# Patient Record
Sex: FEMALE | Race: WHITE | NOT HISPANIC OR LATINO | Employment: FULL TIME | URBAN - METROPOLITAN AREA
[De-identification: names, ages, dates, MRNs, and addresses within clinical notes are randomized per-mention and may not be internally consistent; named-entity substitution may affect disease eponyms.]

---

## 2017-02-09 ENCOUNTER — TELEPHONE (OUTPATIENT)
Dept: INTERNAL MEDICINE | Facility: CLINIC | Age: 47
End: 2017-02-09

## 2017-02-09 RX ORDER — SCOLOPAMINE TRANSDERMAL SYSTEM 1 MG/1
1 PATCH, EXTENDED RELEASE TRANSDERMAL
Qty: 4 PATCH | Refills: 0 | Status: SHIPPED | OUTPATIENT
Start: 2017-02-09 | End: 2018-05-21

## 2017-02-09 NOTE — TELEPHONE ENCOUNTER
----- Message from Maribel Ortiz sent at 2/9/2017 10:08 AM CST -----  Contact: pt  Pt is requesting motion sickness patches for upcoming cruise. Pls call pt back at [phone]. Pt uses ..  [id number] [place] - [address] AT [facility]  [address]  [place] [address]  Phone: [phone] Fax: [phone]

## 2017-02-09 NOTE — TELEPHONE ENCOUNTER
Returned call to pt. Says she'd like to have motion sickness patches for her upcoming cruise. Wasn't sure if she needs an appointment.//rlt

## 2018-02-07 ENCOUNTER — PATIENT OUTREACH (OUTPATIENT)
Dept: ADMINISTRATIVE | Facility: HOSPITAL | Age: 48
End: 2018-02-07

## 2018-02-07 NOTE — PROGRESS NOTES
I have attempted without success to contact this patient by phone to schedule annual mammogram exam. [facility] I have requested the record. Patient in agreement and vocalize understanding.

## 2018-02-16 ENCOUNTER — PATIENT OUTREACH (OUTPATIENT)
Dept: ADMINISTRATIVE | Facility: HOSPITAL | Age: 48
End: 2018-02-16

## 2018-05-21 ENCOUNTER — TELEPHONE (OUTPATIENT)
Dept: INTERNAL MEDICINE | Facility: CLINIC | Age: 48
End: 2018-05-21

## 2018-05-21 ENCOUNTER — OFFICE VISIT (OUTPATIENT)
Dept: INTERNAL MEDICINE | Facility: CLINIC | Age: 48
End: 2018-05-21
Payer: COMMERCIAL

## 2018-05-21 ENCOUNTER — LAB VISIT (OUTPATIENT)
Dept: LAB | Facility: HOSPITAL | Age: 48
End: 2018-05-21
Attending: FAMILY MEDICINE
Payer: COMMERCIAL

## 2018-05-21 VITALS
TEMPERATURE: 95 F | HEART RATE: 80 BPM | SYSTOLIC BLOOD PRESSURE: 124 MMHG | BODY MASS INDEX: 25.85 KG/M2 | OXYGEN SATURATION: 98 % | HEIGHT: 65 IN | DIASTOLIC BLOOD PRESSURE: 76 MMHG | WEIGHT: 155.19 LBS

## 2018-05-21 DIAGNOSIS — R30.0 DYSURIA: Primary | ICD-10-CM

## 2018-05-21 DIAGNOSIS — E10.42 DM TYPE 1 WITH DIABETIC PERIPHERAL NEUROPATHY: ICD-10-CM

## 2018-05-21 DIAGNOSIS — E53.8 B12 DEFICIENCY: ICD-10-CM

## 2018-05-21 DIAGNOSIS — R30.0 DYSURIA: ICD-10-CM

## 2018-05-21 LAB
ALBUMIN SERPL BCP-MCNC: 3.9 G/DL
ALP SERPL-CCNC: 87 U/L
ALT SERPL W/O P-5'-P-CCNC: 17 U/L
ANION GAP SERPL CALC-SCNC: 10 MMOL/L
AST SERPL-CCNC: 17 U/L
BILIRUB SERPL-MCNC: 0.4 MG/DL
BUN SERPL-MCNC: 15 MG/DL
CALCIUM SERPL-MCNC: 9.4 MG/DL
CHLORIDE SERPL-SCNC: 103 MMOL/L
CO2 SERPL-SCNC: 26 MMOL/L
CREAT SERPL-MCNC: 0.8 MG/DL
EST. GFR  (AFRICAN AMERICAN): >60 ML/MIN/1.73 M^2
EST. GFR  (NON AFRICAN AMERICAN): >60 ML/MIN/1.73 M^2
GLUCOSE SERPL-MCNC: 180 MG/DL
POTASSIUM SERPL-SCNC: 3.9 MMOL/L
PROT SERPL-MCNC: 7 G/DL
SODIUM SERPL-SCNC: 139 MMOL/L

## 2018-05-21 PROCEDURE — 99204 OFFICE O/P NEW MOD 45 MIN: CPT | Mod: S$GLB,,, | Performed by: FAMILY MEDICINE

## 2018-05-21 PROCEDURE — 3008F BODY MASS INDEX DOCD: CPT | Mod: CPTII,S$GLB,, | Performed by: FAMILY MEDICINE

## 2018-05-21 PROCEDURE — 99999 PR PBB SHADOW E&M-EST. PATIENT-LVL III: CPT | Mod: PBBFAC,,, | Performed by: FAMILY MEDICINE

## 2018-05-21 PROCEDURE — 36415 COLL VENOUS BLD VENIPUNCTURE: CPT | Mod: PO

## 2018-05-21 PROCEDURE — 80053 COMPREHEN METABOLIC PANEL: CPT | Mod: PO

## 2018-05-21 RX ORDER — CANAGLIFLOZIN 300 MG/1
300 TABLET, FILM COATED ORAL DAILY
COMMUNITY
Start: 2018-03-19 | End: 2020-11-04 | Stop reason: SDUPTHER

## 2018-05-21 RX ORDER — METFORMIN HYDROCHLORIDE 500 MG/1
TABLET, EXTENDED RELEASE ORAL
COMMUNITY
Start: 2018-02-19 | End: 2018-05-21 | Stop reason: SDUPTHER

## 2018-05-21 RX ORDER — INSULIN LISPRO 100 [IU]/ML
INJECTION, SOLUTION INTRAVENOUS; SUBCUTANEOUS
Status: ON HOLD | COMMUNITY
Start: 2014-10-01 | End: 2018-07-19

## 2018-05-21 RX ORDER — CYANOCOBALAMIN 1000 UG/ML
INJECTION, SOLUTION INTRAMUSCULAR; SUBCUTANEOUS
COMMUNITY
Start: 2018-02-19

## 2018-05-21 RX ORDER — NITROFURANTOIN 25; 75 MG/1; MG/1
100 CAPSULE ORAL EVERY 12 HOURS
Status: DISCONTINUED | OUTPATIENT
Start: 2018-05-21 | End: 2018-05-21

## 2018-05-21 RX ORDER — ATORVASTATIN CALCIUM 20 MG/1
TABLET, FILM COATED ORAL
COMMUNITY
Start: 2018-03-25 | End: 2021-05-14 | Stop reason: SDUPTHER

## 2018-05-21 RX ORDER — NITROFURANTOIN (MACROCRYSTALS) 100 MG/1
100 CAPSULE ORAL EVERY 12 HOURS
Qty: 14 CAPSULE | Refills: 0 | Status: SHIPPED | OUTPATIENT
Start: 2018-05-21 | End: 2018-05-28

## 2018-05-21 RX ORDER — PEN NEEDLE, DIABETIC 30 GX3/16"
NEEDLE, DISPOSABLE MISCELLANEOUS
COMMUNITY
Start: 2015-02-19 | End: 2023-02-06

## 2018-05-21 NOTE — TELEPHONE ENCOUNTER
----- Message from Jayna Swenson MD sent at 5/21/2018  3:42 PM CDT -----  Please let Yandy know her kidney function is normal, electrolytes and liver function is also normal. Her urinalysis does not show any indication of infection. She does not need to take the antibiotic prescribed. Her increased urination may be related to her diabetes medication or perhaps increased carbohydrate in her diet. If symptoms persist, I would recommend contacting her endocrinologist. Please let her know she can view her results by logging into [employer].

## 2018-05-21 NOTE — PROGRESS NOTES
Subjective:       Patient ID: Yandy Cabral is a 47 y.o. female.    Chief Complaint: Cystitis    48 yo female with diabetes and here with 5 day history of increased urinary frequency--she is concerned she has a UTI. Denies dysuria, hematuria. She tells me last night she had chills, body aches--she felt like she had high blood sugar but home glucose check was 110. This AM after coffee with cream her CBG was 140. She also reports some vaginal spotting after 2 years of amenorrhea.    Requests Rx for needles to go with previously ordered B12 injections       does not have any pertinent problems on file.  Past Medical History:   Diagnosis Date    Chronic constipation     Diabetes mellitus type I      Past Surgical History:   Procedure Laterality Date    CYST REMOVAL      ganglionic right wrist    gastric sleve  1/01/12    TUBAL LIGATION       Family History   Problem Relation Age of Onset    Diabetes Father         type 2    Heart disease Father     Cancer Brother         testicular    Colon cancer Neg Hx      Social History     Social History    Marital status:      Spouse name: N/A    Number of children: N/A    Years of education: N/A     Occupational History    Not on file.     Social History Main Topics    Smoking status: Never Smoker    Smokeless tobacco: Not on file    Alcohol use Yes    Drug use: No    Sexual activity: Not on file     Other Topics Concern    Not on file     Social History Narrative    No narrative on file     Review of Systems   Constitutional: Positive for chills. Negative for activity change, appetite change, fatigue and fever.   Gastrointestinal: Positive for abdominal pain. Negative for constipation, diarrhea, nausea and vomiting.   Endocrine: Positive for polyuria. Negative for cold intolerance, heat intolerance, polydipsia and polyphagia.   Genitourinary: Positive for urgency and vaginal bleeding. Negative for decreased urine volume, difficulty urinating,  dysuria, enuresis, flank pain, hematuria and vaginal discharge.   Musculoskeletal: Positive for arthralgias and myalgias. Negative for joint swelling, neck pain and neck stiffness.   All other systems reviewed and are negative.      Objective:     Vitals:    05/21/18 1308   BP: 124/76   Pulse: 80   Temp: (!) 95.4 °F (35.2 °C)        Physical Exam   Constitutional: She is oriented to person, place, and time. She appears well-developed and well-nourished.   HENT:   Head: Normocephalic and atraumatic.   Right Ear: External ear normal.   Left Ear: External ear normal.   Nose: Nose normal.   Eyes: Conjunctivae and EOM are normal. Pupils are equal, round, and reactive to light. No scleral icterus.   Cardiovascular: Normal rate, regular rhythm, normal heart sounds and intact distal pulses.  Exam reveals no friction rub.    No murmur heard.  Pulmonary/Chest: Effort normal and breath sounds normal. No respiratory distress. She has no wheezes.   Abdominal: Soft. Bowel sounds are normal. She exhibits no distension and no mass. There is no tenderness.   No SP tenderness or CVA tenderness   Neurological: She is alert and oriented to person, place, and time.   Normal gait. No speech abnormality   Psychiatric: She has a normal mood and affect. Her behavior is normal. Judgment and thought content normal.   Nursing note and vitals reviewed.      Assessment:       1. Dysuria    2. B12 deficiency    3. DM type 1 with diabetic peripheral neuropathy        Plan:           Problem List Items Addressed This Visit     DM type 1 with diabetic peripheral neuropathy    Overview     Dr Mario Oconnor follows         Current Assessment & Plan     Check metabolic panel to assess renal function on Invokana         Dysuria - Primary    Current Assessment & Plan     Emperic treatment with macrobid; increased risk of UTI with use of Invokana for diabetes.          Relevant Medications    nitrofurantoin (MACRODANTIN) 100 MG capsule    Other Relevant  "Orders    Comprehensive metabolic panel (Completed)    Urinalysis (Completed)    Urine culture      Other Visit Diagnoses     B12 deficiency        Relevant Medications    needle, disp, 24 gauge 24 gauge x 1" Ndle        "

## 2018-05-21 NOTE — TELEPHONE ENCOUNTER
Informed pt of results as well as letting her know Rx for antibiotics are not needed and if s/s continue she needs to contact a  Endocrinologist. Pt verbalized understanding./srb

## 2018-05-21 NOTE — TELEPHONE ENCOUNTER
Returned call to pt regarding bladder infection. I advised her that Dr. Ramos does not have anything available today and she can be put on the schedule to see someone else. She has an appt scheduled with Dr. Swenson at 1:00p today. She verbalized understanding.

## 2018-05-23 ENCOUNTER — TELEPHONE (OUTPATIENT)
Dept: INTERNAL MEDICINE | Facility: CLINIC | Age: 48
End: 2018-05-23

## 2018-05-23 NOTE — TELEPHONE ENCOUNTER
Informed pt her urine culture showed no growth; I recommend she follow up with her diabetes doctor if her symptoms persist. Pt verbalized understanding./srb

## 2018-05-23 NOTE — TELEPHONE ENCOUNTER
----- Message from Jayna Swenson MD sent at 5/23/2018  9:56 AM CDT -----  Please let her know her urine culture showed no growth; I recommend she follow up with her diabetes doctor if her symptoms persist.

## 2018-07-19 ENCOUNTER — HOSPITAL ENCOUNTER (INPATIENT)
Facility: HOSPITAL | Age: 48
LOS: 6 days | Discharge: HOME | DRG: 417 | End: 2018-07-25
Attending: EMERGENCY MEDICINE | Admitting: SURGERY
Payer: COMMERCIAL

## 2018-07-19 DIAGNOSIS — K81.0 CHOLECYSTITIS, ACUTE: ICD-10-CM

## 2018-07-19 DIAGNOSIS — R93.89 ABNORMAL CT SCAN: ICD-10-CM

## 2018-07-19 DIAGNOSIS — K81.9 ACALCULOUS CHOLECYSTITIS: Primary | ICD-10-CM

## 2018-07-19 LAB
ALBUMIN SERPL BCP-MCNC: 4.4 G/DL
ALP SERPL-CCNC: 100 U/L
ALT SERPL W/O P-5'-P-CCNC: 25 U/L
ANION GAP SERPL CALC-SCNC: 18 MMOL/L
AST SERPL-CCNC: 21 U/L
B-HCG UR QL: NEGATIVE
BACTERIA #/AREA URNS HPF: NORMAL /HPF
BASOPHILS # BLD AUTO: 0.02 K/UL
BASOPHILS NFR BLD: 0.2 %
BILIRUB SERPL-MCNC: 1.3 MG/DL
BILIRUB UR QL STRIP: NEGATIVE
BUN SERPL-MCNC: 16 MG/DL
CALCIUM SERPL-MCNC: 9.9 MG/DL
CHLORIDE SERPL-SCNC: 100 MMOL/L
CLARITY UR: CLEAR
CO2 SERPL-SCNC: 19 MMOL/L
COLOR UR: YELLOW
CREAT SERPL-MCNC: 0.8 MG/DL
DIFFERENTIAL METHOD: ABNORMAL
EOSINOPHIL # BLD AUTO: 0 K/UL
EOSINOPHIL NFR BLD: 0.2 %
ERYTHROCYTE [DISTWIDTH] IN BLOOD BY AUTOMATED COUNT: 12.9 %
EST. GFR  (AFRICAN AMERICAN): >60 ML/MIN/1.73 M^2
EST. GFR  (NON AFRICAN AMERICAN): >60 ML/MIN/1.73 M^2
ESTIMATED AVG GLUCOSE: 177 MG/DL
GLUCOSE SERPL-MCNC: 176 MG/DL
GLUCOSE UR QL STRIP: ABNORMAL
HBA1C MFR BLD HPLC: 7.8 %
HCT VFR BLD AUTO: 41.2 %
HGB BLD-MCNC: 14.1 G/DL
HGB UR QL STRIP: NEGATIVE
KETONES UR QL STRIP: ABNORMAL
LEUKOCYTE ESTERASE UR QL STRIP: NEGATIVE
LIPASE SERPL-CCNC: 16 U/L
LYMPHOCYTES # BLD AUTO: 1.5 K/UL
LYMPHOCYTES NFR BLD: 11.6 %
MCH RBC QN AUTO: 31.8 PG
MCHC RBC AUTO-ENTMCNC: 34.2 G/DL
MCV RBC AUTO: 93 FL
MICROSCOPIC COMMENT: NORMAL
MONOCYTES # BLD AUTO: 1 K/UL
MONOCYTES NFR BLD: 7.9 %
NEUTROPHILS # BLD AUTO: 10.4 K/UL
NEUTROPHILS NFR BLD: 80.1 %
NITRITE UR QL STRIP: NEGATIVE
PH UR STRIP: 6 [PH] (ref 5–8)
PLATELET # BLD AUTO: 258 K/UL
PMV BLD AUTO: 9.9 FL
POCT GLUCOSE: 146 MG/DL (ref 70–110)
POCT GLUCOSE: 88 MG/DL (ref 70–110)
POTASSIUM SERPL-SCNC: 4 MMOL/L
PROT SERPL-MCNC: 8.2 G/DL
PROT UR QL STRIP: NEGATIVE
RBC # BLD AUTO: 4.44 M/UL
SODIUM SERPL-SCNC: 137 MMOL/L
SP GR UR STRIP: 1.02 (ref 1–1.03)
URN SPEC COLLECT METH UR: ABNORMAL
UROBILINOGEN UR STRIP-ACNC: NEGATIVE EU/DL
WBC # BLD AUTO: 12.97 K/UL
WBC #/AREA URNS HPF: 2 /HPF (ref 0–5)
YEAST URNS QL MICRO: NORMAL

## 2018-07-19 PROCEDURE — 25000003 PHARM REV CODE 250: Performed by: EMERGENCY MEDICINE

## 2018-07-19 PROCEDURE — 83690 ASSAY OF LIPASE: CPT

## 2018-07-19 PROCEDURE — 96361 HYDRATE IV INFUSION ADD-ON: CPT

## 2018-07-19 PROCEDURE — 96375 TX/PRO/DX INJ NEW DRUG ADDON: CPT

## 2018-07-19 PROCEDURE — 63600175 PHARM REV CODE 636 W HCPCS: Performed by: EMERGENCY MEDICINE

## 2018-07-19 PROCEDURE — 96376 TX/PRO/DX INJ SAME DRUG ADON: CPT

## 2018-07-19 PROCEDURE — 36415 COLL VENOUS BLD VENIPUNCTURE: CPT

## 2018-07-19 PROCEDURE — 25000003 PHARM REV CODE 250: Performed by: PHYSICIAN ASSISTANT

## 2018-07-19 PROCEDURE — 83036 HEMOGLOBIN GLYCOSYLATED A1C: CPT

## 2018-07-19 PROCEDURE — 96365 THER/PROPH/DIAG IV INF INIT: CPT

## 2018-07-19 PROCEDURE — 63600175 PHARM REV CODE 636 W HCPCS: Performed by: PHYSICIAN ASSISTANT

## 2018-07-19 PROCEDURE — 96367 TX/PROPH/DG ADDL SEQ IV INF: CPT

## 2018-07-19 PROCEDURE — 82962 GLUCOSE BLOOD TEST: CPT

## 2018-07-19 PROCEDURE — 99285 EMERGENCY DEPT VISIT HI MDM: CPT | Mod: 25

## 2018-07-19 PROCEDURE — 11000001 HC ACUTE MED/SURG PRIVATE ROOM

## 2018-07-19 PROCEDURE — 80053 COMPREHEN METABOLIC PANEL: CPT

## 2018-07-19 PROCEDURE — 81025 URINE PREGNANCY TEST: CPT

## 2018-07-19 PROCEDURE — 85025 COMPLETE CBC W/AUTO DIFF WBC: CPT

## 2018-07-19 PROCEDURE — 81000 URINALYSIS NONAUTO W/SCOPE: CPT

## 2018-07-19 PROCEDURE — 99222 1ST HOSP IP/OBS MODERATE 55: CPT | Mod: 57,,, | Performed by: PHYSICIAN ASSISTANT

## 2018-07-19 PROCEDURE — 25500020 PHARM REV CODE 255: Performed by: EMERGENCY MEDICINE

## 2018-07-19 RX ORDER — PRAVASTATIN SODIUM 20 MG/1
20 TABLET ORAL DAILY
Status: DISCONTINUED | OUTPATIENT
Start: 2018-07-19 | End: 2018-07-20

## 2018-07-19 RX ORDER — RAMELTEON 8 MG/1
8 TABLET ORAL NIGHTLY PRN
Status: DISCONTINUED | OUTPATIENT
Start: 2018-07-19 | End: 2018-07-20

## 2018-07-19 RX ORDER — DIPHENHYDRAMINE HYDROCHLORIDE 50 MG/ML
25 INJECTION INTRAMUSCULAR; INTRAVENOUS EVERY 4 HOURS PRN
Status: DISCONTINUED | OUTPATIENT
Start: 2018-07-19 | End: 2018-07-20

## 2018-07-19 RX ORDER — MORPHINE SULFATE 4 MG/ML
4 INJECTION, SOLUTION INTRAMUSCULAR; INTRAVENOUS EVERY 4 HOURS PRN
Status: DISCONTINUED | OUTPATIENT
Start: 2018-07-19 | End: 2018-07-20

## 2018-07-19 RX ORDER — HYDROMORPHONE HYDROCHLORIDE 2 MG/ML
0.5 INJECTION, SOLUTION INTRAMUSCULAR; INTRAVENOUS; SUBCUTANEOUS
Status: COMPLETED | OUTPATIENT
Start: 2018-07-19 | End: 2018-07-19

## 2018-07-19 RX ORDER — SODIUM CHLORIDE 9 MG/ML
1000 INJECTION, SOLUTION INTRAVENOUS
Status: COMPLETED | OUTPATIENT
Start: 2018-07-19 | End: 2018-07-19

## 2018-07-19 RX ORDER — SODIUM CHLORIDE 9 MG/ML
INJECTION, SOLUTION INTRAVENOUS CONTINUOUS
Status: DISCONTINUED | OUTPATIENT
Start: 2018-07-19 | End: 2018-07-20

## 2018-07-19 RX ORDER — HYDROMORPHONE HYDROCHLORIDE 2 MG/ML
1 INJECTION, SOLUTION INTRAMUSCULAR; INTRAVENOUS; SUBCUTANEOUS
Status: COMPLETED | OUTPATIENT
Start: 2018-07-19 | End: 2018-07-19

## 2018-07-19 RX ORDER — ONDANSETRON 2 MG/ML
4 INJECTION INTRAMUSCULAR; INTRAVENOUS ONCE
Status: COMPLETED | OUTPATIENT
Start: 2018-07-19 | End: 2018-07-19

## 2018-07-19 RX ORDER — INSULIN ASPART 100 [IU]/ML
0-5 INJECTION, SOLUTION INTRAVENOUS; SUBCUTANEOUS EVERY 6 HOURS PRN
Status: DISCONTINUED | OUTPATIENT
Start: 2018-07-19 | End: 2018-07-20

## 2018-07-19 RX ORDER — GLUCAGON 1 MG
1 KIT INJECTION
Status: DISCONTINUED | OUTPATIENT
Start: 2018-07-19 | End: 2018-07-20

## 2018-07-19 RX ORDER — HYDROCODONE BITARTRATE AND ACETAMINOPHEN 5; 325 MG/1; MG/1
1 TABLET ORAL EVERY 4 HOURS PRN
Status: DISCONTINUED | OUTPATIENT
Start: 2018-07-19 | End: 2018-07-20

## 2018-07-19 RX ORDER — ACETAMINOPHEN 325 MG/1
650 TABLET ORAL EVERY 8 HOURS PRN
Status: DISCONTINUED | OUTPATIENT
Start: 2018-07-19 | End: 2018-07-20

## 2018-07-19 RX ORDER — ONDANSETRON 2 MG/ML
4 INJECTION INTRAMUSCULAR; INTRAVENOUS
Status: COMPLETED | OUTPATIENT
Start: 2018-07-19 | End: 2018-07-19

## 2018-07-19 RX ORDER — ONDANSETRON 8 MG/1
8 TABLET, ORALLY DISINTEGRATING ORAL EVERY 8 HOURS PRN
Status: DISCONTINUED | OUTPATIENT
Start: 2018-07-19 | End: 2018-07-20

## 2018-07-19 RX ADMIN — PIPERACILLIN AND TAZOBACTAM 4.5 G: 4; .5 INJECTION, POWDER, LYOPHILIZED, FOR SOLUTION INTRAVENOUS; PARENTERAL at 05:07

## 2018-07-19 RX ADMIN — SODIUM CHLORIDE 1000 ML: 0.9 INJECTION, SOLUTION INTRAVENOUS at 10:07

## 2018-07-19 RX ADMIN — SODIUM CHLORIDE 1000 ML: 0.9 INJECTION, SOLUTION INTRAVENOUS at 02:07

## 2018-07-19 RX ADMIN — HYDROMORPHONE HYDROCHLORIDE 0.5 MG: 2 INJECTION, SOLUTION INTRAMUSCULAR; INTRAVENOUS; SUBCUTANEOUS at 08:07

## 2018-07-19 RX ADMIN — SODIUM CHLORIDE: 0.9 INJECTION, SOLUTION INTRAVENOUS at 11:07

## 2018-07-19 RX ADMIN — IOHEXOL 75 ML: 350 INJECTION, SOLUTION INTRAVENOUS at 06:07

## 2018-07-19 RX ADMIN — ONDANSETRON 4 MG: 2 INJECTION, SOLUTION INTRAMUSCULAR; INTRAVENOUS at 08:07

## 2018-07-19 RX ADMIN — MORPHINE SULFATE 4 MG: 4 INJECTION INTRAVENOUS at 09:07

## 2018-07-19 RX ADMIN — IOHEXOL 30 ML: 350 INJECTION, SOLUTION INTRAVENOUS at 04:07

## 2018-07-19 RX ADMIN — MORPHINE SULFATE 4 MG: 4 INJECTION INTRAVENOUS at 03:07

## 2018-07-19 RX ADMIN — PIPERACILLIN AND TAZOBACTAM 4.5 G: 4; .5 INJECTION, POWDER, LYOPHILIZED, FOR SOLUTION INTRAVENOUS; PARENTERAL at 10:07

## 2018-07-19 RX ADMIN — PROMETHAZINE HYDROCHLORIDE 6.25 MG: 25 INJECTION INTRAMUSCULAR; INTRAVENOUS at 12:07

## 2018-07-19 RX ADMIN — ONDANSETRON 4 MG: 2 INJECTION, SOLUTION INTRAMUSCULAR; INTRAVENOUS at 02:07

## 2018-07-19 RX ADMIN — PROMETHAZINE HYDROCHLORIDE 6.25 MG: 25 INJECTION INTRAMUSCULAR; INTRAVENOUS at 06:07

## 2018-07-19 RX ADMIN — HYDROMORPHONE HYDROCHLORIDE 1 MG: 2 INJECTION, SOLUTION INTRAMUSCULAR; INTRAVENOUS; SUBCUTANEOUS at 02:07

## 2018-07-19 RX ADMIN — PRAVASTATIN SODIUM 20 MG: 20 TABLET ORAL at 11:07

## 2018-07-19 RX ADMIN — ONDANSETRON 8 MG: 8 TABLET, ORALLY DISINTEGRATING ORAL at 03:07

## 2018-07-19 NOTE — ED NOTES
Armband checked, patient verified. Verified by spelling and stated name on armband along with .   LOC: The patient is awake, alert and aware of environment with an appropriate affect, the patient is oriented x 3 and speaking appropriately.  APPEARANCE: Patient on stretcher, pt uncomfortable at this time. Patient is clean and well groomed  SKIN: The skin is warm and dry, color consistent with ethnicity, patient has normal skin turgor and moist mucus membranes, no breakdown or bruising noted.   MUSCULOSKELETAL: Patient moving all extremities to command  RESPIRATORY: Airway is open and patent, respirations are regular, even and non labored.  CARDIAC: Patient has a normal rate, no periphreal edema noted, capillary refill < 3 seconds.  ABDOMEN: Soft and tender to palpation across entire abd. Pt states started with abd pain to across entire upper abd with radiation to left side started yesterday am. States having n/v with pain. Denies dysuria, diarrhea or fever.  Pt states has had gastric sleeve in the past.

## 2018-07-19 NOTE — PLAN OF CARE
Met with patient and family. Patient denies any post hospital needs or services at this time. Patient is independent with all adl's and iadl's.  Transitional Care Folder, Discharge Planning Begins on Admission pamphlet, [facility], Advance Directive information given to patient along with the contact information given.Instructed patient or family to call with any questions or concerns.        [facility] [id number] - [place], [place] - [address] AT [facility]  [address]  [place] [address]  Phone: [phone] Fax: [phone]    [facility]  [place], [place] - [address] [facility]  [address] [place] [address]  Phone: [phone] Fax: [phone]    LEONID Ramos Jr, MD  Payor: [employer]ora [employer] [facility] / Plan: BCBS ALL OUT OF STATE / Product Type: PPO /           07/19/18 1626   Discharge Assessment   Assessment Type Discharge Planning Assessment   Confirmed/corrected address and phone number on facesheet? Yes   Assessment information obtained from? Patient;Caregiver;Medical Record   Expected Length of Stay (days) (tbd)   Communicated expected length of stay with patient/caregiver no   Prior to hospitilization cognitive status: Alert/Oriented   Prior to hospitalization functional status: Independent   Current cognitive status: Alert/Oriented   Current Functional Status: Independent   Facility Arrived From: home   Lives With spouse;child(jill), dependent   Able to Return to Prior Arrangements yes   Is patient able to care for self after discharge? Yes   Who are your caregiver(s) and their phone number(s)? Eliecer Cabral ( spouse ) [phone]   Patient's perception of discharge disposition home or selfcare   Readmission Within The Last 30 Days no previous admission in last 30 days   Patient currently being followed by outpatient case management? No   Patient currently receives any other outside agency services? No    Equipment Currently Used at Home none   Do you have any problems affording any of your prescribed medications? No   Is the patient taking medications as prescribed? yes   Does the patient have transportation home? Yes   Transportation Available car;family or friend will provide   Does the patient receive services at the [facility]? No   Discharge Plan A Home;Home with family   Discharge Plan B Home   Patient/Family In Agreement With Plan yes

## 2018-07-19 NOTE — CONSULTS
[facility] Hospital Medicine  Consult Note    Patient Name: Yandy Cabral  MRN: [id number]  Admission Date: 7/19/2018  Hospital Length of Stay: 0 days  Attending Physician: Fran Chaidez MD   Primary Care Provider: LEONID Ramos Jr, MD           Patient information was obtained from patient, past medical records and ER records.     Inpatient consult to Internal Medicine  Consult performed by: AMINA MCGREGOR  Consult ordered by: JOSHUA ALDRIDGE        Subjective:     Principal Problem: Cholecystitis, acute    Chief Complaint:   Chief Complaint   Patient presents with    Abdominal Pain     upper abdominal pain that radiates to back. +N/V has had gastric sleeve since 2012        HPI: Yandy Cabral is a 47 year old female with DM II and hyperlipidemia who presented to the ED on 7/18/18 with complaints of abdominal pain, nausea and vomiting. Symptoms began yesterday and have been constant. Her abdominal pain is located across her upper abdomen. Her emesis is described as bilious. There are associated chills. She denies fever. In the ED, she was found to have a mildly elevated biliruibin, a mild leukocytosis and findings consistent with acalculous cholecystitis on abdominal US and CT scan of the abdomen. At this time, she is being conservatively managed with IV antibiotics and supportive care.     Past Medical History:   Diagnosis Date    Chronic constipation     DM II (diabetes mellitus, type II), controlled        Past Surgical History:   Procedure Laterality Date    CYST REMOVAL      ganglionic right wrist    gastric sleve  1/01/12    TUBAL LIGATION         Review of patient's allergies indicates:  No Known Allergies    No current facility-administered medications on file prior to encounter.      Current Outpatient Prescriptions on File Prior to Encounter   Medication Sig    CANAGLIFLOZIN (INVOKANA ORAL) Take by mouth.    INVOKANA 300 mg Tab tablet     melatonin 5 mg Tab Take by mouth.  "   metformin (GLUCOPHAGE) 500 MG tablet Take 500 mg by mouth 2 (two) times daily with meals.    multivitamin (ONE DAILY MULTIVITAMIN) per tablet Take 1 tablet by mouth.    atorvastatin (LIPITOR) 20 MG tablet     blood sugar diagnostic Strp Use ac and hs    cyanocobalamin 1,000 mcg/mL injection INJECT 1 ML INTO THE MUSCLE EVERY 30 DAYS    insulin lispro (HUMALOG) 100 unit/mL injection (VIAL)  INJECT UP TO 25 UNITS TOTAL FOR THE DAY IN DIVIDED DOSES    needle, disp, 24 gauge 24 gauge x 1" Ndle 1 Dose by Misc.(Non-Drug; Combo Route) route every 30 days.    ONE TOUCH VERIO Strp     pen needle, diabetic (BD ULTRA-FINE SHORT PEN NEEDLE) 31 gauge x 5/16" Ndle Use up to three daily    pravastatin (PRAVACHOL) 20 MG tablet Take 20 mg by mouth.    sitagliptin (JANUVIA) 100 MG Tab Take 100 mg by mouth.    syringe with needle 3 mL 21 gauge x 1" Syrg USE ONE Q 4 WEEKS     Family History     Problem Relation (Age of Onset)    Cancer Brother    Diabetes Father    Heart disease Father        Social History Main Topics    Smoking status: Never Smoker    Smokeless tobacco: Not on file    Alcohol use 2.4 oz/week     4 Glasses of wine per week    Drug use: No    Sexual activity: Not on file     Review of Systems   Constitutional: Positive for chills. Negative for appetite change, diaphoresis, fatigue and fever.   HENT: Negative for congestion, ear pain, mouth sores, sore throat and trouble swallowing.    Eyes: Negative for pain and visual disturbance.   Respiratory: Negative for cough, chest tightness and shortness of breath.    Cardiovascular: Negative for chest pain, palpitations and leg swelling.   Gastrointestinal: Positive for abdominal pain, nausea and vomiting. Negative for constipation.   Endocrine: Negative for cold intolerance, heat intolerance, polydipsia and polyuria.   Genitourinary: Negative for dysuria, frequency and hematuria.   Musculoskeletal: Negative for arthralgias, back pain, myalgias and neck " pain.   Skin: Negative for pallor, rash and wound.   Allergic/Immunologic: Negative for environmental allergies and immunocompromised state.   Neurological: Negative for dizziness, seizures, syncope, weakness, numbness and headaches.   Hematological: Negative for adenopathy. Does not bruise/bleed easily.   Psychiatric/Behavioral: Negative for agitation, confusion and sleep disturbance.     Objective:     Vital Signs (Most Recent):  Temp: 99.9 °F (37.7 °C) (07/19/18 1239)  Pulse: 80 (07/19/18 1239)  Resp: 16 (07/19/18 1239)  BP: 134/71 (07/19/18 1239)  SpO2: 97 % (07/19/18 1239) Vital Signs (24h Range):  Temp:  [98.2 °F (36.8 °C)-99.9 °F (37.7 °C)] 99.9 °F (37.7 °C)  Pulse:  [] 80  Resp:  [16-20] 16  SpO2:  [91 %-99 %] 97 %  BP: (101-149)/(57-74) 134/71     Weight: 69.5 kg (153 lb 3.5 oz)  Body mass index is 25.5 kg/m².    Physical Exam   Constitutional: She is oriented to person, place, and time. She appears well-developed and well-nourished. No distress.   HENT:   Head: Normocephalic and atraumatic.   Eyes: Conjunctivae are normal.   PERRL   Neck: Neck supple. No JVD present.   Cardiovascular: Normal rate, regular rhythm and normal heart sounds.    Pulmonary/Chest: Effort normal and breath sounds normal.   Abdominal: Soft. Bowel sounds are normal. She exhibits no distension. There is tenderness.   Musculoskeletal: Normal range of motion.   Neurological: She is alert and oriented to person, place, and time.   Skin: Skin is warm and dry.   Psychiatric: She has a normal mood and affect. Her behavior is normal. Thought content normal.   Nursing note and vitals reviewed.          Significant Labs:   CBC:   Recent Labs  Lab 07/19/18  0241   WBC 12.97*   HGB 14.1   HCT 41.2        CMP:   Recent Labs  Lab 07/19/18  0241      K 4.0      CO2 19*   *   BUN 16   CREATININE 0.8   CALCIUM 9.9   PROT 8.2   ALBUMIN 4.4   BILITOT 1.3*   ALKPHOS 100   AST 21   ALT 25   ANIONGAP 18*   EGFRNONAA >60      All pertinent labs within the past 24 hours have been reviewed.    Significant Imaging: I have reviewed all pertinent imaging results/findings within the past 24 hours.   Imaging Results          US Abdomen Limited (Final result)  Result time 07/19/18 09:27:09    Final result by GAVIN Gonzalez Sr., MD (07/19/18 09:27:09)                 Impression:      The findings are consistent with the patient's history and characteristic of acalculous cholecystitis.  The wall of the gallbladder is thickened. It measures 5 mm in thickness. There is a tiny amount of pericholecystic fluid. There is a positive sonographic Ordonez's sign.  There is no cholelithiasis or common bile duct dilatation.      Electronically signed by: Giorgio Gonzalez MD  Date:    07/19/2018  Time:    09:27             Narrative:    EXAMINATION:  US ABDOMEN LIMITED    CLINICAL HISTORY:  Abnormal findings on diagnostic imaging of other specified body structures; suspected acute cholecystitis    TECHNIQUE:  Multiple static ultrasound images are submitted for interpretation.    FINDINGS:  Comparison was made to a CT examination of the abdomen and pelvis performed on 07/19/2018.  The liver is normal in appearance. It measures 13.1 cm in length. There is no intrahepatic biliary ductal dilatation. The common bile duct has a diameter of 4 mm.  There is a small amount of echogenic sludge in the dependent portion of the gallbladder.  The wall of the gallbladder is thickened.  It measures 5 mm in thickness.  There is a tiny amount of pericholecystic fluid.  There is a positive sonographic Ordonez's sign.  There is no cholelithiasis.  The visualized portion of the pancreas is normal in appearance. The right kidney is normal in appearance.  It measures 11.6 cm in length. The main portal vein has a normal venous waveform with flow directed towards the liver. It has a velocity of 42 cm/sec.                               CT Abdomen Pelvis With Contrast (Final result)   Result time 07/19/18 08:06:40    Final result by Len Rivas MD (07/19/18 08:06:40)                 Impression:      Distended gallbladder with wall thickening and pericholecystic fluid without radiopaque stones identified. Findings are concerning for acute cholecystitis.  Ultrasound or HIDA scan can be obtained as clinically warranted.    All CT scans at this facility use dose modulation, iterative reconstruction, and/or weight based dosing when appropriate to reduce radiation dose to as low as reasonable achievable.      Electronically signed by: Len Rivas MD  Date:    07/19/2018  Time:    08:06             Narrative:    EXAMINATION:  CT ABDOMEN PELVIS WITH CONTRAST    CLINICAL HISTORY:  Infection, abdomen-pelvis;    TECHNIQUE:  Low dose axial images, sagittal and coronal reformations were obtained from the lung bases to the pubic symphysis following the IV administration of 75 mL of Omnipaque 350.    COMPARISON:  None    FINDINGS:  Heart: Normal size. No effusion.  Coronary artery disease noted    Lung Bases: Clear.    Liver: Normal size with decreased attenuation consistent with hepatic steatosis.  No focal lesions.    Gallbladder: Distended gallbladder with wall thickening and pericholecystic fluid without radiopaque stones identified.  Findings are concerning for cholecystitis.  Ultrasound or HIDA scan can be obtained as clinically warranted.    Bile Ducts: Common bile duct measures 8 mm which is prominent without evidence of radiopaque choledocholithiasis.    Pancreas: No mass. No peripancreatic fat stranding.    Spleen: Normal.    Adrenals: Normal.    Kidneys/Ureters: Normal enhancement.  No mass or  hydroureteronephrosis.    Bladder: No wall thickening.    Reproductive organs: Normal.    GI Tract/Mesentery: Postoperative changes are seen within the stomach.  Small hiatal hernia.  No evidence of bowel obstruction or inflammation.  Normal appendix.  Moderate constipation    Peritoneal Space: No  ascites or free air.    Retroperitoneum: No significant adenopathy.    Abdominal wall: Normal.    Vasculature: No aneurysm.    Bones: No acute fracture. No suspicious lytic or sclerotic lesions.                                  Assessment/Plan:     * Cholecystitis, acute    IV antibiotics and supportive care, per General Surgery.           Hyperlipemia    Continue statin.             Type 2 diabetes mellitus with diabetic neuropathy, without long-term current use of insulin    -NISS.  -Check hemoglobin A1C.             VTE Risk Mitigation         Ordered     Place sequential compression device  Until discontinued      07/19/18 1022     IP VTE LOW RISK PATIENT  Once      07/19/18 1022              Thank you for your consult. I will follow-up with patient. Please contact us if you have any additional questions.    ALEX Cano  Department of Hospital Medicine   [facility]

## 2018-07-19 NOTE — SUBJECTIVE & OBJECTIVE
"No current facility-administered medications on file prior to encounter.      Current Outpatient Prescriptions on File Prior to Encounter   Medication Sig    CANAGLIFLOZIN (INVOKANA ORAL) Take by mouth.    INVOKANA 300 mg Tab tablet     melatonin 5 mg Tab Take by mouth.    metformin (GLUCOPHAGE) 500 MG tablet Take 500 mg by mouth 2 (two) times daily with meals.    multivitamin (ONE DAILY MULTIVITAMIN) per tablet Take 1 tablet by mouth.    atorvastatin (LIPITOR) 20 MG tablet     blood sugar diagnostic Strp Use ac and hs    cyanocobalamin 1,000 mcg/mL injection INJECT 1 ML INTO THE MUSCLE EVERY 30 DAYS    insulin lispro (HUMALOG) 100 unit/mL injection (VIAL)  INJECT UP TO 25 UNITS TOTAL FOR THE DAY IN DIVIDED DOSES    needle, disp, 24 gauge 24 gauge x 1" Ndle 1 Dose by Misc.(Non-Drug; Combo Route) route every 30 days.    ONE TOUCH VERIO Strp     pen needle, diabetic (BD ULTRA-FINE SHORT PEN NEEDLE) 31 gauge x 5/16" Ndle Use up to three daily    pravastatin (PRAVACHOL) 20 MG tablet Take 20 mg by mouth.    sitagliptin (JANUVIA) 100 MG Tab Take 100 mg by mouth.    syringe with needle 3 mL 21 gauge x 1" Syrg USE ONE Q 4 WEEKS       Review of patient's allergies indicates:  No Known Allergies    Past Medical History:   Diagnosis Date    Chronic constipation     Diabetes mellitus type I      Past Surgical History:   Procedure Laterality Date    CYST REMOVAL      ganglionic right wrist    gastric sleve  1/01/12    TUBAL LIGATION       Family History     Problem Relation (Age of Onset)    Cancer Brother    Diabetes Father    Heart disease Father        Social History Main Topics    Smoking status: Never Smoker    Smokeless tobacco: Not on file    Alcohol use Yes    Drug use: No    Sexual activity: Not on file     Review of Systems   Constitutional: Negative for activity change, chills and fever.   Respiratory: Negative for shortness of breath.    Cardiovascular: Negative for chest pain. "   Gastrointestinal: Positive for abdominal pain, nausea and vomiting.   Genitourinary: Negative for dysuria.   Musculoskeletal: Negative for myalgias.   Skin: Negative for wound.   Neurological: Negative for weakness.   Hematological: Does not bruise/bleed easily.   Psychiatric/Behavioral: The patient is not nervous/anxious.      Objective:     Vital Signs (Most Recent):  Temp: 98.3 °F (36.8 °C) (07/19/18 1002)  Pulse: 80 (07/19/18 1002)  Resp: 18 (07/19/18 0401)  BP: (!) 140/73 (07/19/18 1002)  SpO2: 97 % (07/19/18 1002) Vital Signs (24h Range):  Temp:  [98.3 °F (36.8 °C)-99 °F (37.2 °C)] 98.3 °F (36.8 °C)  Pulse:  [] 80  Resp:  [18-20] 18  SpO2:  [91 %-99 %] 97 %  BP: (101-149)/(57-74) 140/73     Weight: 69.5 kg (153 lb 3.5 oz)  Body mass index is 25.5 kg/m².    Physical Exam   Constitutional: She is oriented to person, place, and time. She appears well-developed and well-nourished.   She appears uncomfortable   HENT:   Head: Normocephalic and atraumatic.   Eyes: EOM are normal.   Cardiovascular: Normal rate and regular rhythm.    Pulmonary/Chest: Effort normal. No respiratory distress.   Abdominal: Soft. She exhibits distension (mild). There is tenderness (RUQ and epigastric ttp).   Musculoskeletal: Normal range of motion.   Neurological: She is alert and oriented to person, place, and time.   Skin: Skin is warm and dry.   Psychiatric: She has a normal mood and affect. Thought content normal.   Vitals reviewed.      Significant Labs:  CBC:   Recent Labs  Lab 07/19/18  0241   WBC 12.97*   RBC 4.44   HGB 14.1   HCT 41.2      MCV 93   MCH 31.8*   MCHC 34.2     CMP:   Recent Labs  Lab 07/19/18  0241   *   CALCIUM 9.9   ALBUMIN 4.4   PROT 8.2      K 4.0   CO2 19*      BUN 16   CREATININE 0.8   ALKPHOS 100   ALT 25   AST 21   BILITOT 1.3*       Significant Diagnostics:  I have reviewed all pertinent imaging results/findings within the past 24 hours.

## 2018-07-19 NOTE — ASSESSMENT & PLAN NOTE
Admit, IV fluids, IV antibiotics, NPO  Plan to treat conservatively and if no improvement she will need cholecystectomy  The plan was discussed with the patient who understands and agrees to proceed with this plan.

## 2018-07-19 NOTE — H&P
"[facility] -   General Surgery  History & Physical    Patient Name: Yandy Cabral  MRN: [id number]  Admission Date: 7/19/2018  Attending Physician: Fran Chaidez MD  Primary Care Provider: LEONID Ramos Jr, MD    Patient information was obtained from patient, past medical records and ER records.     Subjective:     Chief Complaint/Reason for Admission: acute cholecystitis    History of Present Illness: Yandy Cabral is a 47 y.o. female with a pmhx of diabetes, dyslipidemia. She is s/p gastric sleeve in 2012. She presented to the ER with RUQ abdominal pain that started yesterday. Sx are associated with nausea and vomiting. She denies fever or chills. CT and ultrasound done in ED are concerning for acalculous cholecystitis.     No current facility-administered medications on file prior to encounter.      Current Outpatient Prescriptions on File Prior to Encounter   Medication Sig    CANAGLIFLOZIN (INVOKANA ORAL) Take by mouth.    INVOKANA 300 mg Tab tablet     melatonin 5 mg Tab Take by mouth.    metformin (GLUCOPHAGE) 500 MG tablet Take 500 mg by mouth 2 (two) times daily with meals.    multivitamin (ONE DAILY MULTIVITAMIN) per tablet Take 1 tablet by mouth.    atorvastatin (LIPITOR) 20 MG tablet     blood sugar diagnostic Strp Use ac and hs    cyanocobalamin 1,000 mcg/mL injection INJECT 1 ML INTO THE MUSCLE EVERY 30 DAYS    insulin lispro (HUMALOG) 100 unit/mL injection (VIAL)  INJECT UP TO 25 UNITS TOTAL FOR THE DAY IN DIVIDED DOSES    needle, disp, 24 gauge 24 gauge x 1" Ndle 1 Dose by Misc.(Non-Drug; Combo Route) route every 30 days.    ONE TOUCH VERIO Strp     pen needle, diabetic (BD ULTRA-FINE SHORT PEN NEEDLE) 31 gauge x 5/16" Ndle Use up to three daily    pravastatin (PRAVACHOL) 20 MG tablet Take 20 mg by mouth.    sitagliptin (JANUVIA) 100 MG Tab Take 100 mg by mouth.    syringe with needle 3 mL 21 gauge x 1" Syrg USE ONE Q 4 WEEKS       Review of patient's allergies " indicates:  No Known Allergies    Past Medical History:   Diagnosis Date    Chronic constipation     Diabetes mellitus type I      Past Surgical History:   Procedure Laterality Date    CYST REMOVAL      ganglionic right wrist    gastric sleve  1/01/12    TUBAL LIGATION       Family History     Problem Relation (Age of Onset)    Cancer Brother    Diabetes Father    Heart disease Father        Social History Main Topics    Smoking status: Never Smoker    Smokeless tobacco: Not on file    Alcohol use Yes    Drug use: No    Sexual activity: Not on file     Review of Systems   Constitutional: Negative for activity change, chills and fever.   Respiratory: Negative for shortness of breath.    Cardiovascular: Negative for chest pain.   Gastrointestinal: Positive for abdominal pain, nausea and vomiting.   Genitourinary: Negative for dysuria.   Musculoskeletal: Negative for myalgias.   Skin: Negative for wound.   Neurological: Negative for weakness.   Hematological: Does not bruise/bleed easily.   Psychiatric/Behavioral: The patient is not nervous/anxious.      Objective:     Vital Signs (Most Recent):  Temp: 98.3 °F (36.8 °C) (07/19/18 1002)  Pulse: 80 (07/19/18 1002)  Resp: 18 (07/19/18 0401)  BP: (!) 140/73 (07/19/18 1002)  SpO2: 97 % (07/19/18 1002) Vital Signs (24h Range):  Temp:  [98.3 °F (36.8 °C)-99 °F (37.2 °C)] 98.3 °F (36.8 °C)  Pulse:  [] 80  Resp:  [18-20] 18  SpO2:  [91 %-99 %] 97 %  BP: (101-149)/(57-74) 140/73     Weight: 69.5 kg (153 lb 3.5 oz)  Body mass index is 25.5 kg/m².    Physical Exam   Constitutional: She is oriented to person, place, and time. She appears well-developed and well-nourished.   She appears uncomfortable   HENT:   Head: Normocephalic and atraumatic.   Eyes: EOM are normal.   Cardiovascular: Normal rate and regular rhythm.    Pulmonary/Chest: Effort normal. No respiratory distress.   Abdominal: Soft. She exhibits distension (mild). There is tenderness (RUQ and epigastric  ttp).   Musculoskeletal: Normal range of motion.   Neurological: She is alert and oriented to person, place, and time.   Skin: Skin is warm and dry.   Psychiatric: She has a normal mood and affect. Thought content normal.   Vitals reviewed.      Significant Labs:  CBC:   Recent Labs  Lab 07/19/18  0241   WBC 12.97*   RBC 4.44   HGB 14.1   HCT 41.2      MCV 93   MCH 31.8*   MCHC 34.2     CMP:   Recent Labs  Lab 07/19/18  0241   *   CALCIUM 9.9   ALBUMIN 4.4   PROT 8.2      K 4.0   CO2 19*      BUN 16   CREATININE 0.8   ALKPHOS 100   ALT 25   AST 21   BILITOT 1.3*       Significant Diagnostics:  I have reviewed all pertinent imaging results/findings within the past 24 hours.  Imaging is concerning for acute acalculous cholecystitis    Assessment/Plan:     * Cholecystitis, acute    Admit, IV fluids, IV antibiotics, NPO  Plan to treat conservatively and if no improvement she will need cholecystectomy  The plan was discussed with the patient who understands and agrees to proceed with this plan.         Hyperlipemia    Home meds        DM type 1 with diabetic peripheral neuropathy    Insulin sliding scale  [facility]            VTE Risk Mitigation         Ordered     Place sequential compression device  Until discontinued      07/19/18 1022     IP VTE LOW RISK PATIENT  Once      07/19/18 1022          Daxa Costa PA-C  General Surgery  [facility] [place]

## 2018-07-19 NOTE — ED PROVIDER NOTES
SCRIBE #1 NOTE: I, Yanira Gee, am scribing for, and in the presence of, Ken Peck DO. I have scribed the HPI, ROS, and PEx.     SCRIBE #2 NOTE: I, Corinne Mack, am scribing for, and in the presence of,  Giselle Cruz DO. I have scribed the remaining portions of the note not scribed by Scribe #1.     History      Chief Complaint   Patient presents with    Abdominal Pain     upper abdominal pain that radiates to back. +N/V has had gastric sleeve since 2012       Review of patient's allergies indicates:  No Known Allergies     HPI   HPI    7/19/2018, 2:17 AM   History obtained from the patient      History of Present Illness: Yandy Cabral is a 47 y.o. female patient with PMHx of DM who presents to the Emergency Department for upper abdominal pain which onset gradually yesterday. Symptoms are worsening and moderate in severity. The patient describes the sxs as radiating to her back. No mitigating or exacerbating factors reported. Associated sxs include N/V. Patient denies any fever, chills, dysuria, hematuria, hematochezia, constipation, diarrhea, and all other sxs at this time. PSHx includes gastric sleeve. No further complaints or concerns at this time.     Arrival mode: Personal vehicle      PCP: LEONID Ramos Jr, MD       Past Medical History:  Past Medical History:   Diagnosis Date    Chronic constipation     DM II (diabetes mellitus, type II), controlled        Past Surgical History:  Past Surgical History:   Procedure Laterality Date    CYST REMOVAL      ganglionic right wrist    gastric sleve  1/01/12    TUBAL LIGATION           Family History:  Family History   Problem Relation Age of Onset    Diabetes Father         type 2    Heart disease Father     Cancer Brother         testicular    Colon cancer Neg Hx        Social History:  Social History     Social History Main Topics    Smoking status: Never Smoker    Smokeless tobacco: Not on file    Alcohol use 2.4 oz/week     4 Glasses  of wine per week    Drug use: No    Sexual activity: Not on file       ROS   Review of Systems   Constitutional: Negative for chills and fever.   HENT: Negative for sore throat.    Respiratory: Negative for shortness of breath.    Cardiovascular: Negative for chest pain.   Gastrointestinal: Positive for abdominal pain (upper), nausea and vomiting. Negative for blood in stool, constipation and diarrhea.   Genitourinary: Negative for dysuria and hematuria.   Musculoskeletal: Negative for back pain.   Skin: Negative for rash.   Neurological: Negative for weakness.   Hematological: Does not bruise/bleed easily.   All other systems reviewed and are negative.    Physical Exam      Initial Vitals [07/19/18 0212]   BP Pulse Resp Temp SpO2   (!) 149/74 109 18 99 °F (37.2 °C) 99 %      MAP       --          Physical Exam  Nursing Notes and Vital Signs Reviewed.  Constitutional: Patient is in no acute distress. Well-developed and well-nourished.  Head: Atraumatic. Normocephalic.  Eyes: PERRL. EOM intact. Conjunctivae are not pale. No scleral icterus.  ENT: Mucous membranes are moist. Oropharynx is clear and symmetric.    Neck: Supple. Full ROM. No lymphadenopathy.  Cardiovascular: Regular rate. Regular rhythm. No murmurs, rubs, or gallops. Distal pulses are 2+ and symmetric.  Pulmonary/Chest: No respiratory distress. Clear to auscultation bilaterally. No wheezing or rales.  Abdominal: Soft and non-distended.  There is epigastric to RUQ tenderness.  No rebound, guarding, or rigidity. Good bowel sounds.  Genitourinary: No CVA tenderness  Musculoskeletal: Moves all extremities. No obvious deformities. No edema. No calf tenderness.  Skin: Warm and dry.  Neurological:  Alert, awake, and appropriate.  Normal speech.  No acute focal neurological deficits are appreciated.  Psychiatric: Normal affect. Good eye contact. Appropriate in content.    ED Course    Procedures  ED Vital Signs:  Vitals:    07/19/18 0212 07/19/18 0252 07/19/18  "0300 07/19/18 0401   BP: (!) 149/74 118/70  (!) 101/57   Pulse: 109 85  85   Resp: 18 19 20 18   Temp: 99 °F (37.2 °C)      TempSrc: Oral      SpO2: 99% (!) 91%  (!) 91%   Weight: 69.5 kg (153 lb 3.5 oz)      Height: 5' 5" (1.651 m)       07/19/18 0655 07/19/18 0845 07/19/18 1002 07/19/18 1202   BP: 112/64 118/71 (!) 140/73 136/70   Pulse: 81 79 80 81   Resp:       Temp:   98.3 °F (36.8 °C)    TempSrc:   Oral    SpO2: (!) 94% (!) 94% 97% 95%   Weight:       Height:        07/19/18 1226 07/19/18 1239   BP: 122/68 134/71   Pulse: 83 80   Resp:  16   Temp: 98.2 °F (36.8 °C) 99.9 °F (37.7 °C)   TempSrc: Oral Oral   SpO2: 96% 97%   Weight:     Height:         Abnormal Lab Results:  Labs Reviewed   CBC W/ AUTO DIFFERENTIAL - Abnormal; Notable for the following:        Result Value    WBC 12.97 (*)     MCH 31.8 (*)     Gran # (ANC) 10.4 (*)     Gran% 80.1 (*)     Lymph% 11.6 (*)     All other components within normal limits   COMPREHENSIVE METABOLIC PANEL - Abnormal; Notable for the following:     CO2 19 (*)     Glucose 176 (*)     Total Bilirubin 1.3 (*)     Anion Gap 18 (*)     All other components within normal limits   URINALYSIS - Abnormal; Notable for the following:     Glucose, UA 3+ (*)     Ketones, UA 3+ (*)     All other components within normal limits   POCT GLUCOSE - Abnormal; Notable for the following:     POCT Glucose 146 (*)     All other components within normal limits   LIPASE   PREGNANCY TEST, URINE RAPID   URINALYSIS MICROSCOPIC   HEMOGLOBIN A1C   POCT GLUCOSE MONITORING CONTINUOUS   POCT GLUCOSE MONITORING CONTINUOUS        All Lab Results:  Results for orders placed or performed during the hospital encounter of 07/19/18   CBC auto differential   Result Value Ref Range    WBC 12.97 (H) 3.90 - 12.70 K/uL    RBC 4.44 4.00 - 5.40 M/uL    Hemoglobin 14.1 12.0 - 16.0 g/dL    Hematocrit 41.2 37.0 - 48.5 %    MCV 93 82 - 98 fL    MCH 31.8 (H) 27.0 - 31.0 pg    MCHC 34.2 32.0 - 36.0 g/dL    RDW 12.9 11.5 - 14.5 " %    Platelets 258 150 - 350 K/uL    MPV 9.9 9.2 - 12.9 fL    Gran # (ANC) 10.4 (H) 1.8 - 7.7 K/uL    Lymph # 1.5 1.0 - 4.8 K/uL    Mono # 1.0 0.3 - 1.0 K/uL    Eos # 0.0 0.0 - 0.5 K/uL    Baso # 0.02 0.00 - 0.20 K/uL    Gran% 80.1 (H) 38.0 - 73.0 %    Lymph% 11.6 (L) 18.0 - 48.0 %    Mono% 7.9 4.0 - 15.0 %    Eosinophil% 0.2 0.0 - 8.0 %    Basophil% 0.2 0.0 - 1.9 %    Differential Method Automated    Comprehensive metabolic panel   Result Value Ref Range    Sodium 137 136 - 145 mmol/L    Potassium 4.0 3.5 - 5.1 mmol/L    Chloride 100 95 - 110 mmol/L    CO2 19 (L) 23 - 29 mmol/L    Glucose 176 (H) 70 - 110 mg/dL    BUN, Bld 16 6 - 20 mg/dL    Creatinine 0.8 0.5 - 1.4 mg/dL    Calcium 9.9 8.7 - 10.5 mg/dL    Total Protein 8.2 6.0 - 8.4 g/dL    Albumin 4.4 3.5 - 5.2 g/dL    Total Bilirubin 1.3 (H) 0.1 - 1.0 mg/dL    Alkaline Phosphatase 100 55 - 135 U/L    AST 21 10 - 40 U/L    ALT 25 10 - 44 U/L    Anion Gap 18 (H) 8 - 16 mmol/L    eGFR if African American >60 >60 mL/min/1.73 m^2    eGFR if non African American >60 >60 mL/min/1.73 m^2   Lipase   Result Value Ref Range    Lipase 16 4 - 60 U/L   Urinalysis   Result Value Ref Range    Specimen UA Urine, Clean Catch     Color, UA Yellow Yellow, Straw, Roberta    Appearance, UA Clear Clear    pH, UA 6.0 5.0 - 8.0    Specific Gravity, UA 1.020 1.005 - 1.030    Protein, UA Negative Negative    Glucose, UA 3+ (A) Negative    Ketones, UA 3+ (A) Negative    Bilirubin (UA) Negative Negative    Occult Blood UA Negative Negative    Nitrite, UA Negative Negative    Urobilinogen, UA Negative <2.0 EU/dL    Leukocytes, UA Negative Negative   Pregnancy, urine rapid   Result Value Ref Range    Preg Test, Ur Negative    Urinalysis Microscopic   Result Value Ref Range    WBC, UA 2 0 - 5 /hpf    Bacteria, UA Rare None-Occ /hpf    Yeast, UA None None    Microscopic Comment SEE COMMENT    POCT glucose   Result Value Ref Range    POCT Glucose 146 (H) 70 - 110 mg/dL       Imaging  Results:  Imaging Results          US Abdomen Limited (Final result)  Result time 07/19/18 09:27:09    Final result by GAVIN Gonzalez Sr., MD (07/19/18 09:27:09)                 Impression:      The findings are consistent with the patient's history and characteristic of acalculous cholecystitis.  The wall of the gallbladder is thickened. It measures 5 mm in thickness. There is a tiny amount of pericholecystic fluid. There is a positive sonographic Ordonez's sign.  There is no cholelithiasis or common bile duct dilatation.      Electronically signed by: Giorgio Gonzalez MD  Date:    07/19/2018  Time:    09:27             Narrative:    EXAMINATION:  US ABDOMEN LIMITED    CLINICAL HISTORY:  Abnormal findings on diagnostic imaging of other specified body structures; suspected acute cholecystitis    TECHNIQUE:  Multiple static ultrasound images are submitted for interpretation.    FINDINGS:  Comparison was made to a CT examination of the abdomen and pelvis performed on 07/19/2018.  The liver is normal in appearance. It measures 13.1 cm in length. There is no intrahepatic biliary ductal dilatation. The common bile duct has a diameter of 4 mm.  There is a small amount of echogenic sludge in the dependent portion of the gallbladder.  The wall of the gallbladder is thickened.  It measures 5 mm in thickness.  There is a tiny amount of pericholecystic fluid.  There is a positive sonographic Ordonez's sign.  There is no cholelithiasis.  The visualized portion of the pancreas is normal in appearance. The right kidney is normal in appearance.  It measures 11.6 cm in length. The main portal vein has a normal venous waveform with flow directed towards the liver. It has a velocity of 42 cm/sec.                               CT Abdomen Pelvis With Contrast (Final result)  Result time 07/19/18 08:06:40    Final result by Len Rivas MD (07/19/18 08:06:40)                 Impression:      Distended gallbladder with wall thickening  and pericholecystic fluid without radiopaque stones identified. Findings are concerning for acute cholecystitis.  Ultrasound or HIDA scan can be obtained as clinically warranted.    All CT scans at this facility use dose modulation, iterative reconstruction, and/or weight based dosing when appropriate to reduce radiation dose to as low as reasonable achievable.      Electronically signed by: Len Rivas MD  Date:    07/19/2018  Time:    08:06             Narrative:    EXAMINATION:  CT ABDOMEN PELVIS WITH CONTRAST    CLINICAL HISTORY:  Infection, abdomen-pelvis;    TECHNIQUE:  Low dose axial images, sagittal and coronal reformations were obtained from the lung bases to the pubic symphysis following the IV administration of 75 mL of Omnipaque 350.    COMPARISON:  None    FINDINGS:  Heart: Normal size. No effusion.  Coronary artery disease noted    Lung Bases: Clear.    Liver: Normal size with decreased attenuation consistent with hepatic steatosis.  No focal lesions.    Gallbladder: Distended gallbladder with wall thickening and pericholecystic fluid without radiopaque stones identified.  Findings are concerning for cholecystitis.  Ultrasound or HIDA scan can be obtained as clinically warranted.    Bile Ducts: Common bile duct measures 8 mm which is prominent without evidence of radiopaque choledocholithiasis.    Pancreas: No mass. No peripancreatic fat stranding.    Spleen: Normal.    Adrenals: Normal.    Kidneys/Ureters: Normal enhancement.  No mass or  hydroureteronephrosis.    Bladder: No wall thickening.    Reproductive organs: Normal.    GI Tract/Mesentery: Postoperative changes are seen within the stomach.  Small hiatal hernia.  No evidence of bowel obstruction or inflammation.  Normal appendix.  Moderate constipation    Peritoneal Space: No ascites or free air.    Retroperitoneum: No significant adenopathy.    Abdominal wall: Normal.    Vasculature: No aneurysm.    Bones: No acute fracture. No suspicious  lytic or sclerotic lesions.                            IMPRESSION:    Findings suspicious for cholecystitis. Correlate with sonogram and clinical findings. Normal appendix.    Read by Rachel House MD at 06:08           The Emergency Provider reviewed the vital signs and test results, which are outlined above.    ED Discussion     6:00 AM: Dr. Peck transfers care of pt to Dr. Cruz, pending imaging results.    9:07 AM: Pt evaluated by Dr. Cruz. Pt with PMHx of gastric sleeve surgery reports that she began to have worsening upper abd pain that radiates to the pt's back yesterday. Pt's sxs are constant and moderate in severity. There are no mitigating or exacerbating factors reported.Associated sxs include nausea and emesis. Pt denies any fever, chills, CP, SOB, diarrhea, constipation, back pain, flank pain, dysuria, hematuria, HA, dizziness, and all other sxs at this time. No prior Tx reported. Upon re-examination pt's abd is soft and non-distended. Pt is positive Ordonez's sign. D/w pt her lab and imaging results. Informed pt that there is concern for cholecystitis. D/w pt that we re awaiting a general surgery consult and an US of her gallbladder before moving forward with her care. Pt expresses her understanding and has not further questions or concerns at this time.    9:49 AM: Re-evaluated pt. Pt is resting comfortably and is in no acute distress.  D/w pt all pertinent results. Informed pt that her US shows acalculous cholecystitis and of the possible need for surgery. D/w pt any concerns expressed at this time. Answered all questions. Pt expresses understanding at this time.    10:10 AM: Discussed case with Dr. Chaidez (General Surgery). Dr. Chaidez agrees with current care and management of pt and accepts admission. Pt should be made NPO.  Admitting Service: General Surgery  Admitting Physician: Dr. Chaidez  Admit to: Med-Surg    10:13 AM: Dr. Chaidez (General Surgery) is at the pt bedside.    10:15 AM:  Re-evaluated pt. I have discussed test results, shared treatment plan, and the need for admission with patient and family at bedside. Pt and family express understanding at this time and agree with all information. All questions answered. Pt and family have no further questions or concerns at this time. Pt is ready for admit.      ED Medication(s):  Medications   ondansetron disintegrating tablet 8 mg (8 mg Oral Given 7/19/18 1539)   promethazine (PHENERGAN) 6.25 mg in dextrose 5 % 50 mL IVPB (6.25 mg Intravenous New Bag 7/19/18 1207)   ramelteon tablet 8 mg (not administered)   acetaminophen tablet 650 mg (650 mg Oral Not Given 7/19/18 1208)   diphenhydrAMINE injection 25 mg (not administered)   HYDROcodone-acetaminophen 5-325 mg per tablet 1 tablet (not administered)   morphine injection 4 mg (4 mg Intravenous Given 7/19/18 1538)   0.9%  NaCl infusion ( Intravenous New Bag 7/19/18 1108)   pravastatin tablet 20 mg (20 mg Oral Given 7/19/18 1107)   dextrose 50% injection 12.5 g (not administered)   glucagon (human recombinant) injection 1 mg (not administered)   insulin aspart U-100 pen 0-5 Units (not administered)   piperacillin-tazobactam 4.5 g in dextrose 5 % 100 mL IVPB (ready to mix system) (not administered)   sodium chloride 0.9% bolus 1,000 mL (0 mLs Intravenous Stopped 7/19/18 0400)   ondansetron injection 4 mg (4 mg Intravenous Given 7/19/18 0243)   hydromorphone (PF) injection 1 mg (1 mg Intravenous Given 7/19/18 0242)   omnipaque 350 iohexol 30 mL (30 mLs Oral Given 7/19/18 0410)   omnipaque 350 iohexol 75 mL (75 mLs Intravenous Given 7/19/18 0600)   hydromorphone (PF) injection 0.5 mg (0.5 mg Intravenous Given 7/19/18 0816)   ondansetron injection 4 mg (4 mg Intravenous Given 7/19/18 0816)   0.9%  NaCl infusion (0 mLs Intravenous Stopped 7/19/18 1107)   piperacillin-tazobactam 4.5 g in dextrose 5 % 100 mL IVPB (ready to mix system) (0 g Intravenous Stopped 7/19/18 1033)       Current Discharge  Medication List                Medical Decision Making    Medical Decision Making:   Clinical Tests:   Lab Tests: Ordered and Reviewed  Radiological Study: Ordered and Reviewed           Scribe Attestation:   Scribe #1: I performed the above scribed service and the documentation accurately describes the services I performed. I attest to the accuracy of the note.    Attending:   Physician Attestation Statement for Scribe #1: I, Ken Peck DO, personally performed the services described in this documentation, as scribed by Yanira Gee, in my presence, and it is both accurate and complete.       Scribe Attestation:   Scribe #2: I performed the above scribed service and the documentation accurately describes the services I performed. I attest to the accuracy of the note.    Attending Attestation:           Physician Attestation for Scribe:    Physician Attestation Statement for Scribe #2: I, Giselle Cruz DO, reviewed documentation, as scribed by Corinne Mack in my presence, and it is both accurate and complete. I also acknowledge and confirm the content of the note done by Scribe #1.          Clinical Impression       ICD-10-CM ICD-9-CM   1. Acalculous cholecystitis K81.9 575.10   2. Abnormal CT scan R93.8 793.99   3. Cholecystitis, acute K81.0 575.0       Disposition:   Disposition: Admitted  Condition: Fair         Giselle Cruz DO  07/19/18 1625

## 2018-07-19 NOTE — SUBJECTIVE & OBJECTIVE
"Past Medical History:   Diagnosis Date    Chronic constipation     DM II (diabetes mellitus, type II), controlled        Past Surgical History:   Procedure Laterality Date    CYST REMOVAL      ganglionic right wrist    gastric sleve  1/01/12    TUBAL LIGATION         Review of patient's allergies indicates:  No Known Allergies    No current facility-administered medications on file prior to encounter.      Current Outpatient Prescriptions on File Prior to Encounter   Medication Sig    CANAGLIFLOZIN (INVOKANA ORAL) Take by mouth.    INVOKANA 300 mg Tab tablet     melatonin 5 mg Tab Take by mouth.    metformin (GLUCOPHAGE) 500 MG tablet Take 500 mg by mouth 2 (two) times daily with meals.    multivitamin (ONE DAILY MULTIVITAMIN) per tablet Take 1 tablet by mouth.    atorvastatin (LIPITOR) 20 MG tablet     blood sugar diagnostic Strp Use ac and hs    cyanocobalamin 1,000 mcg/mL injection INJECT 1 ML INTO THE MUSCLE EVERY 30 DAYS    insulin lispro (HUMALOG) 100 unit/mL injection (VIAL)  INJECT UP TO 25 UNITS TOTAL FOR THE DAY IN DIVIDED DOSES    needle, disp, 24 gauge 24 gauge x 1" Ndle 1 Dose by Misc.(Non-Drug; Combo Route) route every 30 days.    ONE TOUCH VERIO Strp     pen needle, diabetic (BD ULTRA-FINE SHORT PEN NEEDLE) 31 gauge x 5/16" Ndle Use up to three daily    pravastatin (PRAVACHOL) 20 MG tablet Take 20 mg by mouth.    sitagliptin (JANUVIA) 100 MG Tab Take 100 mg by mouth.    syringe with needle 3 mL 21 gauge x 1" Syrg USE ONE Q 4 WEEKS     Family History     Problem Relation (Age of Onset)    Cancer Brother    Diabetes Father    Heart disease Father        Social History Main Topics    Smoking status: Never Smoker    Smokeless tobacco: Not on file    Alcohol use 2.4 oz/week     4 Glasses of wine per week    Drug use: No    Sexual activity: Not on file     Review of Systems   Constitutional: Positive for chills. Negative for appetite change, diaphoresis, fatigue and fever.   HENT: " Negative for congestion, ear pain, mouth sores, sore throat and trouble swallowing.    Eyes: Negative for pain and visual disturbance.   Respiratory: Negative for cough, chest tightness and shortness of breath.    Cardiovascular: Negative for chest pain, palpitations and leg swelling.   Gastrointestinal: Positive for abdominal pain, nausea and vomiting. Negative for constipation.   Endocrine: Negative for cold intolerance, heat intolerance, polydipsia and polyuria.   Genitourinary: Negative for dysuria, frequency and hematuria.   Musculoskeletal: Negative for arthralgias, back pain, myalgias and neck pain.   Skin: Negative for pallor, rash and wound.   Allergic/Immunologic: Negative for environmental allergies and immunocompromised state.   Neurological: Negative for dizziness, seizures, syncope, weakness, numbness and headaches.   Hematological: Negative for adenopathy. Does not bruise/bleed easily.   Psychiatric/Behavioral: Negative for agitation, confusion and sleep disturbance.     Objective:     Vital Signs (Most Recent):  Temp: 99.9 °F (37.7 °C) (07/19/18 1239)  Pulse: 80 (07/19/18 1239)  Resp: 16 (07/19/18 1239)  BP: 134/71 (07/19/18 1239)  SpO2: 97 % (07/19/18 1239) Vital Signs (24h Range):  Temp:  [98.2 °F (36.8 °C)-99.9 °F (37.7 °C)] 99.9 °F (37.7 °C)  Pulse:  [] 80  Resp:  [16-20] 16  SpO2:  [91 %-99 %] 97 %  BP: (101-149)/(57-74) 134/71     Weight: 69.5 kg (153 lb 3.5 oz)  Body mass index is 25.5 kg/m².    Physical Exam   Constitutional: She is oriented to person, place, and time. She appears well-developed and well-nourished. No distress.   HENT:   Head: Normocephalic and atraumatic.   Eyes: Conjunctivae are normal.   PERRL   Neck: Neck supple. No JVD present.   Cardiovascular: Normal rate, regular rhythm and normal heart sounds.    Pulmonary/Chest: Effort normal and breath sounds normal.   Abdominal: Soft. Bowel sounds are normal. She exhibits no distension. There is tenderness.    Musculoskeletal: Normal range of motion.   Neurological: She is alert and oriented to person, place, and time.   Skin: Skin is warm and dry.   Psychiatric: She has a normal mood and affect. Her behavior is normal. Thought content normal.   Nursing note and vitals reviewed.          Significant Labs:   CBC:   Recent Labs  Lab 07/19/18  0241   WBC 12.97*   HGB 14.1   HCT 41.2        CMP:   Recent Labs  Lab 07/19/18  0241      K 4.0      CO2 19*   *   BUN 16   CREATININE 0.8   CALCIUM 9.9   PROT 8.2   ALBUMIN 4.4   BILITOT 1.3*   ALKPHOS 100   AST 21   ALT 25   ANIONGAP 18*   EGFRNONAA >60     All pertinent labs within the past 24 hours have been reviewed.    Significant Imaging: I have reviewed all pertinent imaging results/findings within the past 24 hours.   Imaging Results          US Abdomen Limited (Final result)  Result time 07/19/18 09:27:09    Final result by GAVIN Gonzalez Sr., MD (07/19/18 09:27:09)                 Impression:      The findings are consistent with the patient's history and characteristic of acalculous cholecystitis.  The wall of the gallbladder is thickened. It measures 5 mm in thickness. There is a tiny amount of pericholecystic fluid. There is a positive sonographic Ordonez's sign.  There is no cholelithiasis or common bile duct dilatation.      Electronically signed by: Giorgio Gonzalez MD  Date:    07/19/2018  Time:    09:27             Narrative:    EXAMINATION:  US ABDOMEN LIMITED    CLINICAL HISTORY:  Abnormal findings on diagnostic imaging of other specified body structures; suspected acute cholecystitis    TECHNIQUE:  Multiple static ultrasound images are submitted for interpretation.    FINDINGS:  Comparison was made to a CT examination of the abdomen and pelvis performed on 07/19/2018.  The liver is normal in appearance. It measures 13.1 cm in length. There is no intrahepatic biliary ductal dilatation. The common bile duct has a diameter of 4 mm.  There  is a small amount of echogenic sludge in the dependent portion of the gallbladder.  The wall of the gallbladder is thickened.  It measures 5 mm in thickness.  There is a tiny amount of pericholecystic fluid.  There is a positive sonographic Ordonez's sign.  There is no cholelithiasis.  The visualized portion of the pancreas is normal in appearance. The right kidney is normal in appearance.  It measures 11.6 cm in length. The main portal vein has a normal venous waveform with flow directed towards the liver. It has a velocity of 42 cm/sec.                               CT Abdomen Pelvis With Contrast (Final result)  Result time 07/19/18 08:06:40    Final result by Len Rivas MD (07/19/18 08:06:40)                 Impression:      Distended gallbladder with wall thickening and pericholecystic fluid without radiopaque stones identified. Findings are concerning for acute cholecystitis.  Ultrasound or HIDA scan can be obtained as clinically warranted.    All CT scans at this facility use dose modulation, iterative reconstruction, and/or weight based dosing when appropriate to reduce radiation dose to as low as reasonable achievable.      Electronically signed by: Len Rivas MD  Date:    07/19/2018  Time:    08:06             Narrative:    EXAMINATION:  CT ABDOMEN PELVIS WITH CONTRAST    CLINICAL HISTORY:  Infection, abdomen-pelvis;    TECHNIQUE:  Low dose axial images, sagittal and coronal reformations were obtained from the lung bases to the pubic symphysis following the IV administration of 75 mL of Omnipaque 350.    COMPARISON:  None    FINDINGS:  Heart: Normal size. No effusion.  Coronary artery disease noted    Lung Bases: Clear.    Liver: Normal size with decreased attenuation consistent with hepatic steatosis.  No focal lesions.    Gallbladder: Distended gallbladder with wall thickening and pericholecystic fluid without radiopaque stones identified.  Findings are concerning for cholecystitis.  Ultrasound  or HIDA scan can be obtained as clinically warranted.    Bile Ducts: Common bile duct measures 8 mm which is prominent without evidence of radiopaque choledocholithiasis.    Pancreas: No mass. No peripancreatic fat stranding.    Spleen: Normal.    Adrenals: Normal.    Kidneys/Ureters: Normal enhancement.  No mass or  hydroureteronephrosis.    Bladder: No wall thickening.    Reproductive organs: Normal.    GI Tract/Mesentery: Postoperative changes are seen within the stomach.  Small hiatal hernia.  No evidence of bowel obstruction or inflammation.  Normal appendix.  Moderate constipation    Peritoneal Space: No ascites or free air.    Retroperitoneum: No significant adenopathy.    Abdominal wall: Normal.    Vasculature: No aneurysm.    Bones: No acute fracture. No suspicious lytic or sclerotic lesions.

## 2018-07-19 NOTE — LETTER
[address] [facility] [address]  Phone: [phone]  Fax: [phone] July 25, 2018     Patient: Yandy Cabral   YOB: 1970   Date of Visit: 7/19/2018       To Whom It May Concern:    Please excuse Yandy Cabral from work until 8/6/18 for medical reason.  No lifting over 20 pounds until 8/20/18    If you have any questions or concerns, please don't hesitate to contact my office.    Sincerely,       Louis O. Jeansonne IV, MD, FACS

## 2018-07-19 NOTE — ED NOTES
Pt resting in bed with eyes closed.  at bedside.Pt easily aroused. AAO x4. Skin warm and dry. Resp even and unlabored with equal chest rise and fall. Pt reports pain rated 8/10 on pain scale to Chris upper quadrants that radiates to her back. States she can tolerate the pain at this time.

## 2018-07-19 NOTE — HPI
Yandy Cabral is a 47 y.o. female with a pmhx of diabetes, dyslipidemia. She is s/p gastric sleeve in 2012. She presented to the ER with RUQ abdominal pain that started yesterday. Sx are associated with nausea and vomiting. She denies fever or chills. CT and ultrasound done in ED are concerning for acalculous cholecystitis.

## 2018-07-19 NOTE — HPI
Yandy Cabral is a 47 year old female with DM II and hyperlipidemia who presented to the ED on 7/18/18 with complaints of abdominal pain, nausea and vomiting. Symptoms began yesterday and have been constant. Her abdominal pain is located across her upper abdomen. Her emesis is described as bilious. There are associated chills. She denies fever. In the ED, she was found to have a mildly elevated biliruibin, a mild leukocytosis and findings consistent with acalculous cholecystitis on abdominal US and CT scan of the abdomen. At this time, she is being conservatively managed with IV antibiotics and supportive care.

## 2018-07-20 ENCOUNTER — ANESTHESIA (OUTPATIENT)
Dept: SURGERY | Facility: HOSPITAL | Age: 48
DRG: 417 | End: 2018-07-20
Payer: COMMERCIAL

## 2018-07-20 ENCOUNTER — ANESTHESIA EVENT (OUTPATIENT)
Dept: SURGERY | Facility: HOSPITAL | Age: 48
DRG: 417 | End: 2018-07-20
Payer: COMMERCIAL

## 2018-07-20 PROBLEM — K81.9 ACALCULOUS CHOLECYSTITIS: Status: ACTIVE | Noted: 2018-07-20

## 2018-07-20 LAB
ALBUMIN SERPL BCP-MCNC: 3.6 G/DL
ALP SERPL-CCNC: 263 U/L
ALT SERPL W/O P-5'-P-CCNC: 434 U/L
ANION GAP SERPL CALC-SCNC: 23 MMOL/L
AST SERPL-CCNC: 368 U/L
BASOPHILS # BLD AUTO: 0.01 K/UL
BASOPHILS NFR BLD: 0 %
BILIRUB SERPL-MCNC: 1.5 MG/DL
BUN SERPL-MCNC: 14 MG/DL
CALCIUM SERPL-MCNC: 9.6 MG/DL
CHLORIDE SERPL-SCNC: 103 MMOL/L
CO2 SERPL-SCNC: 10 MMOL/L
CREAT SERPL-MCNC: 1 MG/DL
DIFFERENTIAL METHOD: ABNORMAL
EOSINOPHIL # BLD AUTO: 0 K/UL
EOSINOPHIL NFR BLD: 0 %
ERYTHROCYTE [DISTWIDTH] IN BLOOD BY AUTOMATED COUNT: 13.5 %
EST. GFR  (AFRICAN AMERICAN): >60 ML/MIN/1.73 M^2
EST. GFR  (NON AFRICAN AMERICAN): >60 ML/MIN/1.73 M^2
GLUCOSE SERPL-MCNC: 162 MG/DL
HCT VFR BLD AUTO: 41.4 %
HGB BLD-MCNC: 13.3 G/DL
LYMPHOCYTES # BLD AUTO: 0.6 K/UL
LYMPHOCYTES NFR BLD: 2.6 %
MCH RBC QN AUTO: 31.6 PG
MCHC RBC AUTO-ENTMCNC: 32.1 G/DL
MCV RBC AUTO: 98 FL
MONOCYTES # BLD AUTO: 1.3 K/UL
MONOCYTES NFR BLD: 5.6 %
NEUTROPHILS # BLD AUTO: 21.5 K/UL
NEUTROPHILS NFR BLD: 92.2 %
PLATELET # BLD AUTO: 265 K/UL
PMV BLD AUTO: 10.5 FL
POCT GLUCOSE: 192 MG/DL (ref 70–110)
POCT GLUCOSE: 212 MG/DL (ref 70–110)
POCT GLUCOSE: 332 MG/DL (ref 70–110)
POTASSIUM SERPL-SCNC: 4.3 MMOL/L
PROT SERPL-MCNC: 7.7 G/DL
RBC # BLD AUTO: 4.21 M/UL
SODIUM SERPL-SCNC: 136 MMOL/L
WBC # BLD AUTO: 23.42 K/UL

## 2018-07-20 PROCEDURE — 94799 UNLISTED PULMONARY SVC/PX: CPT

## 2018-07-20 PROCEDURE — S0030 INJECTION, METRONIDAZOLE: HCPCS | Performed by: SURGERY

## 2018-07-20 PROCEDURE — 88304 TISSUE EXAM BY PATHOLOGIST: CPT | Performed by: PATHOLOGY

## 2018-07-20 PROCEDURE — 25000003 PHARM REV CODE 250: Performed by: SURGERY

## 2018-07-20 PROCEDURE — 80053 COMPREHEN METABOLIC PANEL: CPT

## 2018-07-20 PROCEDURE — 36000708 HC OR TIME LEV III 1ST 15 MIN: Performed by: SURGERY

## 2018-07-20 PROCEDURE — 63600175 PHARM REV CODE 636 W HCPCS: Performed by: NURSE ANESTHETIST, CERTIFIED REGISTERED

## 2018-07-20 PROCEDURE — 63600175 PHARM REV CODE 636 W HCPCS: Performed by: NURSE PRACTITIONER

## 2018-07-20 PROCEDURE — 63600175 PHARM REV CODE 636 W HCPCS: Performed by: PHYSICIAN ASSISTANT

## 2018-07-20 PROCEDURE — 25000003 PHARM REV CODE 250: Performed by: PHYSICIAN ASSISTANT

## 2018-07-20 PROCEDURE — 63600175 PHARM REV CODE 636 W HCPCS: Performed by: SURGERY

## 2018-07-20 PROCEDURE — 80074 ACUTE HEPATITIS PANEL: CPT

## 2018-07-20 PROCEDURE — 25000003 PHARM REV CODE 250: Performed by: NURSE ANESTHETIST, CERTIFIED REGISTERED

## 2018-07-20 PROCEDURE — 94760 N-INVAS EAR/PLS OXIMETRY 1: CPT

## 2018-07-20 PROCEDURE — 11000001 HC ACUTE MED/SURG PRIVATE ROOM

## 2018-07-20 PROCEDURE — 47562 LAPAROSCOPIC CHOLECYSTECTOMY: CPT | Mod: ,,, | Performed by: SURGERY

## 2018-07-20 PROCEDURE — 85025 COMPLETE CBC W/AUTO DIFF WBC: CPT

## 2018-07-20 PROCEDURE — 0FT44ZZ RESECTION OF GALLBLADDER, PERCUTANEOUS ENDOSCOPIC APPROACH: ICD-10-PCS | Performed by: SURGERY

## 2018-07-20 PROCEDURE — 37000008 HC ANESTHESIA 1ST 15 MINUTES: Performed by: SURGERY

## 2018-07-20 PROCEDURE — 88304 TISSUE EXAM BY PATHOLOGIST: CPT | Mod: 26,,, | Performed by: PATHOLOGY

## 2018-07-20 PROCEDURE — 71000033 HC RECOVERY, INTIAL HOUR: Performed by: SURGERY

## 2018-07-20 PROCEDURE — 37000009 HC ANESTHESIA EA ADD 15 MINS: Performed by: SURGERY

## 2018-07-20 PROCEDURE — 36000709 HC OR TIME LEV III EA ADD 15 MIN: Performed by: SURGERY

## 2018-07-20 PROCEDURE — 27201423 OPTIME MED/SURG SUP & DEVICES STERILE SUPPLY: Performed by: SURGERY

## 2018-07-20 PROCEDURE — 36415 COLL VENOUS BLD VENIPUNCTURE: CPT

## 2018-07-20 RX ORDER — FENTANYL CITRATE 50 UG/ML
INJECTION, SOLUTION INTRAMUSCULAR; INTRAVENOUS
Status: DISCONTINUED | OUTPATIENT
Start: 2018-07-20 | End: 2018-07-20

## 2018-07-20 RX ORDER — CEFAZOLIN SODIUM 2 G/50ML
2 SOLUTION INTRAVENOUS
Status: COMPLETED | OUTPATIENT
Start: 2018-07-20 | End: 2018-07-21

## 2018-07-20 RX ORDER — ONDANSETRON 2 MG/ML
INJECTION INTRAMUSCULAR; INTRAVENOUS
Status: DISCONTINUED | OUTPATIENT
Start: 2018-07-20 | End: 2018-07-20

## 2018-07-20 RX ORDER — HYDROCODONE BITARTRATE AND ACETAMINOPHEN 10; 325 MG/1; MG/1
1 TABLET ORAL EVERY 4 HOURS PRN
Status: DISCONTINUED | OUTPATIENT
Start: 2018-07-20 | End: 2018-07-21

## 2018-07-20 RX ORDER — HYDROMORPHONE HYDROCHLORIDE 1 MG/ML
0.2 INJECTION, SOLUTION INTRAMUSCULAR; INTRAVENOUS; SUBCUTANEOUS EVERY 5 MIN PRN
Status: DISCONTINUED | OUTPATIENT
Start: 2018-07-20 | End: 2018-07-20 | Stop reason: HOSPADM

## 2018-07-20 RX ORDER — ONDANSETRON 2 MG/ML
4 INJECTION INTRAMUSCULAR; INTRAVENOUS DAILY PRN
Status: DISCONTINUED | OUTPATIENT
Start: 2018-07-20 | End: 2018-07-20 | Stop reason: HOSPADM

## 2018-07-20 RX ORDER — SODIUM CHLORIDE 0.9 % (FLUSH) 0.9 %
3 SYRINGE (ML) INJECTION
Status: DISCONTINUED | OUTPATIENT
Start: 2018-07-20 | End: 2018-07-20 | Stop reason: HOSPADM

## 2018-07-20 RX ORDER — IBUPROFEN 200 MG
16 TABLET ORAL
Status: DISCONTINUED | OUTPATIENT
Start: 2018-07-20 | End: 2018-07-25 | Stop reason: HOSPADM

## 2018-07-20 RX ORDER — ENOXAPARIN SODIUM 100 MG/ML
40 INJECTION SUBCUTANEOUS EVERY 24 HOURS
Status: DISCONTINUED | OUTPATIENT
Start: 2018-07-21 | End: 2018-07-25 | Stop reason: HOSPADM

## 2018-07-20 RX ORDER — CLONIDINE HYDROCHLORIDE 0.1 MG/1
0.1 TABLET ORAL EVERY 6 HOURS PRN
Status: DISCONTINUED | OUTPATIENT
Start: 2018-07-20 | End: 2018-07-25 | Stop reason: HOSPADM

## 2018-07-20 RX ORDER — AMOXICILLIN 250 MG
1 CAPSULE ORAL 2 TIMES DAILY
Status: DISCONTINUED | OUTPATIENT
Start: 2018-07-20 | End: 2018-07-25 | Stop reason: HOSPADM

## 2018-07-20 RX ORDER — BUPIVACAINE HYDROCHLORIDE 2.5 MG/ML
INJECTION, SOLUTION EPIDURAL; INFILTRATION; INTRACAUDAL
Status: DISCONTINUED | OUTPATIENT
Start: 2018-07-20 | End: 2018-07-20 | Stop reason: HOSPADM

## 2018-07-20 RX ORDER — CHLORHEXIDINE GLUCONATE ORAL RINSE 1.2 MG/ML
10 SOLUTION DENTAL 2 TIMES DAILY
Status: DISCONTINUED | OUTPATIENT
Start: 2018-07-20 | End: 2018-07-25 | Stop reason: HOSPADM

## 2018-07-20 RX ORDER — NEOSTIGMINE METHYLSULFATE 1 MG/ML
INJECTION, SOLUTION INTRAVENOUS
Status: DISCONTINUED | OUTPATIENT
Start: 2018-07-20 | End: 2018-07-20

## 2018-07-20 RX ORDER — IBUPROFEN 200 MG
24 TABLET ORAL
Status: DISCONTINUED | OUTPATIENT
Start: 2018-07-20 | End: 2018-07-25 | Stop reason: HOSPADM

## 2018-07-20 RX ORDER — ACETAMINOPHEN 10 MG/ML
1000 INJECTION, SOLUTION INTRAVENOUS EVERY 8 HOURS
Status: DISCONTINUED | OUTPATIENT
Start: 2018-07-20 | End: 2018-07-20

## 2018-07-20 RX ORDER — GLUCAGON 1 MG
1 KIT INJECTION
Status: DISCONTINUED | OUTPATIENT
Start: 2018-07-20 | End: 2018-07-25 | Stop reason: HOSPADM

## 2018-07-20 RX ORDER — INSULIN ASPART 100 [IU]/ML
0-5 INJECTION, SOLUTION INTRAVENOUS; SUBCUTANEOUS EVERY 6 HOURS PRN
Status: DISCONTINUED | OUTPATIENT
Start: 2018-07-20 | End: 2018-07-20

## 2018-07-20 RX ORDER — SODIUM CHLORIDE, SODIUM LACTATE, POTASSIUM CHLORIDE, CALCIUM CHLORIDE 600; 310; 30; 20 MG/100ML; MG/100ML; MG/100ML; MG/100ML
INJECTION, SOLUTION INTRAVENOUS CONTINUOUS PRN
Status: DISCONTINUED | OUTPATIENT
Start: 2018-07-20 | End: 2018-07-20

## 2018-07-20 RX ORDER — LACTULOSE 10 G/15ML
20 SOLUTION ORAL EVERY 6 HOURS PRN
Status: DISCONTINUED | OUTPATIENT
Start: 2018-07-20 | End: 2018-07-25 | Stop reason: HOSPADM

## 2018-07-20 RX ORDER — ACETAMINOPHEN 325 MG/1
650 TABLET ORAL EVERY 6 HOURS PRN
Status: DISCONTINUED | OUTPATIENT
Start: 2018-07-20 | End: 2018-07-21

## 2018-07-20 RX ORDER — DEXAMETHASONE SODIUM PHOSPHATE 4 MG/ML
INJECTION, SOLUTION INTRA-ARTICULAR; INTRALESIONAL; INTRAMUSCULAR; INTRAVENOUS; SOFT TISSUE
Status: DISCONTINUED | OUTPATIENT
Start: 2018-07-20 | End: 2018-07-20

## 2018-07-20 RX ORDER — SODIUM CHLORIDE, SODIUM LACTATE, POTASSIUM CHLORIDE, CALCIUM CHLORIDE 600; 310; 30; 20 MG/100ML; MG/100ML; MG/100ML; MG/100ML
INJECTION, SOLUTION INTRAVENOUS CONTINUOUS
Status: DISCONTINUED | OUTPATIENT
Start: 2018-07-20 | End: 2018-07-21

## 2018-07-20 RX ORDER — ONDANSETRON 2 MG/ML
4 INJECTION INTRAMUSCULAR; INTRAVENOUS DAILY PRN
Status: DISCONTINUED | OUTPATIENT
Start: 2018-07-20 | End: 2018-07-20 | Stop reason: SDUPTHER

## 2018-07-20 RX ORDER — PROPOFOL 10 MG/ML
VIAL (ML) INTRAVENOUS
Status: DISCONTINUED | OUTPATIENT
Start: 2018-07-20 | End: 2018-07-20

## 2018-07-20 RX ORDER — OXYCODONE HYDROCHLORIDE 5 MG/1
5 TABLET ORAL
Status: DISCONTINUED | OUTPATIENT
Start: 2018-07-20 | End: 2018-07-20

## 2018-07-20 RX ORDER — DIPHENHYDRAMINE HYDROCHLORIDE 50 MG/ML
25 INJECTION INTRAMUSCULAR; INTRAVENOUS EVERY 4 HOURS PRN
Status: DISCONTINUED | OUTPATIENT
Start: 2018-07-20 | End: 2018-07-25 | Stop reason: HOSPADM

## 2018-07-20 RX ORDER — MORPHINE SULFATE 4 MG/ML
2 INJECTION, SOLUTION INTRAMUSCULAR; INTRAVENOUS EVERY 5 MIN PRN
Status: DISCONTINUED | OUTPATIENT
Start: 2018-07-20 | End: 2018-07-20

## 2018-07-20 RX ORDER — ROCURONIUM BROMIDE 10 MG/ML
INJECTION, SOLUTION INTRAVENOUS
Status: DISCONTINUED | OUTPATIENT
Start: 2018-07-20 | End: 2018-07-20

## 2018-07-20 RX ORDER — SUCCINYLCHOLINE CHLORIDE 20 MG/ML
INJECTION INTRAMUSCULAR; INTRAVENOUS
Status: DISCONTINUED | OUTPATIENT
Start: 2018-07-20 | End: 2018-07-20

## 2018-07-20 RX ORDER — ONDANSETRON 8 MG/1
8 TABLET, ORALLY DISINTEGRATING ORAL EVERY 8 HOURS PRN
Status: DISCONTINUED | OUTPATIENT
Start: 2018-07-20 | End: 2018-07-21

## 2018-07-20 RX ORDER — RAMELTEON 8 MG/1
8 TABLET ORAL NIGHTLY PRN
Status: DISCONTINUED | OUTPATIENT
Start: 2018-07-20 | End: 2018-07-25 | Stop reason: HOSPADM

## 2018-07-20 RX ORDER — MIDAZOLAM HYDROCHLORIDE 1 MG/ML
INJECTION, SOLUTION INTRAMUSCULAR; INTRAVENOUS
Status: DISCONTINUED | OUTPATIENT
Start: 2018-07-20 | End: 2018-07-20

## 2018-07-20 RX ORDER — OXYCODONE HYDROCHLORIDE 5 MG/1
5 TABLET ORAL
Status: DISCONTINUED | OUTPATIENT
Start: 2018-07-20 | End: 2018-07-20 | Stop reason: HOSPADM

## 2018-07-20 RX ORDER — LIDOCAINE HYDROCHLORIDE 10 MG/ML
INJECTION, SOLUTION EPIDURAL; INFILTRATION; INTRACAUDAL; PERINEURAL
Status: DISCONTINUED | OUTPATIENT
Start: 2018-07-20 | End: 2018-07-20 | Stop reason: HOSPADM

## 2018-07-20 RX ORDER — SCOLOPAMINE TRANSDERMAL SYSTEM 1 MG/1
PATCH, EXTENDED RELEASE TRANSDERMAL
Status: DISCONTINUED | OUTPATIENT
Start: 2018-07-20 | End: 2018-07-20

## 2018-07-20 RX ORDER — METRONIDAZOLE 500 MG/100ML
500 INJECTION, SOLUTION INTRAVENOUS
Status: COMPLETED | OUTPATIENT
Start: 2018-07-20 | End: 2018-07-21

## 2018-07-20 RX ORDER — HYDROCODONE BITARTRATE AND ACETAMINOPHEN 5; 325 MG/1; MG/1
1 TABLET ORAL EVERY 4 HOURS PRN
Status: DISCONTINUED | OUTPATIENT
Start: 2018-07-20 | End: 2018-07-21

## 2018-07-20 RX ORDER — LIDOCAINE HYDROCHLORIDE 10 MG/ML
INJECTION INFILTRATION; PERINEURAL
Status: DISCONTINUED | OUTPATIENT
Start: 2018-07-20 | End: 2018-07-20

## 2018-07-20 RX ORDER — GLYCOPYRROLATE 0.2 MG/ML
INJECTION INTRAMUSCULAR; INTRAVENOUS
Status: DISCONTINUED | OUTPATIENT
Start: 2018-07-20 | End: 2018-07-20

## 2018-07-20 RX ORDER — SODIUM CHLORIDE 0.9 % (FLUSH) 0.9 %
3 SYRINGE (ML) INJECTION
Status: DISCONTINUED | OUTPATIENT
Start: 2018-07-20 | End: 2018-07-24

## 2018-07-20 RX ORDER — ONDANSETRON 2 MG/ML
4 INJECTION INTRAMUSCULAR; INTRAVENOUS EVERY 8 HOURS PRN
Status: DISCONTINUED | OUTPATIENT
Start: 2018-07-20 | End: 2018-07-20

## 2018-07-20 RX ORDER — BISACODYL 10 MG
10 SUPPOSITORY, RECTAL RECTAL DAILY PRN
Status: DISCONTINUED | OUTPATIENT
Start: 2018-07-20 | End: 2018-07-25 | Stop reason: HOSPADM

## 2018-07-20 RX ORDER — INSULIN ASPART 100 [IU]/ML
0-5 INJECTION, SOLUTION INTRAVENOUS; SUBCUTANEOUS
Status: DISCONTINUED | OUTPATIENT
Start: 2018-07-20 | End: 2018-07-22

## 2018-07-20 RX ADMIN — MORPHINE SULFATE 4 MG: 4 INJECTION INTRAVENOUS at 01:07

## 2018-07-20 RX ADMIN — ROCURONIUM BROMIDE 20 MG: 10 INJECTION, SOLUTION INTRAVENOUS at 02:07

## 2018-07-20 RX ADMIN — HYDROCODONE BITARTRATE AND ACETAMINOPHEN 1 TABLET: 10; 325 TABLET ORAL at 05:07

## 2018-07-20 RX ADMIN — CHLORHEXIDINE GLUCONATE 10 ML: 1.2 RINSE ORAL at 09:07

## 2018-07-20 RX ADMIN — MIDAZOLAM 2 MG: 1 INJECTION INTRAMUSCULAR; INTRAVENOUS at 02:07

## 2018-07-20 RX ADMIN — SODIUM CHLORIDE, SODIUM LACTATE, POTASSIUM CHLORIDE, AND CALCIUM CHLORIDE: 600; 310; 30; 20 INJECTION, SOLUTION INTRAVENOUS at 03:07

## 2018-07-20 RX ADMIN — NEOSTIGMINE METHYLSULFATE 5 MG: 1 INJECTION INTRAVENOUS at 03:07

## 2018-07-20 RX ADMIN — FENTANYL CITRATE 50 MCG: 50 INJECTION, SOLUTION INTRAMUSCULAR; INTRAVENOUS at 02:07

## 2018-07-20 RX ADMIN — METRONIDAZOLE 500 MG: 500 INJECTION, SOLUTION INTRAVENOUS at 05:07

## 2018-07-20 RX ADMIN — PIPERACILLIN AND TAZOBACTAM 4.5 G: 4; .5 INJECTION, POWDER, LYOPHILIZED, FOR SOLUTION INTRAVENOUS; PARENTERAL at 10:07

## 2018-07-20 RX ADMIN — STANDARDIZED SENNA CONCENTRATE AND DOCUSATE SODIUM 1 TABLET: 8.6; 5 TABLET, FILM COATED ORAL at 09:07

## 2018-07-20 RX ADMIN — ROBINUL 0.8 MG: 0.2 INJECTION INTRAMUSCULAR; INTRAVENOUS at 03:07

## 2018-07-20 RX ADMIN — ONDANSETRON 4 MG: 2 INJECTION, SOLUTION INTRAMUSCULAR; INTRAVENOUS at 03:07

## 2018-07-20 RX ADMIN — SCOPOLAMINE 1 PATCH: 1 PATCH, EXTENDED RELEASE TRANSDERMAL at 02:07

## 2018-07-20 RX ADMIN — ONDANSETRON 4 MG: 2 INJECTION, SOLUTION INTRAMUSCULAR; INTRAVENOUS at 06:07

## 2018-07-20 RX ADMIN — MORPHINE SULFATE 4 MG: 4 INJECTION INTRAVENOUS at 06:07

## 2018-07-20 RX ADMIN — HYDROCODONE BITARTRATE AND ACETAMINOPHEN 1 TABLET: 10; 325 TABLET ORAL at 09:07

## 2018-07-20 RX ADMIN — DEXAMETHASONE SODIUM PHOSPHATE 4 MG: 4 INJECTION, SOLUTION INTRA-ARTICULAR; INTRALESIONAL; INTRAMUSCULAR; INTRAVENOUS; SOFT TISSUE at 03:07

## 2018-07-20 RX ADMIN — CEFAZOLIN SODIUM 2 G: 2 SOLUTION INTRAVENOUS at 05:07

## 2018-07-20 RX ADMIN — PIPERACILLIN AND TAZOBACTAM 4.5 G: 4; .5 INJECTION, POWDER, LYOPHILIZED, FOR SOLUTION INTRAVENOUS; PARENTERAL at 01:07

## 2018-07-20 RX ADMIN — INSULIN ASPART 2 UNITS: 100 INJECTION, SOLUTION INTRAVENOUS; SUBCUTANEOUS at 10:07

## 2018-07-20 RX ADMIN — SUCCINYLCHOLINE CHLORIDE 100 MG: 20 INJECTION, SOLUTION INTRAMUSCULAR; INTRAVENOUS at 02:07

## 2018-07-20 RX ADMIN — LIDOCAINE HYDROCHLORIDE 40 MG: 10 INJECTION, SOLUTION INFILTRATION; PERINEURAL at 02:07

## 2018-07-20 RX ADMIN — Medication 10 MG: at 03:07

## 2018-07-20 RX ADMIN — PROPOFOL 150 MG: 10 INJECTION, EMULSION INTRAVENOUS at 02:07

## 2018-07-20 RX ADMIN — ROCURONIUM BROMIDE 5 MG: 10 INJECTION, SOLUTION INTRAVENOUS at 02:07

## 2018-07-20 RX ADMIN — SODIUM CHLORIDE, SODIUM LACTATE, POTASSIUM CHLORIDE, AND CALCIUM CHLORIDE: 600; 310; 30; 20 INJECTION, SOLUTION INTRAVENOUS at 02:07

## 2018-07-20 RX ADMIN — PROMETHAZINE HYDROCHLORIDE 6.25 MG: 25 INJECTION INTRAMUSCULAR; INTRAVENOUS at 08:07

## 2018-07-20 RX ADMIN — INSULIN ASPART 2 UNITS: 100 INJECTION, SOLUTION INTRAVENOUS; SUBCUTANEOUS at 06:07

## 2018-07-20 RX ADMIN — SODIUM CHLORIDE, SODIUM LACTATE, POTASSIUM CHLORIDE, AND CALCIUM CHLORIDE: .6; .31; .03; .02 INJECTION, SOLUTION INTRAVENOUS at 05:07

## 2018-07-20 NOTE — HOSPITAL COURSE
"Patient admitted for cholecystitis. Initial workup revealed mildly elevated biliruibin, a mild leukocytosis and findings consistent with acalculous cholecystitis on abdominal US and CT scan of the abdomen. General surgery primary. At this time, she is being conservatively managed with IV antibiotics and supportive care. POD # 1 s/p laparoscopic cholecystectomy. Heart rate in the 110's-120's. Mostly likely due to dehydration 2/2 vomiting. IVFs started. Will continue to monitor closely.    7/22  I was called in the room this morning by the nurse to evaluate patient for tachypnea .She was seen yesterday by dr Miller . Patient is confused tachypnea ,tachycardia and sweating . Review of chart patient received clonidine for tachycardia/hypertension yesterday and 500 ml iv bolus for dehydration.BMP showing increase anion gap metabolic acidosis . Patient looks dehydrated with high concern for sepsis .Ordered iv fluids bolus . Started antibotics blood cultures . Start bicarb drip for severe acidosis . Transfer to icu . cta negative for pe . Ct abdomen pelvis no new acute finding . Concern for dka discuss with icu will wait on next bmp also replace K before giving insulin   7/23  vitals and labs responding appropriately to hydration and bicarb infusion; hypophosphatemia refractory to overnight supplementation; reports feeling "better" but continues to complain of nausea, malaise, fatigue  7/24  Patient feels better today and electrolytes replaced . Discussed with icu team and surgery . Will move patient out of icu  7/25  Patient stable to discharge home on oral antibotics with cipro/metronidazole for 10 days as per surgery. Patient denies of any complains   "

## 2018-07-20 NOTE — PROGRESS NOTES
[facility] -   General Surgery  Progress Note    Subjective:     History of Present Illness:  Yandy Cabral is a 47 y.o. female with a pmhx of diabetes, dyslipidemia. She is s/p gastric sleeve in 2012. She presented to the ER with RUQ abdominal pain that started yesterday. Sx are associated with nausea and vomiting. She denies fever or chills. CT and ultrasound done in ED are concerning for acalculous cholecystitis.     Post-Op Info:  * No surgery found *         Interval History: she continues to c/o severe RUQ pain and tenderness as well as nausea, emesis.     Medications:  Continuous Infusions:   sodium chloride 0.9% 100 mL/hr at 07/19/18 1108     Scheduled Meds:   acetaminophen  1,000 mg Intravenous Q8H    piperacillin-tazobactam (ZOSYN) IVPB  4.5 g Intravenous Q8H    pravastatin  20 mg Oral Daily     PRN Meds:acetaminophen, dextrose 50%, diphenhydrAMINE, glucagon (human recombinant), HYDROcodone-acetaminophen, insulin aspart U-100, ondansetron, promethazine (PHENERGAN) IVPB, ramelteon     Review of patient's allergies indicates:  No Known Allergies  Objective:     Vital Signs (Most Recent):  Temp: 98.8 °F (37.1 °C) (07/20/18 0827)  Pulse: (!) 112 (07/20/18 0827)  Resp: 19 (07/20/18 0827)  BP: 136/74 (07/20/18 0827)  SpO2: 95 % (07/20/18 0827) Vital Signs (24h Range):  Temp:  [98.2 °F (36.8 °C)-99.9 °F (37.7 °C)] 98.8 °F (37.1 °C)  Pulse:  [] 112  Resp:  [16-20] 19  SpO2:  [95 %-98 %] 95 %  BP: (122-140)/(61-74) 136/74     Weight: 69.5 kg (153 lb 3.5 oz)  Body mass index is 25.5 kg/m².    Intake/Output - Last 3 Shifts       07/18 0700 - 07/19 0659 07/19 0700 - 07/20 0659 07/20 0700 - 07/21 0659    I.V. (mL/kg)  743.3 (10.7)     IV Piggyback 1000 250     Total Intake(mL/kg) 1000 (14.4) 993.3 (14.3)     Net +1000 +993.3             Urine Occurrence  1 x 1 x    Emesis Occurrence  1 x           Physical Exam   Constitutional: She is oriented to person, place, and time. She appears  well-developed and well-nourished.   HENT:   Head: Normocephalic and atraumatic.   Eyes: EOM are normal.   Cardiovascular: Normal rate and regular rhythm.    Pulmonary/Chest: Effort normal. No respiratory distress.   Abdominal: Soft. There is tenderness (RUQ). There is guarding (ruq).   Musculoskeletal: Normal range of motion.   Neurological: She is alert and oriented to person, place, and time.   Skin: Skin is warm and dry.   Psychiatric: She has a normal mood and affect. Thought content normal.   Vitals reviewed.      Significant Labs:  CBC:   Recent Labs  Lab 07/20/18  0455   WBC 23.42*   RBC 4.21   HGB 13.3   HCT 41.4      MCV 98   MCH 31.6*   MCHC 32.1     CMP:   Recent Labs  Lab 07/20/18  0455   *   CALCIUM 9.6   ALBUMIN 3.6   PROT 7.7      K 4.3   CO2 10*      BUN 14   CREATININE 1.0   ALKPHOS 263*   *   *   BILITOT 1.5*       Significant Diagnostics:  I have reviewed all pertinent imaging results/findings within the past 24 hours.    Assessment/Plan:     * Cholecystitis, acute    Continue IV fluids, NPO, IV antibiotics. IV tylenol for pain  HIDA scan to further eval for acute cholecystitis  Hepatitis panel        Hyperlipemia    Home meds        Type 2 diabetes mellitus with diabetic neuropathy, without long-term current use of insulin    Insulin sliding scale  [facility]              Daxa Costa PA-C  General Surgery  [facility] -

## 2018-07-20 NOTE — SUBJECTIVE & OBJECTIVE
Interval History: No acute events overnight. Patient continues to c/o severe RUQ pain and tenderness. General surgery following.     Review of Systems   Constitutional: Positive for chills. Negative for appetite change, diaphoresis, fatigue and fever.   HENT: Negative for congestion, ear pain, mouth sores, sore throat and trouble swallowing.    Eyes: Negative for pain and visual disturbance.   Respiratory: Negative for cough, chest tightness and shortness of breath.    Cardiovascular: Negative for chest pain, palpitations and leg swelling.   Gastrointestinal: Positive for abdominal pain, nausea and vomiting. Negative for constipation.   Endocrine: Negative for cold intolerance, heat intolerance, polydipsia and polyuria.   Genitourinary: Negative for dysuria, frequency and hematuria.   Musculoskeletal: Negative for arthralgias, back pain, myalgias and neck pain.   Skin: Negative for pallor, rash and wound.   Allergic/Immunologic: Negative for environmental allergies and immunocompromised state.   Neurological: Negative for dizziness, seizures, syncope, weakness, numbness and headaches.   Hematological: Negative for adenopathy. Does not bruise/bleed easily.   Psychiatric/Behavioral: Negative for agitation, confusion and sleep disturbance.     Objective:     Vital Signs (Most Recent):  Temp: 98.2 °F (36.8 °C) (07/20/18 1645)  Pulse: 110 (07/20/18 1645)  Resp: 18 (07/20/18 1645)  BP: (!) 148/74 (07/20/18 1645)  SpO2: 95 % (07/20/18 1645) Vital Signs (24h Range):  Temp:  [98.2 °F (36.8 °C)-99.8 °F (37.7 °C)] 98.2 °F (36.8 °C)  Pulse:  [] 110  Resp:  [15-22] 18  SpO2:  [95 %-100 %] 95 %  BP: (136-157)/(69-86) 148/74     Weight: 69.5 kg (153 lb 3.5 oz)  Body mass index is 25.5 kg/m².    Intake/Output Summary (Last 24 hours) at 07/20/18 1650  Last data filed at 07/20/18 1620   Gross per 24 hour   Intake          2448.33 ml   Output                0 ml   Net          2448.33 ml      Physical Exam   Constitutional: She  is oriented to person, place, and time. She appears well-developed and well-nourished. No distress.   HENT:   Head: Normocephalic and atraumatic.   Eyes: Conjunctivae are normal.   PERRL   Neck: Neck supple. No JVD present.   Cardiovascular: Normal rate, regular rhythm and normal heart sounds.    Pulmonary/Chest: Effort normal and breath sounds normal.   Abdominal: Soft. Bowel sounds are normal. She exhibits no distension. There is tenderness. There is guarding.   Musculoskeletal: Normal range of motion.   Neurological: She is alert and oriented to person, place, and time.   Skin: Skin is warm and dry.   Psychiatric: She has a normal mood and affect. Her behavior is normal. Thought content normal.   Nursing note and vitals reviewed.      Significant Labs:   BMP:   Recent Labs  Lab 07/20/18  0455   *      K 4.3      CO2 10*   BUN 14   CREATININE 1.0   CALCIUM 9.6     CBC:   Recent Labs  Lab 07/19/18 0241 07/20/18  0455   WBC 12.97* 23.42*   HGB 14.1 13.3   HCT 41.2 41.4    265     CMP:   Recent Labs  Lab 07/19/18 0241 07/20/18  0455    136   K 4.0 4.3    103   CO2 19* 10*   * 162*   BUN 16 14   CREATININE 0.8 1.0   CALCIUM 9.9 9.6   PROT 8.2 7.7   ALBUMIN 4.4 3.6   BILITOT 1.3* 1.5*   ALKPHOS 100 263*   AST 21 368*   ALT 25 434*   ANIONGAP 18* 23*   EGFRNONAA >60 >60     All pertinent labs within the past 24 hours have been reviewed.    Significant Imaging:   Imaging Results          US Abdomen Limited (Final result)  Result time 07/19/18 09:27:09    Final result by GAVIN Gonzalez Sr., MD (07/19/18 09:27:09)                 Impression:      The findings are consistent with the patient's history and characteristic of acalculous cholecystitis.  The wall of the gallbladder is thickened. It measures 5 mm in thickness. There is a tiny amount of pericholecystic fluid. There is a positive sonographic Ordonez's sign.  There is no cholelithiasis or common bile duct  dilatation.      Electronically signed by: Giorgio Gonzalez MD  Date:    07/19/2018  Time:    09:27             Narrative:    EXAMINATION:  US ABDOMEN LIMITED    CLINICAL HISTORY:  Abnormal findings on diagnostic imaging of other specified body structures; suspected acute cholecystitis    TECHNIQUE:  Multiple static ultrasound images are submitted for interpretation.    FINDINGS:  Comparison was made to a CT examination of the abdomen and pelvis performed on 07/19/2018.  The liver is normal in appearance. It measures 13.1 cm in length. There is no intrahepatic biliary ductal dilatation. The common bile duct has a diameter of 4 mm.  There is a small amount of echogenic sludge in the dependent portion of the gallbladder.  The wall of the gallbladder is thickened.  It measures 5 mm in thickness.  There is a tiny amount of pericholecystic fluid.  There is a positive sonographic Ordonez's sign.  There is no cholelithiasis.  The visualized portion of the pancreas is normal in appearance. The right kidney is normal in appearance.  It measures 11.6 cm in length. The main portal vein has a normal venous waveform with flow directed towards the liver. It has a velocity of 42 cm/sec.                               CT Abdomen Pelvis With Contrast (Final result)  Result time 07/19/18 08:06:40    Final result by Len Rivas MD (07/19/18 08:06:40)                 Impression:      Distended gallbladder with wall thickening and pericholecystic fluid without radiopaque stones identified. Findings are concerning for acute cholecystitis.  Ultrasound or HIDA scan can be obtained as clinically warranted.    All CT scans at this facility use dose modulation, iterative reconstruction, and/or weight based dosing when appropriate to reduce radiation dose to as low as reasonable achievable.      Electronically signed by: Len Rivas MD  Date:    07/19/2018  Time:    08:06             Narrative:    EXAMINATION:  CT ABDOMEN PELVIS WITH  CONTRAST    CLINICAL HISTORY:  Infection, abdomen-pelvis;    TECHNIQUE:  Low dose axial images, sagittal and coronal reformations were obtained from the lung bases to the pubic symphysis following the IV administration of 75 mL of Omnipaque 350.    COMPARISON:  None    FINDINGS:  Heart: Normal size. No effusion.  Coronary artery disease noted    Lung Bases: Clear.    Liver: Normal size with decreased attenuation consistent with hepatic steatosis.  No focal lesions.    Gallbladder: Distended gallbladder with wall thickening and pericholecystic fluid without radiopaque stones identified.  Findings are concerning for cholecystitis.  Ultrasound or HIDA scan can be obtained as clinically warranted.    Bile Ducts: Common bile duct measures 8 mm which is prominent without evidence of radiopaque choledocholithiasis.    Pancreas: No mass. No peripancreatic fat stranding.    Spleen: Normal.    Adrenals: Normal.    Kidneys/Ureters: Normal enhancement.  No mass or  hydroureteronephrosis.    Bladder: No wall thickening.    Reproductive organs: Normal.    GI Tract/Mesentery: Postoperative changes are seen within the stomach.  Small hiatal hernia.  No evidence of bowel obstruction or inflammation.  Normal appendix.  Moderate constipation    Peritoneal Space: No ascites or free air.    Retroperitoneum: No significant adenopathy.    Abdominal wall: Normal.    Vasculature: No aneurysm.    Bones: No acute fracture. No suspicious lytic or sclerotic lesions.

## 2018-07-20 NOTE — PLAN OF CARE
Problem: Patient Care Overview  Goal: Plan of Care Review  Outcome: Ongoing (interventions implemented as appropriate)  Plan of care reviewed and discussed with patient.  No acute distress noted.  Safety and fall precautions in place.  Patient free from injury.  Pain managed adequately.  Nausea medication given around the clock.  NPO status maintained.  HIDA scan scheduled to around noon.  IV hydration maintained.  Will continue to monitor.    12 hour chart check complete.

## 2018-07-20 NOTE — HOSPITAL COURSE
Hospital day 2: wbc increased, LFT's increased. She continued to c/o severe RUQ pain.   Taken to OR on 7'/20 for laparoscopic cholecystectomy.  On 7/21 she c/o vomiting and inadequate pain control.  On 7/22 she began to have labored breathing, tachycardia, and low pH.  She was transferred to ICU.  She responded to fluid resuscitation.  By 07/25/2018 her vitals and labs had normalized and she had good urine output.  She was felt to be stable for discharge.

## 2018-07-20 NOTE — PROGRESS NOTES
[facility] Hospital Medicine  Progress Note    Patient Name: Yandy Cabral  MRN: [id number]  Patient Class: IP- Inpatient   Admission Date: 7/19/2018  Length of Stay: 1 days  Attending Physician: Fran Chaidez MD  Primary Care Provider: LEONID Ramos Jr, MD        Subjective:     Principal Problem:Cholecystitis, acute    HPI:  Yandy Cabral is a 47 year old female with DM II and hyperlipidemia who presented to the ED on 7/18/18 with complaints of abdominal pain, nausea and vomiting. Symptoms began yesterday and have been constant. Her abdominal pain is located across her upper abdomen. Her emesis is described as bilious. There are associated chills. She denies fever. In the ED, she was found to have a mildly elevated biliruibin, a mild leukocytosis and findings consistent with acalculous cholecystitis on abdominal US and CT scan of the abdomen. At this time, she is being conservatively managed with IV antibiotics and supportive care.     Hospital Course:  Patient admitted for cholecystitis. Initial workup revealed mildly elevated biliruibin, a mild leukocytosis and findings consistent with acalculous cholecystitis on abdominal US and CT scan of the abdomen. General surgery primary. At this time, she is being conservatively managed with IV antibiotics and supportive care. WBCs trending up.     Interval History: No acute events overnight. Patient continues to c/o severe RUQ pain and tenderness. General surgery following.     Review of Systems   Constitutional: Positive for chills. Negative for appetite change, diaphoresis, fatigue and fever.   HENT: Negative for congestion, ear pain, mouth sores, sore throat and trouble swallowing.    Eyes: Negative for pain and visual disturbance.   Respiratory: Negative for cough, chest tightness and shortness of breath.    Cardiovascular: Negative for chest pain, palpitations and leg swelling.   Gastrointestinal: Positive for abdominal pain, nausea and  vomiting. Negative for constipation.   Endocrine: Negative for cold intolerance, heat intolerance, polydipsia and polyuria.   Genitourinary: Negative for dysuria, frequency and hematuria.   Musculoskeletal: Negative for arthralgias, back pain, myalgias and neck pain.   Skin: Negative for pallor, rash and wound.   Allergic/Immunologic: Negative for environmental allergies and immunocompromised state.   Neurological: Negative for dizziness, seizures, syncope, weakness, numbness and headaches.   Hematological: Negative for adenopathy. Does not bruise/bleed easily.   Psychiatric/Behavioral: Negative for agitation, confusion and sleep disturbance.     Objective:     Vital Signs (Most Recent):  Temp: 98.2 °F (36.8 °C) (07/20/18 1645)  Pulse: 110 (07/20/18 1645)  Resp: 18 (07/20/18 1645)  BP: (!) 148/74 (07/20/18 1645)  SpO2: 95 % (07/20/18 1645) Vital Signs (24h Range):  Temp:  [98.2 °F (36.8 °C)-99.8 °F (37.7 °C)] 98.2 °F (36.8 °C)  Pulse:  [] 110  Resp:  [15-22] 18  SpO2:  [95 %-100 %] 95 %  BP: (136-157)/(69-86) 148/74     Weight: 69.5 kg (153 lb 3.5 oz)  Body mass index is 25.5 kg/m².    Intake/Output Summary (Last 24 hours) at 07/20/18 1650  Last data filed at 07/20/18 1620   Gross per 24 hour   Intake          2448.33 ml   Output                0 ml   Net          2448.33 ml      Physical Exam   Constitutional: She is oriented to person, place, and time. She appears well-developed and well-nourished. No distress.   HENT:   Head: Normocephalic and atraumatic.   Eyes: Conjunctivae are normal.   PERRL   Neck: Neck supple. No JVD present.   Cardiovascular: Normal rate, regular rhythm and normal heart sounds.    Pulmonary/Chest: Effort normal and breath sounds normal.   Abdominal: Soft. Bowel sounds are normal. She exhibits no distension. There is tenderness. There is guarding.   Musculoskeletal: Normal range of motion.   Neurological: She is alert and oriented to person, place, and time.   Skin: Skin is warm and  dry.   Psychiatric: She has a normal mood and affect. Her behavior is normal. Thought content normal.   Nursing note and vitals reviewed.      Significant Labs:   BMP:   Recent Labs  Lab 07/20/18  0455   *      K 4.3      CO2 10*   BUN 14   CREATININE 1.0   CALCIUM 9.6     CBC:   Recent Labs  Lab 07/19/18  0241 07/20/18  0455   WBC 12.97* 23.42*   HGB 14.1 13.3   HCT 41.2 41.4    265     CMP:   Recent Labs  Lab 07/19/18  0241 07/20/18  0455    136   K 4.0 4.3    103   CO2 19* 10*   * 162*   BUN 16 14   CREATININE 0.8 1.0   CALCIUM 9.9 9.6   PROT 8.2 7.7   ALBUMIN 4.4 3.6   BILITOT 1.3* 1.5*   ALKPHOS 100 263*   AST 21 368*   ALT 25 434*   ANIONGAP 18* 23*   EGFRNONAA >60 >60     All pertinent labs within the past 24 hours have been reviewed.    Significant Imaging:   Imaging Results          US Abdomen Limited (Final result)  Result time 07/19/18 09:27:09    Final result by GAVIN Gonzalez Sr., MD (07/19/18 09:27:09)                 Impression:      The findings are consistent with the patient's history and characteristic of acalculous cholecystitis.  The wall of the gallbladder is thickened. It measures 5 mm in thickness. There is a tiny amount of pericholecystic fluid. There is a positive sonographic Ordonez's sign.  There is no cholelithiasis or common bile duct dilatation.      Electronically signed by: Giorgio Gonzalez MD  Date:    07/19/2018  Time:    09:27             Narrative:    EXAMINATION:  US ABDOMEN LIMITED    CLINICAL HISTORY:  Abnormal findings on diagnostic imaging of other specified body structures; suspected acute cholecystitis    TECHNIQUE:  Multiple static ultrasound images are submitted for interpretation.    FINDINGS:  Comparison was made to a CT examination of the abdomen and pelvis performed on 07/19/2018.  The liver is normal in appearance. It measures 13.1 cm in length. There is no intrahepatic biliary ductal dilatation. The common bile duct has  a diameter of 4 mm.  There is a small amount of echogenic sludge in the dependent portion of the gallbladder.  The wall of the gallbladder is thickened.  It measures 5 mm in thickness.  There is a tiny amount of pericholecystic fluid.  There is a positive sonographic Ordonez's sign.  There is no cholelithiasis.  The visualized portion of the pancreas is normal in appearance. The right kidney is normal in appearance.  It measures 11.6 cm in length. The main portal vein has a normal venous waveform with flow directed towards the liver. It has a velocity of 42 cm/sec.                               CT Abdomen Pelvis With Contrast (Final result)  Result time 07/19/18 08:06:40    Final result by Len Rivas MD (07/19/18 08:06:40)                 Impression:      Distended gallbladder with wall thickening and pericholecystic fluid without radiopaque stones identified. Findings are concerning for acute cholecystitis.  Ultrasound or HIDA scan can be obtained as clinically warranted.    All CT scans at this facility use dose modulation, iterative reconstruction, and/or weight based dosing when appropriate to reduce radiation dose to as low as reasonable achievable.      Electronically signed by: Len Rivas MD  Date:    07/19/2018  Time:    08:06             Narrative:    EXAMINATION:  CT ABDOMEN PELVIS WITH CONTRAST    CLINICAL HISTORY:  Infection, abdomen-pelvis;    TECHNIQUE:  Low dose axial images, sagittal and coronal reformations were obtained from the lung bases to the pubic symphysis following the IV administration of 75 mL of Omnipaque 350.    COMPARISON:  None    FINDINGS:  Heart: Normal size. No effusion.  Coronary artery disease noted    Lung Bases: Clear.    Liver: Normal size with decreased attenuation consistent with hepatic steatosis.  No focal lesions.    Gallbladder: Distended gallbladder with wall thickening and pericholecystic fluid without radiopaque stones identified.  Findings are concerning for  cholecystitis.  Ultrasound or HIDA scan can be obtained as clinically warranted.    Bile Ducts: Common bile duct measures 8 mm which is prominent without evidence of radiopaque choledocholithiasis.    Pancreas: No mass. No peripancreatic fat stranding.    Spleen: Normal.    Adrenals: Normal.    Kidneys/Ureters: Normal enhancement.  No mass or  hydroureteronephrosis.    Bladder: No wall thickening.    Reproductive organs: Normal.    GI Tract/Mesentery: Postoperative changes are seen within the stomach.  Small hiatal hernia.  No evidence of bowel obstruction or inflammation.  Normal appendix.  Moderate constipation    Peritoneal Space: No ascites or free air.    Retroperitoneum: No significant adenopathy.    Abdominal wall: Normal.    Vasculature: No aneurysm.    Bones: No acute fracture. No suspicious lytic or sclerotic lesions.                              Assessment/Plan:      * Cholecystitis, acute    General Surgery primary   NPO, IV antibiotics, supportive care.   WBCs trending up           Hyperlipemia    NPO , will resume statin when appropriate.          Type 2 diabetes mellitus with diabetic neuropathy, without long-term current use of insulin    -NISS.  -hemoglobin A1C 7.8.               VTE Risk Mitigation         Ordered     enoxaparin injection 40 mg  Daily      07/20/18 1657     Place sequential compression device  Until discontinued      07/20/18 1657     IP VTE HIGH RISK PATIENT  Once      07/20/18 1657              Ana Hernandez NP  Department of Hospital Medicine   [facility]

## 2018-07-20 NOTE — SUBJECTIVE & OBJECTIVE
Interval History: she continues to c/o severe RUQ pain and tenderness as well as nausea, emesis.     Medications:  Continuous Infusions:   sodium chloride 0.9% 100 mL/hr at 07/19/18 1108     Scheduled Meds:   acetaminophen  1,000 mg Intravenous Q8H    piperacillin-tazobactam (ZOSYN) IVPB  4.5 g Intravenous Q8H    pravastatin  20 mg Oral Daily     PRN Meds:acetaminophen, dextrose 50%, diphenhydrAMINE, glucagon (human recombinant), HYDROcodone-acetaminophen, insulin aspart U-100, ondansetron, promethazine (PHENERGAN) IVPB, ramelteon     Review of patient's allergies indicates:  No Known Allergies  Objective:     Vital Signs (Most Recent):  Temp: 98.8 °F (37.1 °C) (07/20/18 0827)  Pulse: (!) 112 (07/20/18 0827)  Resp: 19 (07/20/18 0827)  BP: 136/74 (07/20/18 0827)  SpO2: 95 % (07/20/18 0827) Vital Signs (24h Range):  Temp:  [98.2 °F (36.8 °C)-99.9 °F (37.7 °C)] 98.8 °F (37.1 °C)  Pulse:  [] 112  Resp:  [16-20] 19  SpO2:  [95 %-98 %] 95 %  BP: (122-140)/(61-74) 136/74     Weight: 69.5 kg (153 lb 3.5 oz)  Body mass index is 25.5 kg/m².    Intake/Output - Last 3 Shifts       07/18 0700 - 07/19 0659 07/19 0700 - 07/20 0659 07/20 0700 - 07/21 0659    I.V. (mL/kg)  743.3 (10.7)     IV Piggyback 1000 250     Total Intake(mL/kg) 1000 (14.4) 993.3 (14.3)     Net +1000 +993.3             Urine Occurrence  1 x 1 x    Emesis Occurrence  1 x           Physical Exam   Constitutional: She is oriented to person, place, and time. She appears well-developed and well-nourished.   HENT:   Head: Normocephalic and atraumatic.   Eyes: EOM are normal.   Cardiovascular: Normal rate and regular rhythm.    Pulmonary/Chest: Effort normal. No respiratory distress.   Abdominal: Soft. There is tenderness (RUQ). There is guarding (ruq).   Musculoskeletal: Normal range of motion.   Neurological: She is alert and oriented to person, place, and time.   Skin: Skin is warm and dry.   Psychiatric: She has a normal mood and affect. Thought content  normal.   Vitals reviewed.      Significant Labs:  CBC:   Recent Labs  Lab 07/20/18  0455   WBC 23.42*   RBC 4.21   HGB 13.3   HCT 41.4      MCV 98   MCH 31.6*   MCHC 32.1     CMP:   Recent Labs  Lab 07/20/18  0455   *   CALCIUM 9.6   ALBUMIN 3.6   PROT 7.7      K 4.3   CO2 10*      BUN 14   CREATININE 1.0   ALKPHOS 263*   *   *   BILITOT 1.5*       Significant Diagnostics:  I have reviewed all pertinent imaging results/findings within the past 24 hours.

## 2018-07-20 NOTE — TRANSFER OF CARE
"Anesthesia Transfer of Care Note    Patient: Yandy Cabral    Procedure(s) Performed: Procedure(s) (LRB):  CHOLECYSTECTOMY-LAPAROSCOPIC (N/A)    Patient location: PACU    Anesthesia Type: general    Transport from OR: Transported from OR on room air with adequate spontaneous ventilation    Post pain: adequate analgesia    Post assessment: no apparent anesthetic complications    Post vital signs: stable    Level of consciousness: responds to stimulation    Nausea/Vomiting: no nausea/vomiting    Complications: none    Transfer of care protocol was followed      Last vitals:   Visit Vitals  BP (!) 156/77 (BP Location: Right arm, Patient Position: Lying)   Pulse (!) 120   Temp 37.4 °C (99.4 °F) (Temporal)   Resp 16   Ht 5' 5" (1.651 m)   Wt 69.5 kg (153 lb 3.5 oz)   SpO2 96%   Breastfeeding? No   BMI 25.50 kg/m²     "

## 2018-07-20 NOTE — ANESTHESIA PREPROCEDURE EVALUATION
07/20/2018  Yandy Cabral is a 47 y.o., female.    Anesthesia Evaluation    I have reviewed the Patient Summary Reports.    I have reviewed the Nursing Notes.   I have reviewed the Medications.     Review of Systems  Anesthesia Hx:  No problems with previous Anesthesia  Denies Family Hx of Anesthesia complications.   Denies Personal Hx of Anesthesia complications.   Social:  Non-Smoker    Hematology/Oncology:  Hematology Normal   Oncology Normal     EENT/Dental:EENT/Dental Normal   Cardiovascular:  Cardiovascular Normal Exercise tolerance: good     Pulmonary:  Pulmonary Normal    Renal/:  Renal/ Normal     Hepatic/GI:  Hepatic/GI Normal    Musculoskeletal:  Musculoskeletal Normal    Neurological:  Neurology Normal    Endocrine:   Diabetes, poorly controlled, type 2    Dermatological:  Skin Normal    Psych:  Psychiatric Normal           Physical Exam  General:  Well nourished    Airway/Jaw/Neck:  Airway Findings: Mouth Opening: Normal Tongue: Normal  General Airway Assessment: Adult, Average  Mallampati: II  TM Distance: Normal, at least 6 cm       Chest/Lungs:  Chest/Lungs Findings: Clear to auscultation, Normal Respiratory Rate     Heart/Vascular:  Heart Findings: Rate: Normal  Rhythm: Regular Rhythm  Sounds: Normal        Mental Status:  Mental Status Findings:  Cooperative, Alert and Oriented         Anesthesia Plan  Type of Anesthesia, risks & benefits discussed:  Anesthesia Type:  general  Patient's Preference:   Intra-op Monitoring Plan:   Intra-op Monitoring Plan Comments:   Post Op Pain Control Plan:   Post Op Pain Control Plan Comments:   Induction:   IV  Beta Blocker:  Patient is not currently on a Beta-Blocker (No further documentation required).       Informed Consent: Patient understands risks and agrees with Anesthesia plan.  Questions answered. Anesthesia consent signed with  patient.  ASA Score: 2     Day of Surgery Review of History & Physical: I have interviewed and examined the patient. I have reviewed the patient's H&P dated: 7/20/18. There are no significant changes.          Ready For Surgery From Anesthesia Perspective.

## 2018-07-20 NOTE — ANESTHESIA RELEASE NOTE
"Anesthesia Release from PACU Note    Patient: Yandy Cabral    Procedure(s) Performed: Procedure(s) (LRB):  CHOLECYSTECTOMY-LAPAROSCOPIC (N/A)    Anesthesia type: general    Post pain: Adequate analgesia    Post assessment: no apparent anesthetic complications, tolerated procedure well and no evidence of recall    Last Vitals:   Visit Vitals  BP (!) 156/77 (BP Location: Right arm, Patient Position: Lying)   Pulse (!) 120   Temp 37.4 °C (99.4 °F) (Temporal)   Resp 16   Ht 5' 5" (1.651 m)   Wt 69.5 kg (153 lb 3.5 oz)   SpO2 96%   Breastfeeding? No   BMI 25.50 kg/m²       Post vital signs: stable    Level of consciousness: responds to stimulation    Nausea/Vomiting: no nausea/no vomiting    Complications: none    Airway Patency: patent    Respiratory: unassisted    Cardiovascular: stable and blood pressure at baseline    Hydration: euvolemic     "

## 2018-07-20 NOTE — INTERVAL H&P NOTE
I have seen the patient and reviewed this note.  The patient is not improving with IV antibiotic treatment.  The risks of laparoscopic cholecystectomy including bleeding, infection, common bile duct injury, bile leak, injury to abdominal organs, failure to alleviate symptoms, pulmonary embolus, deep vein thrombosis, cardiac event, and possibility of conversion to an open operation were explained to the patient.   The nature of the patient's condition, probability of success, risks of refusing treatment, and alternatives and risks of the alternatives were also explained.  The patient verbalized understanding.

## 2018-07-20 NOTE — PLAN OF CARE
Problem: Patient Care Overview  Goal: Plan of Care Review  Outcome: Ongoing (interventions implemented as appropriate)  Patient remained free from injury. C/o abd pain and nausea. PRN medications administered as prescribed. Resting with eyes closed. No distress noted. Oriented x3. Respirations even and non labored. IV patent and infusing. Bed locked and low. Call light in reach. Safety measures in place. Will continue to monitor. Reviewed plan of care. Patient verbalized understanding and teach back.    12hr chart check complete.

## 2018-07-20 NOTE — OP NOTE
Operative Note       SURGERY DATE:  7/20/2018     PRE-OP DIAGNOSIS:  Cholecystitis, acute [K81.0]    POST-OP DIAGNOSIS:  Cholecystitis, acute [K81.0]    Procedure(s) (LRB):  CHOLECYSTECTOMY-LAPAROSCOPIC (N/A)    Surgeon(s) and Role:     * Louis O. Jeansonne IV, MD - Primary    ASSISTANT:  None    TASKS PERFORMED BY ASSISTANT:  No assistant    ANESTHESIA: General    FINDINGS: gangrenous cholecystitis    GRAFTS/IMPLANTS:  None    ESTIMATED BLOOD LOSS: 30 mL              COMPLICATIONS:  None    SPECIMEN:  Gallbladder    DESCRIPTION OF PROCEDURE:    The patient was placed on the operating table in the supine position. SCDs were placed for DVT prophylaxis and antibiotics were administered within an hour prior to the incision. General anesthesia was induced.  The abdomen was prepped and draped in a sterile fashion.    An incision was made at the umbilicus. The fascia was elevated and the Veress needle was inserted. Proper position was confirmed by aspiration and saline meniscus test. The abdomen was insufflated with carbon dioxide to a pressure of 15 mmHg. The patient tolerated insufflation well. A 12-mm trocar was then inserted.     The laparoscope was inserted and the abdomen inspected. No injuries from the initial trocar placement were noted. Three 5-mm trocars were then inserted, one in the right epigastrium and two along the right costal margin. The abdomen was inspected and no gross abnormalities were found.  The table was placed in the reverse Trendelenburg position with the right side up.     Filmy adhesions to the gallbladder were lysed bluntly. The dome of the gallbladder was grasped with an atraumatic grasper passed through the lateral port and retracted over the dome of the liver. The infundibulum was also grasped with an atraumatic grasper through the midclavicular port and retracted toward the right lower quadrant. This maneuver exposed Calot's triangle. The peritoneum overlying the gallbladder infundibulum  was then incised and the cystic duct and cystic artery were identified and circumferentially dissected. The cystic duct and cystic artery were then doubly clipped and divided close to the gallbladder.     The gallbladder was then dissected from its peritoneal attachments by electrocautery. Hemostasis was checked and the gallbladder was removed through the umbilical port. The gallbladder was passed off the table as a specimen. The gallbladder fossa was copiously irrigated with saline, and hemostasis was obtained. There was no evidence of bleeding of the gallbladder fossa or cystic artery or leakage of bile from the cystic duct stump.    Secondary trocars were removed under direct vision. No bleeding was noted. The umbilical port fascia was closed with a 0-Vicryl suture. The skin for all ports was closed with 4-0 Monocryl subcuticular sutures.     The patient tolerated the procedure well and was taken to the postanesthesia care unit in stable condition.             CONDITION: Good    DISPOSITION: PACU - hemodynamically stable.

## 2018-07-20 NOTE — ASSESSMENT & PLAN NOTE
Continue IV fluids, NPO, IV antibiotics. IV tylenol for pain  HIDA scan to further eval for acute cholecystitis  Hepatitis panel

## 2018-07-20 NOTE — PLAN OF CARE
Patient prepared for surgery. Spouse at bedside. Jewelry removed and given to  along with cell phone.

## 2018-07-21 PROBLEM — R00.0 TACHYCARDIA: Status: ACTIVE | Noted: 2018-07-21

## 2018-07-21 LAB
ALBUMIN SERPL BCP-MCNC: 3.1 G/DL
ALP SERPL-CCNC: 158 U/L
ALT SERPL W/O P-5'-P-CCNC: 238 U/L
ANION GAP SERPL CALC-SCNC: 20 MMOL/L
AST SERPL-CCNC: 115 U/L
BILIRUB SERPL-MCNC: 0.6 MG/DL
BUN SERPL-MCNC: 18 MG/DL
CALCIUM SERPL-MCNC: 10.6 MG/DL
CHLORIDE SERPL-SCNC: 109 MMOL/L
CO2 SERPL-SCNC: 10 MMOL/L
CREAT SERPL-MCNC: 1.5 MG/DL
ERYTHROCYTE [DISTWIDTH] IN BLOOD BY AUTOMATED COUNT: 13.7 %
EST. GFR  (AFRICAN AMERICAN): 47 ML/MIN/1.73 M^2
EST. GFR  (NON AFRICAN AMERICAN): 41 ML/MIN/1.73 M^2
GLUCOSE SERPL-MCNC: 214 MG/DL
HCT VFR BLD AUTO: 39.1 %
HGB BLD-MCNC: 12.9 G/DL
MCH RBC QN AUTO: 31.9 PG
MCHC RBC AUTO-ENTMCNC: 33 G/DL
MCV RBC AUTO: 97 FL
PLATELET # BLD AUTO: 270 K/UL
PMV BLD AUTO: 9.9 FL
POCT GLUCOSE: 173 MG/DL (ref 70–110)
POCT GLUCOSE: 195 MG/DL (ref 70–110)
POCT GLUCOSE: 201 MG/DL (ref 70–110)
POCT GLUCOSE: 206 MG/DL (ref 70–110)
POTASSIUM SERPL-SCNC: 4.4 MMOL/L
PROT SERPL-MCNC: 7.6 G/DL
RBC # BLD AUTO: 4.04 M/UL
SODIUM SERPL-SCNC: 139 MMOL/L
WBC # BLD AUTO: 15.24 K/UL

## 2018-07-21 PROCEDURE — 25000003 PHARM REV CODE 250: Performed by: NURSE PRACTITIONER

## 2018-07-21 PROCEDURE — 99024 POSTOP FOLLOW-UP VISIT: CPT | Mod: ,,, | Performed by: SURGERY

## 2018-07-21 PROCEDURE — 85027 COMPLETE CBC AUTOMATED: CPT

## 2018-07-21 PROCEDURE — 99900035 HC TECH TIME PER 15 MIN (STAT)

## 2018-07-21 PROCEDURE — 96372 THER/PROPH/DIAG INJ SC/IM: CPT

## 2018-07-21 PROCEDURE — 11000001 HC ACUTE MED/SURG PRIVATE ROOM

## 2018-07-21 PROCEDURE — 25000003 PHARM REV CODE 250: Performed by: SURGERY

## 2018-07-21 PROCEDURE — 81000 URINALYSIS NONAUTO W/SCOPE: CPT

## 2018-07-21 PROCEDURE — 80053 COMPREHEN METABOLIC PANEL: CPT

## 2018-07-21 PROCEDURE — 63600175 PHARM REV CODE 636 W HCPCS: Performed by: SURGERY

## 2018-07-21 PROCEDURE — 94799 UNLISTED PULMONARY SVC/PX: CPT

## 2018-07-21 PROCEDURE — 25000003 PHARM REV CODE 250: Performed by: INTERNAL MEDICINE

## 2018-07-21 PROCEDURE — S0030 INJECTION, METRONIDAZOLE: HCPCS | Performed by: SURGERY

## 2018-07-21 PROCEDURE — 36415 COLL VENOUS BLD VENIPUNCTURE: CPT

## 2018-07-21 PROCEDURE — 94760 N-INVAS EAR/PLS OXIMETRY 1: CPT

## 2018-07-21 RX ORDER — ACETAMINOPHEN 10 MG/ML
1000 INJECTION, SOLUTION INTRAVENOUS EVERY 8 HOURS
Status: COMPLETED | OUTPATIENT
Start: 2018-07-21 | End: 2018-07-22

## 2018-07-21 RX ORDER — SODIUM CHLORIDE 9 MG/ML
INJECTION, SOLUTION INTRAVENOUS CONTINUOUS
Status: DISCONTINUED | OUTPATIENT
Start: 2018-07-21 | End: 2018-07-22

## 2018-07-21 RX ORDER — MEPERIDINE HYDROCHLORIDE 25 MG/ML
12.5 INJECTION INTRAMUSCULAR; INTRAVENOUS; SUBCUTANEOUS
Status: DISPENSED | OUTPATIENT
Start: 2018-07-21 | End: 2018-07-24

## 2018-07-21 RX ORDER — ACETAMINOPHEN 325 MG/1
650 TABLET ORAL EVERY 6 HOURS PRN
Status: DISCONTINUED | OUTPATIENT
Start: 2018-07-22 | End: 2018-07-25 | Stop reason: HOSPADM

## 2018-07-21 RX ORDER — MEPERIDINE HYDROCHLORIDE 25 MG/ML
25 INJECTION INTRAMUSCULAR; INTRAVENOUS; SUBCUTANEOUS
Status: DISCONTINUED | OUTPATIENT
Start: 2018-07-21 | End: 2018-07-21

## 2018-07-21 RX ORDER — ONDANSETRON 2 MG/ML
8 INJECTION INTRAMUSCULAR; INTRAVENOUS EVERY 6 HOURS PRN
Status: DISCONTINUED | OUTPATIENT
Start: 2018-07-21 | End: 2018-07-25 | Stop reason: HOSPADM

## 2018-07-21 RX ORDER — CALCIUM CARBONATE 200(500)MG
500 TABLET,CHEWABLE ORAL ONCE AS NEEDED
Status: COMPLETED | OUTPATIENT
Start: 2018-07-21 | End: 2018-07-21

## 2018-07-21 RX ADMIN — CHLORHEXIDINE GLUCONATE 10 ML: 1.2 RINSE ORAL at 08:07

## 2018-07-21 RX ADMIN — PROMETHAZINE HYDROCHLORIDE 6.25 MG: 25 INJECTION INTRAMUSCULAR; INTRAVENOUS at 06:07

## 2018-07-21 RX ADMIN — PROMETHAZINE HYDROCHLORIDE 12.5 MG: 25 INJECTION INTRAMUSCULAR; INTRAVENOUS at 11:07

## 2018-07-21 RX ADMIN — STANDARDIZED SENNA CONCENTRATE AND DOCUSATE SODIUM 1 TABLET: 8.6; 5 TABLET, FILM COATED ORAL at 08:07

## 2018-07-21 RX ADMIN — ONDANSETRON 8 MG: 2 INJECTION, SOLUTION INTRAMUSCULAR; INTRAVENOUS at 11:07

## 2018-07-21 RX ADMIN — SODIUM CHLORIDE 500 ML: 0.9 INJECTION, SOLUTION INTRAVENOUS at 11:07

## 2018-07-21 RX ADMIN — PROMETHAZINE HYDROCHLORIDE 12.5 MG: 25 INJECTION INTRAMUSCULAR; INTRAVENOUS at 08:07

## 2018-07-21 RX ADMIN — SODIUM CHLORIDE, SODIUM LACTATE, POTASSIUM CHLORIDE, AND CALCIUM CHLORIDE: .6; .31; .03; .02 INJECTION, SOLUTION INTRAVENOUS at 06:07

## 2018-07-21 RX ADMIN — ACETAMINOPHEN 1000 MG: 10 INJECTION, SOLUTION INTRAVENOUS at 10:07

## 2018-07-21 RX ADMIN — SODIUM CHLORIDE: 0.9 INJECTION, SOLUTION INTRAVENOUS at 11:07

## 2018-07-21 RX ADMIN — CALCIUM CARBONATE (ANTACID) CHEW TAB 500 MG 500 MG: 500 CHEW TAB at 08:07

## 2018-07-21 RX ADMIN — SODIUM CHLORIDE: 0.9 INJECTION, SOLUTION INTRAVENOUS at 04:07

## 2018-07-21 RX ADMIN — CLONIDINE HYDROCHLORIDE 0.1 MG: 0.1 TABLET ORAL at 12:07

## 2018-07-21 RX ADMIN — ONDANSETRON 8 MG: 2 INJECTION, SOLUTION INTRAMUSCULAR; INTRAVENOUS at 06:07

## 2018-07-21 RX ADMIN — SODIUM CHLORIDE: 0.9 INJECTION, SOLUTION INTRAVENOUS at 02:07

## 2018-07-21 RX ADMIN — ONDANSETRON 8 MG: 2 INJECTION, SOLUTION INTRAMUSCULAR; INTRAVENOUS at 09:07

## 2018-07-21 RX ADMIN — MEPERIDINE HYDROCHLORIDE 25 MG: 25 INJECTION INTRAMUSCULAR; INTRAVENOUS; SUBCUTANEOUS at 09:07

## 2018-07-21 RX ADMIN — ACETAMINOPHEN 1000 MG: 10 INJECTION, SOLUTION INTRAVENOUS at 12:07

## 2018-07-21 RX ADMIN — INSULIN ASPART 2 UNITS: 100 INJECTION, SOLUTION INTRAVENOUS; SUBCUTANEOUS at 11:07

## 2018-07-21 RX ADMIN — CEFAZOLIN SODIUM 2 G: 2 SOLUTION INTRAVENOUS at 12:07

## 2018-07-21 RX ADMIN — ENOXAPARIN SODIUM 40 MG: 100 INJECTION SUBCUTANEOUS at 08:07

## 2018-07-21 RX ADMIN — METRONIDAZOLE 500 MG: 500 INJECTION, SOLUTION INTRAVENOUS at 01:07

## 2018-07-21 RX ADMIN — MEPERIDINE HYDROCHLORIDE 12.5 MG: 25 INJECTION INTRAMUSCULAR; INTRAVENOUS; SUBCUTANEOUS at 06:07

## 2018-07-21 NOTE — PROGRESS NOTES
[facility] Hospital Medicine  Progress Note    Patient Name: Yandy Cabral  MRN: [id number]  Patient Class: IP- Inpatient   Admission Date: 7/19/2018  Length of Stay: 2 days  Attending Physician: Fran Chaidez MD  Primary Care Provider: LEONID Ramos Jr, MD        Subjective:     Principal Problem:Cholecystitis, acute    HPI:  Yandy Cabral is a 47 year old female with DM II and hyperlipidemia who presented to the ED on 7/18/18 with complaints of abdominal pain, nausea and vomiting. Symptoms began yesterday and have been constant. Her abdominal pain is located across her upper abdomen. Her emesis is described as bilious. There are associated chills. She denies fever. In the ED, she was found to have a mildly elevated biliruibin, a mild leukocytosis and findings consistent with acalculous cholecystitis on abdominal US and CT scan of the abdomen. At this time, she is being conservatively managed with IV antibiotics and supportive care.     Hospital Course:  Patient admitted for cholecystitis. Initial workup revealed mildly elevated biliruibin, a mild leukocytosis and findings consistent with acalculous cholecystitis on abdominal US and CT scan of the abdomen. General surgery primary. At this time, she is being conservatively managed with IV antibiotics and supportive care. POD # 1 s/p laparoscopic cholecystectomy. Heart rate in the 110's-120's. Mostly likely due to dehydration 2/2 vomiting. IVFs started. Will continue to monitor closely.      Interval History: Patient experiencing nausea and vomiting. Pain uncontrolled. Medications adjusted per primary team. IVFs started.     Review of Systems   Constitutional: Negative for appetite change, chills, diaphoresis, fatigue and fever.   HENT: Negative for congestion, ear pain, mouth sores, sore throat and trouble swallowing.    Eyes: Negative for pain and visual disturbance.   Respiratory: Negative for cough, chest tightness and shortness of breath.     Cardiovascular: Negative for chest pain, palpitations and leg swelling.   Gastrointestinal: Positive for abdominal pain, nausea and vomiting. Negative for constipation.   Endocrine: Negative for cold intolerance, heat intolerance, polydipsia and polyuria.   Genitourinary: Negative for dysuria, frequency and hematuria.   Musculoskeletal: Negative for arthralgias, back pain, myalgias and neck pain.   Skin: Negative for pallor, rash and wound.   Allergic/Immunologic: Negative for environmental allergies and immunocompromised state.   Neurological: Negative for dizziness, seizures, syncope, weakness, numbness and headaches.   Hematological: Negative for adenopathy. Does not bruise/bleed easily.   Psychiatric/Behavioral: Negative for agitation, confusion and sleep disturbance.     Objective:     Vital Signs (Most Recent):  Temp: 98.2 °F (36.8 °C) (07/21/18 1108)  Pulse: (!) 127 (07/21/18 1108)  Resp: (!) 23 (07/21/18 1108)  BP: (!) 162/77 (07/21/18 1108)  SpO2: 98 % (07/21/18 1108) Vital Signs (24h Range):  Temp:  [97.7 °F (36.5 °C)-99.4 °F (37.4 °C)] 98.2 °F (36.8 °C)  Pulse:  [109-127] 127  Resp:  [15-23] 23  SpO2:  [94 %-100 %] 98 %  BP: ()/(54-86) 162/77     Weight: 69.5 kg (153 lb 3.5 oz)  Body mass index is 25.5 kg/m².    Intake/Output Summary (Last 24 hours) at 07/21/18 1128  Last data filed at 07/21/18 0857   Gross per 24 hour   Intake             5815 ml   Output                0 ml   Net             5815 ml      Physical Exam   Constitutional: She is oriented to person, place, and time. She appears well-developed and well-nourished. No distress.   HENT:   Head: Normocephalic and atraumatic.   Eyes: Conjunctivae are normal.   PERRL   Neck: Neck supple. No JVD present.   Cardiovascular: Normal rate, regular rhythm and normal heart sounds.    Pulmonary/Chest: Effort normal and breath sounds normal.   Abdominal: Soft. Bowel sounds are normal. She exhibits no distension. There is tenderness. There is no  guarding.   Musculoskeletal: Normal range of motion.   Neurological: She is alert and oriented to person, place, and time.   Skin: Skin is warm and dry.   Psychiatric: She has a normal mood and affect. Her behavior is normal. Thought content normal.   Nursing note and vitals reviewed.      Significant Labs:   BMP:   Recent Labs  Lab 07/21/18  0502   *      K 4.4      CO2 10*   BUN 18   CREATININE 1.5*   CALCIUM 10.6*     CBC:   Recent Labs  Lab 07/20/18  0455 07/21/18  0502   WBC 23.42* 15.24*   HGB 13.3 12.9   HCT 41.4 39.1    270     CMP:   Recent Labs  Lab 07/20/18  0455 07/21/18  0502    139   K 4.3 4.4    109   CO2 10* 10*   * 214*   BUN 14 18   CREATININE 1.0 1.5*   CALCIUM 9.6 10.6*   PROT 7.7 7.6   ALBUMIN 3.6 3.1*   BILITOT 1.5* 0.6   ALKPHOS 263* 158*   * 115*   * 238*   ANIONGAP 23* 20*   EGFRNONAA >60 41*       Significant Imaging:   Imaging Results          US Abdomen Limited (Final result)  Result time 07/19/18 09:27:09    Final result by GAVIN Gonzalez Sr., MD (07/19/18 09:27:09)                 Impression:      The findings are consistent with the patient's history and characteristic of acalculous cholecystitis.  The wall of the gallbladder is thickened. It measures 5 mm in thickness. There is a tiny amount of pericholecystic fluid. There is a positive sonographic Ordonez's sign.  There is no cholelithiasis or common bile duct dilatation.      Electronically signed by: Giorgio Gonzalez MD  Date:    07/19/2018  Time:    09:27             Narrative:    EXAMINATION:  US ABDOMEN LIMITED    CLINICAL HISTORY:  Abnormal findings on diagnostic imaging of other specified body structures; suspected acute cholecystitis    TECHNIQUE:  Multiple static ultrasound images are submitted for interpretation.    FINDINGS:  Comparison was made to a CT examination of the abdomen and pelvis performed on 07/19/2018.  The liver is normal in appearance. It measures  13.1 cm in length. There is no intrahepatic biliary ductal dilatation. The common bile duct has a diameter of 4 mm.  There is a small amount of echogenic sludge in the dependent portion of the gallbladder.  The wall of the gallbladder is thickened.  It measures 5 mm in thickness.  There is a tiny amount of pericholecystic fluid.  There is a positive sonographic Ordonez's sign.  There is no cholelithiasis.  The visualized portion of the pancreas is normal in appearance. The right kidney is normal in appearance.  It measures 11.6 cm in length. The main portal vein has a normal venous waveform with flow directed towards the liver. It has a velocity of 42 cm/sec.                               CT Abdomen Pelvis With Contrast (Final result)  Result time 07/19/18 08:06:40    Final result by Len Rivas MD (07/19/18 08:06:40)                 Impression:      Distended gallbladder with wall thickening and pericholecystic fluid without radiopaque stones identified. Findings are concerning for acute cholecystitis.  Ultrasound or HIDA scan can be obtained as clinically warranted.    All CT scans at this facility use dose modulation, iterative reconstruction, and/or weight based dosing when appropriate to reduce radiation dose to as low as reasonable achievable.      Electronically signed by: Len Rivas MD  Date:    07/19/2018  Time:    08:06             Narrative:    EXAMINATION:  CT ABDOMEN PELVIS WITH CONTRAST    CLINICAL HISTORY:  Infection, abdomen-pelvis;    TECHNIQUE:  Low dose axial images, sagittal and coronal reformations were obtained from the lung bases to the pubic symphysis following the IV administration of 75 mL of Omnipaque 350.    COMPARISON:  None    FINDINGS:  Heart: Normal size. No effusion.  Coronary artery disease noted    Lung Bases: Clear.    Liver: Normal size with decreased attenuation consistent with hepatic steatosis.  No focal lesions.    Gallbladder: Distended gallbladder with wall  thickening and pericholecystic fluid without radiopaque stones identified.  Findings are concerning for cholecystitis.  Ultrasound or HIDA scan can be obtained as clinically warranted.    Bile Ducts: Common bile duct measures 8 mm which is prominent without evidence of radiopaque choledocholithiasis.    Pancreas: No mass. No peripancreatic fat stranding.    Spleen: Normal.    Adrenals: Normal.    Kidneys/Ureters: Normal enhancement.  No mass or  hydroureteronephrosis.    Bladder: No wall thickening.    Reproductive organs: Normal.    GI Tract/Mesentery: Postoperative changes are seen within the stomach.  Small hiatal hernia.  No evidence of bowel obstruction or inflammation.  Normal appendix.  Moderate constipation    Peritoneal Space: No ascites or free air.    Retroperitoneum: No significant adenopathy.    Abdominal wall: Normal.    Vasculature: No aneurysm.    Bones: No acute fracture. No suspicious lytic or sclerotic lesions.                              Assessment/Plan:      * Cholecystitis, acute    General Surgery primary   NPO, IV antibiotics, supportive care.   POD #1, +n/v, also pain uncontrolled   Primary team managing           Tachycardia    's-120's  Pain uncontrolled, +nausea and vomiting. Medications adjusted    Labs reflect dehydration   IVFs started   Monitor closely           Hyperlipemia    NPO , will resume statin when appropriate.          Type 2 diabetes mellitus with diabetic neuropathy, without long-term current use of insulin    -NISS.  -hemoglobin A1C 7.8.               VTE Risk Mitigation         Ordered     enoxaparin injection 40 mg  Daily      07/20/18 1657     Place sequential compression device  Until discontinued      07/20/18 1657     IP VTE HIGH RISK PATIENT  Once      07/20/18 1657              nAa Hernandez NP  Department of Hospital Medicine   [facility]

## 2018-07-21 NOTE — ASSESSMENT & PLAN NOTE
's-120's  Pain uncontrolled, +nausea and vomiting. Medications adjusted    Labs reflect dehydration   IVFs started   Monitor closely      contact guard

## 2018-07-21 NOTE — ANESTHESIA POSTPROCEDURE EVALUATION
"Anesthesia Post Evaluation    Patient: Yandy Cabral    Procedure(s) Performed: Procedure(s) (LRB):  CHOLECYSTECTOMY-LAPAROSCOPIC (N/A)    Final Anesthesia Type: general  Patient location during evaluation: PACU  Patient participation: Yes- Able to Participate  Level of consciousness: awake and alert  Post-procedure vital signs: reviewed and stable  Pain management: adequate  Airway patency: stentor  PONV status at discharge: No PONV  Anesthetic complications: no      Cardiovascular status: blood pressure returned to baseline  Respiratory status: unassisted  Hydration status: euvolemic  Follow-up not needed.        Visit Vitals  BP (!) 99/54   Pulse 109   Temp 36.7 °C (98 °F)   Resp 20   Ht 5' 5" (1.651 m)   Wt 69.5 kg (153 lb 3.5 oz)   SpO2 95%   Breastfeeding? No   BMI 25.50 kg/m²       Pain/Tita Score: Pain Assessment Performed: Yes (7/20/2018 11:12 PM)  Presence of Pain: complains of pain/discomfort (7/20/2018 11:12 PM)  Pain Rating Prior to Med Admin: 8 (7/20/2018  9:42 PM)  Pain Rating Post Med Admin: 6 (7/20/2018 10:42 PM)  Tita Score: 10 (7/20/2018  4:45 PM)      "

## 2018-07-21 NOTE — PLAN OF CARE
Fall precautions maintained, pt remains free from injuries/fall. Pt independent. NPO. PRN pain med given for abd pain. Abd lap sites clean and dry. No redness, warmth, swelling noted. Open to air. PRN Zofran and phenergan given for nausea. Pt reports that nausea is better than this morning. No vomiting noted. Pt had 500 ml NS bolus. HR lower. PRN clonidine given for increased BP. BP WNL at this time. IVF infusing as ordered.  POC and meds discussed with pt and , verbalized understanding. Side rails x 2, bed locked and low, phone and call light w/in reach. VSS. Will cont to monitor.

## 2018-07-21 NOTE — PLAN OF CARE
Problem: Patient Care Overview  Goal: Plan of Care Review  Outcome: Ongoing (interventions implemented as appropriate)  Fall precautions maintained, pt remains free from injuries/fall. Pt independent. Tolerating sips of clear liquids. PRN pain med given for abd pain. Abd lap sites clean and dry. No redness, warmth, swelling noted. Open to air. PRN Zofran given for nausea. IVF infusing as ordered.  No vomiting. POC and meds discussed with pt and family, verbalized understanding. Side rails x 2, bed locked and low, phone and call light w/in reach. VSS. Will cont to monitor.

## 2018-07-21 NOTE — PROGRESS NOTES
General Surgery Progress Note    SUBJECTIVE:    C/o nausea, vomiting, inadequate pain control    OBJECTIVE:    Tachycardic    Physical Exam:   General: no distress   Abdomen: appropriately tender to palpation, no peritoneal signs  Incisions:  Clean, dry, intact    Labs and images reviewed.      Recent Labs  Lab 07/19/18  0241 07/20/18  0455 07/21/18  0502   WBC 12.97* 23.42* 15.24*   HCT 41.2 41.4 39.1        ASSESSMENT/PLAN:    Active Hospital Problems    Diagnosis  POA    *Cholecystitis, acute [K81.0]  Yes    Acalculous cholecystitis [K81.9]  Yes    Hyperlipemia [E78.5]  Yes    Type 2 diabetes mellitus with diabetic neuropathy, without long-term current use of insulin [E11.40]  Yes     Dr Mario Oconnor follows        Resolved Hospital Problems    Diagnosis Date Resolved POA   No resolved problems to display.        Adjust pain meds, keep NPO, increase nausea meds.  Check labs in am.

## 2018-07-21 NOTE — SUBJECTIVE & OBJECTIVE
Interval History: Patient experiencing nausea and vomiting. Pain uncontrolled. Medications adjusted per primary team. IVFs started.     Review of Systems   Constitutional: Negative for appetite change, chills, diaphoresis, fatigue and fever.   HENT: Negative for congestion, ear pain, mouth sores, sore throat and trouble swallowing.    Eyes: Negative for pain and visual disturbance.   Respiratory: Negative for cough, chest tightness and shortness of breath.    Cardiovascular: Negative for chest pain, palpitations and leg swelling.   Gastrointestinal: Positive for abdominal pain, nausea and vomiting. Negative for constipation.   Endocrine: Negative for cold intolerance, heat intolerance, polydipsia and polyuria.   Genitourinary: Negative for dysuria, frequency and hematuria.   Musculoskeletal: Negative for arthralgias, back pain, myalgias and neck pain.   Skin: Negative for pallor, rash and wound.   Allergic/Immunologic: Negative for environmental allergies and immunocompromised state.   Neurological: Negative for dizziness, seizures, syncope, weakness, numbness and headaches.   Hematological: Negative for adenopathy. Does not bruise/bleed easily.   Psychiatric/Behavioral: Negative for agitation, confusion and sleep disturbance.     Objective:     Vital Signs (Most Recent):  Temp: 98.2 °F (36.8 °C) (07/21/18 1108)  Pulse: (!) 127 (07/21/18 1108)  Resp: (!) 23 (07/21/18 1108)  BP: (!) 162/77 (07/21/18 1108)  SpO2: 98 % (07/21/18 1108) Vital Signs (24h Range):  Temp:  [97.7 °F (36.5 °C)-99.4 °F (37.4 °C)] 98.2 °F (36.8 °C)  Pulse:  [109-127] 127  Resp:  [15-23] 23  SpO2:  [94 %-100 %] 98 %  BP: ()/(54-86) 162/77     Weight: 69.5 kg (153 lb 3.5 oz)  Body mass index is 25.5 kg/m².    Intake/Output Summary (Last 24 hours) at 07/21/18 1128  Last data filed at 07/21/18 0857   Gross per 24 hour   Intake             5815 ml   Output                0 ml   Net             5815 ml      Physical Exam   Constitutional: She is  oriented to person, place, and time. She appears well-developed and well-nourished. No distress.   HENT:   Head: Normocephalic and atraumatic.   Eyes: Conjunctivae are normal.   PERRL   Neck: Neck supple. No JVD present.   Cardiovascular: Normal rate, regular rhythm and normal heart sounds.    Pulmonary/Chest: Effort normal and breath sounds normal.   Abdominal: Soft. Bowel sounds are normal. She exhibits no distension. There is tenderness. There is no guarding.   Musculoskeletal: Normal range of motion.   Neurological: She is alert and oriented to person, place, and time.   Skin: Skin is warm and dry.   Psychiatric: She has a normal mood and affect. Her behavior is normal. Thought content normal.   Nursing note and vitals reviewed.      Significant Labs:   BMP:   Recent Labs  Lab 07/21/18  0502   *      K 4.4      CO2 10*   BUN 18   CREATININE 1.5*   CALCIUM 10.6*     CBC:   Recent Labs  Lab 07/20/18  0455 07/21/18  0502   WBC 23.42* 15.24*   HGB 13.3 12.9   HCT 41.4 39.1    270     CMP:   Recent Labs  Lab 07/20/18  0455 07/21/18  0502    139   K 4.3 4.4    109   CO2 10* 10*   * 214*   BUN 14 18   CREATININE 1.0 1.5*   CALCIUM 9.6 10.6*   PROT 7.7 7.6   ALBUMIN 3.6 3.1*   BILITOT 1.5* 0.6   ALKPHOS 263* 158*   * 115*   * 238*   ANIONGAP 23* 20*   EGFRNONAA >60 41*       Significant Imaging:   Imaging Results          US Abdomen Limited (Final result)  Result time 07/19/18 09:27:09    Final result by GAVIN Gonzalez Sr., MD (07/19/18 09:27:09)                 Impression:      The findings are consistent with the patient's history and characteristic of acalculous cholecystitis.  The wall of the gallbladder is thickened. It measures 5 mm in thickness. There is a tiny amount of pericholecystic fluid. There is a positive sonographic Ordonez's sign.  There is no cholelithiasis or common bile duct dilatation.      Electronically signed by: Giorgio Gonzalez  MD  Date:    07/19/2018  Time:    09:27             Narrative:    EXAMINATION:  US ABDOMEN LIMITED    CLINICAL HISTORY:  Abnormal findings on diagnostic imaging of other specified body structures; suspected acute cholecystitis    TECHNIQUE:  Multiple static ultrasound images are submitted for interpretation.    FINDINGS:  Comparison was made to a CT examination of the abdomen and pelvis performed on 07/19/2018.  The liver is normal in appearance. It measures 13.1 cm in length. There is no intrahepatic biliary ductal dilatation. The common bile duct has a diameter of 4 mm.  There is a small amount of echogenic sludge in the dependent portion of the gallbladder.  The wall of the gallbladder is thickened.  It measures 5 mm in thickness.  There is a tiny amount of pericholecystic fluid.  There is a positive sonographic Ordonez's sign.  There is no cholelithiasis.  The visualized portion of the pancreas is normal in appearance. The right kidney is normal in appearance.  It measures 11.6 cm in length. The main portal vein has a normal venous waveform with flow directed towards the liver. It has a velocity of 42 cm/sec.                               CT Abdomen Pelvis With Contrast (Final result)  Result time 07/19/18 08:06:40    Final result by Len Rivas MD (07/19/18 08:06:40)                 Impression:      Distended gallbladder with wall thickening and pericholecystic fluid without radiopaque stones identified. Findings are concerning for acute cholecystitis.  Ultrasound or HIDA scan can be obtained as clinically warranted.    All CT scans at this facility use dose modulation, iterative reconstruction, and/or weight based dosing when appropriate to reduce radiation dose to as low as reasonable achievable.      Electronically signed by: Len Rivas MD  Date:    07/19/2018  Time:    08:06             Narrative:    EXAMINATION:  CT ABDOMEN PELVIS WITH CONTRAST    CLINICAL HISTORY:  Infection,  abdomen-pelvis;    TECHNIQUE:  Low dose axial images, sagittal and coronal reformations were obtained from the lung bases to the pubic symphysis following the IV administration of 75 mL of Omnipaque 350.    COMPARISON:  None    FINDINGS:  Heart: Normal size. No effusion.  Coronary artery disease noted    Lung Bases: Clear.    Liver: Normal size with decreased attenuation consistent with hepatic steatosis.  No focal lesions.    Gallbladder: Distended gallbladder with wall thickening and pericholecystic fluid without radiopaque stones identified.  Findings are concerning for cholecystitis.  Ultrasound or HIDA scan can be obtained as clinically warranted.    Bile Ducts: Common bile duct measures 8 mm which is prominent without evidence of radiopaque choledocholithiasis.    Pancreas: No mass. No peripancreatic fat stranding.    Spleen: Normal.    Adrenals: Normal.    Kidneys/Ureters: Normal enhancement.  No mass or  hydroureteronephrosis.    Bladder: No wall thickening.    Reproductive organs: Normal.    GI Tract/Mesentery: Postoperative changes are seen within the stomach.  Small hiatal hernia.  No evidence of bowel obstruction or inflammation.  Normal appendix.  Moderate constipation    Peritoneal Space: No ascites or free air.    Retroperitoneum: No significant adenopathy.    Abdominal wall: Normal.    Vasculature: No aneurysm.    Bones: No acute fracture. No suspicious lytic or sclerotic lesions.

## 2018-07-21 NOTE — ASSESSMENT & PLAN NOTE
General Surgery primary   NPO, IV antibiotics, supportive care.   POD #1, +n/v, also pain uncontrolled   Primary team managing

## 2018-07-22 PROBLEM — A41.9 SEVERE SEPSIS: Status: ACTIVE | Noted: 2018-07-22

## 2018-07-22 PROBLEM — E86.0 DEHYDRATION: Status: ACTIVE | Noted: 2018-07-22

## 2018-07-22 PROBLEM — E87.20 METABOLIC ACIDOSIS: Status: ACTIVE | Noted: 2018-07-22

## 2018-07-22 PROBLEM — R65.20 SEVERE SEPSIS: Status: ACTIVE | Noted: 2018-07-22

## 2018-07-22 LAB
ALBUMIN SERPL BCP-MCNC: 2.9 G/DL
ALLENS TEST: ABNORMAL
ALP SERPL-CCNC: 146 U/L
ALT SERPL W/O P-5'-P-CCNC: 145 U/L
ANION GAP SERPL CALC-SCNC: 17 MMOL/L
ANION GAP SERPL CALC-SCNC: 21 MMOL/L
ANION GAP SERPL CALC-SCNC: 25 MMOL/L
ANION GAP SERPL CALC-SCNC: ABNORMAL MMOL/L
APTT BLDCRRT: 34.4 SEC
AST SERPL-CCNC: 57 U/L
B-OH-BUTYR BLD STRIP-SCNC: 5.2 MMOL/L
BACTERIA #/AREA URNS HPF: ABNORMAL /HPF
BACTERIA #/AREA URNS HPF: ABNORMAL /HPF
BILIRUB SERPL-MCNC: 0.4 MG/DL
BILIRUB UR QL STRIP: ABNORMAL
BILIRUB UR QL STRIP: NEGATIVE
BUN SERPL-MCNC: 22 MG/DL
BUN SERPL-MCNC: 22 MG/DL
BUN SERPL-MCNC: 26 MG/DL
BUN SERPL-MCNC: 28 MG/DL
CALCIUM SERPL-MCNC: 8.3 MG/DL
CALCIUM SERPL-MCNC: 8.5 MG/DL
CALCIUM SERPL-MCNC: 9 MG/DL
CALCIUM SERPL-MCNC: 9.9 MG/DL
CHLORIDE SERPL-SCNC: 110 MMOL/L
CHLORIDE SERPL-SCNC: 114 MMOL/L
CHLORIDE SERPL-SCNC: 116 MMOL/L
CHLORIDE SERPL-SCNC: 116 MMOL/L
CLARITY UR: ABNORMAL
CLARITY UR: CLEAR
CO2 SERPL-SCNC: 12 MMOL/L
CO2 SERPL-SCNC: 5 MMOL/L
CO2 SERPL-SCNC: 7 MMOL/L
CO2 SERPL-SCNC: <5 MMOL/L
COLOR UR: YELLOW
COLOR UR: YELLOW
CREAT SERPL-MCNC: 1.5 MG/DL
CREAT SERPL-MCNC: 1.6 MG/DL
ERYTHROCYTE [DISTWIDTH] IN BLOOD BY AUTOMATED COUNT: 14.6 %
EST. GFR  (AFRICAN AMERICAN): 44 ML/MIN/1.73 M^2
EST. GFR  (AFRICAN AMERICAN): 47 ML/MIN/1.73 M^2
EST. GFR  (NON AFRICAN AMERICAN): 38 ML/MIN/1.73 M^2
EST. GFR  (NON AFRICAN AMERICAN): 41 ML/MIN/1.73 M^2
GLUCOSE SERPL-MCNC: 232 MG/DL
GLUCOSE SERPL-MCNC: 274 MG/DL
GLUCOSE SERPL-MCNC: 348 MG/DL
GLUCOSE SERPL-MCNC: 358 MG/DL
GLUCOSE SERPL-MCNC: 367 MG/DL (ref 70–110)
GLUCOSE UR QL STRIP: ABNORMAL
GLUCOSE UR QL STRIP: ABNORMAL
GRAN CASTS #/AREA URNS LPF: 18 /LPF
GRAN CASTS #/AREA URNS LPF: 5 /LPF
HCO3 UR-SCNC: 2.7 MMOL/L (ref 24–28)
HCT VFR BLD AUTO: 39.9 %
HCT VFR BLD CALC: 33 %PCV (ref 36–54)
HGB BLD-MCNC: 12.3 G/DL
HGB UR QL STRIP: ABNORMAL
HGB UR QL STRIP: ABNORMAL
HYALINE CASTS #/AREA URNS LPF: 0 /LPF
HYALINE CASTS #/AREA URNS LPF: 0 /LPF
INR PPP: 1
KETONES UR QL STRIP: ABNORMAL
KETONES UR QL STRIP: ABNORMAL
LACTATE SERPL-SCNC: 1 MMOL/L
LACTATE SERPL-SCNC: 1.3 MMOL/L
LACTATE SERPL-SCNC: 1.3 MMOL/L
LEUKOCYTE ESTERASE UR QL STRIP: NEGATIVE
LEUKOCYTE ESTERASE UR QL STRIP: NEGATIVE
LIPASE SERPL-CCNC: 11 U/L
MAGNESIUM SERPL-MCNC: 2 MG/DL
MAGNESIUM SERPL-MCNC: 2.1 MG/DL
MCH RBC QN AUTO: 32 PG
MCHC RBC AUTO-ENTMCNC: 30.8 G/DL
MCV RBC AUTO: 104 FL
MICROSCOPIC COMMENT: ABNORMAL
MICROSCOPIC COMMENT: ABNORMAL
NITRITE UR QL STRIP: NEGATIVE
NITRITE UR QL STRIP: NEGATIVE
PCO2 BLDA: 12.4 MMHG (ref 35–45)
PH SMN: 6.95 [PH] (ref 7.35–7.45)
PH UR STRIP: 6 [PH] (ref 5–8)
PH UR STRIP: 6 [PH] (ref 5–8)
PHOSPHATE SERPL-MCNC: 2.9 MG/DL
PHOSPHATE SERPL-MCNC: 3.1 MG/DL
PHOSPHATE SERPL-MCNC: <1 MG/DL
PLATELET # BLD AUTO: 270 K/UL
PMV BLD AUTO: 10.3 FL
PO2 BLDA: 148 MMHG (ref 80–100)
POC BE: -29 MMOL/L
POC IONIZED CALCIUM: 1.17 MMOL/L (ref 1.06–1.42)
POC SATURATED O2: 97 % (ref 95–100)
POCT GLUCOSE: 237 MG/DL (ref 70–110)
POCT GLUCOSE: 238 MG/DL (ref 70–110)
POCT GLUCOSE: 295 MG/DL (ref 70–110)
POTASSIUM BLD-SCNC: 3.5 MMOL/L (ref 3.5–5.1)
POTASSIUM SERPL-SCNC: 2.8 MMOL/L
POTASSIUM SERPL-SCNC: 3.2 MMOL/L
POTASSIUM SERPL-SCNC: 3.2 MMOL/L
POTASSIUM SERPL-SCNC: 4 MMOL/L
PROCALCITONIN SERPL IA-MCNC: 10.53 NG/ML
PROT SERPL-MCNC: 7.3 G/DL
PROT UR QL STRIP: ABNORMAL
PROT UR QL STRIP: ABNORMAL
PROTHROMBIN TIME: 10 SEC
RBC # BLD AUTO: 3.84 M/UL
RBC #/AREA URNS HPF: 2 /HPF (ref 0–4)
RBC #/AREA URNS HPF: 3 /HPF (ref 0–4)
SAMPLE: ABNORMAL
SITE: ABNORMAL
SODIUM BLD-SCNC: 142 MMOL/L (ref 136–145)
SODIUM SERPL-SCNC: 140 MMOL/L
SODIUM SERPL-SCNC: 140 MMOL/L
SODIUM SERPL-SCNC: 142 MMOL/L
SODIUM SERPL-SCNC: 145 MMOL/L
SP GR UR STRIP: 1.02 (ref 1–1.03)
SP GR UR STRIP: >=1.03 (ref 1–1.03)
URN SPEC COLLECT METH UR: ABNORMAL
URN SPEC COLLECT METH UR: ABNORMAL
UROBILINOGEN UR STRIP-ACNC: NEGATIVE EU/DL
UROBILINOGEN UR STRIP-ACNC: NEGATIVE EU/DL
WBC # BLD AUTO: 21.82 K/UL
WBC #/AREA URNS HPF: 0 /HPF (ref 0–5)
WBC #/AREA URNS HPF: 1 /HPF (ref 0–5)

## 2018-07-22 PROCEDURE — 85610 PROTHROMBIN TIME: CPT

## 2018-07-22 PROCEDURE — 36600 WITHDRAWAL OF ARTERIAL BLOOD: CPT

## 2018-07-22 PROCEDURE — 36415 COLL VENOUS BLD VENIPUNCTURE: CPT

## 2018-07-22 PROCEDURE — 83690 ASSAY OF LIPASE: CPT

## 2018-07-22 PROCEDURE — 94799 UNLISTED PULMONARY SVC/PX: CPT

## 2018-07-22 PROCEDURE — 63600175 PHARM REV CODE 636 W HCPCS: Performed by: SURGERY

## 2018-07-22 PROCEDURE — 84145 PROCALCITONIN (PCT): CPT

## 2018-07-22 PROCEDURE — 25000003 PHARM REV CODE 250: Performed by: INTERNAL MEDICINE

## 2018-07-22 PROCEDURE — 84132 ASSAY OF SERUM POTASSIUM: CPT

## 2018-07-22 PROCEDURE — 85730 THROMBOPLASTIN TIME PARTIAL: CPT

## 2018-07-22 PROCEDURE — 80053 COMPREHEN METABOLIC PANEL: CPT

## 2018-07-22 PROCEDURE — 25500020 PHARM REV CODE 255: Performed by: SURGERY

## 2018-07-22 PROCEDURE — 82010 KETONE BODYS QUAN: CPT

## 2018-07-22 PROCEDURE — 81000 URINALYSIS NONAUTO W/SCOPE: CPT

## 2018-07-22 PROCEDURE — 25000003 PHARM REV CODE 250: Performed by: SURGERY

## 2018-07-22 PROCEDURE — 84100 ASSAY OF PHOSPHORUS: CPT

## 2018-07-22 PROCEDURE — 51702 INSERT TEMP BLADDER CATH: CPT

## 2018-07-22 PROCEDURE — 27000221 HC OXYGEN, UP TO 24 HOURS

## 2018-07-22 PROCEDURE — 83735 ASSAY OF MAGNESIUM: CPT

## 2018-07-22 PROCEDURE — 99291 CRITICAL CARE FIRST HOUR: CPT | Mod: ,,, | Performed by: NURSE PRACTITIONER

## 2018-07-22 PROCEDURE — A4216 STERILE WATER/SALINE, 10 ML: HCPCS | Performed by: INTERNAL MEDICINE

## 2018-07-22 PROCEDURE — 85014 HEMATOCRIT: CPT

## 2018-07-22 PROCEDURE — 20000000 HC ICU ROOM

## 2018-07-22 PROCEDURE — 87040 BLOOD CULTURE FOR BACTERIA: CPT

## 2018-07-22 PROCEDURE — 83735 ASSAY OF MAGNESIUM: CPT | Mod: 91

## 2018-07-22 PROCEDURE — 85027 COMPLETE CBC AUTOMATED: CPT

## 2018-07-22 PROCEDURE — 99900035 HC TECH TIME PER 15 MIN (STAT)

## 2018-07-22 PROCEDURE — 84295 ASSAY OF SERUM SODIUM: CPT

## 2018-07-22 PROCEDURE — 94761 N-INVAS EAR/PLS OXIMETRY MLT: CPT

## 2018-07-22 PROCEDURE — 96372 THER/PROPH/DIAG INJ SC/IM: CPT

## 2018-07-22 PROCEDURE — 63600175 PHARM REV CODE 636 W HCPCS: Performed by: INTERNAL MEDICINE

## 2018-07-22 PROCEDURE — 83605 ASSAY OF LACTIC ACID: CPT | Mod: 91

## 2018-07-22 PROCEDURE — 83605 ASSAY OF LACTIC ACID: CPT

## 2018-07-22 PROCEDURE — 63600175 PHARM REV CODE 636 W HCPCS: Performed by: NURSE PRACTITIONER

## 2018-07-22 PROCEDURE — 82803 BLOOD GASES ANY COMBINATION: CPT

## 2018-07-22 PROCEDURE — 82330 ASSAY OF CALCIUM: CPT

## 2018-07-22 PROCEDURE — 25000003 PHARM REV CODE 250: Performed by: NURSE PRACTITIONER

## 2018-07-22 PROCEDURE — 80048 BASIC METABOLIC PNL TOTAL CA: CPT

## 2018-07-22 PROCEDURE — 84100 ASSAY OF PHOSPHORUS: CPT | Mod: 91

## 2018-07-22 RX ORDER — INDOMETHACIN 25 MG/1
50 CAPSULE ORAL ONCE
Status: DISCONTINUED | OUTPATIENT
Start: 2018-07-22 | End: 2018-07-22

## 2018-07-22 RX ORDER — INDOMETHACIN 25 MG/1
100 CAPSULE ORAL ONCE
Status: COMPLETED | OUTPATIENT
Start: 2018-07-22 | End: 2018-07-22

## 2018-07-22 RX ORDER — POTASSIUM CHLORIDE 7.45 MG/ML
10 INJECTION INTRAVENOUS
Status: COMPLETED | OUTPATIENT
Start: 2018-07-22 | End: 2018-07-22

## 2018-07-22 RX ORDER — POTASSIUM CHLORIDE 20 MEQ/15ML
40 SOLUTION ORAL
Status: COMPLETED | OUTPATIENT
Start: 2018-07-22 | End: 2018-07-22

## 2018-07-22 RX ORDER — MEROPENEM AND SODIUM CHLORIDE 500 MG/50ML
500 INJECTION, SOLUTION INTRAVENOUS
Status: DISCONTINUED | OUTPATIENT
Start: 2018-07-22 | End: 2018-07-24

## 2018-07-22 RX ORDER — POTASSIUM CHLORIDE 750 MG/1
50 TABLET, EXTENDED RELEASE ORAL
Status: COMPLETED | OUTPATIENT
Start: 2018-07-22 | End: 2018-07-22

## 2018-07-22 RX ORDER — INSULIN ASPART 100 [IU]/ML
1-10 INJECTION, SOLUTION INTRAVENOUS; SUBCUTANEOUS EVERY 6 HOURS PRN
Status: DISCONTINUED | OUTPATIENT
Start: 2018-07-22 | End: 2018-07-24

## 2018-07-22 RX ORDER — POLYETHYLENE GLYCOL 3350 17 G/17G
17 POWDER, FOR SOLUTION ORAL DAILY
Status: DISCONTINUED | OUTPATIENT
Start: 2018-07-22 | End: 2018-07-25 | Stop reason: HOSPADM

## 2018-07-22 RX ORDER — SODIUM CHLORIDE 0.9 % (FLUSH) 0.9 %
3 SYRINGE (ML) INJECTION EVERY 8 HOURS
Status: DISCONTINUED | OUTPATIENT
Start: 2018-07-22 | End: 2018-07-25 | Stop reason: HOSPADM

## 2018-07-22 RX ADMIN — PROMETHAZINE HYDROCHLORIDE 12.5 MG: 25 INJECTION INTRAMUSCULAR; INTRAVENOUS at 09:07

## 2018-07-22 RX ADMIN — SODIUM BICARBONATE: 84 INJECTION, SOLUTION INTRAVENOUS at 08:07

## 2018-07-22 RX ADMIN — SODIUM CHLORIDE 1000 ML: 0.9 INJECTION, SOLUTION INTRAVENOUS at 11:07

## 2018-07-22 RX ADMIN — POLYETHYLENE GLYCOL (3350) 17 G: 17 POWDER, FOR SOLUTION ORAL at 05:07

## 2018-07-22 RX ADMIN — SODIUM CHLORIDE 1000 ML: 0.9 INJECTION, SOLUTION INTRAVENOUS at 01:07

## 2018-07-22 RX ADMIN — POTASSIUM CHLORIDE 10 MEQ: 7.46 INJECTION, SOLUTION INTRAVENOUS at 05:07

## 2018-07-22 RX ADMIN — SODIUM BICARBONATE 100 MEQ: 84 INJECTION, SOLUTION INTRAVENOUS at 08:07

## 2018-07-22 RX ADMIN — SODIUM CHLORIDE 1000 ML: 0.9 INJECTION, SOLUTION INTRAVENOUS at 09:07

## 2018-07-22 RX ADMIN — ACETAMINOPHEN 1000 MG: 10 INJECTION, SOLUTION INTRAVENOUS at 06:07

## 2018-07-22 RX ADMIN — INSULIN ASPART 2 UNITS: 100 INJECTION, SOLUTION INTRAVENOUS; SUBCUTANEOUS at 06:07

## 2018-07-22 RX ADMIN — STANDARDIZED SENNA CONCENTRATE AND DOCUSATE SODIUM 1 TABLET: 8.6; 5 TABLET, FILM COATED ORAL at 09:07

## 2018-07-22 RX ADMIN — CHLORHEXIDINE GLUCONATE 10 ML: 1.2 RINSE ORAL at 09:07

## 2018-07-22 RX ADMIN — CLONIDINE HYDROCHLORIDE 0.1 MG: 0.1 TABLET ORAL at 02:07

## 2018-07-22 RX ADMIN — SODIUM CHLORIDE, PRESERVATIVE FREE 3 ML: 5 INJECTION INTRAVENOUS at 01:07

## 2018-07-22 RX ADMIN — RAMELTEON 8 MG: 8 TABLET, FILM COATED ORAL at 02:07

## 2018-07-22 RX ADMIN — POTASSIUM PHOSPHATE, MONOBASIC AND POTASSIUM PHOSPHATE, DIBASIC 30 MMOL: 224; 236 INJECTION, SOLUTION, CONCENTRATE INTRAVENOUS at 11:07

## 2018-07-22 RX ADMIN — INSULIN ASPART 10 UNITS: 100 INJECTION, SOLUTION INTRAVENOUS; SUBCUTANEOUS at 11:07

## 2018-07-22 RX ADMIN — ONDANSETRON 8 MG: 2 INJECTION, SOLUTION INTRAMUSCULAR; INTRAVENOUS at 04:07

## 2018-07-22 RX ADMIN — POTASSIUM CHLORIDE 50 MEQ: 750 TABLET, FILM COATED, EXTENDED RELEASE ORAL at 05:07

## 2018-07-22 RX ADMIN — MEROPENEM AND SODIUM CHLORIDE 500 MG: 500 INJECTION, SOLUTION INTRAVENOUS at 09:07

## 2018-07-22 RX ADMIN — INSULIN ASPART 6 UNITS: 100 INJECTION, SOLUTION INTRAVENOUS; SUBCUTANEOUS at 06:07

## 2018-07-22 RX ADMIN — SODIUM CHLORIDE 1000 ML: 0.9 INJECTION, SOLUTION INTRAVENOUS at 10:07

## 2018-07-22 RX ADMIN — SODIUM CHLORIDE, PRESERVATIVE FREE 3 ML: 5 INJECTION INTRAVENOUS at 09:07

## 2018-07-22 RX ADMIN — POTASSIUM CHLORIDE 40 MEQ: 20 SOLUTION ORAL at 11:07

## 2018-07-22 RX ADMIN — IOHEXOL 75 ML: 350 INJECTION, SOLUTION INTRAVENOUS at 09:07

## 2018-07-22 RX ADMIN — STANDARDIZED SENNA CONCENTRATE AND DOCUSATE SODIUM 1 TABLET: 8.6; 5 TABLET, FILM COATED ORAL at 08:07

## 2018-07-22 RX ADMIN — SODIUM BICARBONATE: 84 INJECTION, SOLUTION INTRAVENOUS at 04:07

## 2018-07-22 RX ADMIN — PROMETHAZINE HYDROCHLORIDE 12.5 MG: 25 INJECTION INTRAMUSCULAR; INTRAVENOUS at 02:07

## 2018-07-22 RX ADMIN — POTASSIUM CHLORIDE 50 MEQ: 750 TABLET, FILM COATED, EXTENDED RELEASE ORAL at 08:07

## 2018-07-22 RX ADMIN — ENOXAPARIN SODIUM 40 MG: 100 INJECTION SUBCUTANEOUS at 09:07

## 2018-07-22 RX ADMIN — POTASSIUM CHLORIDE 40 MEQ: 20 SOLUTION ORAL at 04:07

## 2018-07-22 RX ADMIN — INSULIN ASPART 2 UNITS: 100 INJECTION, SOLUTION INTRAVENOUS; SUBCUTANEOUS at 11:07

## 2018-07-22 RX ADMIN — MEROPENEM AND SODIUM CHLORIDE 500 MG: 500 INJECTION, SOLUTION INTRAVENOUS at 04:07

## 2018-07-22 RX ADMIN — CHLORHEXIDINE GLUCONATE 10 ML: 1.2 RINSE ORAL at 08:07

## 2018-07-22 RX ADMIN — SODIUM BICARBONATE 100 MEQ: 84 INJECTION, SOLUTION INTRAVENOUS at 11:07

## 2018-07-22 NOTE — PROGRESS NOTES
[facility] -   General Surgery  Progress Note    Subjective:     History of Present Illness:  Yandy Cabral is a 47 y.o. female with a pmhx of diabetes, dyslipidemia. She is s/p gastric sleeve in 2012. She presented to the ER with RUQ abdominal pain that started yesterday. Sx are associated with nausea and vomiting. She denies fever or chills. CT and ultrasound done in ED are concerning for acalculous cholecystitis.     Post-Op Info:  Procedure(s) (LRB):  CHOLECYSTECTOMY-LAPAROSCOPIC (N/A)   2 Days Post-Op     Interval History: labored breathing, tachycardia, low pH and bicarb.  Transferred to ICU.  Currently responding to fluid bolus.    Medications:  Continuous Infusions:   sodium bicarbonate drip 125 mL/hr at 07/22/18 0837     Scheduled Meds:   chlorhexidine  10 mL Mouth/Throat BID    enoxaparin  40 mg Subcutaneous Daily    meropenem (MERREM) IVPB  500 mg Intravenous Q8H    nozaseptin   Each Nare BID    senna-docusate 8.6-50 mg  1 tablet Oral BID    sodium chloride 0.9%  1,000 mL Intravenous Once    sodium chloride 0.9%  3 mL Intravenous Q8H     PRN Meds:acetaminophen, bisacodyl, cloNIDine, dextrose 50%, dextrose 50%, diphenhydrAMINE, glucagon (human recombinant), glucose, glucose, insulin aspart U-100, lactulose, meperidine, ondansetron, promethazine (PHENERGAN) IVPB, ramelteon, sodium chloride 0.9%     Review of patient's allergies indicates:  No Known Allergies  Objective:     Vital Signs (Most Recent):  Temp: (!) 95 °F (35 °C) (07/22/18 0830)  Pulse: (!) 123 (07/22/18 0915)  Resp: (!) 24 (07/22/18 0915)  BP: 91/71 (07/22/18 0915)  SpO2: 100 % (07/22/18 0746) Vital Signs (24h Range):  Temp:  [95 °F (35 °C)-98.8 °F (37.1 °C)] 95 °F (35 °C)  Pulse:  [116-157] 123  Resp:  [16-26] 24  SpO2:  [97 %-100 %] 100 %  BP: ()/(70-80) 91/71     Weight: 69.5 kg (153 lb 3.5 oz)  Body mass index is 25.5 kg/m².    Intake/Output - Last 3 Shifts       07/20 0700 - 07/21 0659 07/21 0700 - 07/22  0659 07/22 0700 - 07/23 0659    P.O. 600 120     I.V. (mL/kg) 4615 (66.4) 1943.8 (28)     IV Piggyback 600 850     Total Intake(mL/kg) 5815 (83.7) 2913.8 (41.9)     Net +5815 +2913.8             Urine Occurrence 4 x 9 x           Physical Exam   Constitutional: She appears distressed.   Pulmonary/Chest: She is in respiratory distress.   Abdominal: Soft. She exhibits no distension. There is tenderness. There is no rebound and no guarding.   Incisions c/d/i       Significant Labs:  CBC:   Recent Labs  Lab 07/22/18  0535   WBC 21.82*   RBC 3.84*   HGB 12.3   HCT 39.9      *   MCH 32.0*   MCHC 30.8*     CMP:   Recent Labs  Lab 07/22/18  0535   *   CALCIUM 9.9   ALBUMIN 2.9*   PROT 7.3      K 4.0   CO2 <5*   *   BUN 22*   CREATININE 1.5*   ALKPHOS 146*   *   AST 57*   BILITOT 0.4       Significant Diagnostics:  I have reviewed all pertinent imaging results/findings within the past 24 hours.      CT scan shows no PE, post op changes in gallbladder fossa, right pleural effusion with atalectasis    Assessment/Plan:     * Acute gangrenous cholecystitis    S/p laparoscopic cholecystectomy        Dehydration    Fluid resuscitation        Severe sepsis    Broad spectrum antibiotics and fluid resuscitation, monitor urine output        Hyperlipemia    Home meds        Type 2 diabetes mellitus with hyperglycemia, without long-term current use of insulin    Insulin sliding scale  [facility]              Louis O. Jeansonne, MD  General Surgery  [facility] -

## 2018-07-22 NOTE — ASSESSMENT & PLAN NOTE
Escalate SSI prn with monitoring for glucose control and prevention of insulin toxicity  Goal glucose 100-180 mg/dl while critically ill

## 2018-07-22 NOTE — ASSESSMENT & PLAN NOTE
Suspect intra abdominal process; stat CT to include chest to r/o post op pneumonia  Culture blood, urine, sputum (if able)  Initiate broad spectrum abx  External warming prn to keep temp 97-99F  Lactate wnl and no hypotension, but with tachycardia, dry mucous membranes, and complaint of severe thirst, we will initiate IVF infusion

## 2018-07-22 NOTE — ASSESSMENT & PLAN NOTE
- sepsis -dehydration iv fluids vs dka waiting on repeat bmp may need insulin drip if continue to hyperglycemic   - iv bicarb

## 2018-07-22 NOTE — NURSING
Rounded on pt to check status, pt sleeping comfortably and respirations WDL with no signs of distress. Will continue to monitor

## 2018-07-22 NOTE — SUBJECTIVE & OBJECTIVE
Past Medical History:   Diagnosis Date    Chronic constipation     DM II (diabetes mellitus, type II), controlled        Past Surgical History:   Procedure Laterality Date    CYST REMOVAL      ganglionic right wrist    gastric sleve  1/01/12    TUBAL LIGATION         Review of patient's allergies indicates:  No Known Allergies    Family History     Problem Relation (Age of Onset)    Cancer Brother    Diabetes Father    Heart disease Father        Social History Main Topics    Smoking status: Never Smoker    Smokeless tobacco: Not on file    Alcohol use 2.4 oz/week     4 Glasses of wine per week    Drug use: No    Sexual activity: Not on file         Review of Systems   Reason unable to perform ROS: limited verbalization likely s/t acuity of condition.   Constitutional: Positive for chills and fatigue.   Respiratory: Negative for cough and wheezing.    Cardiovascular: Negative for chest pain.   Gastrointestinal: Positive for abdominal pain and nausea.   Psychiatric/Behavioral: Negative for confusion. The patient is not nervous/anxious.      Objective:     Vital Signs (Most Recent):  Temp: 96.1 °F (35.6 °C) (07/22/18 0746)  Pulse: (!) 124 (07/22/18 0746)  Resp: (!) 22 (07/22/18 0746)  BP: 120/80 (07/22/18 0746)  SpO2: 100 % (07/22/18 0746) Vital Signs (24h Range):  Temp:  [96.1 °F (35.6 °C)-98.8 °F (37.1 °C)] 96.1 °F (35.6 °C)  Pulse:  [112-136] 124  Resp:  [16-26] 22  SpO2:  [97 %-100 %] 100 %  BP: (120-166)/(70-80) 120/80     Weight: 69.5 kg (153 lb 3.5 oz)  Body mass index is 25.5 kg/m².      Intake/Output Summary (Last 24 hours) at 07/22/18 0847  Last data filed at 07/22/18 0539   Gross per 24 hour   Intake          2913.75 ml   Output                0 ml   Net          2913.75 ml       Physical Exam   Constitutional: She appears well-nourished. She appears lethargic. She has a sickly appearance.   HENT:   Head: Normocephalic and atraumatic.   Eyes: Conjunctivae are normal. Pupils are equal, round, and  reactive to light.   Neck: No JVD present. No tracheal deviation present.   Cardiovascular: Regular rhythm.  Tachycardia present.    No murmur heard.  Pulses:       Radial pulses are 2+ on the right side, and 2+ on the left side.        Dorsalis pedis pulses are 1+ on the right side, and 1+ on the left side.   Pulmonary/Chest: Breath sounds normal. No accessory muscle usage. Tachypnea noted. No respiratory distress. She has no wheezes. She has no rhonchi. She has no rales.   Abdominal: She exhibits distension. There is tenderness. There is no rigidity and no guarding.   Neurological: She appears lethargic. GCS eye subscore is 3. GCS verbal subscore is 5. GCS motor subscore is 6.   Skin: Skin is dry. Capillary refill takes 2 to 3 seconds. No cyanosis. There is pallor. Nails show no clubbing.   abd lap sites intact, well approximated, no erythema or drainage   Psychiatric: Her affect is blunt. She is inattentive.       Vents:  Oxygen Concentration (%): 32 (07/22/18 0645)    Lines/Drains/Airways     Peripheral Intravenous Line                 Peripheral IV - Single Lumen 07/19/18 0241 Right Antecubital 3 days         Peripheral IV - Single Lumen 07/20/18 1500 Left Forearm 1 day                Significant Labs:    CBC/Anemia Profile:    Recent Labs  Lab 07/21/18  0502 07/22/18  0535   WBC 15.24* 21.82*   HGB 12.9 12.3   HCT 39.1 39.9    270   MCV 97 104*   RDW 13.7 14.6*        Chemistries:    Recent Labs  Lab 07/21/18  0502 07/22/18  0535    140   K 4.4 4.0    116*   CO2 10* <5*   BUN 18 22*   CREATININE 1.5* 1.5*   CALCIUM 10.6* 9.9   ALBUMIN 3.1* 2.9*   PROT 7.6 7.3   BILITOT 0.6 0.4   ALKPHOS 158* 146*   * 145*   * 57*   MG  --  2.1   PHOS  --  3.1       All pertinent labs within the past 24 hours have been reviewed.  ABG Results 0729   Left Brachial   Arterial     PH  6.789*  CO2  12.6*  PO2  114  BE  <-30  HCO3  1.9  SO2%  92    Significant Imaging:   I have reviewed all pertinent  imaging results/findings within the past 24 hours.

## 2018-07-22 NOTE — PROGRESS NOTES
[facility] Hospital Medicine  Progress Note    Patient Name: Yandy Cabral  MRN: [id number]  Patient Class: IP- Inpatient   Admission Date: 7/19/2018  Length of Stay: 3 days  Attending Physician: Fran Chaidez MD  Primary Care Provider: LEONID Ramos Jr, MD        Subjective:     Principal Problem:Acute gangrenous cholecystitis    HPI:  Yandy Cabral is a 47 year old female with DM II and hyperlipidemia who presented to the ED on 7/18/18 with complaints of abdominal pain, nausea and vomiting. Symptoms began yesterday and have been constant. Her abdominal pain is located across her upper abdomen. Her emesis is described as bilious. There are associated chills. She denies fever. In the ED, she was found to have a mildly elevated biliruibin, a mild leukocytosis and findings consistent with acalculous cholecystitis on abdominal US and CT scan of the abdomen. At this time, she is being conservatively managed with IV antibiotics and supportive care.     Hospital Course:  Patient admitted for cholecystitis. Initial workup revealed mildly elevated biliruibin, a mild leukocytosis and findings consistent with acalculous cholecystitis on abdominal US and CT scan of the abdomen. General surgery primary. At this time, she is being conservatively managed with IV antibiotics and supportive care. POD # 1 s/p laparoscopic cholecystectomy. Heart rate in the 110's-120's. Mostly likely due to dehydration 2/2 vomiting. IVFs started. Will continue to monitor closely.    7/22  I was called in the room this morning by the nurse to evaluate patient for tachypnea .She was seen yesterday by dr Miller . Patient is confused tachypnea ,tachycardia and sweating . Review of chart patient received clonidine for tachycardia/hypertension yesterday and 500 ml iv bolus for dehydration.BMP showing increase anion gap metabolic acidosis . Patient looks dehydrated with high concern for sepsis .Ordered iv fluids bolus . Started  antibotics blood cultures . Start bicarb drip for severe acidosis . Transfer to icu . cta negative for pe . Ct abdomen pelvis no new acute finding . Concern for dka discuss with icu will wait on next bmp also replace K before giving insulin         Review of Systems   Reason unable to perform ROS: limited verbalization likely s/t acuity of condition.   Constitutional: Positive for chills and fatigue.   Respiratory: Negative for cough and wheezing.    Cardiovascular: Negative for chest pain.   Gastrointestinal: Positive for abdominal pain and nausea.   Psychiatric/Behavioral: Negative for confusion. The patient is not nervous/anxious.      Objective:     Vital Signs (Most Recent):  Temp: 96.3 °F (35.7 °C) (07/22/18 1305)  Pulse: (!) 122 (07/22/18 1350)  Resp: (!) 36 (07/22/18 1350)  BP: (!) 150/74 (07/22/18 1350)  SpO2: 100 % (07/22/18 1350) Vital Signs (24h Range):  Temp:  [94.3 °F (34.6 °C)-98.8 °F (37.1 °C)] 96.3 °F (35.7 °C)  Pulse:  [] 122  Resp:  [16-36] 36  SpO2:  [95 %-100 %] 100 %  BP: ()/(38-90) 150/74     Weight: 72.8 kg (160 lb 7.9 oz)  Body mass index is 26.71 kg/m².    Intake/Output Summary (Last 24 hours) at 07/22/18 1613  Last data filed at 07/22/18 1405   Gross per 24 hour   Intake          7607.09 ml   Output             1143 ml   Net          6464.09 ml      Physical Exam   Constitutional: She appears well-nourished. She appears lethargic. She has a sickly appearance.   HENT:   Head: Normocephalic and atraumatic.   Eyes: Conjunctivae are normal. Pupils are equal, round, and reactive to light.   Neck: No JVD present. No tracheal deviation present.   Cardiovascular: Regular rhythm.  Tachycardia present.    No murmur heard.  Pulses:       Radial pulses are 2+ on the right side, and 2+ on the left side.        Dorsalis pedis pulses are 1+ on the right side, and 1+ on the left side.   Pulmonary/Chest: Breath sounds normal. No accessory muscle usage. Tachypnea noted. No respiratory distress.  She has no wheezes. She has no rhonchi. She has no rales.   Abdominal: She exhibits distension. There is tenderness. There is no rigidity and no guarding.   Neurological: She appears lethargic. GCS eye subscore is 3. GCS verbal subscore is 5. GCS motor subscore is 6.   Skin: Skin is dry. Capillary refill takes 2 to 3 seconds. No cyanosis. There is pallor. Nails show no clubbing.   abd lap sites intact, well approximated, no erythema or drainage   Psychiatric: Her affect is blunt. She is inattentive.       Significant Labs: All pertinent labs within the past 24 hours have been reviewed.    Significant Imaging: I have reviewed all pertinent imaging results/findings within the past 24 hours.    Assessment/Plan:      * Acute gangrenous cholecystitis    General Surgery primary   NPO, IV antibiotics, supportive care.   POD #1, +n/v, also pain uncontrolled   Primary team managing           Metabolic acidosis    - sepsis -dehydration iv fluids vs dka waiting on repeat bmp may need insulin drip if continue to hyperglycemic   - iv bicarb         Severe sepsis    - start meropneum   - pan cultures   - iv fluids         Tachycardia    's-120's  Pain uncontrolled, +nausea and vomiting. Medications adjusted    Labs reflect dehydration   IVFs started   Monitor closely           Hyperlipemia    NPO , will resume statin when appropriate.          Type 2 diabetes mellitus with hyperglycemia, without long-term current use of insulin    -NISS.  -hemoglobin A1C 7.8.               VTE Risk Mitigation         Ordered     enoxaparin injection 40 mg  Daily      07/20/18 1657     Place sequential compression device  Until discontinued      07/20/18 1657     IP VTE HIGH RISK PATIENT  Once      07/20/18 1657          Critical care time spent on the evaluation and treatment of severe organ dysfunction, review of pertinent labs and imaging studies, discussions with consulting providers and discussions with patient/family:  40minutes.    Naseem Mata MD  Department of Hospital Medicine   [facility]

## 2018-07-22 NOTE — PROGRESS NOTES
ABG Results 0729   Left Brachial   Arterial    PH  6.789*  CO2  12.6*  PO2  114  BE  <-30  HCO3  1.9  SO2%  92      Results out of range unable to crossover at this time.

## 2018-07-22 NOTE — HPI
47 year old female with PMH DM2, HLD, chronic constipation presented to ED on night of 7/18 with complaint of abd pain, N/V x 1 day with associated chills  CT scan showed acalculous chlecystitis, abx and IVF initiated  After no improvement and HIDA scan showed no gall bladder, she was taken to OR on 7/20 for lap lucius revealing gangrenous cholecystitis; post operatively returned to med surg room

## 2018-07-22 NOTE — PLAN OF CARE
Problem: Patient Care Overview  Goal: Plan of Care Review  Outcome: Ongoing (interventions implemented as appropriate)  Fall prevention precautions maintained, pt remained free of falls throughout shift, call bell and personal items within reach, pt's pain mildy controlled by prn pain medication, nausea mildly controlled by prn medication. 24 hour chart check completed. Will continue to monitor

## 2018-07-22 NOTE — PLAN OF CARE
Problem: Patient Care Overview  Goal: Plan of Care Review  Outcome: Ongoing (interventions implemented as appropriate)  Pt restless at times. Calms easily. Oriented to self and place, intermittently to situation.  remains at bedside. 4L NS bolus administered today. Bicarb gtt infusing. Rate increased. Pt turns independently in bed. Wedge provided for support. SCDs in place. Tolerating po intake. Nausea noted with potassium elixir. Changed to small dose pills to attempt to prevent nausea. Pt tolerated evening dose of potassium tablets. Call light in reach. Will continue to monitor.

## 2018-07-22 NOTE — HOSPITAL COURSE
"7/22 am decline in status with dyspnea, tachypnea, hypothermia, and abg shows severe metabolic acidosis; transferred to ICU emergently and currently undergoing CT of chest, abdomen, and pelvis  7/23 - vitals and labs responding appropriately to hydration and bicarb infusion; hypophosphatemia refractory to overnight supplementation; reports feeling "better" but continues to complain of nausea, malaise, fatigue  7/24 - VSS and labs improving; reports feeling "bad" because she is tired, did not rest overnight; denies pain, fever, chills, or nausea; tolerating full diet  "

## 2018-07-22 NOTE — SUBJECTIVE & OBJECTIVE
Interval History: labored breathing, tachycardia, low pH and bicarb.  Transferred to ICU.  Currently responding to fluid bolus.    Medications:  Continuous Infusions:   sodium bicarbonate drip 125 mL/hr at 07/22/18 0837     Scheduled Meds:   chlorhexidine  10 mL Mouth/Throat BID    enoxaparin  40 mg Subcutaneous Daily    meropenem (MERREM) IVPB  500 mg Intravenous Q8H    nozaseptin   Each Nare BID    senna-docusate 8.6-50 mg  1 tablet Oral BID    sodium chloride 0.9%  1,000 mL Intravenous Once    sodium chloride 0.9%  3 mL Intravenous Q8H     PRN Meds:acetaminophen, bisacodyl, cloNIDine, dextrose 50%, dextrose 50%, diphenhydrAMINE, glucagon (human recombinant), glucose, glucose, insulin aspart U-100, lactulose, meperidine, ondansetron, promethazine (PHENERGAN) IVPB, ramelteon, sodium chloride 0.9%     Review of patient's allergies indicates:  No Known Allergies  Objective:     Vital Signs (Most Recent):  Temp: (!) 95 °F (35 °C) (07/22/18 0830)  Pulse: (!) 123 (07/22/18 0915)  Resp: (!) 24 (07/22/18 0915)  BP: 91/71 (07/22/18 0915)  SpO2: 100 % (07/22/18 0746) Vital Signs (24h Range):  Temp:  [95 °F (35 °C)-98.8 °F (37.1 °C)] 95 °F (35 °C)  Pulse:  [116-157] 123  Resp:  [16-26] 24  SpO2:  [97 %-100 %] 100 %  BP: ()/(70-80) 91/71     Weight: 69.5 kg (153 lb 3.5 oz)  Body mass index is 25.5 kg/m².    Intake/Output - Last 3 Shifts       07/20 0700 - 07/21 0659 07/21 0700 - 07/22 0659 07/22 0700 - 07/23 0659    P.O. 600 120     I.V. (mL/kg) 4615 (66.4) 1943.8 (28)     IV Piggyback 600 850     Total Intake(mL/kg) 5815 (83.7) 2913.8 (41.9)     Net +5815 +2913.8             Urine Occurrence 4 x 9 x           Physical Exam   Constitutional: She appears distressed.   Pulmonary/Chest: She is in respiratory distress.   Abdominal: Soft. She exhibits no distension. There is tenderness. There is no rebound and no guarding.   Incisions c/d/i       Significant Labs:  CBC:   Recent Labs  Lab 07/22/18  0535   WBC 21.82*    RBC 3.84*   HGB 12.3   HCT 39.9      *   MCH 32.0*   MCHC 30.8*     CMP:   Recent Labs  Lab 07/22/18  0535   *   CALCIUM 9.9   ALBUMIN 2.9*   PROT 7.3      K 4.0   CO2 <5*   *   BUN 22*   CREATININE 1.5*   ALKPHOS 146*   *   AST 57*   BILITOT 0.4       Significant Diagnostics:  I have reviewed all pertinent imaging results/findings within the past 24 hours.

## 2018-07-22 NOTE — ASSESSMENT & PLAN NOTE
POD 2 lap lucius  Metabolic acidosis and abd distention concerning for intra-abdominal abscess/process, STAT CT in progress  Surgery following and aware of status

## 2018-07-22 NOTE — CONSULTS
[facility] -   Critical Care Medicine  Consult Note    Patient Name: Yandy Cabral  MRN: [id number]  Admission Date: 7/19/2018  Hospital Length of Stay: 3 days  Code Status: Full Code  Attending Physician: Fran Chaidez MD   Primary Care Provider: LEONID Ramos Jr, MD   Principal Problem: Severe sepsis      Subjective:     HPI:  47 year old female with PMH DM2, HLD, chronic constipation presented to ED on night of 7/18 with complaint of abd pain, N/V x 1 day with associated chills  CT scan showed acalculous chlecystitis, abx and IVF initiated  After no improvement and HIDA scan showed no gall bladder, she was taken to OR on 7/20 for lap lucius revealing gangrenous cholecystitis; post operatively returned to [facility] surg room     Hospital/ICU Course:  7/22 am decline in status with dyspnea, tachypnea, hypothermia, and abg shows severe metabolic acidosis; transferred to ICU emergently and currently undergoing CT of chest, abdomen, and pelvis    Past Medical History:   Diagnosis Date    Chronic constipation     DM II (diabetes mellitus, type II), controlled        Past Surgical History:   Procedure Laterality Date    CYST REMOVAL      ganglionic right wrist    gastric sleve  1/01/12    TUBAL LIGATION         Review of patient's allergies indicates:  No Known Allergies    Family History     Problem Relation (Age of Onset)    Cancer Brother    Diabetes Father    Heart disease Father        Social History Main Topics    Smoking status: Never Smoker    Smokeless tobacco: Not on file    Alcohol use 2.4 oz/week     4 Glasses of wine per week    Drug use: No    Sexual activity: Not on file         Review of Systems   Reason unable to perform ROS: limited verbalization likely s/t acuity of condition.   Constitutional: Positive for chills and fatigue.   Respiratory: Negative for cough and wheezing.    Cardiovascular: Negative for chest pain.   Gastrointestinal: Positive for abdominal pain and nausea.    Psychiatric/Behavioral: Negative for confusion. The patient is not nervous/anxious.      Objective:     Vital Signs (Most Recent):  Temp: 96.1 °F (35.6 °C) (07/22/18 0746)  Pulse: (!) 124 (07/22/18 0746)  Resp: (!) 22 (07/22/18 0746)  BP: 120/80 (07/22/18 0746)  SpO2: 100 % (07/22/18 0746) Vital Signs (24h Range):  Temp:  [96.1 °F (35.6 °C)-98.8 °F (37.1 °C)] 96.1 °F (35.6 °C)  Pulse:  [112-136] 124  Resp:  [16-26] 22  SpO2:  [97 %-100 %] 100 %  BP: (120-166)/(70-80) 120/80     Weight: 69.5 kg (153 lb 3.5 oz)  Body mass index is 25.5 kg/m².      Intake/Output Summary (Last 24 hours) at 07/22/18 0847  Last data filed at 07/22/18 0539   Gross per 24 hour   Intake          2913.75 ml   Output                0 ml   Net          2913.75 ml       Physical Exam   Constitutional: She appears well-nourished. She appears lethargic. She has a sickly appearance.   HENT:   Head: Normocephalic and atraumatic.   Eyes: Conjunctivae are normal. Pupils are equal, round, and reactive to light.   Neck: No JVD present. No tracheal deviation present.   Cardiovascular: Regular rhythm.  Tachycardia present.    No murmur heard.  Pulses:       Radial pulses are 2+ on the right side, and 2+ on the left side.        Dorsalis pedis pulses are 1+ on the right side, and 1+ on the left side.   Pulmonary/Chest: Breath sounds normal. No accessory muscle usage. Tachypnea noted. No respiratory distress. She has no wheezes. She has no rhonchi. She has no rales.   Abdominal: She exhibits distension. There is tenderness. There is no rigidity and no guarding.   Neurological: She appears lethargic. GCS eye subscore is 3. GCS verbal subscore is 5. GCS motor subscore is 6.   Skin: Skin is dry. Capillary refill takes 2 to 3 seconds. No cyanosis. There is pallor. Nails show no clubbing.   abd lap sites intact, well approximated, no erythema or drainage   Psychiatric: Her affect is blunt. She is inattentive.       Vents:  Oxygen Concentration (%): 32  (07/22/18 0645)    Lines/Drains/Airways     Peripheral Intravenous Line                 Peripheral IV - Single Lumen 07/19/18 0241 Right Antecubital 3 days         Peripheral IV - Single Lumen 07/20/18 1500 Left Forearm 1 day                Significant Labs:    CBC/Anemia Profile:    Recent Labs  Lab 07/21/18  0502 07/22/18  0535   WBC 15.24* 21.82*   HGB 12.9 12.3   HCT 39.1 39.9    270   MCV 97 104*   RDW 13.7 14.6*        Chemistries:    Recent Labs  Lab 07/21/18  0502 07/22/18  0535    140   K 4.4 4.0    116*   CO2 10* <5*   BUN 18 22*   CREATININE 1.5* 1.5*   CALCIUM 10.6* 9.9   ALBUMIN 3.1* 2.9*   PROT 7.6 7.3   BILITOT 0.6 0.4   ALKPHOS 158* 146*   * 145*   * 57*   MG  --  2.1   PHOS  --  3.1       All pertinent labs within the past 24 hours have been reviewed.  ABG Results 0729   Left Brachial   Arterial     PH  6.789*  CO2  12.6*  PO2  114  BE  <-30  HCO3  1.9  SO2%  92    Significant Imaging:   I have reviewed all pertinent imaging results/findings within the past 24 hours.    Assessment/Plan:     Renal/   Metabolic acidosis    IVF resuscitation  Supplement bicarb infusion  Identify septic source          ID   Severe sepsis    Suspect intra abdominal process; stat CT to include chest to r/o post op pneumonia  Culture blood, urine, sputum (if able)  Initiate broad spectrum abx  External warming prn to keep temp 97-99F  Lactate wnl and no hypotension, but with tachycardia, dry mucous membranes, and complaint of severe thirst, we will initiate IVF infusion        Endocrine   Type 2 diabetes mellitus with hyperglycemia, without long-term current use of insulin    Escalate SSI prn with monitoring for glucose control and prevention of insulin toxicity  Goal glucose 100-180 mg/dl while critically ill        GI   * Acute gangrenous cholecystitis    POD 2 lap lucius  Metabolic acidosis and abd distention concerning for intra-abdominal abscess/process, STAT CT in progress  Surgery  following and aware of status            Critical Care Daily Checklist:    A: Awake: RASS Goal/Actual Goal:    Actual:     B: Spontaneous Breathing Trial Performed?     C: SAT & SBT Coordinated?  n/a                      D: Delirium: CAM-ICU     E: Early Mobility Performed? yes   F: Feeding Goal:    Status:     Current Diet Order   Procedures    Diet NPO Except for: Medication     Order Specific Question:   Except for     Answer:   Medication      AS: Analgesia/Sedation prn   T: Thromboembolic Prophylaxis LMWH   H: HOB > 300 Yes   U: Stress Ulcer Prophylaxis (if needed) Add pepcid   G: Glucose Control SSI prn   B: Bowel Function     I: Indwelling Catheter (Lines & Dockery) Necessity reviewed   D: De-escalation of Antimicrobials/Pharmacotherapies reviewed    Plan for the day/ETD ID septic source; correct acidosis; supportive care    Code Status:  Family/Goals of Care: Full Code  Home on discharge   I have discussed case and plan of care in detail with Dr Rios and Dr Mata; Status and plan of care were discussed with team on multidisciplinary rounds.    Critical Care Time: 70 minutes  Critical secondary to severe metabolic acidosis, severe sepsis  Critical care was time spent personally by me on the following activities: development of treatment plan with patient or surrogate and bedside caregivers, discussions with consultants, evaluation of patient's response to treatment, examination of patient, ordering and performing treatments and interventions, ordering and review of laboratory studies, ordering and review of radiographic studies, pulse oximetry, re-evaluation of patient's condition. This critical care time did not overlap with that of any other provider or involve time for any procedures.    Thank you for your consult.      Yandy Hagen, Acute Care-BC  Critical Care Medicine  [facility]

## 2018-07-22 NOTE — SUBJECTIVE & OBJECTIVE
Review of Systems   Reason unable to perform ROS: limited verbalization likely s/t acuity of condition.   Constitutional: Positive for chills and fatigue.   Respiratory: Negative for cough and wheezing.    Cardiovascular: Negative for chest pain.   Gastrointestinal: Positive for abdominal pain and nausea.   Psychiatric/Behavioral: Negative for confusion. The patient is not nervous/anxious.      Objective:     Vital Signs (Most Recent):  Temp: 96.3 °F (35.7 °C) (07/22/18 1305)  Pulse: (!) 122 (07/22/18 1350)  Resp: (!) 36 (07/22/18 1350)  BP: (!) 150/74 (07/22/18 1350)  SpO2: 100 % (07/22/18 1350) Vital Signs (24h Range):  Temp:  [94.3 °F (34.6 °C)-98.8 °F (37.1 °C)] 96.3 °F (35.7 °C)  Pulse:  [] 122  Resp:  [16-36] 36  SpO2:  [95 %-100 %] 100 %  BP: ()/(38-90) 150/74     Weight: 72.8 kg (160 lb 7.9 oz)  Body mass index is 26.71 kg/m².    Intake/Output Summary (Last 24 hours) at 07/22/18 1613  Last data filed at 07/22/18 1405   Gross per 24 hour   Intake          7607.09 ml   Output             1143 ml   Net          6464.09 ml      Physical Exam   Constitutional: She appears well-nourished. She appears lethargic. She has a sickly appearance.   HENT:   Head: Normocephalic and atraumatic.   Eyes: Conjunctivae are normal. Pupils are equal, round, and reactive to light.   Neck: No JVD present. No tracheal deviation present.   Cardiovascular: Regular rhythm.  Tachycardia present.    No murmur heard.  Pulses:       Radial pulses are 2+ on the right side, and 2+ on the left side.        Dorsalis pedis pulses are 1+ on the right side, and 1+ on the left side.   Pulmonary/Chest: Breath sounds normal. No accessory muscle usage. Tachypnea noted. No respiratory distress. She has no wheezes. She has no rhonchi. She has no rales.   Abdominal: She exhibits distension. There is tenderness. There is no rigidity and no guarding.   Neurological: She appears lethargic. GCS eye subscore is 3. GCS verbal subscore is 5.  GCS motor subscore is 6.   Skin: Skin is dry. Capillary refill takes 2 to 3 seconds. No cyanosis. There is pallor. Nails show no clubbing.   abd lap sites intact, well approximated, no erythema or drainage   Psychiatric: Her affect is blunt. She is inattentive.       Significant Labs: All pertinent labs within the past 24 hours have been reviewed.    Significant Imaging: I have reviewed all pertinent imaging results/findings within the past 24 hours.

## 2018-07-23 PROBLEM — E87.8 ELECTROLYTE IMBALANCE: Status: ACTIVE | Noted: 2018-07-23

## 2018-07-23 LAB
ALBUMIN SERPL BCP-MCNC: 2.5 G/DL
ALLENS TEST: ABNORMAL
ALLENS TEST: ABNORMAL
ALP SERPL-CCNC: 133 U/L
ALT SERPL W/O P-5'-P-CCNC: 109 U/L
ANION GAP SERPL CALC-SCNC: 17 MMOL/L
ANION GAP SERPL CALC-SCNC: 17 MMOL/L
ANION GAP SERPL CALC-SCNC: 18 MMOL/L
AST SERPL-CCNC: 66 U/L
BASOPHILS # BLD AUTO: 0 K/UL
BASOPHILS NFR BLD: 0 %
BILIRUB SERPL-MCNC: 0.5 MG/DL
BUN SERPL-MCNC: 18 MG/DL
CALCIUM SERPL-MCNC: 8.4 MG/DL
CALCIUM SERPL-MCNC: 8.4 MG/DL
CALCIUM SERPL-MCNC: 8.5 MG/DL
CHLORIDE SERPL-SCNC: 113 MMOL/L
CHLORIDE SERPL-SCNC: 113 MMOL/L
CHLORIDE SERPL-SCNC: 114 MMOL/L
CO2 SERPL-SCNC: 12 MMOL/L
CO2 SERPL-SCNC: 14 MMOL/L
CO2 SERPL-SCNC: 14 MMOL/L
CREAT SERPL-MCNC: 1.4 MG/DL
DELSYS: ABNORMAL
DELSYS: ABNORMAL
DIFFERENTIAL METHOD: ABNORMAL
EOSINOPHIL # BLD AUTO: 0 K/UL
EOSINOPHIL NFR BLD: 0 %
ERYTHROCYTE [DISTWIDTH] IN BLOOD BY AUTOMATED COUNT: 14 %
EST. GFR  (AFRICAN AMERICAN): 51 ML/MIN/1.73 M^2
EST. GFR  (NON AFRICAN AMERICAN): 44 ML/MIN/1.73 M^2
FIO2: 21
GLUCOSE SERPL-MCNC: 244 MG/DL
GLUCOSE SERPL-MCNC: 244 MG/DL
GLUCOSE SERPL-MCNC: 273 MG/DL
HAV IGM SERPL QL IA: NEGATIVE
HBV CORE IGM SERPL QL IA: NEGATIVE
HBV SURFACE AG SERPL QL IA: NEGATIVE
HCO3 UR-SCNC: 1.9 MMOL/L (ref 24–28)
HCO3 UR-SCNC: 14.4 MMOL/L (ref 24–28)
HCT VFR BLD AUTO: 30.3 %
HCV AB SERPL QL IA: NEGATIVE
HGB BLD-MCNC: 10.8 G/DL
LYMPHOCYTES # BLD AUTO: 0.5 K/UL
LYMPHOCYTES NFR BLD: 4.1 %
MAGNESIUM SERPL-MCNC: 1.9 MG/DL
MCH RBC QN AUTO: 32.3 PG
MCHC RBC AUTO-ENTMCNC: 35.6 G/DL
MCV RBC AUTO: 91 FL
MODE: ABNORMAL
MONOCYTES # BLD AUTO: 1.1 K/UL
MONOCYTES NFR BLD: 8.5 %
NEUTROPHILS # BLD AUTO: 11.2 K/UL
NEUTROPHILS NFR BLD: 87.4 %
PCO2 BLDA: 12.6 MMHG (ref 35–45)
PCO2 BLDA: 21.4 MMHG (ref 35–45)
PH SMN: 6.79 [PH] (ref 7.35–7.45)
PH SMN: 7.43 [PH] (ref 7.35–7.45)
PHOSPHATE SERPL-MCNC: <1 MG/DL
PLATELET # BLD AUTO: 263 K/UL
PMV BLD AUTO: 9.7 FL
PO2 BLDA: 114 MMHG (ref 80–100)
PO2 BLDA: 60 MMHG (ref 80–100)
POC BE: -10 MMOL/L
POC BE: <-30 MMOL/L
POC SATURATED O2: 92 % (ref 95–100)
POC SATURATED O2: 92 % (ref 95–100)
POCT GLUCOSE: 131 MG/DL (ref 70–110)
POCT GLUCOSE: 237 MG/DL (ref 70–110)
POCT GLUCOSE: 264 MG/DL (ref 70–110)
POTASSIUM SERPL-SCNC: 3.4 MMOL/L
POTASSIUM SERPL-SCNC: 3.8 MMOL/L
POTASSIUM SERPL-SCNC: 3.8 MMOL/L
PROT SERPL-MCNC: 5.9 G/DL
RBC # BLD AUTO: 3.34 M/UL
SAMPLE: ABNORMAL
SAMPLE: ABNORMAL
SITE: ABNORMAL
SITE: ABNORMAL
SODIUM SERPL-SCNC: 144 MMOL/L
WBC # BLD AUTO: 12.79 K/UL

## 2018-07-23 PROCEDURE — 25000003 PHARM REV CODE 250: Performed by: INTERNAL MEDICINE

## 2018-07-23 PROCEDURE — G8978 MOBILITY CURRENT STATUS: HCPCS | Mod: CJ

## 2018-07-23 PROCEDURE — 20000000 HC ICU ROOM

## 2018-07-23 PROCEDURE — 85025 COMPLETE CBC W/AUTO DIFF WBC: CPT

## 2018-07-23 PROCEDURE — 36600 WITHDRAWAL OF ARTERIAL BLOOD: CPT

## 2018-07-23 PROCEDURE — 82803 BLOOD GASES ANY COMBINATION: CPT

## 2018-07-23 PROCEDURE — 99900037 HC PT THERAPY SCREENING (STAT)

## 2018-07-23 PROCEDURE — 97161 PT EVAL LOW COMPLEX 20 MIN: CPT

## 2018-07-23 PROCEDURE — 84100 ASSAY OF PHOSPHORUS: CPT

## 2018-07-23 PROCEDURE — G8979 MOBILITY GOAL STATUS: HCPCS | Mod: CI

## 2018-07-23 PROCEDURE — 99900035 HC TECH TIME PER 15 MIN (STAT)

## 2018-07-23 PROCEDURE — A4216 STERILE WATER/SALINE, 10 ML: HCPCS | Performed by: INTERNAL MEDICINE

## 2018-07-23 PROCEDURE — 80053 COMPREHEN METABOLIC PANEL: CPT

## 2018-07-23 PROCEDURE — 63600175 PHARM REV CODE 636 W HCPCS: Performed by: SURGERY

## 2018-07-23 PROCEDURE — 25000003 PHARM REV CODE 250: Performed by: NURSE PRACTITIONER

## 2018-07-23 PROCEDURE — 97116 GAIT TRAINING THERAPY: CPT

## 2018-07-23 PROCEDURE — 63600175 PHARM REV CODE 636 W HCPCS: Performed by: NURSE PRACTITIONER

## 2018-07-23 PROCEDURE — 94799 UNLISTED PULMONARY SVC/PX: CPT

## 2018-07-23 PROCEDURE — 63600175 PHARM REV CODE 636 W HCPCS: Performed by: INTERNAL MEDICINE

## 2018-07-23 PROCEDURE — 36415 COLL VENOUS BLD VENIPUNCTURE: CPT

## 2018-07-23 PROCEDURE — 99024 POSTOP FOLLOW-UP VISIT: CPT | Mod: ,,, | Performed by: SURGERY

## 2018-07-23 PROCEDURE — 25000003 PHARM REV CODE 250: Performed by: SURGERY

## 2018-07-23 PROCEDURE — 83735 ASSAY OF MAGNESIUM: CPT

## 2018-07-23 PROCEDURE — 80048 BASIC METABOLIC PNL TOTAL CA: CPT

## 2018-07-23 PROCEDURE — 99291 CRITICAL CARE FIRST HOUR: CPT | Mod: ,,, | Performed by: NURSE PRACTITIONER

## 2018-07-23 RX ORDER — SODIUM CHLORIDE AND POTASSIUM CHLORIDE 150; 900 MG/100ML; MG/100ML
INJECTION, SOLUTION INTRAVENOUS CONTINUOUS
Status: DISCONTINUED | OUTPATIENT
Start: 2018-07-23 | End: 2018-07-23

## 2018-07-23 RX ORDER — SODIUM,POTASSIUM PHOSPHATES 280-250MG
1 POWDER IN PACKET (EA) ORAL ONCE
Status: DISCONTINUED | OUTPATIENT
Start: 2018-07-23 | End: 2018-07-24

## 2018-07-23 RX ORDER — FAMOTIDINE 20 MG/1
20 TABLET, FILM COATED ORAL DAILY
Status: DISCONTINUED | OUTPATIENT
Start: 2018-07-23 | End: 2018-07-24

## 2018-07-23 RX ORDER — SODIUM CHLORIDE AND POTASSIUM CHLORIDE 150; 450 MG/100ML; MG/100ML
INJECTION, SOLUTION INTRAVENOUS CONTINUOUS
Status: DISCONTINUED | OUTPATIENT
Start: 2018-07-23 | End: 2018-07-24

## 2018-07-23 RX ADMIN — SODIUM CHLORIDE, PRESERVATIVE FREE 3 ML: 5 INJECTION INTRAVENOUS at 02:07

## 2018-07-23 RX ADMIN — POLYETHYLENE GLYCOL (3350) 17 G: 17 POWDER, FOR SOLUTION ORAL at 08:07

## 2018-07-23 RX ADMIN — LACTOBACILLUS TAB 4 TABLET: TAB at 06:07

## 2018-07-23 RX ADMIN — FAMOTIDINE 20 MG: 20 TABLET ORAL at 11:07

## 2018-07-23 RX ADMIN — SODIUM CHLORIDE AND POTASSIUM CHLORIDE: 4.5; 1.49 INJECTION, SOLUTION INTRAVENOUS at 11:07

## 2018-07-23 RX ADMIN — MEROPENEM AND SODIUM CHLORIDE 500 MG: 500 INJECTION, SOLUTION INTRAVENOUS at 06:07

## 2018-07-23 RX ADMIN — SODIUM CHLORIDE AND POTASSIUM CHLORIDE: 4.5; 1.49 INJECTION, SOLUTION INTRAVENOUS at 06:07

## 2018-07-23 RX ADMIN — INSULIN ASPART 4 UNITS: 100 INJECTION, SOLUTION INTRAVENOUS; SUBCUTANEOUS at 11:07

## 2018-07-23 RX ADMIN — MEROPENEM AND SODIUM CHLORIDE 500 MG: 500 INJECTION, SOLUTION INTRAVENOUS at 01:07

## 2018-07-23 RX ADMIN — POTASSIUM PHOSPHATE, MONOBASIC AND POTASSIUM PHOSPHATE, DIBASIC 30 MMOL: 224; 236 INJECTION, SOLUTION, CONCENTRATE INTRAVENOUS at 08:07

## 2018-07-23 RX ADMIN — CHLORHEXIDINE GLUCONATE 10 ML: 1.2 RINSE ORAL at 08:07

## 2018-07-23 RX ADMIN — ONDANSETRON 8 MG: 2 INJECTION, SOLUTION INTRAMUSCULAR; INTRAVENOUS at 08:07

## 2018-07-23 RX ADMIN — ENOXAPARIN SODIUM 40 MG: 100 INJECTION SUBCUTANEOUS at 08:07

## 2018-07-23 RX ADMIN — INSULIN ASPART 6 UNITS: 100 INJECTION, SOLUTION INTRAVENOUS; SUBCUTANEOUS at 06:07

## 2018-07-23 RX ADMIN — STANDARDIZED SENNA CONCENTRATE AND DOCUSATE SODIUM 1 TABLET: 8.6; 5 TABLET, FILM COATED ORAL at 08:07

## 2018-07-23 RX ADMIN — INSULIN DETEMIR 20 UNITS: 100 INJECTION, SOLUTION SUBCUTANEOUS at 08:07

## 2018-07-23 RX ADMIN — ONDANSETRON 8 MG: 2 INJECTION, SOLUTION INTRAMUSCULAR; INTRAVENOUS at 01:07

## 2018-07-23 RX ADMIN — MEROPENEM AND SODIUM CHLORIDE 500 MG: 500 INJECTION, SOLUTION INTRAVENOUS at 09:07

## 2018-07-23 NOTE — PLAN OF CARE
Problem: Patient Care Overview  Goal: Plan of Care Review  Outcome: Ongoing (interventions implemented as appropriate)  Pt stable. Pt is NSR/ST on the heart monitor, HR range 90s-120s.  Temp max 100.3, temp consistently remained  this shift.  Pt denied pain.  Pt generalized edema increased overnight, IVF adjusted.  Labs monitored closely and electrolytes replaced.  Pt received scheduled abx.  Blood sugar monitored and covered with SSI.  Critacore ling intact, UOP roughly 3.4L this shift.  Pt c/o nausea after getting up and going to bedside commode.  Pt turned and repositioned self with use of pillows.  PIVs intact with no redness, swelling or drainage.  Bed low, wheels locked, bed alarm on, call light in reach.  Pt instructed to call for assistance.  Plan of care reviewed.  Pt verbalizes understanding. Will continue to monitor.

## 2018-07-23 NOTE — SUBJECTIVE & OBJECTIVE
Review of Systems   Reason unable to perform ROS: limited verbalization likely s/t acuity of condition.   Constitutional: Positive for chills and fatigue.   Respiratory: Negative for cough and wheezing.    Cardiovascular: Negative for chest pain.   Gastrointestinal: Positive for abdominal pain and nausea.   Psychiatric/Behavioral: Negative for confusion. The patient is not nervous/anxious.      Objective:     Vital Signs (Most Recent):  Temp: 100.3 °F (37.9 °C) (07/23/18 1205)  Pulse: 109 (07/23/18 1205)  Resp: (!) 22 (07/23/18 1205)  BP: 127/73 (07/23/18 1205)  SpO2: 97 % (07/23/18 1205) Vital Signs (24h Range):  Temp:  [96.8 °F (36 °C)-100.8 °F (38.2 °C)] 100.3 °F (37.9 °C)  Pulse:  [] 109  Resp:  [13-36] 22  SpO2:  [96 %-100 %] 97 %  BP: (122-175)/(67-92) 127/73     Weight: 72.8 kg (160 lb 7.9 oz)  Body mass index is 26.71 kg/m².    Intake/Output Summary (Last 24 hours) at 07/23/18 1306  Last data filed at 07/23/18 1200   Gross per 24 hour   Intake           6394.6 ml   Output             6314 ml   Net             80.6 ml      Physical Exam   Constitutional: She appears well-nourished. She appears lethargic. She has a sickly appearance.   HENT:   Head: Normocephalic and atraumatic.   Eyes: Conjunctivae are normal. Pupils are equal, round, and reactive to light.   Neck: No JVD present. No tracheal deviation present.   Cardiovascular: Regular rhythm.  Tachycardia present.    No murmur heard.  Pulses:       Radial pulses are 2+ on the right side, and 2+ on the left side.        Dorsalis pedis pulses are 1+ on the right side, and 1+ on the left side.   Pulmonary/Chest: Breath sounds normal. No accessory muscle usage. Tachypnea noted. No respiratory distress. She has no wheezes. She has no rhonchi. She has no rales.   Abdominal: She exhibits distension. There is tenderness. There is no rigidity and no guarding.   Neurological: She appears lethargic. GCS eye subscore is 3. GCS verbal subscore is 5. GCS motor  subscore is 6.   Skin: Skin is dry. Capillary refill takes 2 to 3 seconds. No cyanosis. There is pallor. Nails show no clubbing.   abd lap sites intact, well approximated, no erythema or drainage   Psychiatric: Her affect is blunt. She is inattentive.   Nursing note and vitals reviewed.      Significant Labs: All pertinent labs within the past 24 hours have been reviewed.    Significant Imaging: I have reviewed all pertinent imaging results/findings within the past 24 hours.

## 2018-07-23 NOTE — PROGRESS NOTES
"[facility] -   Critical Care Medicine  Progress Note    Patient Name: Yandy Cabral  MRN: [id number]  Admission Date: 7/19/2018  Hospital Length of Stay: 4 days  Code Status: Full Code  Attending Provider: Louis O. Jeansonne IV, MD  Primary Care Provider: LEONID Ramos Jr, MD   Principal Problem: Acute gangrenous cholecystitis    Subjective:     HPI:  47 year old female with PMH DM2, HLD, chronic constipation presented to ED on night of 7/18 with complaint of abd pain, N/V x 1 day with associated chills  CT scan showed acalculous chlecystitis, abx and IVF initiated  After no improvement and HIDA scan showed no gall bladder, she was taken to OR on 7/20 for lap lucius revealing gangrenous cholecystitis; post operatively returned to [facility] surg room     Hospital/ICU Course:  7/22 am decline in status with dyspnea, tachypnea, hypothermia, and abg shows severe metabolic acidosis; transferred to ICU emergently and currently undergoing CT of chest, abdomen, and pelvis  7/23 - vitals and labs responding appropriately to hydration and bicarb infusion; hypophosphatemia refractory to overnight supplementation; reports feeling "better" but continues to complain of nausea, malaise, fatigue    Review of Systems   Constitutional: Positive for malaise/fatigue.   Respiratory: Positive for cough. Negative for sputum production, shortness of breath and wheezing.    Cardiovascular: Negative for chest pain and palpitations.   Gastrointestinal: Positive for abdominal pain (lazaro incisional tenderness) and nausea.        Thirst   Genitourinary: Negative.    Musculoskeletal: Positive for myalgias.   Neurological: Negative for focal weakness and headaches.       Objective:     Vital Signs (Most Recent):  Temp: (!) 100.8 °F (38.2 °C) (07/23/18 0705)  Pulse: 103 (07/23/18 0705)  Resp: (!) 29 (07/23/18 0705)  BP: (!) 159/84 (07/23/18 0705)  SpO2: 97 % (07/23/18 0705) Vital Signs (24h Range):  Temp:  [94.3 °F (34.6 °C)-100.8 °F " (38.2 °C)] 100.8 °F (38.2 °C)  Pulse:  [] 103  Resp:  [13-36] 29  SpO2:  [95 %-100 %] 97 %  BP: ()/(38-92) 159/84     Weight: 72.8 kg (160 lb 7.9 oz)  Body mass index is 26.71 kg/m².      Intake/Output Summary (Last 24 hours) at 07/23/18 0823  Last data filed at 07/23/18 0600   Gross per 24 hour   Intake          9302.93 ml   Output             5800 ml   Net          3502.93 ml       Physical Exam   Constitutional: She is oriented to person, place, and time. She appears well-nourished. She appears ill.   HENT:   Head: Normocephalic and atraumatic.   Eyes: Conjunctivae are normal. Pupils are equal, round, and reactive to light.   Neck: No JVD present. No tracheal deviation present.   Cardiovascular: Regular rhythm.  Tachycardia present.    No murmur heard.  Pulses:       Radial pulses are 2+ on the right side, and 2+ on the left side.        Dorsalis pedis pulses are 2+ on the right side, and 2+ on the left side.   Pulmonary/Chest: Breath sounds normal. No accessory muscle usage. Tachypnea noted. No respiratory distress. She has no wheezes. She has no rhonchi. She has no rales.   Abdominal: She exhibits distension. There is tenderness in the right upper quadrant. There is no rigidity and no guarding.   Neurological: She is alert and oriented to person, place, and time. GCS eye subscore is 4. GCS verbal subscore is 5. GCS motor subscore is 6.   Skin: Skin is dry. Capillary refill takes less than 2 seconds. No cyanosis. Nails show no clubbing.   abd lap sites intact, well approximated, no erythema or drainage   Psychiatric: Her affect is angry. She is inattentive.       Vents:  Oxygen Concentration (%): 32 (07/22/18 0645)    Lines/Drains/Airways     Drain                 Urethral Catheter 07/22/18 0900 Temperature probe 16 Fr. less than 1 day          Peripheral Intravenous Line                 Peripheral IV - Single Lumen 07/19/18 0241 Right Antecubital 4 days         Peripheral IV - Single Lumen 07/20/18  1500 Left Forearm 2 days         Peripheral IV - Single Lumen 07/23/18 0713 Right Forearm less than 1 day                Significant Labs:    CBC/Anemia Profile:    Recent Labs  Lab 07/22/18  0535 07/22/18  1103 07/23/18  0338   WBC 21.82*  --  12.79*   HGB 12.3  --  10.8*   HCT 39.9 33* 30.3*     --  263   *  --  91   RDW 14.6*  --  14.0        Chemistries:    Recent Labs  Lab 07/22/18  0535 07/22/18  1003  07/22/18  2126 07/23/18  0130 07/23/18  0337    140  < > 145 144 144  144   K 4.0 3.2*  < > 3.2* 3.4* 3.8  3.8   * 110  < > 116* 114* 113*  113*   CO2 <5* 5*  < > 12* 12* 14*  14*   BUN 22* 28*  < > 22* 18 18  18   CREATININE 1.5* 1.5*  < > 1.5* 1.4 1.4  1.4   CALCIUM 9.9 9.0  < > 8.3* 8.5* 8.4*  8.4*   ALBUMIN 2.9*  --   --   --   --  2.5*   PROT 7.3  --   --   --   --  5.9*   BILITOT 0.4  --   --   --   --  0.5   ALKPHOS 146*  --   --   --   --  133   *  --   --   --   --  109*   AST 57*  --   --   --   --  66*   MG 2.1 2.0  --   --   --  1.9   PHOS 3.1 2.9  --  <1.0*  --  <1.0*   < > = values in this interval not displayed.    All pertinent labs within the past 24 hours have been reviewed.    Significant Imaging:  I have reviewed all pertinent imaging results/findings within the past 24 hours.      Assessment/Plan:     Renal/   Electrolyte imbalance    Stop bicarb  Replace pot, phos, and mag  Monitor calcium        Dehydration    Continue IVF resuscitation  Fluids adjusted for improving acidosis and electrolyte depletion        Metabolic acidosis    CO2 trending up, stop bicarb  Continue IVF  monitor          ID   Severe sepsis    CT abd neg for intra abd process; ngtd on cultures and responding appropriately to aggressive IVF resuscitation  No overt infectious process but procalcitonin significantly elevated  Will continue abx, repeat procal and consider de-escalation tomorrow        Endocrine   Type 2 diabetes mellitus with hyperglycemia, without long-term current  use of insulin    +serum ketones likely more from starvation ketosis and dehydration  Add long acting insulin  continue SSI prn with monitoring for glucose control and prevention of insulin toxicity  Goal glucose 100-180 mg/dl while critically ill        GI   * Acute gangrenous cholecystitis    POD 3 lap lucius  Surgery following and aware of status           Critical Care Daily Checklist:    A: Awake: RASS Goal/Actual Goal:    Actual:     B: Spontaneous Breathing Trial Performed?     C: SAT & SBT Coordinated?  n/a                      D: Delirium: CAM-ICU     E: Early Mobility Performed? Yes   F: Feeding Goal:    Status:     Current Diet Order   Procedures    Diet diabetic [facility]; 2000 Calorie     Order Specific Question:   Indicate patient location for additional diet options:     Answer:   [facility]     Order Specific Question:   Total calories:     Answer:   2000 Calorie      AS: Analgesia/Sedation prn   T: Thromboembolic Prophylaxis lovenox   H: HOB > 300 Yes   U: Stress Ulcer Prophylaxis (if needed) pepcid   G: Glucose Control Long acting plus SSI   B: Bowel Function     I: Indwelling Catheter (Lines & Dockery) Necessity reviewed   D: De-escalation of Antimicrobials/Pharmacotherapies reviewed    Plan for the day/ETD Hydration; mobilization; electrolyte replacement    Code Status:  Family/Goals of Care: Full Code  Home on discharge   I have discussed case and plan of care in detail with Dr Bernardo; Status and plan of care were discussed with team on multidisciplinary rounds.    Critical Care Time: 50 minutes  Critical secondary to severe metabolic acidosis and electrolyte imbalance   Critical care was time spent personally by me on the following activities: development of treatment plan with patient or surrogate and bedside caregivers, discussions with consultants, evaluation of patient's response to treatment, examination of patient, ordering and performing treatments and interventions, ordering  and review of laboratory studies, ordering and review of radiographic studies, pulse oximetry, re-evaluation of patient's condition. This critical care time did not overlap with that of any other provider or involve time for any procedures.     Yandy Hagen, Acute Care NP-BC  Critical Care Medicine  [facility]

## 2018-07-23 NOTE — PT/OT/SLP PROGRESS
Physical Therapy      Patient Name:  Yandy Cabral   MRN:  [id number]    P.CHICO BRANCH INITIATED THIS AM VIA CHART REVIEW, PT ADAMANTLY REFUSED TO GET OOB TO WALK DESPITE MAX EDUCATION AND ENCOURAGEMENT FROM THERAPIST, NURSE, AND , WILL ASSESS PT NEXT VISIT    Angeles Dean, PT   7/23/2018  111

## 2018-07-23 NOTE — SUBJECTIVE & OBJECTIVE
Review of Systems   Constitutional: Positive for malaise/fatigue.   Respiratory: Positive for cough. Negative for sputum production, shortness of breath and wheezing.    Cardiovascular: Negative for chest pain and palpitations.   Gastrointestinal: Positive for abdominal pain (lazaro incisional tenderness) and nausea.        Thirst   Genitourinary: Negative.    Musculoskeletal: Positive for myalgias.   Neurological: Negative for focal weakness and headaches.       Objective:     Vital Signs (Most Recent):  Temp: (!) 100.8 °F (38.2 °C) (07/23/18 0705)  Pulse: 103 (07/23/18 0705)  Resp: (!) 29 (07/23/18 0705)  BP: (!) 159/84 (07/23/18 0705)  SpO2: 97 % (07/23/18 0705) Vital Signs (24h Range):  Temp:  [94.3 °F (34.6 °C)-100.8 °F (38.2 °C)] 100.8 °F (38.2 °C)  Pulse:  [] 103  Resp:  [13-36] 29  SpO2:  [95 %-100 %] 97 %  BP: ()/(38-92) 159/84     Weight: 72.8 kg (160 lb 7.9 oz)  Body mass index is 26.71 kg/m².      Intake/Output Summary (Last 24 hours) at 07/23/18 0823  Last data filed at 07/23/18 0600   Gross per 24 hour   Intake          9302.93 ml   Output             5800 ml   Net          3502.93 ml       Physical Exam   Constitutional: She is oriented to person, place, and time. She appears well-nourished. She appears ill.   HENT:   Head: Normocephalic and atraumatic.   Eyes: Conjunctivae are normal. Pupils are equal, round, and reactive to light.   Neck: No JVD present. No tracheal deviation present.   Cardiovascular: Regular rhythm.  Tachycardia present.    No murmur heard.  Pulses:       Radial pulses are 2+ on the right side, and 2+ on the left side.        Dorsalis pedis pulses are 2+ on the right side, and 2+ on the left side.   Pulmonary/Chest: Breath sounds normal. No accessory muscle usage. Tachypnea noted. No respiratory distress. She has no wheezes. She has no rhonchi. She has no rales.   Abdominal: She exhibits distension. There is tenderness in the right upper quadrant. There is no rigidity  and no guarding.   Neurological: She is alert and oriented to person, place, and time. GCS eye subscore is 4. GCS verbal subscore is 5. GCS motor subscore is 6.   Skin: Skin is dry. Capillary refill takes less than 2 seconds. No cyanosis. Nails show no clubbing.   abd lap sites intact, well approximated, no erythema or drainage   Psychiatric: Her affect is angry. She is inattentive.       Vents:  Oxygen Concentration (%): 32 (07/22/18 0645)    Lines/Drains/Airways     Drain                 Urethral Catheter 07/22/18 0900 Temperature probe 16 Fr. less than 1 day          Peripheral Intravenous Line                 Peripheral IV - Single Lumen 07/19/18 0241 Right Antecubital 4 days         Peripheral IV - Single Lumen 07/20/18 1500 Left Forearm 2 days         Peripheral IV - Single Lumen 07/23/18 0713 Right Forearm less than 1 day                Significant Labs:    CBC/Anemia Profile:    Recent Labs  Lab 07/22/18  0535 07/22/18  1103 07/23/18  0338   WBC 21.82*  --  12.79*   HGB 12.3  --  10.8*   HCT 39.9 33* 30.3*     --  263   *  --  91   RDW 14.6*  --  14.0        Chemistries:    Recent Labs  Lab 07/22/18  0535 07/22/18  1003  07/22/18  2126 07/23/18  0130 07/23/18  0337    140  < > 145 144 144  144   K 4.0 3.2*  < > 3.2* 3.4* 3.8  3.8   * 110  < > 116* 114* 113*  113*   CO2 <5* 5*  < > 12* 12* 14*  14*   BUN 22* 28*  < > 22* 18 18  18   CREATININE 1.5* 1.5*  < > 1.5* 1.4 1.4  1.4   CALCIUM 9.9 9.0  < > 8.3* 8.5* 8.4*  8.4*   ALBUMIN 2.9*  --   --   --   --  2.5*   PROT 7.3  --   --   --   --  5.9*   BILITOT 0.4  --   --   --   --  0.5   ALKPHOS 146*  --   --   --   --  133   *  --   --   --   --  109*   AST 57*  --   --   --   --  66*   MG 2.1 2.0  --   --   --  1.9   PHOS 3.1 2.9  --  <1.0*  --  <1.0*   < > = values in this interval not displayed.    All pertinent labs within the past 24 hours have been reviewed.    Significant Imaging:  I have reviewed all pertinent  imaging results/findings within the past 24 hours.

## 2018-07-23 NOTE — PLAN OF CARE
"Problem: Patient Care Overview  Goal: Plan of Care Review  Outcome: Ongoing (interventions implemented as appropriate)  Pt oob to chair today. Ambulated to with PT. Pt has to be educated and convinced to participate in care. Refused phos oral supplement due to taste. Refused PT with first attempt. PT attempted 2 more times and pt agreed with  pleading for pt to ambulate. Ambulates to toilet with stand-by assist. Pt c/o constipation. Medium hard BMx 1 today. Minimal toleration of po intake. Pt stated "Im hungry" for first time this evening. Waiting on supper tray. VSS. Call light in reach. Repositions self in bed without assist.  at bedside. Will continue to monitor.      "

## 2018-07-23 NOTE — SUBJECTIVE & OBJECTIVE
Interval History: Improved since yesterday, tolerated liquids, responding to fluid resuscitation    Medications:  Continuous Infusions:   0/9% NACL & POTASSIUM CHLORIDE 20 MEQ/L       Scheduled Meds:   chlorhexidine  10 mL Mouth/Throat BID    enoxaparin  40 mg Subcutaneous Daily    famotidine  20 mg Oral Daily    insulin detemir U-100  20 Units Subcutaneous Daily    meropenem (MERREM) IVPB  500 mg Intravenous Q8H    nozaseptin   Each Nare BID    polyethylene glycol  17 g Oral Daily    potassium phosphate IVPB  30 mmol Intravenous Once    potassium, sodium phosphates  1 packet Oral Once    senna-docusate 8.6-50 mg  1 tablet Oral BID    sodium chloride 0.9%  3 mL Intravenous Q8H     PRN Meds:acetaminophen, bisacodyl, cloNIDine, dextrose 50%, dextrose 50%, diphenhydrAMINE, glucagon (human recombinant), glucose, glucose, insulin aspart U-100, lactulose, meperidine, ondansetron, promethazine (PHENERGAN) IVPB, ramelteon, sodium chloride 0.9%     Review of patient's allergies indicates:  No Known Allergies  Objective:     Vital Signs (Most Recent):  Temp: (!) 100.4 °F (38 °C) (07/23/18 0905)  Pulse: 97 (07/23/18 0918)  Resp: (!) 21 (07/23/18 0918)  BP: (!) 152/72 (07/23/18 0908)  SpO2: 98 % (07/23/18 1000) Vital Signs (24h Range):  Temp:  [94.5 °F (34.7 °C)-100.8 °F (38.2 °C)] 100.4 °F (38 °C)  Pulse:  [] 97  Resp:  [13-36] 21  SpO2:  [95 %-100 %] 98 %  BP: (109-175)/(51-92) 152/72     Weight: 72.8 kg (160 lb 7.9 oz)  Body mass index is 26.71 kg/m².    Intake/Output - Last 3 Shifts       07/21 0700 - 07/22 0659 07/22 0700 - 07/23 0659 07/23 0700 - 07/24 0659    P.O. 120 1320     I.V. (mL/kg) 1943.8 (28) 3082.9 (42.3) 385.4 (5.3)    IV Piggyback 850 4900 550    Total Intake(mL/kg) 2913.8 (41.9) 9302.9 (127.8) 935.4 (12.8)    Urine (mL/kg/hr)  5800 (3.3) 954 (3.3)    Total Output   5800 954    Net +2913.8 +3502.9 -18.6           Urine Occurrence 9 x            Physical Exam   Constitutional: No distress.    Pulmonary/Chest: No respiratory distress.   Abdominal: Soft. She exhibits no distension. There is tenderness. There is no rebound and no guarding.   Incisions c/d/i       Significant Labs:  CBC:     Recent Labs  Lab 07/23/18  0338   WBC 12.79*   RBC 3.34*   HGB 10.8*   HCT 30.3*      MCV 91   MCH 32.3*   MCHC 35.6     CMP:     Recent Labs  Lab 07/23/18  0337   *  244*   CALCIUM 8.4*  8.4*   ALBUMIN 2.5*   PROT 5.9*     144   K 3.8  3.8   CO2 14*  14*   *  113*   BUN 18  18   CREATININE 1.4  1.4   ALKPHOS 133   *   AST 66*   BILITOT 0.5       Significant Diagnostics:  I have reviewed all pertinent imaging results/findings within the past 24 hours.

## 2018-07-23 NOTE — PROGRESS NOTES
[facility] -   General Surgery  Progress Note    Subjective:     History of Present Illness:  Yandy Cabral is a 47 y.o. female with a pmhx of diabetes, dyslipidemia. She is s/p gastric sleeve in 2012. She presented to the ER with RUQ abdominal pain that started yesterday. Sx are associated with nausea and vomiting. She denies fever or chills. CT and ultrasound done in ED are concerning for acalculous cholecystitis.     Post-Op Info:  Procedure(s) (LRB):  CHOLECYSTECTOMY-LAPAROSCOPIC (N/A)   3 Days Post-Op     Interval History: Improved since yesterday, tolerated liquids, responding to fluid resuscitation    Medications:  Continuous Infusions:   0/9% NACL & POTASSIUM CHLORIDE 20 MEQ/L       Scheduled Meds:   chlorhexidine  10 mL Mouth/Throat BID    enoxaparin  40 mg Subcutaneous Daily    famotidine  20 mg Oral Daily    insulin detemir U-100  20 Units Subcutaneous Daily    meropenem (MERREM) IVPB  500 mg Intravenous Q8H    nozaseptin   Each Nare BID    polyethylene glycol  17 g Oral Daily    potassium phosphate IVPB  30 mmol Intravenous Once    potassium, sodium phosphates  1 packet Oral Once    senna-docusate 8.6-50 mg  1 tablet Oral BID    sodium chloride 0.9%  3 mL Intravenous Q8H     PRN Meds:acetaminophen, bisacodyl, cloNIDine, dextrose 50%, dextrose 50%, diphenhydrAMINE, glucagon (human recombinant), glucose, glucose, insulin aspart U-100, lactulose, meperidine, ondansetron, promethazine (PHENERGAN) IVPB, ramelteon, sodium chloride 0.9%     Review of patient's allergies indicates:  No Known Allergies  Objective:     Vital Signs (Most Recent):  Temp: (!) 100.4 °F (38 °C) (07/23/18 0905)  Pulse: 97 (07/23/18 0918)  Resp: (!) 21 (07/23/18 0918)  BP: (!) 152/72 (07/23/18 0908)  SpO2: 98 % (07/23/18 1000) Vital Signs (24h Range):  Temp:  [94.5 °F (34.7 °C)-100.8 °F (38.2 °C)] 100.4 °F (38 °C)  Pulse:  [] 97  Resp:  [13-36] 21  SpO2:  [95 %-100 %] 98 %  BP: (109-175)/(51-92)  152/72     Weight: 72.8 kg (160 lb 7.9 oz)  Body mass index is 26.71 kg/m².    Intake/Output - Last 3 Shifts       07/21 0700 - 07/22 0659 07/22 0700 - 07/23 0659 07/23 0700 - 07/24 0659    P.O. 120 1320     I.V. (mL/kg) 1943.8 (28) 3082.9 (42.3) 385.4 (5.3)    IV Piggyback 850 4900 550    Total Intake(mL/kg) 2913.8 (41.9) 9302.9 (127.8) 935.4 (12.8)    Urine (mL/kg/hr)  5800 (3.3) 954 (3.3)    Total Output   5800 954    Net +2913.8 +3502.9 -18.6           Urine Occurrence 9 x            Physical Exam   Constitutional: No distress.   Pulmonary/Chest: No respiratory distress.   Abdominal: Soft. She exhibits no distension. There is tenderness. There is no rebound and no guarding.   Incisions c/d/i       Significant Labs:  CBC:     Recent Labs  Lab 07/23/18  0338   WBC 12.79*   RBC 3.34*   HGB 10.8*   HCT 30.3*      MCV 91   MCH 32.3*   MCHC 35.6     CMP:     Recent Labs  Lab 07/23/18  0337   *  244*   CALCIUM 8.4*  8.4*   ALBUMIN 2.5*   PROT 5.9*     144   K 3.8  3.8   CO2 14*  14*   *  113*   BUN 18  18   CREATININE 1.4  1.4   ALKPHOS 133   *   AST 66*   BILITOT 0.5       Significant Diagnostics:  I have reviewed all pertinent imaging results/findings within the past 24 hours.    Assessment/Plan:     * Acute gangrenous cholecystitis    S/p laparoscopic cholecystectomy        Dehydration    Fluid resuscitation        Severe sepsis    Broad spectrum antibiotics and fluid resuscitation, monitor urine output        Hyperlipemia    Home meds        Type 2 diabetes mellitus with hyperglycemia, without long-term current use of insulin    Insulin sliding scale  accuchecks  Hoptal Medicine was consulted              Louis O. Jeansonne, MD  General Surgery  [facility] -

## 2018-07-23 NOTE — ASSESSMENT & PLAN NOTE
CT abd neg for intra abd process; ngtd on cultures and responding appropriately to aggressive IVF resuscitation  No overt infectious process but procalcitonin significantly elevated  Will continue abx, repeat procal and consider de-escalation tomorrow

## 2018-07-23 NOTE — PROGRESS NOTES
[facility] Hospital Medicine  Progress Note    Patient Name: Yandy Cabral  MRN: [id number]  Patient Class: IP- Inpatient   Admission Date: 7/19/2018  Length of Stay: 4 days  Attending Physician: Louis O. Jeansonne IV, MD  Primary Care Provider: LEONID Ramos Jr, MD        Subjective:     Principal Problem:Cholecystitis, acute    HPI:  Yandy Cabral is a 47 year old female with DM II and hyperlipidemia who presented to the ED on 7/18/18 with complaints of abdominal pain, nausea and vomiting. Symptoms began yesterday and have been constant. Her abdominal pain is located across her upper abdomen. Her emesis is described as bilious. There are associated chills. She denies fever. In the ED, she was found to have a mildly elevated biliruibin, a mild leukocytosis and findings consistent with acalculous cholecystitis on abdominal US and CT scan of the abdomen. At this time, she is being conservatively managed with IV antibiotics and supportive care.     Hospital Course:  Patient admitted for cholecystitis. Initial workup revealed mildly elevated biliruibin, a mild leukocytosis and findings consistent with acalculous cholecystitis on abdominal US and CT scan of the abdomen. General surgery primary. At this time, she is being conservatively managed with IV antibiotics and supportive care. POD # 1 s/p laparoscopic cholecystectomy. Heart rate in the 110's-120's. Mostly likely due to dehydration 2/2 vomiting. IVFs started. Will continue to monitor closely.    7/22  I was called in the room this morning by the nurse to evaluate patient for tachypnea .She was seen yesterday by dr Miller . Patient is confused tachypnea ,tachycardia and sweating . Review of chart patient received clonidine for tachycardia/hypertension yesterday and 500 ml iv bolus for dehydration.BMP showing increase anion gap metabolic acidosis . Patient looks dehydrated with high concern for sepsis .Ordered iv fluids bolus . Started  "antibotics blood cultures . Start bicarb drip for severe acidosis . Transfer to icu . cta negative for pe . Ct abdomen pelvis no new acute finding . Concern for dka discuss with icu will wait on next bmp also replace K before giving insulin   7/23  vitals and labs responding appropriately to hydration and bicarb infusion; hypophosphatemia refractory to overnight supplementation; reports feeling "better" but continues to complain of nausea, malaise, fatigue        Review of Systems   Reason unable to perform ROS: limited verbalization likely s/t acuity of condition.   Constitutional: Positive for chills and fatigue.   Respiratory: Negative for cough and wheezing.    Cardiovascular: Negative for chest pain.   Gastrointestinal: Positive for abdominal pain and nausea.   Psychiatric/Behavioral: Negative for confusion. The patient is not nervous/anxious.      Objective:     Vital Signs (Most Recent):  Temp: 100.3 °F (37.9 °C) (07/23/18 1205)  Pulse: 109 (07/23/18 1205)  Resp: (!) 22 (07/23/18 1205)  BP: 127/73 (07/23/18 1205)  SpO2: 97 % (07/23/18 1205) Vital Signs (24h Range):  Temp:  [96.8 °F (36 °C)-100.8 °F (38.2 °C)] 100.3 °F (37.9 °C)  Pulse:  [] 109  Resp:  [13-36] 22  SpO2:  [96 %-100 %] 97 %  BP: (122-175)/(67-92) 127/73     Weight: 72.8 kg (160 lb 7.9 oz)  Body mass index is 26.71 kg/m².    Intake/Output Summary (Last 24 hours) at 07/23/18 1306  Last data filed at 07/23/18 1200   Gross per 24 hour   Intake           6394.6 ml   Output             6314 ml   Net             80.6 ml      Physical Exam   Constitutional: She appears well-nourished. She appears lethargic. She has a sickly appearance.   HENT:   Head: Normocephalic and atraumatic.   Eyes: Conjunctivae are normal. Pupils are equal, round, and reactive to light.   Neck: No JVD present. No tracheal deviation present.   Cardiovascular: Regular rhythm.  Tachycardia present.    No murmur heard.  Pulses:       Radial pulses are 2+ on the right side, and " 2+ on the left side.        Dorsalis pedis pulses are 1+ on the right side, and 1+ on the left side.   Pulmonary/Chest: Breath sounds normal. No accessory muscle usage. Tachypnea noted. No respiratory distress. She has no wheezes. She has no rhonchi. She has no rales.   Abdominal: She exhibits distension. There is tenderness. There is no rigidity and no guarding.   Neurological: She appears lethargic. GCS eye subscore is 3. GCS verbal subscore is 5. GCS motor subscore is 6.   Skin: Skin is dry. Capillary refill takes 2 to 3 seconds. No cyanosis. There is pallor. Nails show no clubbing.   abd lap sites intact, well approximated, no erythema or drainage   Psychiatric: Her affect is blunt. She is inattentive.   Nursing note and vitals reviewed.      Significant Labs: All pertinent labs within the past 24 hours have been reviewed.    Significant Imaging: I have reviewed all pertinent imaging results/findings within the past 24 hours.    Assessment/Plan:      * Cholecystitis, acute    General Surgery primary   NPO, IV antibiotics, supportive care.   POD #1, +n/v, also pain uncontrolled   Primary team managing           Metabolic acidosis    - sepsis -dehydration iv fluids vs dka waiting on repeat bmp may need insulin drip if continue to hyperglycemic   - iv bicarb  7/23  Improving continue with bicarb and fluid resusitation        Severe sepsis    - start meropneum   - pan cultures   - iv fluids         Tachycardia    's-120's  Pain uncontrolled, +nausea and vomiting. Medications adjusted    Labs reflect dehydration   IVFs started   Monitor closely           Hyperlipemia    NPO , will resume statin when appropriate.          Type 2 diabetes mellitus with hyperglycemia, without long-term current use of insulin    -NISS with long acting insulin   -hemoglobin A1C 7.8.               VTE Risk Mitigation         Ordered     enoxaparin injection 40 mg  Daily      07/20/18 5277     Place sequential compression device   Until discontinued      07/20/18 1657     IP VTE HIGH RISK PATIENT  Once      07/20/18 1657          Critical care time spent on the evaluation and treatment of severe organ dysfunction, review of pertinent labs and imaging studies, discussions with consulting providers and discussions with patient/family: 40 minutes.    Naseem Mata MD  Department of Hospital Medicine   [facility] -

## 2018-07-23 NOTE — EICU
Kphos 30 mmol IV for Po4 of 1 and K of 3.2  Creatinine 1.5, improving.  Follow am labs. Earlier received Kcl with K up from 2.8.

## 2018-07-23 NOTE — ASSESSMENT & PLAN NOTE
+serum ketones likely more from starvation ketosis and dehydration  Add long acting insulin  continue SSI prn with monitoring for glucose control and prevention of insulin toxicity  Goal glucose 100-180 mg/dl while critically ill

## 2018-07-23 NOTE — PLAN OF CARE
Patient in ICU for Severe Acidosis but is gradually improving     07/23/18 1040   Discharge Reassessment   Assessment Type Discharge Planning Reassessment   Provided patient/caregiver education on the expected discharge date and the discharge plan No   Do you have any problems affording any of your prescribed medications? No   Discharge Plan A Home Health;Home with family   Discharge Plan B Home with family   Patient choice form signed by patient/caregiver N/A   Can the patient answer the patient profile reliably? Yes, cognitively intact   How does the patient rate their overall health at the present time? Good   Describe the patient's ability to walk at the present time. No restrictions   How often would a person be available to care for the patient? Whenever needed   Number of comorbid conditions (as recorded on the chart) Two   During the past month, has the patient often been bothered by feeling down, depressed or hopeless? No   During the past month, has the patient often been bothered by little interest or pleasure in doing things? No

## 2018-07-23 NOTE — ASSESSMENT & PLAN NOTE
- sepsis -dehydration iv fluids vs dka waiting on repeat bmp may need insulin drip if continue to hyperglycemic   - iv bicarb  7/23  Improving continue with bicarb and fluid resusitation

## 2018-07-23 NOTE — PT/OT/SLP EVAL
Physical Therapy Evaluation    Patient Name:  Yandy Cabral   MRN:  [id number]    Recommendations:     Discharge Recommendations:  home   Discharge Equipment Recommendations: none   Barriers to discharge: None    Assessment:     Yandy Cabral is a 47 y.o. female admitted with a medical diagnosis of Cholecystitis, acute.  She presents with the following impairments/functional limitations:  impaired endurance, impaired functional mobilty, gait instability, impaired balance.    Rehab Prognosis:  GOOD; patient would benefit from acute skilled PT services to address these deficits and reach maximum level of function.      Recent Surgery: Procedure(s) (LRB):  CHOLECYSTECTOMY-LAPAROSCOPIC (N/A)  LYSIS, ADHESIONS, LAPAROSCOPIC (N/A) 3 Days Post-Op    Plan:     During this hospitalization, patient to be seen 5 x/week to address the above listed problems via gait training, therapeutic activities, therapeutic exercises  · Plan of Care Expires:  07/30/18   Plan of Care Reviewed with: patient, spouse    Subjective     Communicated with NURSE ABBOTT prior to session.  Patient found SUPINE upon PT entry to room, agreeable to evaluation.      Chief Complaint: DIZZINESS  Patient comments/goals:   Pain/Comfort:  · Pain Rating 1: 4/10  · Location 1: abdomen    Patients cultural, spiritual, Methodist conflicts given the current situation:      Living Environment:  PT LIVES WITH  WHO IS ABLE TO ASSIST AS NEEDED, 2 STORY HOUSE, BR/BATH ON 1ST FLOOR, 3 STEPS TO ENTER HOME WITH 1 SIDE RAIL, INDEP WITH COMMUNITY AMBULATION, INDEP WITH ADL'S  Prior to admission, patients level of function was INDEP.  Patient has the following equipment: none.  DME owned (not currently used): none.  Upon discharge, patient will have assistance from .    Objective:     Patient found with: telemetry, peripheral IV, pulse ox (continuous), ling catheter, blood pressure cuff     General Precautions: Standard, fall   Orthopedic  Precautions:N/A   Braces: N/A     Exams:  · Cognitive Exam:  Patient is oriented to Person, Place, Time and Situation and follows 100% of SIMPLE commands   · Postural Exam:  Patient presented with the following abnormalities:    · -       No postural abnormalities identified  · Sensation:    · -       Intact  · RLE ROM: WNL  · RLE Strength: WNL  · LLE ROM: WNL  · LLE Strength: WNL    Functional Mobility:  · Bed Mobility:     · Rolling Right: stand by assistance  · Scooting: stand by assistance  · Supine to Sit: stand by assistance  · Transfers:     · Sit to Stand:  contact guard assistance with no AD  · Bed to Chair: contact guard assistance with  no AD  using  Stand Pivot  · Gait: PT AMB 25' WITH HHA/TAMARA, UNSTEADY GAIT, LIMITED BY C/O WEAKNESS, QUICK TO FATIGUE  · Balance: POOR+    AM-PAC 6 CLICK MOBILITY  Total Score:18     Therapeutic Activities and Exercises:   PT EDUCATED IN ROLE OF P.T., PT EDUCATED IN BLE THEREX TO PERFORM WHILE SEATED IN CHAIR, PT ENCOURAGED TO INCREASE TIME OOB IN CHAIR    Patient left up in chair with all lines intact, call button in reach, NURSE notified and  present.    GOALS:    Physical Therapy Goals        Problem: Physical Therapy Goal    Goal Priority Disciplines Outcome Goal Variances Interventions   Physical Therapy Goal     PT/OT, PT      Description:  LTG'S TO BE MET IN 7 DAYS (7-30-18)  1. PT WILL BE INDEP WITH BED MOBILITY  2. PT WILL BE INDEP WITH TF'S  3. PT WILL ' SONDRA  4. PT WILL DEMO G DYNAMIC BALANCE DURING GAIT                    History:     Past Medical History:   Diagnosis Date    Chronic constipation     DM II (diabetes mellitus, type II), controlled        Past Surgical History:   Procedure Laterality Date    CYST REMOVAL      ganglionic right wrist    gastric sleve  1/01/12    LAPAROSCOPIC CHOLECYSTECTOMY N/A 7/20/2018    Procedure: CHOLECYSTECTOMY-LAPAROSCOPIC;  Surgeon: Louis O. Jeansonne IV, MD;  Location: [facility];  Service: General;   Laterality: N/A;    LAPAROSCOPIC LYSIS OF ADHESIONS N/A 7/20/2018    Procedure: LYSIS, ADHESIONS, LAPAROSCOPIC;  Surgeon: Louis O. Jeansonne IV, MD;  Location: [facility];  Service: General;  Laterality: N/A;    TUBAL LIGATION         Clinical Decision Making:     History  Co-morbidities and personal factors that may impact the plan of care Examination  Body Structures and Functions, activity limitations and participation restrictions that may impact the plan of care Clinical Presentation   Decision Making/ Complexity Score   Co-morbidities:   [] Time since onset of injury / illness / exacerbation  [] Status of current condition  []Patient's cognitive status and safety concerns    [] Multiple Medical Problems (see med hx)  Personal Factors:   [] Patient's age  [] Prior Level of function   [] Patient's home situation (environment and family support)  [] Patient's level of motivation  [] Expected progression of patient      HISTORY:(criteria)    [] 35270 - no personal factors/history    [] 96711 - has 1-2 personal factor/comorbidity     [] 83708 - has >3 personal factor/comorbidity     Body Regions:  [] Objective examination findings  [] Head     []  Neck  [] Trunk   [] Upper Extremity  [] Lower Extremity    Body Systems:  [] For communication ability, affect, cognition, language, and learning style: the assessment of the ability to make needs known, consciousness, orientation (person, place, and time), expected emotional /behavioral responses, and learning preferences (eg, learning barriers, education  needs)  [] For the neuromuscular system: a general assessment of gross coordinated movement (eg, balance, gait, locomotion, transfers, and transitions) and motor function  (motor control and motor learning)  [] For the musculoskeletal system: the assessment of gross symmetry, gross range of motion, gross strength, height, and weight  [] For the integumentary system: the assessment of pliability(texture), presence of  scar formation, skin color, and skin integrity  [] For cardiovascular/pulmonary system: the assessment of heart rate, respiratory rate, blood pressure, and edema     Activity limitations:    [] Patient's cognitive status and saf ety concerns          [] Status of current condition      [] Weight bearing restriction  [] Cardiopulmunary Restriction    Participation Restrictions:   [] Goals and goal agreement with the patient     [] Rehab potential (prognosis) and probable outcome      Examination of Body System: (criteria)    [] 01371 - addressing 1-2 elements    [] 41526 - addressing a total of 3 or more elements     [] 92574 -  Addressing a total of 4 or more elements         Clinical Presentation: (criteria)  Choose one     On examination of body system using standardized tests and measures patient presents with (CHOOSE ONE) elements from any of the following: body structures and functions, activity limitations, and/or participation restrictions.  Leading to a clinical presentation that is considered (CHOOSE ONE)                              Clinical Decision Making  (Eval Complexity):  Choose One     Time Tracking:     PT Received On: 07/23/18  PT Start Time: 1145     PT Stop Time: 1210  PT Total Time (min): 25 min     Billable Minutes: Evaluation 15 and Gait Training 10     PT ENCOURAGED TO CALL FOR ASSISTANCE WITH ALL NEEDS DUE TO FALL RISK STATUS, PT AGREEABLE    Angeles Dean, PT  07/23/2018

## 2018-07-23 NOTE — EICU
Getting sod bicarb at 175 ml/hr.  sats ok.  SBP > 150.  K and po4 was low, replaced.    Ok to decrease sod bicarb to 125 ml/hr for now.  AG and hco3 improving. S/p lap cholecystectomy status.     Follow am labs.  Including Mg level.

## 2018-07-24 PROBLEM — R00.0 TACHYCARDIA: Status: RESOLVED | Noted: 2018-07-21 | Resolved: 2018-07-24

## 2018-07-24 PROBLEM — E87.20 METABOLIC ACIDOSIS: Status: RESOLVED | Noted: 2018-07-22 | Resolved: 2018-07-24

## 2018-07-24 PROBLEM — E86.0 DEHYDRATION: Status: RESOLVED | Noted: 2018-07-22 | Resolved: 2018-07-24

## 2018-07-24 PROBLEM — E87.8 ELECTROLYTE IMBALANCE: Status: RESOLVED | Noted: 2018-07-23 | Resolved: 2018-07-24

## 2018-07-24 LAB
ALBUMIN SERPL BCP-MCNC: 2.3 G/DL
ALP SERPL-CCNC: 105 U/L
ALT SERPL W/O P-5'-P-CCNC: 70 U/L
ANION GAP SERPL CALC-SCNC: 10 MMOL/L
AST SERPL-CCNC: 36 U/L
BASOPHILS # BLD AUTO: 0 K/UL
BASOPHILS NFR BLD: 0 %
BILIRUB SERPL-MCNC: 0.6 MG/DL
BUN SERPL-MCNC: 11 MG/DL
CALCIUM SERPL-MCNC: 7.8 MG/DL
CHLORIDE SERPL-SCNC: 107 MMOL/L
CO2 SERPL-SCNC: 26 MMOL/L
CREAT SERPL-MCNC: 0.8 MG/DL
DIFFERENTIAL METHOD: ABNORMAL
EOSINOPHIL # BLD AUTO: 0 K/UL
EOSINOPHIL NFR BLD: 0.1 %
ERYTHROCYTE [DISTWIDTH] IN BLOOD BY AUTOMATED COUNT: 14 %
EST. GFR  (AFRICAN AMERICAN): >60 ML/MIN/1.73 M^2
EST. GFR  (NON AFRICAN AMERICAN): >60 ML/MIN/1.73 M^2
GLUCOSE SERPL-MCNC: 106 MG/DL
HCT VFR BLD AUTO: 26.5 %
HGB BLD-MCNC: 9.3 G/DL
LYMPHOCYTES # BLD AUTO: 1.2 K/UL
LYMPHOCYTES NFR BLD: 14.4 %
MAGNESIUM SERPL-MCNC: 1.4 MG/DL
MCH RBC QN AUTO: 31.5 PG
MCHC RBC AUTO-ENTMCNC: 35.1 G/DL
MCV RBC AUTO: 90 FL
MONOCYTES # BLD AUTO: 0.6 K/UL
MONOCYTES NFR BLD: 7.6 %
NEUTROPHILS # BLD AUTO: 6.3 K/UL
NEUTROPHILS NFR BLD: 77.9 %
PHOSPHATE SERPL-MCNC: 1.5 MG/DL
PLATELET # BLD AUTO: 185 K/UL
PMV BLD AUTO: 9.1 FL
POCT GLUCOSE: 100 MG/DL (ref 70–110)
POCT GLUCOSE: 106 MG/DL (ref 70–110)
POCT GLUCOSE: 163 MG/DL (ref 70–110)
POCT GLUCOSE: 180 MG/DL (ref 70–110)
POCT GLUCOSE: 215 MG/DL (ref 70–110)
POTASSIUM SERPL-SCNC: 3 MMOL/L
PROT SERPL-MCNC: 5.2 G/DL
RBC # BLD AUTO: 2.95 M/UL
SODIUM SERPL-SCNC: 143 MMOL/L
WBC # BLD AUTO: 8.03 K/UL

## 2018-07-24 PROCEDURE — 36415 COLL VENOUS BLD VENIPUNCTURE: CPT

## 2018-07-24 PROCEDURE — 94799 UNLISTED PULMONARY SVC/PX: CPT

## 2018-07-24 PROCEDURE — 85025 COMPLETE CBC W/AUTO DIFF WBC: CPT

## 2018-07-24 PROCEDURE — 80053 COMPREHEN METABOLIC PANEL: CPT

## 2018-07-24 PROCEDURE — 84100 ASSAY OF PHOSPHORUS: CPT

## 2018-07-24 PROCEDURE — 99233 SBSQ HOSP IP/OBS HIGH 50: CPT | Mod: ,,, | Performed by: NURSE PRACTITIONER

## 2018-07-24 PROCEDURE — 20000000 HC ICU ROOM

## 2018-07-24 PROCEDURE — 97116 GAIT TRAINING THERAPY: CPT

## 2018-07-24 PROCEDURE — 25000003 PHARM REV CODE 250: Performed by: NURSE PRACTITIONER

## 2018-07-24 PROCEDURE — 97110 THERAPEUTIC EXERCISES: CPT

## 2018-07-24 PROCEDURE — 63600175 PHARM REV CODE 636 W HCPCS: Performed by: INTERNAL MEDICINE

## 2018-07-24 PROCEDURE — 83735 ASSAY OF MAGNESIUM: CPT

## 2018-07-24 PROCEDURE — 25000003 PHARM REV CODE 250: Performed by: SURGERY

## 2018-07-24 PROCEDURE — 25000003 PHARM REV CODE 250: Performed by: INTERNAL MEDICINE

## 2018-07-24 PROCEDURE — 63600175 PHARM REV CODE 636 W HCPCS: Performed by: SURGERY

## 2018-07-24 PROCEDURE — A4216 STERILE WATER/SALINE, 10 ML: HCPCS | Performed by: INTERNAL MEDICINE

## 2018-07-24 RX ORDER — LANOLIN ALCOHOL/MO/W.PET/CERES
400 CREAM (GRAM) TOPICAL ONCE
Status: COMPLETED | OUTPATIENT
Start: 2018-07-24 | End: 2018-07-24

## 2018-07-24 RX ORDER — SODIUM CHLORIDE, SODIUM LACTATE, POTASSIUM CHLORIDE, CALCIUM CHLORIDE 600; 310; 30; 20 MG/100ML; MG/100ML; MG/100ML; MG/100ML
INJECTION, SOLUTION INTRAVENOUS CONTINUOUS
Status: DISCONTINUED | OUTPATIENT
Start: 2018-07-24 | End: 2018-07-25

## 2018-07-24 RX ORDER — POTASSIUM CHLORIDE 20 MEQ/1
40 TABLET, EXTENDED RELEASE ORAL ONCE
Status: COMPLETED | OUTPATIENT
Start: 2018-07-24 | End: 2018-07-24

## 2018-07-24 RX ORDER — FAMOTIDINE 20 MG/1
20 TABLET, FILM COATED ORAL 2 TIMES DAILY
Status: DISCONTINUED | OUTPATIENT
Start: 2018-07-24 | End: 2018-07-25 | Stop reason: HOSPADM

## 2018-07-24 RX ORDER — INSULIN ASPART 100 [IU]/ML
1-10 INJECTION, SOLUTION INTRAVENOUS; SUBCUTANEOUS
Status: DISCONTINUED | OUTPATIENT
Start: 2018-07-24 | End: 2018-07-25 | Stop reason: HOSPADM

## 2018-07-24 RX ORDER — MEROPENEM AND SODIUM CHLORIDE 500 MG/50ML
500 INJECTION, SOLUTION INTRAVENOUS
Status: DISCONTINUED | OUTPATIENT
Start: 2018-07-24 | End: 2018-07-25 | Stop reason: HOSPADM

## 2018-07-24 RX ADMIN — SODIUM CHLORIDE AND POTASSIUM CHLORIDE: 4.5; 1.49 INJECTION, SOLUTION INTRAVENOUS at 02:07

## 2018-07-24 RX ADMIN — POLYETHYLENE GLYCOL (3350) 17 G: 17 POWDER, FOR SOLUTION ORAL at 09:07

## 2018-07-24 RX ADMIN — POTASSIUM PHOSPHATE, MONOBASIC AND POTASSIUM PHOSPHATE, DIBASIC 30 MMOL: 224; 236 INJECTION, SOLUTION, CONCENTRATE INTRAVENOUS at 08:07

## 2018-07-24 RX ADMIN — SODIUM CHLORIDE, SODIUM LACTATE, POTASSIUM CHLORIDE, AND CALCIUM CHLORIDE: .6; .31; .03; .02 INJECTION, SOLUTION INTRAVENOUS at 08:07

## 2018-07-24 RX ADMIN — MEROPENEM AND SODIUM CHLORIDE 500 MG: 500 INJECTION, SOLUTION INTRAVENOUS at 09:07

## 2018-07-24 RX ADMIN — INSULIN ASPART 4 UNITS: 100 INJECTION, SOLUTION INTRAVENOUS; SUBCUTANEOUS at 04:07

## 2018-07-24 RX ADMIN — INSULIN ASPART 1 UNITS: 100 INJECTION, SOLUTION INTRAVENOUS; SUBCUTANEOUS at 08:07

## 2018-07-24 RX ADMIN — ENOXAPARIN SODIUM 40 MG: 100 INJECTION SUBCUTANEOUS at 09:07

## 2018-07-24 RX ADMIN — LACTOBACILLUS TAB 4 TABLET: TAB at 08:07

## 2018-07-24 RX ADMIN — INSULIN ASPART 2 UNITS: 100 INJECTION, SOLUTION INTRAVENOUS; SUBCUTANEOUS at 11:07

## 2018-07-24 RX ADMIN — FAMOTIDINE 20 MG: 20 TABLET ORAL at 08:07

## 2018-07-24 RX ADMIN — ACETAMINOPHEN 650 MG: 325 TABLET, FILM COATED ORAL at 02:07

## 2018-07-24 RX ADMIN — MEROPENEM AND SODIUM CHLORIDE 500 MG: 500 INJECTION, SOLUTION INTRAVENOUS at 11:07

## 2018-07-24 RX ADMIN — POTASSIUM CHLORIDE 40 MEQ: 1500 TABLET, EXTENDED RELEASE ORAL at 09:07

## 2018-07-24 RX ADMIN — ACETAMINOPHEN 650 MG: 325 TABLET, FILM COATED ORAL at 07:07

## 2018-07-24 RX ADMIN — FAMOTIDINE 20 MG: 20 TABLET ORAL at 09:07

## 2018-07-24 RX ADMIN — MEROPENEM AND SODIUM CHLORIDE 500 MG: 500 INJECTION, SOLUTION INTRAVENOUS at 05:07

## 2018-07-24 RX ADMIN — STANDARDIZED SENNA CONCENTRATE AND DOCUSATE SODIUM 1 TABLET: 8.6; 5 TABLET, FILM COATED ORAL at 09:07

## 2018-07-24 RX ADMIN — MEROPENEM AND SODIUM CHLORIDE 500 MG: 500 INJECTION, SOLUTION INTRAVENOUS at 12:07

## 2018-07-24 RX ADMIN — MAGNESIUM OXIDE TAB 400 MG (241.3 MG ELEMENTAL MG) 400 MG: 400 (241.3 MG) TAB at 09:07

## 2018-07-24 RX ADMIN — SODIUM CHLORIDE, PRESERVATIVE FREE 3 ML: 5 INJECTION INTRAVENOUS at 01:07

## 2018-07-24 NOTE — ASSESSMENT & PLAN NOTE
General Surgery primary   NPO, IV antibiotics, supportive care.   POD #1, +n/v, also pain uncontrolled   Primary team managing   7/24  Patient denies of any complains   Low grade fever continue with antibotics

## 2018-07-24 NOTE — PROGRESS NOTES
[facility] Hospital Medicine  Progress Note    Patient Name: Yandy Cabral  MRN: [id number]  Patient Class: IP- Inpatient   Admission Date: 7/19/2018  Length of Stay: 5 days  Attending Physician: Louis O. Jeansonne IV, MD  Primary Care Provider: LEONID Ramos Jr, MD        Subjective:     Principal Problem:Cholecystitis, acute    HPI:  Yandy Cabral is a 47 year old female with DM II and hyperlipidemia who presented to the ED on 7/18/18 with complaints of abdominal pain, nausea and vomiting. Symptoms began yesterday and have been constant. Her abdominal pain is located across her upper abdomen. Her emesis is described as bilious. There are associated chills. She denies fever. In the ED, she was found to have a mildly elevated biliruibin, a mild leukocytosis and findings consistent with acalculous cholecystitis on abdominal US and CT scan of the abdomen. At this time, she is being conservatively managed with IV antibiotics and supportive care.     Hospital Course:  Patient admitted for cholecystitis. Initial workup revealed mildly elevated biliruibin, a mild leukocytosis and findings consistent with acalculous cholecystitis on abdominal US and CT scan of the abdomen. General surgery primary. At this time, she is being conservatively managed with IV antibiotics and supportive care. POD # 1 s/p laparoscopic cholecystectomy. Heart rate in the 110's-120's. Mostly likely due to dehydration 2/2 vomiting. IVFs started. Will continue to monitor closely.    7/22  I was called in the room this morning by the nurse to evaluate patient for tachypnea .She was seen yesterday by dr Miller . Patient is confused tachypnea ,tachycardia and sweating . Review of chart patient received clonidine for tachycardia/hypertension yesterday and 500 ml iv bolus for dehydration.BMP showing increase anion gap metabolic acidosis . Patient looks dehydrated with high concern for sepsis .Ordered iv fluids bolus . Started  "antibotics blood cultures . Start bicarb drip for severe acidosis . Transfer to icu . cta negative for pe . Ct abdomen pelvis no new acute finding . Concern for dka discuss with icu will wait on next bmp also replace K before giving insulin   7/23  vitals and labs responding appropriately to hydration and bicarb infusion; hypophosphatemia refractory to overnight supplementation; reports feeling "better" but continues to complain of nausea, malaise, fatigue  7/24  Patient feels better today and electrolytes replaced . Discussed with icu team and surgery . Will move patient out of icu        Review of Systems   Reason unable to perform ROS: limited verbalization likely s/t acuity of condition.   Constitutional: Negative for chills and fatigue.   Respiratory: Negative for cough and wheezing.    Cardiovascular: Negative for chest pain.   Gastrointestinal: Positive for nausea. Negative for abdominal pain.   Psychiatric/Behavioral: Negative for confusion. The patient is not nervous/anxious.      Objective:     Vital Signs (Most Recent):  Temp: 99.6 °F (37.6 °C) (07/24/18 0700)  Pulse: 88 (07/24/18 0800)  Resp: 20 (07/24/18 0800)  BP: (!) 163/81 (07/24/18 0800)  SpO2: 97 % (07/24/18 0800) Vital Signs (24h Range):  Temp:  [99.5 °F (37.5 °C)-100.4 °F (38 °C)] 99.6 °F (37.6 °C)  Pulse:  [] 88  Resp:  [12-25] 20  SpO2:  [93 %-98 %] 97 %  BP: (120-163)/() 163/81     Weight: 72.8 kg (160 lb 7.9 oz)  Body mass index is 26.71 kg/m².    Intake/Output Summary (Last 24 hours) at 07/24/18 1147  Last data filed at 07/24/18 1100   Gross per 24 hour   Intake          3827.09 ml   Output             3438 ml   Net           389.09 ml      Physical Exam   Constitutional: She is oriented to person, place, and time. No distress.   Eyes: Right eye exhibits no discharge. Left eye exhibits no discharge.   Cardiovascular: Normal rate and regular rhythm.    Pulmonary/Chest: Effort normal and breath sounds normal. No respiratory " distress.   Abdominal: Soft. She exhibits no distension. There is tenderness. There is no rebound and no guarding.   Incisions c/d/i   Neurological: She is alert and oriented to person, place, and time.   Skin: Skin is warm and dry.   Psychiatric: She has a normal mood and affect.   Nursing note and vitals reviewed.      Significant Labs: All pertinent labs within the past 24 hours have been reviewed.    Significant Imaging: I have reviewed all pertinent imaging results/findings within the past 24 hours.    Assessment/Plan:      * Cholecystitis, acute    General Surgery primary   NPO, IV antibiotics, supportive care.   POD #1, +n/v, also pain uncontrolled   Primary team managing   7/24  Patient denies of any complains   Low grade fever continue with antibotics           Severe sepsis    - start meropneum   - pan cultures   - iv fluids   7/24  So far cultures negative   Low grade fever continue with empiric antibotics for now         Acalculous cholecystitis              Hyperlipemia    Continue with home meds         Type 2 diabetes mellitus with hyperglycemia, without long-term current use of insulin    -NISS with long acting insulin   -hemoglobin A1C 7.8.               VTE Risk Mitigation         Ordered     enoxaparin injection 40 mg  Daily      07/20/18 1657     Place sequential compression device  Until discontinued      07/20/18 1657     IP VTE HIGH RISK PATIENT  Once      07/20/18 1657          tranfer patient to telemetry     Naseem Mata MD  Department of Hospital Medicine   [facility]

## 2018-07-24 NOTE — ASSESSMENT & PLAN NOTE
Tolerating diet and glucose trend down; decrease long acting insulin  continue SSI prn with monitoring for glucose control and prevention of insulin toxicity  Goal glucose 100-180 mg/dl while critically ill

## 2018-07-24 NOTE — ASSESSMENT & PLAN NOTE
CT abd neg for intra abd process; ngtd on cultures and responding appropriately to aggressive IVF resuscitation  No overt infectious process but procalcitonin significantly elevated  continue abx, recommend repeat procal and consider de-escalation tomorrow

## 2018-07-24 NOTE — PLAN OF CARE
Problem: Physical Therapy Goal  Goal: Physical Therapy Goal  LTG'S TO BE MET IN 7 DAYS (7-30-18)  1. PT WILL BE INDEP WITH BED MOBILITY  2. PT WILL BE INDEP WITH TF'S  3. PT WILL ' SONDRA  4. PT WILL DEMO G DYNAMIC BALANCE DURING GAIT   Outcome: Outcome(s) achieved Date Met: 07/24/18  D/C PT FROM P[facility]T. SERVICES TO [facility], PT SONDRA WITH ALL FUNCTIONAL MOBILITY

## 2018-07-24 NOTE — PLAN OF CARE
Problem: Patient Care Overview  Goal: Plan of Care Review  Outcome: Ongoing (interventions implemented as appropriate)  Pt tolerating diet, denies pain, VSS, Ambulates independently to BSC. Report given to NADEEM Doyle.

## 2018-07-24 NOTE — SUBJECTIVE & OBJECTIVE
Review of Systems   Reason unable to perform ROS: limited verbalization likely s/t acuity of condition.   Constitutional: Negative for chills and fatigue.   Respiratory: Negative for cough and wheezing.    Cardiovascular: Negative for chest pain.   Gastrointestinal: Positive for nausea. Negative for abdominal pain.   Psychiatric/Behavioral: Negative for confusion. The patient is not nervous/anxious.      Objective:     Vital Signs (Most Recent):  Temp: 99.6 °F (37.6 °C) (07/24/18 0700)  Pulse: 88 (07/24/18 0800)  Resp: 20 (07/24/18 0800)  BP: (!) 163/81 (07/24/18 0800)  SpO2: 97 % (07/24/18 0800) Vital Signs (24h Range):  Temp:  [99.5 °F (37.5 °C)-100.4 °F (38 °C)] 99.6 °F (37.6 °C)  Pulse:  [] 88  Resp:  [12-25] 20  SpO2:  [93 %-98 %] 97 %  BP: (120-163)/() 163/81     Weight: 72.8 kg (160 lb 7.9 oz)  Body mass index is 26.71 kg/m².    Intake/Output Summary (Last 24 hours) at 07/24/18 1147  Last data filed at 07/24/18 1100   Gross per 24 hour   Intake          3827.09 ml   Output             3438 ml   Net           389.09 ml      Physical Exam   Constitutional: She is oriented to person, place, and time. No distress.   Eyes: Right eye exhibits no discharge. Left eye exhibits no discharge.   Cardiovascular: Normal rate and regular rhythm.    Pulmonary/Chest: Effort normal and breath sounds normal. No respiratory distress.   Abdominal: Soft. She exhibits no distension. There is tenderness. There is no rebound and no guarding.   Incisions c/d/i   Neurological: She is alert and oriented to person, place, and time.   Skin: Skin is warm and dry.   Psychiatric: She has a normal mood and affect.   Nursing note and vitals reviewed.      Significant Labs: All pertinent labs within the past 24 hours have been reviewed.    Significant Imaging: I have reviewed all pertinent imaging results/findings within the past 24 hours.

## 2018-07-24 NOTE — PT/OT/SLP PROGRESS
Physical Therapy  Treatment    Yandy Cabral   MRN: [id number]   Admitting Diagnosis: Cholecystitis, acute    PT Received On: 07/24/18  PT Start Time: 1025     PT Stop Time: 1050    PT Total Time (min): 25 min       Billable Minutes:  Gait Training 15 and Therapeutic Exercise 10    Treatment Type: Treatment  PT/PTA: PT       General Precautions: Standard, fall  Orthopedic Precautions: N/A   Braces: N/A    Subjective:  Communicated with NURSE NASH prior to session.  Pain/Comfort  Pain Rating 1: 0/10    Objective:   Patient found with: telemetry    Functional Mobility:  Therapeutic Activities and Exercises:  PT REPORTS FEELING BETTER TODAY, SONDRA FOR SUP<>SIT TF, PT PERFORMED TOILETING SONDRA, NO ASSIST FOR CLEANING, SONDRA FOR SIT<>STAND TF AND BED<>CHAIR TF, PT ' NO AD SONDRA, NO LOB OR SOB ON ROOM AIR, NO C/O DIZZINESS, REVIEW THEREX WITH PT AND     AM-PAC 6 CLICK MOBILITY  How much help from another person does this patient currently need?   1 = Unable, Total/Dependent Assistance  2 = A lot, Maximum/Moderate Assistance  3 = A little, Minimum/Contact Guard/Supervision  4 = None, Modified [place]/Independent    Turning over in bed (including adjusting bedclothes, sheets and blankets)?: 4  Sitting down on and standing up from a chair with arms (e.g., wheelchair, bedside commode, etc.): 4  Moving from lying on back to sitting on the side of the bed?: 4  Moving to and from a bed to a chair (including a wheelchair)?: 4  Need to walk in hospital room?: 4  Climbing 3-5 steps with a railing?: 1  Basic Mobility Total Score: 21    AM-PAC Raw Score CMS G-Code Modifier Level of Impairment Assistance   6 % Total / Unable   7 - 9 CM 80 - 100% Maximal Assist   10 - 14 CL 60 - 80% Moderate Assist   15 - 19 CK 40 - 60% Moderate Assist   20 - 22 CJ 20 - 40% Minimal Assist   23 CI 1-20% SBA / CGA   24 CH 0% Independent/ Mod I     Patient left up in chair with all lines intact, call button in reach, NURSE  notified and  present.    Assessment:  Yandy Cabral is a 47 y.o. female with a medical diagnosis of Cholecystitis, acute.  PT IS NO LONGER A CANDIDATE FOR INPATIENT SKILLED P.T DUE TO HIGH LEVEL OF FUNCTION, PT WILL BENEFIT FROM [facility] FOR CONT. GAIT/TE    Rehab identified problem list/impairments:     Rehab potential is     Activity tolerance:     Discharge recommendations: Discharge Facility/Level Of Care Needs: home     Barriers to discharge: NONE    Equipment recommendations: Equipment Needed After Discharge: none     GOALS:    Physical Therapy Goals     Not on file          Multidisciplinary Problems (Resolved)        Problem: Physical Therapy Goal    Goal Priority Disciplines Outcome Goal Variances Interventions   Physical Therapy Goal   (Resolved)     PT/OT, PT Outcome(s) achieved     Description:  LTG'S TO BE MET IN 7 DAYS (7-30-18)  1. PT WILL BE INDEP WITH BED MOBILITY  2. PT WILL BE INDEP WITH TF'S  3. PT WILL ' SONDRA  4. PT WILL DEMO G DYNAMIC BALANCE DURING GAIT                    PLAN:    D/C PT FROM [facility]T. SERVICES TO [facility]     Angeles Dean, PT  07/24/2018

## 2018-07-24 NOTE — SUBJECTIVE & OBJECTIVE
Interval History: Tolerating oral intake, pain controlled, patient resting.    Medications:  Continuous Infusions:   lactated ringers 75 mL/hr at 07/24/18 0812     Scheduled Meds:   chlorhexidine  10 mL Mouth/Throat BID    enoxaparin  40 mg Subcutaneous Daily    famotidine  20 mg Oral Daily    insulin aspart U-100  1-10 Units Subcutaneous QID (AC & HS)    insulin detemir U-100  10 Units Subcutaneous Daily    Lactobacillus acidoph-L.bulgar  4 tablet Oral Daily    magnesium oxide  400 mg Oral Once    meropenem (MERREM) IVPB  500 mg Intravenous Q8H    nozaseptin   Each Nare BID    polyethylene glycol  17 g Oral Daily    potassium chloride  40 mEq Oral Once    potassium phosphate IVPB  30 mmol Intravenous Once    senna-docusate 8.6-50 mg  1 tablet Oral BID    sodium chloride 0.9%  3 mL Intravenous Q8H     PRN Meds:acetaminophen, bisacodyl, cloNIDine, dextrose 50%, dextrose 50%, diphenhydrAMINE, glucagon (human recombinant), glucose, glucose, lactulose, meperidine, ondansetron, promethazine (PHENERGAN) IVPB, ramelteon     Review of patient's allergies indicates:  No Known Allergies  Objective:     Vital Signs (Most Recent):  Temp: 99.5 °F (37.5 °C) (07/24/18 0600)  Pulse: 88 (07/24/18 0751)  Resp: (!) 21 (07/24/18 0751)  BP: (!) 156/82 (07/24/18 0600)  SpO2: 95 % (07/24/18 0751) Vital Signs (24h Range):  Temp:  [99.5 °F (37.5 °C)-100.4 °F (38 °C)] 99.5 °F (37.5 °C)  Pulse:  [] 88  Resp:  [12-26] 21  SpO2:  [93 %-98 %] 95 %  BP: (120-156)/() 156/82     Weight: 72.8 kg (160 lb 7.9 oz)  Body mass index is 26.71 kg/m².    Intake/Output - Last 3 Shifts       07/22 0700 - 07/23 0659 07/23 0700 - 07/24 0659 07/24 0700 - 07/25 0659    P.O. 1320 720     I.V. (mL/kg) 3082.9 (42.3) 3010 (41.3)     IV Piggyback 4900 650     Total Intake(mL/kg) 9302.9 (127.8) 4380 (60.2)     Urine (mL/kg/hr) 5800 (3.3) 4119 (2.4)     Total Output 5800 4119      Net +3502.9 +261                   Physical Exam    Constitutional: No distress.   Pulmonary/Chest: No respiratory distress.   Abdominal: Soft. She exhibits no distension. There is tenderness. There is no rebound and no guarding.   Incisions c/d/i       Significant Labs:  CBC:     Recent Labs  Lab 07/24/18  0408   WBC 8.03   RBC 2.95*   HGB 9.3*   HCT 26.5*      MCV 90   MCH 31.5*   MCHC 35.1     CMP:     Recent Labs  Lab 07/24/18  0408      CALCIUM 7.8*   ALBUMIN 2.3*   PROT 5.2*      K 3.0*   CO2 26      BUN 11   CREATININE 0.8   ALKPHOS 105   ALT 70*   AST 36   BILITOT 0.6       Significant Diagnostics:  I have reviewed all pertinent imaging results/findings within the past 24 hours.

## 2018-07-24 NOTE — PROGRESS NOTES
"[facility]   Critical Care Medicine  Progress Note    Patient Name: Yandy Cabral  MRN: [id number]  Admission Date: 7/19/2018  Hospital Length of Stay: 5 days  Code Status: Full Code  Attending Provider: Louis O. Jeansonne IV, MD  Primary Care Provider: LEONID Ramos Jr, MD   Principal Problem: Cholecystitis, acute    Subjective:     HPI:  47 year old female with PMH DM2, HLD, chronic constipation presented to ED on night of 7/18 with complaint of abd pain, N/V x 1 day with associated chills  CT scan showed acalculous chlecystitis, abx and IVF initiated  After no improvement and HIDA scan showed no gall bladder, she was taken to OR on 7/20 for lap lucius revealing gangrenous cholecystitis; post operatively returned to [facility] surg room     Hospital/ICU Course:  7/22 am decline in status with dyspnea, tachypnea, hypothermia, and abg shows severe metabolic acidosis; transferred to ICU emergently and currently undergoing CT of chest, abdomen, and pelvis  7/23 - vitals and labs responding appropriately to hydration and bicarb infusion; hypophosphatemia refractory to overnight supplementation; reports feeling "better" but continues to complain of nausea, malaise, fatigue  7/24 - VSS and labs improving; reports feeling "bad" because she is tired, did not rest overnight; denies pain, fever, chills, or nausea; tolerating full diet    Review of Systems   Constitutional: Positive for malaise/fatigue. Negative for chills and fever.   HENT: Negative for sore throat.    Respiratory: Negative for sputum production and shortness of breath.    Cardiovascular: Negative for chest pain and palpitations.   Gastrointestinal: Negative for nausea and vomiting.   Genitourinary: Negative.    Musculoskeletal: Positive for myalgias.   Skin: Negative for itching and rash.   Neurological: Negative for focal weakness and seizures.   Psychiatric/Behavioral: The patient has insomnia.        Objective:     Vital Signs (Most " Recent):  Temp: 99.5 °F (37.5 °C) (07/24/18 0600)  Pulse: 88 (07/24/18 0751)  Resp: (!) 21 (07/24/18 0751)  BP: (!) 156/82 (07/24/18 0600)  SpO2: 95 % (07/24/18 0751) Vital Signs (24h Range):  Temp:  [99.5 °F (37.5 °C)-100.7 °F (38.2 °C)] 99.5 °F (37.5 °C)  Pulse:  [] 88  Resp:  [12-26] 21  SpO2:  [93 %-98 %] 95 %  BP: (120-170)/() 156/82     Weight: 72.8 kg (160 lb 7.9 oz)  Body mass index is 26.71 kg/m².      Intake/Output Summary (Last 24 hours) at 07/24/18 0759  Last data filed at 07/24/18 0600   Gross per 24 hour   Intake          4380.01 ml   Output             3775 ml   Net           605.01 ml       Physical Exam   Constitutional: She is oriented to person, place, and time. She appears well-nourished. She appears ill.   HENT:   Head: Normocephalic and atraumatic.   Eyes: Conjunctivae are normal. Pupils are equal, round, and reactive to light.   Neck: No JVD present. No tracheal deviation present.   Cardiovascular: Normal rate and regular rhythm.    No murmur heard.  Pulses:       Radial pulses are 2+ on the right side, and 2+ on the left side.        Dorsalis pedis pulses are 2+ on the right side, and 2+ on the left side.   Pulmonary/Chest: Breath sounds normal. No accessory muscle usage. Tachypnea noted. No respiratory distress. She has no wheezes. She has no rhonchi. She has no rales.   Abdominal: Soft. Bowel sounds are normal. She exhibits no distension. There is tenderness in the right upper quadrant. There is no rigidity and no guarding.   Musculoskeletal: She exhibits edema (generalized non pitting).   Neurological: She is alert and oriented to person, place, and time. GCS eye subscore is 4. GCS verbal subscore is 5. GCS motor subscore is 6.   Skin: Skin is dry. Capillary refill takes less than 2 seconds. No cyanosis. Nails show no clubbing.   abd lap sites intact, well approximated, no erythema or drainage   Psychiatric: Her speech is normal and behavior is normal. Thought content normal.  Her affect is not angry. Cognition and memory are normal.       Vents:  Oxygen Concentration (%): 32 (07/22/18 0645)    Lines/Drains/Airways     Drain                 Urethral Catheter 07/22/18 0900 Temperature probe 16 Fr. 1 day          Peripheral Intravenous Line                 Peripheral IV - Single Lumen 07/23/18 0713 Right Forearm 1 day         Peripheral IV - Single Lumen 07/23/18 1054 Left Forearm less than 1 day                Significant Labs:    CBC/Anemia Profile:    Recent Labs  Lab 07/22/18  1103 07/23/18  0338 07/24/18  0408   WBC  --  12.79* 8.03   HGB  --  10.8* 9.3*   HCT 33* 30.3* 26.5*   PLT  --  263 185   MCV  --  91 90   RDW  --  14.0 14.0        Chemistries:    Recent Labs  Lab 07/22/18  1003  07/22/18  2126 07/23/18  0130 07/23/18  0337 07/24/18  0408     < > 145 144 144  144 143   K 3.2*  < > 3.2* 3.4* 3.8  3.8 3.0*     < > 116* 114* 113*  113* 107   CO2 5*  < > 12* 12* 14*  14* 26   BUN 28*  < > 22* 18 18  18 11   CREATININE 1.5*  < > 1.5* 1.4 1.4  1.4 0.8   CALCIUM 9.0  < > 8.3* 8.5* 8.4*  8.4* 7.8*   ALBUMIN  --   --   --   --  2.5* 2.3*   PROT  --   --   --   --  5.9* 5.2*   BILITOT  --   --   --   --  0.5 0.6   ALKPHOS  --   --   --   --  133 105   ALT  --   --   --   --  109* 70*   AST  --   --   --   --  66* 36   MG 2.0  --   --   --  1.9 1.4*   PHOS 2.9  --  <1.0*  --  <1.0* 1.5*   < > = values in this interval not displayed.    All pertinent labs within the past 24 hours have been reviewed.        Assessment/Plan:     Renal/   Electrolyte imbalance    Additional potassium, phosphorus, and mag replacement  Monitor response        Metabolic acidosis    resolved          ID   Severe sepsis    CT abd neg for intra abd process; ngtd on cultures and responding appropriately to aggressive IVF resuscitation  No overt infectious process but procalcitonin significantly elevated  continue abx, recommend repeat procal and consider de-escalation tomorrow        Endocrine    Type 2 diabetes mellitus with hyperglycemia, without long-term current use of insulin    Tolerating diet and glucose trend down; decrease long acting insulin  continue SSI prn with monitoring for glucose control and prevention of insulin toxicity  Goal glucose 100-180 mg/dl while critically ill        GI   * Cholecystitis, acute    POD 4 lap lucius  Surgery following           Critical Care Daily Checklist:    A: Awake: RASS Goal/Actual Goal:    Actual:     B: Spontaneous Breathing Trial Performed?     C: SAT & SBT Coordinated?  n/a                      D: Delirium: CAM-ICU Overall CAM-ICU: Negative   E: Early Mobility Performed? Yes   F: Feeding Goal:    Status:     Current Diet Order   Procedures    Diet diabetic [facility]; 2000 Calorie     Order Specific Question:   Indicate patient location for additional diet options:     Answer:   [facility]     Order Specific Question:   Total calories:     Answer:   2000 Calorie      AS: Analgesia/Sedation prn   T: Thromboembolic Prophylaxis lovenox   H: HOB > 300 Yes   U: Stress Ulcer Prophylaxis (if needed) pepcid   G: Glucose Control Long acting plus SSI   B: Bowel Function     I: Indwelling Catheter (Lines & Dockery) Necessity reviewed   D: De-escalation of Antimicrobials/Pharmacotherapies reviewed    Plan for the day/ETD Transfer out of ICU, continue abx, supportive care    Code Status:  Family/Goals of Care: Full Code  Home on discharge   I have discussed case and plan of care in detail with Dr Bernardo; Status and plan of care were discussed with team on multidisciplinary rounds.    Will sign off on transfer out of ICU; please call if we can be of any further assistance.     Yandy Hagen, Acute Care NP-BC  Critical Care Medicine  [facility]

## 2018-07-24 NOTE — ASSESSMENT & PLAN NOTE
- start meropneum   - pan cultures   - iv fluids   7/24  So far cultures negative   Low grade fever continue with empiric antibotics for now

## 2018-07-24 NOTE — SUBJECTIVE & OBJECTIVE
Review of Systems   Constitutional: Positive for malaise/fatigue. Negative for chills and fever.   HENT: Negative for sore throat.    Respiratory: Negative for sputum production and shortness of breath.    Cardiovascular: Negative for chest pain and palpitations.   Gastrointestinal: Negative for nausea and vomiting.   Genitourinary: Negative.    Musculoskeletal: Positive for myalgias.   Skin: Negative for itching and rash.   Neurological: Negative for focal weakness and seizures.   Psychiatric/Behavioral: The patient has insomnia.        Objective:     Vital Signs (Most Recent):  Temp: 99.5 °F (37.5 °C) (07/24/18 0600)  Pulse: 88 (07/24/18 0751)  Resp: (!) 21 (07/24/18 0751)  BP: (!) 156/82 (07/24/18 0600)  SpO2: 95 % (07/24/18 0751) Vital Signs (24h Range):  Temp:  [99.5 °F (37.5 °C)-100.7 °F (38.2 °C)] 99.5 °F (37.5 °C)  Pulse:  [] 88  Resp:  [12-26] 21  SpO2:  [93 %-98 %] 95 %  BP: (120-170)/() 156/82     Weight: 72.8 kg (160 lb 7.9 oz)  Body mass index is 26.71 kg/m².      Intake/Output Summary (Last 24 hours) at 07/24/18 0759  Last data filed at 07/24/18 0600   Gross per 24 hour   Intake          4380.01 ml   Output             3775 ml   Net           605.01 ml       Physical Exam   Constitutional: She is oriented to person, place, and time. She appears well-nourished. She appears ill.   HENT:   Head: Normocephalic and atraumatic.   Eyes: Conjunctivae are normal. Pupils are equal, round, and reactive to light.   Neck: No JVD present. No tracheal deviation present.   Cardiovascular: Normal rate and regular rhythm.    No murmur heard.  Pulses:       Radial pulses are 2+ on the right side, and 2+ on the left side.        Dorsalis pedis pulses are 2+ on the right side, and 2+ on the left side.   Pulmonary/Chest: Breath sounds normal. No accessory muscle usage. Tachypnea noted. No respiratory distress. She has no wheezes. She has no rhonchi. She has no rales.   Abdominal: Soft. Bowel sounds are normal.  She exhibits no distension. There is tenderness in the right upper quadrant. There is no rigidity and no guarding.   Musculoskeletal: She exhibits edema (generalized non pitting).   Neurological: She is alert and oriented to person, place, and time. GCS eye subscore is 4. GCS verbal subscore is 5. GCS motor subscore is 6.   Skin: Skin is dry. Capillary refill takes less than 2 seconds. No cyanosis. Nails show no clubbing.   abd lap sites intact, well approximated, no erythema or drainage   Psychiatric: Her speech is normal and behavior is normal. Thought content normal. Her affect is not angry. Cognition and memory are normal.       Vents:  Oxygen Concentration (%): 32 (07/22/18 0645)    Lines/Drains/Airways     Drain                 Urethral Catheter 07/22/18 0900 Temperature probe 16 Fr. 1 day          Peripheral Intravenous Line                 Peripheral IV - Single Lumen 07/23/18 0713 Right Forearm 1 day         Peripheral IV - Single Lumen 07/23/18 1054 Left Forearm less than 1 day                Significant Labs:    CBC/Anemia Profile:    Recent Labs  Lab 07/22/18  1103 07/23/18  0338 07/24/18  0408   WBC  --  12.79* 8.03   HGB  --  10.8* 9.3*   HCT 33* 30.3* 26.5*   PLT  --  263 185   MCV  --  91 90   RDW  --  14.0 14.0        Chemistries:    Recent Labs  Lab 07/22/18  1003  07/22/18  2126 07/23/18  0130 07/23/18  0337 07/24/18  0408     < > 145 144 144  144 143   K 3.2*  < > 3.2* 3.4* 3.8  3.8 3.0*     < > 116* 114* 113*  113* 107   CO2 5*  < > 12* 12* 14*  14* 26   BUN 28*  < > 22* 18 18  18 11   CREATININE 1.5*  < > 1.5* 1.4 1.4  1.4 0.8   CALCIUM 9.0  < > 8.3* 8.5* 8.4*  8.4* 7.8*   ALBUMIN  --   --   --   --  2.5* 2.3*   PROT  --   --   --   --  5.9* 5.2*   BILITOT  --   --   --   --  0.5 0.6   ALKPHOS  --   --   --   --  133 105   ALT  --   --   --   --  109* 70*   AST  --   --   --   --  66* 36   MG 2.0  --   --   --  1.9 1.4*   PHOS 2.9  --  <1.0*  --  <1.0* 1.5*   < > = values in  this interval not displayed.    All pertinent labs within the past 24 hours have been reviewed.

## 2018-07-24 NOTE — PROGRESS NOTES
Pharmacist Renal Dose Adjustment Note    Yandy Cabral is a 47 y.o. female being treated with the medication Pepcid and Meropenem     Patient Data:    Vital Signs (Most Recent):  Temp: 99.6 °F (37.6 °C) (07/24/18 0700)  Pulse: 88 (07/24/18 0800)  Resp: 20 (07/24/18 0800)  BP: (!) 163/81 (07/24/18 0800)  SpO2: 97 % (07/24/18 0800)   Vital Signs (72h Range):  Temp:  [94.3 °F (34.6 °C)-100.8 °F (38.2 °C)]   Pulse:  []   Resp:  [12-36]   BP: ()/()   SpO2:  [93 %-100 %]        Recent Labs     Lab 07/23/18  0130 07/23/18  0337 07/24/18  0408   CREATININE 1.4 1.4  1.4 0.8     Serum creatinine: 0.8 mg/dL 07/24/18 0408  Estimated creatinine clearance: 85.9 mL/min    Medication:   Pepcid 20mg daily will be changed to Pepcid 20mg twice daily.   Meropenem 500mg every 8 hours will be changed to 500mg every 6 hours.     Pharmacist's Name: Ann Grigsby  Pharmacist's Extension: [phone]

## 2018-07-25 VITALS
RESPIRATION RATE: 18 BRPM | HEART RATE: 79 BPM | BODY MASS INDEX: 26.74 KG/M2 | SYSTOLIC BLOOD PRESSURE: 145 MMHG | OXYGEN SATURATION: 95 % | HEIGHT: 65 IN | TEMPERATURE: 99 F | WEIGHT: 160.5 LBS | DIASTOLIC BLOOD PRESSURE: 85 MMHG

## 2018-07-25 PROBLEM — R65.20 SEVERE SEPSIS: Status: RESOLVED | Noted: 2018-07-22 | Resolved: 2018-07-25

## 2018-07-25 PROBLEM — A41.9 SEVERE SEPSIS: Status: RESOLVED | Noted: 2018-07-22 | Resolved: 2018-07-25

## 2018-07-25 PROBLEM — K81.9 ACALCULOUS CHOLECYSTITIS: Status: RESOLVED | Noted: 2018-07-20 | Resolved: 2018-07-25

## 2018-07-25 LAB
ALBUMIN SERPL BCP-MCNC: 2.6 G/DL
ALP SERPL-CCNC: 115 U/L
ALT SERPL W/O P-5'-P-CCNC: 61 U/L
ANION GAP SERPL CALC-SCNC: 9 MMOL/L
AST SERPL-CCNC: 35 U/L
BILIRUB SERPL-MCNC: 0.8 MG/DL
BUN SERPL-MCNC: 10 MG/DL
CALCIUM SERPL-MCNC: 8.6 MG/DL
CHLORIDE SERPL-SCNC: 102 MMOL/L
CO2 SERPL-SCNC: 31 MMOL/L
CREAT SERPL-MCNC: 0.6 MG/DL
EST. GFR  (AFRICAN AMERICAN): >60 ML/MIN/1.73 M^2
EST. GFR  (NON AFRICAN AMERICAN): >60 ML/MIN/1.73 M^2
GLUCOSE SERPL-MCNC: 128 MG/DL
MAGNESIUM SERPL-MCNC: 1.6 MG/DL
PHOSPHATE SERPL-MCNC: 2 MG/DL
POCT GLUCOSE: 140 MG/DL (ref 70–110)
POCT GLUCOSE: 232 MG/DL (ref 70–110)
POCT GLUCOSE: <20 MG/DL (ref 70–110)
POTASSIUM SERPL-SCNC: 3.2 MMOL/L
PROCALCITONIN SERPL IA-MCNC: 0.78 NG/ML
PROT SERPL-MCNC: 6 G/DL
SODIUM SERPL-SCNC: 142 MMOL/L

## 2018-07-25 PROCEDURE — 80053 COMPREHEN METABOLIC PANEL: CPT

## 2018-07-25 PROCEDURE — 25000003 PHARM REV CODE 250: Performed by: NURSE PRACTITIONER

## 2018-07-25 PROCEDURE — 25000003 PHARM REV CODE 250: Performed by: SURGERY

## 2018-07-25 PROCEDURE — 99024 POSTOP FOLLOW-UP VISIT: CPT | Mod: ,,, | Performed by: SURGERY

## 2018-07-25 PROCEDURE — 84145 PROCALCITONIN (PCT): CPT

## 2018-07-25 PROCEDURE — 84100 ASSAY OF PHOSPHORUS: CPT

## 2018-07-25 PROCEDURE — 83735 ASSAY OF MAGNESIUM: CPT

## 2018-07-25 PROCEDURE — 25000003 PHARM REV CODE 250: Performed by: INTERNAL MEDICINE

## 2018-07-25 PROCEDURE — 36415 COLL VENOUS BLD VENIPUNCTURE: CPT

## 2018-07-25 PROCEDURE — 63600175 PHARM REV CODE 636 W HCPCS: Performed by: SURGERY

## 2018-07-25 RX ORDER — POTASSIUM CHLORIDE 750 MG/1
10 TABLET, EXTENDED RELEASE ORAL ONCE
Status: DISCONTINUED | OUTPATIENT
Start: 2018-07-25 | End: 2018-07-25 | Stop reason: HOSPADM

## 2018-07-25 RX ORDER — METRONIDAZOLE 500 MG/1
500 TABLET ORAL 3 TIMES DAILY
Qty: 30 TABLET | Refills: 0 | Status: SHIPPED | OUTPATIENT
Start: 2018-07-25 | End: 2018-08-04

## 2018-07-25 RX ORDER — POTASSIUM CHLORIDE 750 MG/1
50 TABLET, EXTENDED RELEASE ORAL ONCE
Status: COMPLETED | OUTPATIENT
Start: 2018-07-25 | End: 2018-07-25

## 2018-07-25 RX ORDER — HYDROCODONE BITARTRATE AND ACETAMINOPHEN 5; 325 MG/1; MG/1
1 TABLET ORAL EVERY 4 HOURS PRN
Qty: 30 TABLET | Refills: 0 | Status: SHIPPED | OUTPATIENT
Start: 2018-07-25 | End: 2018-08-04

## 2018-07-25 RX ORDER — CIPROFLOXACIN 500 MG/1
500 TABLET ORAL 2 TIMES DAILY
Qty: 20 TABLET | Refills: 0 | Status: SHIPPED | OUTPATIENT
Start: 2018-07-25 | End: 2018-08-04

## 2018-07-25 RX ADMIN — ENOXAPARIN SODIUM 40 MG: 100 INJECTION SUBCUTANEOUS at 08:07

## 2018-07-25 RX ADMIN — FAMOTIDINE 20 MG: 20 TABLET ORAL at 08:07

## 2018-07-25 RX ADMIN — POTASSIUM CHLORIDE 50 MEQ: 750 TABLET, EXTENDED RELEASE ORAL at 09:07

## 2018-07-25 RX ADMIN — CLONIDINE HYDROCHLORIDE 0.1 MG: 0.1 TABLET ORAL at 04:07

## 2018-07-25 RX ADMIN — MEROPENEM AND SODIUM CHLORIDE 500 MG: 500 INJECTION, SOLUTION INTRAVENOUS at 05:07

## 2018-07-25 RX ADMIN — CHLORHEXIDINE GLUCONATE 10 ML: 1.2 RINSE ORAL at 08:07

## 2018-07-25 RX ADMIN — STANDARDIZED SENNA CONCENTRATE AND DOCUSATE SODIUM 1 TABLET: 8.6; 5 TABLET, FILM COATED ORAL at 08:07

## 2018-07-25 RX ADMIN — POLYETHYLENE GLYCOL (3350) 17 G: 17 POWDER, FOR SOLUTION ORAL at 08:07

## 2018-07-25 NOTE — PLAN OF CARE
07/25/18 1532   Final Note   Assessment Type Final Discharge Note   Discharge Disposition Home   Hospital Follow Up  Appt(s) scheduled? Yes

## 2018-07-25 NOTE — SUBJECTIVE & OBJECTIVE
Review of Systems   Constitutional: Negative for chills and fatigue.   Respiratory: Negative for cough and wheezing.    Cardiovascular: Negative for chest pain.   Gastrointestinal: Negative for abdominal pain and nausea.   Psychiatric/Behavioral: Negative for confusion. The patient is not nervous/anxious.      Objective:     Vital Signs (Most Recent):  Temp: 98.6 °F (37 °C) (07/25/18 0758)  Pulse: 79 (07/25/18 1021)  Resp: 18 (07/25/18 0758)  BP: (!) 145/85 (07/25/18 1021)  SpO2: 95 % (07/25/18 1021) Vital Signs (24h Range):  Temp:  [98.5 °F (36.9 °C)-99.5 °F (37.5 °C)] 98.6 °F (37 °C)  Pulse:  [] 79  Resp:  [18-20] 18  SpO2:  [95 %-97 %] 95 %  BP: (118-180)/(78-99) 145/85     Weight: 72.8 kg (160 lb 7.9 oz)  Body mass index is 26.71 kg/m².    Intake/Output Summary (Last 24 hours) at 07/25/18 1400  Last data filed at 07/25/18 0521   Gross per 24 hour   Intake          1431.25 ml   Output                0 ml   Net          1431.25 ml      Physical Exam   Constitutional: She is oriented to person, place, and time. She appears well-developed and well-nourished. No distress.   Eyes: Right eye exhibits no discharge. Left eye exhibits no discharge.   Cardiovascular: Normal rate and regular rhythm.    Pulmonary/Chest: Effort normal and breath sounds normal. No respiratory distress.   Abdominal: Soft. She exhibits no distension. There is no tenderness. There is no rebound and no guarding.   Incisions c/d/i   Neurological: She is alert and oriented to person, place, and time.   Skin: Skin is warm and dry.   Psychiatric: She has a normal mood and affect.   Nursing note and vitals reviewed.      Significant Labs: All pertinent labs within the past 24 hours have been reviewed.    Significant Imaging: I have reviewed all pertinent imaging results/findings within the past 24 hours.

## 2018-07-25 NOTE — NURSING
Pt discharged, discharge instructions given and verbally reviewed. Opportunity for questions, all questions answered. IV discontinued, no site complications noted. Pt awaiting medications from pharmacy at this time,  at bedside.

## 2018-07-25 NOTE — PHYSICIAN QUERY
PT Name: Yandy Cabral  MR #: [id number]     Physician Query Form - Documentation Clarification      CDS/: Brandy E Capley               Contact information:  Spectralink:  [phone]    This form is a permanent document in the medical record.     Query Date: July 25, 2018    By submitting this query, we are merely seeking further clarification of documentation. Please utilize your independent clinical judgment when addressing the question(s) below.    The Medical record reflects the following:    Supporting Clinical Findings Location in Medical Record     Magnesium= 1.4     Labs 7/24     magnesium oxide tablet 400 mg  :  Dose 400 mg  :  Oral  :  Once     MAR 7/24                                                                            Doctor, Please specify diagnosis or diagnoses associated with above clinical findings.    Provider Use Only       (x  )  Hypomagnesemia     (  )  Other (please specify):  ____________________________                                                                                                             [  ] Clinically undetermined

## 2018-07-25 NOTE — DISCHARGE INSTRUCTIONS
[facility]  Instructions for Patients and Families      If your incisions have:  · Glue:  You may take a bath or shower immediately and wash your skin as you normally do.  The glue will eventually crumble or peel off. Do not let your incisions soak under water.  · Strips: Leave them on, but it is OK if they fall off on their own. It is OK to get them wet 48 hours after surgery.  · Bandage: You may remove it 2 days after your surgery, and then you may leave the incision open and take a shower or bath, unless otherwise instructed. If your bandage has clear plastic, you may shower with it on and then remove it 2 days after surgery.    Activities  · Walking is recommended after surgery; bed rest is not recommended unless specifically ordered.  · If you have had abdominal surgery, do not lift over 20 pounds for 4 weeks after surgery.  · If you have had hernia surgery, do not lift over 20 pounds for 6 weeks after surgery.  · You may drive when you are off your post-operative pain medication.  · Do not smoke after surgery, it decreases your ability to heal and increases the risk of infection and pneumonia.    Diet:  Drink lots of fluids after surgery.  You might not have much of an appetite at first, you may eat regular food when you feel ready, unless you are given special diet instructions.    Post-operative symptoms and medications  · It is safe to take over-the-counter medications for constipation, heartburn, sleep, or itching if needed.  Prescription pain medication may contain acetaminophen (Tylenol), so you should not take additional acetaminophen (Tylenol) at the same time as your pain medication.  · You may experience nausea, low fever/chills, and clear drainage from your incision, sometimes up to a month after surgery.  Notify our office if you have fever over 101 degrees, worsening redness around your incision, thick cloudy drainage, or inability to drink any liquids.  · You will  experience some level of pain after surgery.  Your pain medication should help with the pain, but may not be able to eliminate it entirely.  Pain will decrease with time, and most pain will be gone by 4 to 6 weeks after surgery.  · We are not able to call in prescriptions for pain medication after hours or on weekends.  If your pain medication is ineffective or you will run out soon and need a refill, please call our office at [phone].  We are not able to replace pain medication that has been lost or stolen.

## 2018-07-25 NOTE — NURSING
Pt left unit via wheelchair with this nurse. Pt left hospital in husbands car. No problems ambulating to and from wheelchair noted.

## 2018-07-25 NOTE — ASSESSMENT & PLAN NOTE
General Surgery primary   NPO, IV antibiotics, supportive care.   POD #1, +n/v, also pain uncontrolled   Primary team managing   7/24  Patient denies of any complains   Low grade fever continue with antibotics   7/25  Patient will be discharge on oral antibotics for ten days follow up with surgery

## 2018-07-25 NOTE — DISCHARGE SUMMARY
[facility] -   General Surgery  Discharge Summary      Patient Name: Yandy Cabral  MRN: [id number]  Admission Date: 7/19/2018  Hospital Length of Stay: 6 days  Discharge Date and Time:  07/25/2018 9:57 AM  Attending Physician: Louis O. Jeansonne IV, MD   Discharging Provider: Louis O. Jeansonne, MD  Primary Care Provider: LEONID Ramos Jr, MD    HPI:   Yandy Cabral is a 47 y.o. female with a pmhx of diabetes, dyslipidemia. She is s/p gastric sleeve in 2012. She presented to the ER with RUQ abdominal pain that started yesterday. Sx are associated with nausea and vomiting. She denies fever or chills. CT and ultrasound done in ED are concerning for acalculous cholecystitis.     Procedure(s) (LRB):  CHOLECYSTECTOMY-LAPAROSCOPIC (N/A)  LYSIS, ADHESIONS, LAPAROSCOPIC (N/A)      Indwelling Lines/Drains at time of discharge:   Lines/Drains/Airways          No matching active lines, drains, or airways        Hospital Course: Hospital day 2: wbc increased, LFT's increased. She continued to c/o severe RUQ pain.   Taken to OR on 7'/20 for laparoscopic cholecystectomy.  On 7/21 she c/o vomiting and inadequate pain control.  On 7/22 she began to have labored breathing, tachycardia, and low pH.  She was transferred to ICU.  She responded to fluid resuscitation.  By 07/25/2018 her vitals and labs had normalized and she had good urine output.  She was felt to be stable for discharge.    Consults:   Consults         Status Ordering Provider     Inpatient consult to Internal Medicine  Once     Provider:  (Not yet assigned)    Completed JOSHUA ALDRIDGE     Inpatient consult to Internal Medicine  Once     Provider:  (Not yet assigned)    Acknowledged JEANSONNE, LOUIS O. IV     Inpatient consult to Pulmonology  Once     Provider:  Vitor Rios MD    Completed MARK CONWAY          Significant Diagnostic Studies: Labs:   CBC   Recent Labs  Lab 07/24/18  0408   WBC 8.03   HGB 9.3*   HCT 26.5*   PLT  "185       Pending Diagnostic Studies:     None        Final Active Diagnoses:    Diagnosis Date Noted POA    PRINCIPAL PROBLEM:  Cholecystitis, acute [K81.0] 07/19/2018 Yes    Hyperlipemia [E78.5] 03/23/2015 Yes    Type 2 diabetes mellitus with hyperglycemia, without long-term current use of insulin [E11.65] 03/27/2014 Yes      Problems Resolved During this Admission:    Diagnosis Date Noted Date Resolved POA    Electrolyte imbalance [E87.8] 07/23/2018 07/24/2018 No    Severe sepsis [A41.9, R65.20] 07/22/2018 07/25/2018 Yes    Metabolic acidosis [E87.2] 07/22/2018 07/24/2018 No    Dehydration [E86.0] 07/22/2018 07/24/2018 Unknown    Tachycardia [R00.0] 07/21/2018 07/24/2018 Unknown    Acalculous cholecystitis [K81.9] 07/20/2018 07/25/2018 Yes      Discharged Condition: good    Disposition: Home or Self Care    Follow Up:  Follow-up Information     Louis O. Jeansonne, MD In 2 weeks.    Specialties:  General Surgery, Surgery  Contact information:  [address] [place] [address] DR Armando ALVARADO [id number] [phone]                 Patient Instructions:     Diet diabetic     Lifting restrictions   Order Comments: No lifting over 20 pounds       Medications:  Reconciled Home Medications:      Medication List      START taking these medications    ciprofloxacin HCl 500 MG tablet  Commonly known as:  CIPRO  Take 1 tablet (500 mg total) by mouth 2 (two) times daily. for 10 days     HYDROcodone-acetaminophen 5-325 mg per tablet  Commonly known as:  NORCO  Take 1 tablet by mouth every 4 (four) hours as needed for Pain.     metroNIDAZOLE 500 MG tablet  Commonly known as:  FLAGYL  Take 1 tablet (500 mg total) by mouth 3 (three) times daily. for 10 days        CONTINUE taking these medications    atorvastatin 20 MG tablet  Commonly known as:  LIPITOR     BD ULTRA-FINE SHORT PEN NEEDLE 31 gauge x 5/16" Ndle  Generic drug:  pen needle, diabetic  Use up to three daily     cyanocobalamin 1,000 mcg/mL injection  INJECT 1 ML INTO THE " "MUSCLE EVERY 30 DAYS     * INVOKANA ORAL  Take by mouth.     * INVOKANA 300 mg Tab tablet  Generic drug:  canagliflozin     melatonin 5 mg Tab  Take by mouth.     metFORMIN 500 MG tablet  Commonly known as:  GLUCOPHAGE  Take 500 mg by mouth 2 (two) times daily with meals.     needle (disp) 24 gauge 24 gauge x 1" Ndle  1 Dose by Misc.(Non-Drug; Combo Route) route every 30 days.     ONE DAILY MULTIVITAMIN per tablet  Generic drug:  multivitamin  Take 1 tablet by mouth.     * ONETOUCH VERIO Strp  Generic drug:  blood sugar diagnostic     * blood sugar diagnostic Strp  Use ac and hs     PRAVACHOL 20 MG tablet  Generic drug:  pravastatin  Take 20 mg by mouth.     syringe with needle 3 mL 21 gauge x 1" Syrg  USE ONE Q 4 WEEKS        * This list has 4 medication(s) that are the same as other medications prescribed for you. Read the directions carefully, and ask your doctor or other care provider to review them with you.            STOP taking these medications    JANUVIA 100 MG Tab  Generic drug:  SITagliptin          Time spent on the discharge of patient: 25 minutes    Louis O. Jeansonne, MD  General Surgery  [facility]  "

## 2018-07-25 NOTE — PROGRESS NOTES
[facility] Hospital Medicine  Progress Note    Patient Name: Yandy Cabral  MRN: [id number]  Patient Class: IP- Inpatient   Admission Date: 7/19/2018  Length of Stay: 6 days  Attending Physician: No att. providers found  Primary Care Provider: LEONID Ramos Jr, MD        Subjective:     Principal Problem:Cholecystitis, acute    HPI:  Yandy Cabral is a 47 year old female with DM II and hyperlipidemia who presented to the ED on 7/18/18 with complaints of abdominal pain, nausea and vomiting. Symptoms began yesterday and have been constant. Her abdominal pain is located across her upper abdomen. Her emesis is described as bilious. There are associated chills. She denies fever. In the ED, she was found to have a mildly elevated biliruibin, a mild leukocytosis and findings consistent with acalculous cholecystitis on abdominal US and CT scan of the abdomen. At this time, she is being conservatively managed with IV antibiotics and supportive care.     Hospital Course:  Patient admitted for cholecystitis. Initial workup revealed mildly elevated biliruibin, a mild leukocytosis and findings consistent with acalculous cholecystitis on abdominal US and CT scan of the abdomen. General surgery primary. At this time, she is being conservatively managed with IV antibiotics and supportive care. POD # 1 s/p laparoscopic cholecystectomy. Heart rate in the 110's-120's. Mostly likely due to dehydration 2/2 vomiting. IVFs started. Will continue to monitor closely.    7/22  I was called in the room this morning by the nurse to evaluate patient for tachypnea .She was seen yesterday by dr Miller . Patient is confused tachypnea ,tachycardia and sweating . Review of chart patient received clonidine for tachycardia/hypertension yesterday and 500 ml iv bolus for dehydration.BMP showing increase anion gap metabolic acidosis . Patient looks dehydrated with high concern for sepsis .Ordered iv fluids bolus . Started antibotics  "blood cultures . Start bicarb drip for severe acidosis . Transfer to icu . cta negative for pe . Ct abdomen pelvis no new acute finding . Concern for dka discuss with icu will wait on next bmp also replace K before giving insulin   7/23  vitals and labs responding appropriately to hydration and bicarb infusion; hypophosphatemia refractory to overnight supplementation; reports feeling "better" but continues to complain of nausea, malaise, fatigue  7/24  Patient feels better today and electrolytes replaced . Discussed with icu team and surgery . Will move patient out of icu  7/25  Patient stable to discharge home on oral antibotics with cipro/metronidazole for 10 days as per surgery. Patient denies of any complains         Review of Systems   Constitutional: Negative for chills and fatigue.   Respiratory: Negative for cough and wheezing.    Cardiovascular: Negative for chest pain.   Gastrointestinal: Negative for abdominal pain and nausea.   Psychiatric/Behavioral: Negative for confusion. The patient is not nervous/anxious.      Objective:     Vital Signs (Most Recent):  Temp: 98.6 °F (37 °C) (07/25/18 0758)  Pulse: 79 (07/25/18 1021)  Resp: 18 (07/25/18 0758)  BP: (!) 145/85 (07/25/18 1021)  SpO2: 95 % (07/25/18 1021) Vital Signs (24h Range):  Temp:  [98.5 °F (36.9 °C)-99.5 °F (37.5 °C)] 98.6 °F (37 °C)  Pulse:  [] 79  Resp:  [18-20] 18  SpO2:  [95 %-97 %] 95 %  BP: (118-180)/(78-99) 145/85     Weight: 72.8 kg (160 lb 7.9 oz)  Body mass index is 26.71 kg/m².    Intake/Output Summary (Last 24 hours) at 07/25/18 1400  Last data filed at 07/25/18 0521   Gross per 24 hour   Intake          1431.25 ml   Output                0 ml   Net          1431.25 ml      Physical Exam   Constitutional: She is oriented to person, place, and time. She appears well-developed and well-nourished. No distress.   Eyes: Right eye exhibits no discharge. Left eye exhibits no discharge.   Cardiovascular: Normal rate and regular rhythm.  "   Pulmonary/Chest: Effort normal and breath sounds normal. No respiratory distress.   Abdominal: Soft. She exhibits no distension. There is no tenderness. There is no rebound and no guarding.   Incisions c/d/i   Neurological: She is alert and oriented to person, place, and time.   Skin: Skin is warm and dry.   Psychiatric: She has a normal mood and affect.   Nursing note and vitals reviewed.      Significant Labs: All pertinent labs within the past 24 hours have been reviewed.    Significant Imaging: I have reviewed all pertinent imaging results/findings within the past 24 hours.    Assessment/Plan:      * Cholecystitis, acute    General Surgery primary   NPO, IV antibiotics, supportive care.   POD #1, +n/v, also pain uncontrolled   Primary team managing   7/24  Patient denies of any complains   Low grade fever continue with antibotics   7/25  Patient will be discharge on oral antibotics for ten days follow up with surgery           Hyperlipemia    Continue with home meds         Type 2 diabetes mellitus with hyperglycemia, without long-term current use of insulin    -NISS with long acting insulin   -hemoglobin A1C 7.8.               VTE Risk Mitigation         Ordered     IP VTE HIGH RISK PATIENT  Once      07/20/18 1333            Naseem Mata MD  Department of Hospital Medicine   [facility]

## 2018-07-25 NOTE — PLAN OF CARE
Problem: Patient Care Overview  Goal: Plan of Care Review  Outcome: Ongoing (interventions implemented as appropriate)  POC discussed w/patient and . Pt anxious to go home today. Pt w/minimal intake and reports intermittent nausea; pt declines PRN antiemetics. Tmax 99.6F. PRN Clonidine x1 for htn. NSR on monitor. LR gtt and IV abx as ordered. Ambulates independently and encouraged to complete all ADLs independently. Fall precautions in place.

## 2018-07-25 NOTE — PHYSICIAN QUERY
PT Name: Yandy Cabral  MR #: [id number]    Physician Query Form -Systemic Infectious Process Clarification     CDS/: Brandy E Capley               Contact information: Spectralink:  [phone]  This form is a permanent document in the medical record.     Query Date: July 25, 2018     By submitting this query, we are merely seeking further clarification of documentation. Please utilize your independent clinical judgment when addressing the question(s) below.    The Medical record contains the following:     Indicators   Supporting Clinical Findings   Location in Medical Record   X HR RR BP Temp Vital Signs (24h Range):  Temp:  [94.3 °F (34.6 °C)-98.8 °F (37.1 °C)] 96.3 °F (35.7 °C)  Pulse:  [] 122  Resp:  [16-36] 36  SpO2:  [95 %-100 %] 100 %  BP: ()/(38-90) 150/74    Vital Signs (24h Range):  Temp:  [94.3 °F (34.6 °C)-100.8 °F (38.2 °C)] 100.8 °F (38.2 °C)  Pulse:  [] 103  Resp:  [13-36] 29  SpO2:  [95 %-100 %] 97 %  BP: ()/(38-92) 159/84   Hospital medicine progress notes 7/22              Pulmonology progress notes 7/23   X Lactic Acid             Procalcitonin Lactate= 1 --> 1.3 --> 1.3   Labs 7/22 0755 --> 7/22 1154 --> 7/22 1546   X WBC                Bands                     CRP WBC= 12.97 --> 23.42 --> 15.24 --> 21.82 --> 12.79 --> 8.03    Procalcitonin= 10.53   Labs 7/19 --> 7/20 --> 7/21 --> 7/22 --> 7/23 --> 7/24    Labs 7/22   X Culture(s) Urine Culture, Routine  No significant growth     Blood Culture, Routine No Growth to dateP   Status:  Preliminary result        Blood Culture, Routine No Growth to dateP   Status:  Preliminary result      Urine culture 5/21    Blood culture 7/22 1004      Blood culture 7/22 1154   X AMS, Confusion, LOC, etc. Neurological:  Alert, awake, and appropriate.  Normal speech.  No acute focal neurological deficits are appreciated.  Psychiatric: Normal affect. Good eye contact. Appropriate in content.    Patient is confused tachypnea  ,tachycardia and sweating .  Psychiatric/Behavioral: Negative for confusion. ED provider notes 7/19      Hospital medicine progress notes 7/22    Organ Dysfunction / Failure     X Bacteremia or Sepsis / Septic 7/22 am   decline in status with dyspnea, tachypnea, hypothermia, and abg shows severe metabolic acidosis; transferred to ICU emergently and currently undergoing CT of chest, abdomen, and pelvis  Severe sepsis  Suspect intra abdominal process; stat CT to include chest to r/o post op pneumonia  Lactate wnl and no hypotension, but with tachycardia, dry mucous     Severe sepsis  CT abd neg for intra abd process; ngtd on cultures and responding appropriately to aggressive IVF resuscitation  No overt infectious process but procalcitonin significantly elevated  continue abx, recommend repeat procal and consider de-escalation tomorrow    Severe sepsis  - start meropneum   - pan cultures   - iv fluids   7/24  So far cultures negative   Low grade fever continue with empiric antibotics for now     Severe sepsis   POA yes Pulmonology consult 7/22                Pulmonology progress notes 7/24          Hospital medicine progress notes 7/24                Discharge summaries 7/25    Known or Suspected Source of Infection documented      (Failed) Outpatient Treatment     X Medication meropenem-0.9% sodium chloride 500 mg/50 mL IVPB  :  Dose 500 mg  :  50 mL/hr  :  Intravenous  :  Every 6 hours    cefazolin (ANCEF) 2 gram in dextrose 5% 50 mL IVPB (premix)  :  Dose 2 g  :  100 mL/hr  :  Intravenous  :  Every 8 hours      piperacillin-tazobactam 4.5 g in dextrose 5 % 100 mL IVPB (ready to mix system)  :  Dose 4.5 g  :  200 mL/hr  :  Intravenous  :  ED 1 Time   MAR 7/24    MAR 7/20    MAR 7/19    Treatment     X Other CHOLECYSTECTOMY-LAPAROSCOPIC   FINDINGS: gangrenous cholecystitis  SURGERY DATE:  7/20/2018     Impression    No evidence of PE.  Right pleural effusion with adjacent lower lobe atelectasis/consolidation.   See report of CT abdomen.    BUN/creatinine= 16/0.8 --> 14/1 --> 18/1.5 --> 22/1.6 --> 18/1.4 --> 11/0.8 --> 10/0.6  GFR= >60 --> >60 --> 41 --> 38 --> 44 --> >60 --> >60 Op note 7/20        CT chest 7/22      Labs 7/19 --> 7/20 --> 7/21 --> 7/22 1256 --> 7/23 0130 --> 7/24 --> 7/25     Provider, please specify diagnosis or diagnoses associated with above clinical findings.    [  ] Sepsis (please further specify POA status):         [ x ] POA        [  ]  Not POA  [  ] Severe Sepsis with Acute Organ Dysfunction/Failure (please specify organ dysfunction/failure): ___________________ (please further specify POA status):        [  ]  POA        [  ]  Not POA  [  ] Sepsis ruled out  [  ] Other: __________________________________  [  ] Clinically Undetermined    Please document in your progress notes daily for the duration of treatment until resolved and include in your discharge summary.

## 2018-07-27 LAB
BACTERIA BLD CULT: NORMAL
BACTERIA BLD CULT: NORMAL

## 2018-08-06 ENCOUNTER — LAB VISIT (OUTPATIENT)
Dept: LAB | Facility: HOSPITAL | Age: 48
End: 2018-08-06
Attending: NURSE PRACTITIONER
Payer: COMMERCIAL

## 2018-08-06 ENCOUNTER — OFFICE VISIT (OUTPATIENT)
Dept: SURGERY | Facility: CLINIC | Age: 48
End: 2018-08-06
Payer: COMMERCIAL

## 2018-08-06 VITALS
RESPIRATION RATE: 16 BRPM | DIASTOLIC BLOOD PRESSURE: 80 MMHG | SYSTOLIC BLOOD PRESSURE: 117 MMHG | HEIGHT: 65 IN | TEMPERATURE: 98 F | BODY MASS INDEX: 24.43 KG/M2 | WEIGHT: 146.63 LBS | HEART RATE: 94 BPM

## 2018-08-06 DIAGNOSIS — D50.9 IRON DEFICIENCY ANEMIA, UNSPECIFIED IRON DEFICIENCY ANEMIA TYPE: Primary | ICD-10-CM

## 2018-08-06 DIAGNOSIS — Z90.49 S/P LAPAROSCOPIC CHOLECYSTECTOMY: ICD-10-CM

## 2018-08-06 DIAGNOSIS — R53.83 FATIGUE, UNSPECIFIED TYPE: ICD-10-CM

## 2018-08-06 DIAGNOSIS — Z86.19 HISTORY OF SEPSIS: ICD-10-CM

## 2018-08-06 DIAGNOSIS — Z90.49 S/P LAPAROSCOPIC CHOLECYSTECTOMY: Primary | ICD-10-CM

## 2018-08-06 LAB
ALBUMIN SERPL BCP-MCNC: 3.4 G/DL
ALP SERPL-CCNC: 114 U/L
ALT SERPL W/O P-5'-P-CCNC: 17 U/L
ANION GAP SERPL CALC-SCNC: 10 MMOL/L
AST SERPL-CCNC: 26 U/L
BASOPHILS # BLD AUTO: 0.03 K/UL
BASOPHILS NFR BLD: 0.5 %
BILIRUB SERPL-MCNC: 0.5 MG/DL
BUN SERPL-MCNC: 14 MG/DL
CALCIUM SERPL-MCNC: 9.6 MG/DL
CHLORIDE SERPL-SCNC: 101 MMOL/L
CO2 SERPL-SCNC: 29 MMOL/L
CREAT SERPL-MCNC: 0.7 MG/DL
DIFFERENTIAL METHOD: ABNORMAL
EOSINOPHIL # BLD AUTO: 0.1 K/UL
EOSINOPHIL NFR BLD: 1 %
ERYTHROCYTE [DISTWIDTH] IN BLOOD BY AUTOMATED COUNT: 13 %
EST. GFR  (AFRICAN AMERICAN): >60 ML/MIN/1.73 M^2
EST. GFR  (NON AFRICAN AMERICAN): >60 ML/MIN/1.73 M^2
FERRITIN SERPL-MCNC: 88 NG/ML
GLUCOSE SERPL-MCNC: 230 MG/DL
HCT VFR BLD AUTO: 35 %
HGB BLD-MCNC: 10.8 G/DL
IRON SERPL-MCNC: 36 UG/DL
LYMPHOCYTES # BLD AUTO: 1.5 K/UL
LYMPHOCYTES NFR BLD: 25.1 %
MCH RBC QN AUTO: 30.1 PG
MCHC RBC AUTO-ENTMCNC: 30.9 G/DL
MCV RBC AUTO: 98 FL
MONOCYTES # BLD AUTO: 0.5 K/UL
MONOCYTES NFR BLD: 8.3 %
NEUTROPHILS # BLD AUTO: 4 K/UL
NEUTROPHILS NFR BLD: 65.1 %
PLATELET # BLD AUTO: 392 K/UL
PMV BLD AUTO: 9.2 FL
POTASSIUM SERPL-SCNC: 4.1 MMOL/L
PROCALCITONIN SERPL IA-MCNC: 0.14 NG/ML
PROT SERPL-MCNC: 7.7 G/DL
RBC # BLD AUTO: 3.59 M/UL
SATURATED IRON: 10 %
SODIUM SERPL-SCNC: 140 MMOL/L
TOTAL IRON BINDING CAPACITY: 370 UG/DL
TRANSFERRIN SERPL-MCNC: 250 MG/DL
WBC # BLD AUTO: 6.06 K/UL

## 2018-08-06 PROCEDURE — 99024 POSTOP FOLLOW-UP VISIT: CPT | Mod: S$GLB,,, | Performed by: NURSE PRACTITIONER

## 2018-08-06 PROCEDURE — 85025 COMPLETE CBC W/AUTO DIFF WBC: CPT

## 2018-08-06 PROCEDURE — 99999 PR PBB SHADOW E&M-EST. PATIENT-LVL III: CPT | Mod: PBBFAC,,, | Performed by: NURSE PRACTITIONER

## 2018-08-06 PROCEDURE — 36415 COLL VENOUS BLD VENIPUNCTURE: CPT

## 2018-08-06 PROCEDURE — 80053 COMPREHEN METABOLIC PANEL: CPT

## 2018-08-06 PROCEDURE — 82728 ASSAY OF FERRITIN: CPT

## 2018-08-06 PROCEDURE — 84145 PROCALCITONIN (PCT): CPT

## 2018-08-06 PROCEDURE — 83540 ASSAY OF IRON: CPT

## 2018-08-06 NOTE — PROGRESS NOTES
Notified patient of iron studies.  Iron levels and ferritin within normal limits but lower normal limits.  Suggest patient eat iron rich foods and or take a multivitamin with a small amount of iron.  We will retest her CBC and iron levels in 6 weeks.  Record review reveals history of decreased saturated iron dating back as far as 10 years ago.  Patient verbalized understanding and willingness to follow-up in the future.

## 2018-08-06 NOTE — PROGRESS NOTES
"Yandy Cabral is a 47 y.o. female patient.   1. S/P laparoscopic cholecystectomy    2. Fatigue, unspecified type    3. History of sepsis      Past Medical History:   Diagnosis Date    Chronic constipation     DM II (diabetes mellitus, type II), controlled      No past surgical history pertinent negatives on file.  Scheduled Meds:  Continuous Infusions:  PRN Meds:    Review of patient's allergies indicates:  No Known Allergies  There are no hospital problems to display for this patient.    Blood pressure 117/80, pulse 94, temperature 98.3 °F (36.8 °C), resp. rate 16, height 5' 5" (1.651 m), weight 66.5 kg (146 lb 9.7 oz).    Subjective:   Diet: Dietary issues: Intermittent- pt relates not unusual after anesthesia for her to have several weeks postop. States appetite is ok. Eating. Blood sugars running normal. Walking. Drinking fluids. Occasional night time cough with little sputum.   Patient reports nausea and vomiting.    Activity level: Impaired due to weakness (Pt relates debilitating fatigue- No SOB - starts to do something and has to sit and rest due to extreme fatigue. Denies any sizziness. ).    Pain control: Well controlled (Denies any abdominal pain. Has had intermittent episodes of loose bowel that is normal for her she states. ).    Wound: Subjective wound complaints: No pain or itching. Still has adhesive coverings.     Pt relates having a temp of 101 5 days ago and no other episode of fever. Completed antibiotics after discharge with no difficulty.     Objective:  Vital signs (most recent): Blood pressure 117/80, pulse 94, temperature 98.3 °F (36.8 °C), resp. rate 16, height 5' 5" (1.651 m), weight 66.5 kg (146 lb 9.7 oz).  General appearance: Not in pain (pale and weak appearing).    Lungs:  Normal respiratory rate and normal effort.  Breath sounds normal.  There are no decreased breath sounds, wheezes, rales or rhonchi.    Heart: Normal rate.  Regular rhythm.  S1 normal.    Abdomen: Abdomen is " soft.    Bowel sounds:  Bowel sounds are normal.    Tenderness: There is no abdominal tenderness tenderness.    Wound:  Clean (right lateral lap wound has slight prurulent moist top. Cleaned with q-tip. No drainage. ).  There is no drainage.    Extremities: There is normal range of motion.    Neurological: The patient is alert and oriented to person, place and time.  (No jaundice noted - conjunctiva pale. ).  Pupils are equal, round, and reactive to light.  Right pupil is reactive.  Left pupil is reactive.      Reviewed all records in [facility] and recent hospital admission- labs 12 days ago revealed anemia, low potassium and improved procalcitonin level.     Will order labs today to see if still anemic and assess potassium status - if low can contribute to her fatigue.      Plan:  Wound care plan: Clean right lateral wound with peroxide and q-tip after showering. Call for any redness or drainage.   Diet Plan: encouraged eating potassium rich foods and iron rich foods.  General Orders/Medications Plan: cbc, cmp, iron, tibc, ferritin and procalcitonin levels today. Will call with results. RTC in 2 weeks for recheck.           Karen Peña NP  8/6/2018

## 2018-08-20 DIAGNOSIS — D50.8 OTHER IRON DEFICIENCY ANEMIA: Primary | ICD-10-CM

## 2019-06-24 ENCOUNTER — LAB VISIT (OUTPATIENT)
Dept: LAB | Facility: HOSPITAL | Age: 49
End: 2019-06-24
Attending: INTERNAL MEDICINE
Payer: COMMERCIAL

## 2019-06-24 ENCOUNTER — OFFICE VISIT (OUTPATIENT)
Dept: INTERNAL MEDICINE | Facility: CLINIC | Age: 49
End: 2019-06-24
Payer: COMMERCIAL

## 2019-06-24 ENCOUNTER — TELEPHONE (OUTPATIENT)
Dept: INTERNAL MEDICINE | Facility: CLINIC | Age: 49
End: 2019-06-24

## 2019-06-24 VITALS
OXYGEN SATURATION: 99 % | SYSTOLIC BLOOD PRESSURE: 130 MMHG | HEIGHT: 65 IN | TEMPERATURE: 98 F | BODY MASS INDEX: 24.68 KG/M2 | WEIGHT: 148.13 LBS | HEART RATE: 86 BPM | DIASTOLIC BLOOD PRESSURE: 83 MMHG

## 2019-06-24 DIAGNOSIS — R13.19 ESOPHAGEAL DYSPHAGIA: ICD-10-CM

## 2019-06-24 DIAGNOSIS — R30.0 DYSURIA: Primary | ICD-10-CM

## 2019-06-24 DIAGNOSIS — F41.9 ANXIETY: ICD-10-CM

## 2019-06-24 LAB
ALBUMIN SERPL BCP-MCNC: 4.3 G/DL (ref 3.5–5.2)
ALP SERPL-CCNC: 125 U/L (ref 55–135)
ALT SERPL W/O P-5'-P-CCNC: 27 U/L (ref 10–44)
ANION GAP SERPL CALC-SCNC: 14 MMOL/L (ref 8–16)
AST SERPL-CCNC: 21 U/L (ref 10–40)
BACTERIA #/AREA URNS HPF: ABNORMAL /HPF
BASOPHILS # BLD AUTO: 0.03 K/UL (ref 0–0.2)
BASOPHILS NFR BLD: 0.3 % (ref 0–1.9)
BILIRUB SERPL-MCNC: 0.5 MG/DL (ref 0.1–1)
BILIRUB UR QL STRIP: NEGATIVE
BUN SERPL-MCNC: 18 MG/DL (ref 6–20)
CALCIUM SERPL-MCNC: 10.4 MG/DL (ref 8.7–10.5)
CHLORIDE SERPL-SCNC: 100 MMOL/L (ref 95–110)
CLARITY UR: ABNORMAL
CO2 SERPL-SCNC: 27 MMOL/L (ref 23–29)
COLOR UR: YELLOW
CREAT SERPL-MCNC: 0.8 MG/DL (ref 0.5–1.4)
DIFFERENTIAL METHOD: ABNORMAL
EOSINOPHIL # BLD AUTO: 0.1 K/UL (ref 0–0.5)
EOSINOPHIL NFR BLD: 0.8 % (ref 0–8)
ERYTHROCYTE [DISTWIDTH] IN BLOOD BY AUTOMATED COUNT: 12.9 % (ref 11.5–14.5)
EST. GFR  (AFRICAN AMERICAN): >60 ML/MIN/1.73 M^2
EST. GFR  (NON AFRICAN AMERICAN): >60 ML/MIN/1.73 M^2
GLUCOSE SERPL-MCNC: 162 MG/DL (ref 70–110)
GLUCOSE UR QL STRIP: ABNORMAL
HCT VFR BLD AUTO: 41.9 % (ref 37–48.5)
HGB BLD-MCNC: 13.2 G/DL (ref 12–16)
HGB UR QL STRIP: ABNORMAL
HYALINE CASTS #/AREA URNS LPF: 0 /LPF
IMM GRANULOCYTES # BLD AUTO: 0.04 K/UL (ref 0–0.04)
IMM GRANULOCYTES NFR BLD AUTO: 0.4 % (ref 0–0.5)
KETONES UR QL STRIP: ABNORMAL
LEUKOCYTE ESTERASE UR QL STRIP: NEGATIVE
LYMPHOCYTES # BLD AUTO: 2 K/UL (ref 1–4.8)
LYMPHOCYTES NFR BLD: 18.2 % (ref 18–48)
MCH RBC QN AUTO: 31.1 PG (ref 27–31)
MCHC RBC AUTO-ENTMCNC: 31.5 G/DL (ref 32–36)
MCV RBC AUTO: 99 FL (ref 82–98)
MICROSCOPIC COMMENT: ABNORMAL
MONOCYTES # BLD AUTO: 0.7 K/UL (ref 0.3–1)
MONOCYTES NFR BLD: 6.7 % (ref 4–15)
NEUTROPHILS # BLD AUTO: 8.1 K/UL (ref 1.8–7.7)
NEUTROPHILS NFR BLD: 73.6 % (ref 38–73)
NITRITE UR QL STRIP: NEGATIVE
NRBC BLD-RTO: 0 /100 WBC
PH UR STRIP: 6 [PH] (ref 5–8)
PLATELET # BLD AUTO: 323 K/UL (ref 150–350)
PMV BLD AUTO: 10.7 FL (ref 9.2–12.9)
POTASSIUM SERPL-SCNC: 4.3 MMOL/L (ref 3.5–5.1)
PROT SERPL-MCNC: 8 G/DL (ref 6–8.4)
PROT UR QL STRIP: ABNORMAL
RBC # BLD AUTO: 4.25 M/UL (ref 4–5.4)
RBC #/AREA URNS HPF: >100 /HPF (ref 0–4)
SODIUM SERPL-SCNC: 141 MMOL/L (ref 136–145)
SP GR UR STRIP: 1.02 (ref 1–1.03)
SQUAMOUS #/AREA URNS HPF: 15 /HPF
URN SPEC COLLECT METH UR: ABNORMAL
WBC # BLD AUTO: 11.03 K/UL (ref 3.9–12.7)
WBC #/AREA URNS HPF: 30 /HPF (ref 0–5)
WBC CLUMPS URNS QL MICRO: ABNORMAL
YEAST URNS QL MICRO: ABNORMAL

## 2019-06-24 PROCEDURE — 99214 PR OFFICE/OUTPT VISIT, EST, LEVL IV, 30-39 MIN: ICD-10-PCS | Mod: S$GLB,,, | Performed by: INTERNAL MEDICINE

## 2019-06-24 PROCEDURE — 3008F PR BODY MASS INDEX (BMI) DOCUMENTED: ICD-10-PCS | Mod: CPTII,S$GLB,, | Performed by: INTERNAL MEDICINE

## 2019-06-24 PROCEDURE — 87086 URINE CULTURE/COLONY COUNT: CPT

## 2019-06-24 PROCEDURE — 3008F BODY MASS INDEX DOCD: CPT | Mod: CPTII,S$GLB,, | Performed by: INTERNAL MEDICINE

## 2019-06-24 PROCEDURE — 85025 COMPLETE CBC W/AUTO DIFF WBC: CPT

## 2019-06-24 PROCEDURE — 99214 OFFICE O/P EST MOD 30 MIN: CPT | Mod: S$GLB,,, | Performed by: INTERNAL MEDICINE

## 2019-06-24 PROCEDURE — 87088 URINE BACTERIA CULTURE: CPT

## 2019-06-24 PROCEDURE — 80053 COMPREHEN METABOLIC PANEL: CPT

## 2019-06-24 PROCEDURE — 36415 COLL VENOUS BLD VENIPUNCTURE: CPT

## 2019-06-24 PROCEDURE — 87186 SC STD MICRODIL/AGAR DIL: CPT

## 2019-06-24 PROCEDURE — 99999 PR PBB SHADOW E&M-EST. PATIENT-LVL IV: CPT | Mod: PBBFAC,,, | Performed by: INTERNAL MEDICINE

## 2019-06-24 PROCEDURE — 81000 URINALYSIS NONAUTO W/SCOPE: CPT

## 2019-06-24 PROCEDURE — 87077 CULTURE AEROBIC IDENTIFY: CPT

## 2019-06-24 PROCEDURE — 99999 PR PBB SHADOW E&M-EST. PATIENT-LVL IV: ICD-10-PCS | Mod: PBBFAC,,, | Performed by: INTERNAL MEDICINE

## 2019-06-24 RX ORDER — PHENAZOPYRIDINE HYDROCHLORIDE 200 MG/1
200 TABLET, FILM COATED ORAL 3 TIMES DAILY PRN
Qty: 9 TABLET | Refills: 0 | Status: SHIPPED | OUTPATIENT
Start: 2019-06-24 | End: 2019-06-27

## 2019-06-24 RX ORDER — NITROFURANTOIN 25; 75 MG/1; MG/1
CAPSULE ORAL
Refills: 0 | COMMUNITY
Start: 2019-06-22 | End: 2019-06-27 | Stop reason: ALTCHOICE

## 2019-06-24 RX ORDER — CIPROFLOXACIN 500 MG/1
500 TABLET ORAL 2 TIMES DAILY
Qty: 10 TABLET | Refills: 0 | Status: SHIPPED | OUTPATIENT
Start: 2019-06-24 | End: 2019-06-27 | Stop reason: ALTCHOICE

## 2019-06-24 RX ORDER — OMEPRAZOLE 40 MG/1
40 CAPSULE, DELAYED RELEASE ORAL DAILY
Qty: 30 CAPSULE | Refills: 0 | Status: SHIPPED | OUTPATIENT
Start: 2019-06-24 | End: 2020-02-03

## 2019-06-24 RX ORDER — ESCITALOPRAM OXALATE 10 MG/1
10 TABLET ORAL DAILY
Qty: 90 TABLET | Refills: 0 | Status: SHIPPED | OUTPATIENT
Start: 2019-06-24 | End: 2020-02-26

## 2019-06-24 NOTE — TELEPHONE ENCOUNTER
No infection cells in todays urine. Urine culture will give more info once resulted. Cbc and cmp still in process.

## 2019-06-24 NOTE — PROGRESS NOTES
"Subjective:      Patient ID: Yandy Cabral is a 48 y.o. female.    Chief Complaint: Urinary Tract Infection (pt c/o burning and heaviness) and Chest Pain (pt states chest tightness only while eating or drinking)    HPI   47 yo with   Patient Active Problem List   Diagnosis    Type 2 diabetes mellitus with hyperglycemia, without long-term current use of insulin    Hyperlipemia    Diabetes mellitus    Seasonal allergies    Dysuria    Abnormal CT scan    Cholecystitis, acute     Past Medical History:   Diagnosis Date    Chronic constipation     DM II (diabetes mellitus, type II), controlled      Here today c/o frequency, incomplete emptying, suprapubic pressure worsening over several days.  She has been taking Macrobid for 5 doses now.  Symptoms have had no improvement.  She also reports dysphagia and odynophagia with solids and liquids for several days. Denies trauma and choking. Pt also reports increase anxiety for months related to stress surrounding selling current home and buying a new home. Anxiousness daily. Causing some sleep disturbance.       Review of Systems   Constitutional: Negative for chills and fever.   Respiratory: Negative for cough.    Cardiovascular: Negative for chest pain and palpitations.   Gastrointestinal: Negative for abdominal pain, blood in stool, nausea and vomiting.   Skin: Negative for rash.   Psychiatric/Behavioral: Negative for dysphoric mood, hallucinations and suicidal ideas. The patient is nervous/anxious.      Objective:   /83   Pulse 86   Temp 97.8 °F (36.6 °C) (Tympanic)   Ht 5' 5" (1.651 m)   Wt 67.2 kg (148 lb 2.4 oz)   SpO2 99%   BMI 24.65 kg/m²     Physical Exam   Constitutional: She is oriented to person, place, and time. She appears well-developed and well-nourished. No distress.   HENT:   Head: Normocephalic and atraumatic.   Mouth/Throat: Oropharynx is clear and moist.   Eyes: Pupils are equal, round, and reactive to light. EOM are normal. "   Neck: Neck supple. No thyromegaly present.   Cardiovascular: Normal rate and regular rhythm.   Pulmonary/Chest: Effort normal and breath sounds normal. She has no wheezes. She has no rales.   Abdominal: Soft. Bowel sounds are normal. There is no tenderness.   Musculoskeletal: She exhibits no edema.   Lymphadenopathy:     She has no cervical adenopathy.   Neurological: She is alert and oriented to person, place, and time.   Skin: Skin is warm and dry.   Psychiatric: She has a normal mood and affect. Her behavior is normal.       Assessment:     1. Dysuria    2. Esophageal dysphagia    3. Anxiety      Plan:   Dysuria  -     Urinalysis; Future; Expected date: 06/24/2019  -     ciprofloxacin HCl (CIPRO) 500 MG tablet; Take 1 tablet (500 mg total) by mouth 2 (two) times daily. for 5 days  Dispense: 10 tablet; Refill: 0  -     phenazopyridine (PYRIDIUM) 200 MG tablet; Take 1 tablet (200 mg total) by mouth 3 (three) times daily as needed for Pain.  Dispense: 9 tablet; Refill: 0  -     Urine culture    Esophageal dysphagia  -     Ambulatory Referral to Gastroenterology  -     Comprehensive metabolic panel; Future; Expected date: 06/24/2019  -     CBC auto differential; Future; Expected date: 06/24/2019  -     omeprazole (PRILOSEC) 40 MG capsule; Take 1 capsule (40 mg total) by mouth once daily.  Dispense: 30 capsule; Refill: 0    Anxiety  -     escitalopram oxalate (LEXAPRO) 10 MG tablet; Take 1 tablet (10 mg total) by mouth once daily.  Dispense: 90 tablet; Refill: 0    Other orders  -     Urinalysis Microscopic        Lab Frequency Next Occurrence   CBC auto differential Once 09/10/2018   Iron and TIBC Once 09/10/2018   Ferritin Once 09/10/2018   CBC auto differential Once 08/20/2018   Basic metabolic panel Once 08/20/2018   Iron and TIBC Once 08/20/2018   Ferritin Once 08/20/2018       Problem List Items Addressed This Visit        Renal/    Dysuria - Primary    Relevant Medications    ciprofloxacin HCl (CIPRO) 500  MG tablet    phenazopyridine (PYRIDIUM) 200 MG tablet    Other Relevant Orders    Urinalysis (Completed)    Urine culture (Completed)      Other Visit Diagnoses     Esophageal dysphagia        Relevant Medications    omeprazole (PRILOSEC) 40 MG capsule    Other Relevant Orders    Ambulatory Referral to Gastroenterology    Comprehensive metabolic panel (Completed)    CBC auto differential (Completed)    Anxiety        Relevant Medications    escitalopram oxalate (LEXAPRO) 10 MG tablet          Follow up in about 2 weeks (around 7/8/2019), or if symptoms worsen or fail to improve, for with pcp.

## 2019-06-25 ENCOUNTER — TELEPHONE (OUTPATIENT)
Dept: INTERNAL MEDICINE | Facility: CLINIC | Age: 49
End: 2019-06-25

## 2019-06-27 ENCOUNTER — TELEPHONE (OUTPATIENT)
Dept: INTERNAL MEDICINE | Facility: CLINIC | Age: 49
End: 2019-06-27

## 2019-06-27 DIAGNOSIS — N30.00 ACUTE CYSTITIS WITHOUT HEMATURIA: Primary | ICD-10-CM

## 2019-06-27 LAB — BACTERIA UR CULT: ABNORMAL

## 2019-06-27 RX ORDER — LEVOFLOXACIN 250 MG/1
250 TABLET ORAL DAILY
Qty: 5 TABLET | Refills: 0 | Status: SHIPPED | OUTPATIENT
Start: 2019-06-27 | End: 2019-07-02

## 2019-06-27 NOTE — TELEPHONE ENCOUNTER
Urine culture indicates Levaquin will be needed to resolve urinary tract infection..  Cipro and start Levaquin.  Levaquin has been sent to pharmacy.

## 2019-06-28 NOTE — TELEPHONE ENCOUNTER
Conveyed result and recommendation to pt who verbalized understanding and stated she will  Levaquin prescription today.

## 2019-08-07 ENCOUNTER — PATIENT OUTREACH (OUTPATIENT)
Dept: ADMINISTRATIVE | Facility: HOSPITAL | Age: 49
End: 2019-08-07

## 2019-08-07 NOTE — PROGRESS NOTES
Attempted to schedule obstetrics annual visit. Patient had recent screening with doctor. I sent patient a e mail to retrieve results from [facility].

## 2019-08-15 ENCOUNTER — TELEPHONE (OUTPATIENT)
Dept: ENDOSCOPY | Facility: HOSPITAL | Age: 49
End: 2019-08-15

## 2019-08-15 NOTE — TELEPHONE ENCOUNTER
Spoke with patient regarding order.  No open endoscopy order noted at this time.  Encouraged patient to call PCP for new order, verbalized understanding.

## 2019-12-11 ENCOUNTER — PATIENT OUTREACH (OUTPATIENT)
Dept: ADMINISTRATIVE | Facility: HOSPITAL | Age: 49
End: 2019-12-11

## 2019-12-11 NOTE — PROGRESS NOTES
L/M for pt to call for Repeat A1c         Adri CAMILO LPN Care Coordinator  Care Coordination Department  [facility]  [phone]

## 2020-01-30 ENCOUNTER — PATIENT OUTREACH (OUTPATIENT)
Dept: ADMINISTRATIVE | Facility: HOSPITAL | Age: 50
End: 2020-01-30

## 2020-01-30 NOTE — PROGRESS NOTES
Per audit re Mammogram. Awaiting for mammogram report from Dr. Stephen Dave @ [facility]. Records were requested.

## 2020-02-03 ENCOUNTER — OFFICE VISIT (OUTPATIENT)
Dept: INTERNAL MEDICINE | Facility: CLINIC | Age: 50
End: 2020-02-03
Payer: COMMERCIAL

## 2020-02-03 ENCOUNTER — TELEPHONE (OUTPATIENT)
Dept: INTERNAL MEDICINE | Facility: CLINIC | Age: 50
End: 2020-02-03

## 2020-02-03 VITALS
WEIGHT: 149.5 LBS | SYSTOLIC BLOOD PRESSURE: 116 MMHG | HEIGHT: 65 IN | HEART RATE: 70 BPM | TEMPERATURE: 98 F | BODY MASS INDEX: 24.91 KG/M2 | OXYGEN SATURATION: 98 % | DIASTOLIC BLOOD PRESSURE: 82 MMHG

## 2020-02-03 DIAGNOSIS — R07.89 CHEST TIGHTNESS: Primary | ICD-10-CM

## 2020-02-03 DIAGNOSIS — E11.65 TYPE 2 DIABETES MELLITUS WITH HYPERGLYCEMIA, WITHOUT LONG-TERM CURRENT USE OF INSULIN: ICD-10-CM

## 2020-02-03 PROCEDURE — 99999 PR PBB SHADOW E&M-EST. PATIENT-LVL IV: CPT | Mod: PBBFAC,,, | Performed by: FAMILY MEDICINE

## 2020-02-03 PROCEDURE — 93010 ELECTROCARDIOGRAM REPORT: CPT | Mod: S$GLB,,, | Performed by: INTERNAL MEDICINE

## 2020-02-03 PROCEDURE — 99214 PR OFFICE/OUTPT VISIT, EST, LEVL IV, 30-39 MIN: ICD-10-PCS | Mod: S$GLB,,, | Performed by: FAMILY MEDICINE

## 2020-02-03 PROCEDURE — 3008F PR BODY MASS INDEX (BMI) DOCUMENTED: ICD-10-PCS | Mod: CPTII,S$GLB,, | Performed by: FAMILY MEDICINE

## 2020-02-03 PROCEDURE — 99214 OFFICE O/P EST MOD 30 MIN: CPT | Mod: S$GLB,,, | Performed by: FAMILY MEDICINE

## 2020-02-03 PROCEDURE — 93005 EKG 12-LEAD: ICD-10-PCS | Mod: S$GLB,,, | Performed by: FAMILY MEDICINE

## 2020-02-03 PROCEDURE — 99999 PR PBB SHADOW E&M-EST. PATIENT-LVL IV: ICD-10-PCS | Mod: PBBFAC,,, | Performed by: FAMILY MEDICINE

## 2020-02-03 PROCEDURE — 3008F BODY MASS INDEX DOCD: CPT | Mod: CPTII,S$GLB,, | Performed by: FAMILY MEDICINE

## 2020-02-03 PROCEDURE — 93010 EKG 12-LEAD: ICD-10-PCS | Mod: S$GLB,,, | Performed by: INTERNAL MEDICINE

## 2020-02-03 PROCEDURE — 93005 ELECTROCARDIOGRAM TRACING: CPT | Mod: S$GLB,,, | Performed by: FAMILY MEDICINE

## 2020-02-03 RX ORDER — NITROGLYCERIN 0.4 MG/1
0.4 TABLET SUBLINGUAL EVERY 5 MIN PRN
Qty: 25 TABLET | Refills: 0 | Status: SHIPPED | OUTPATIENT
Start: 2020-02-03 | End: 2022-05-13

## 2020-02-03 RX ORDER — INSULIN GLARGINE 100 [IU]/ML
15 INJECTION, SOLUTION SUBCUTANEOUS
COMMUNITY
Start: 2019-06-14 | End: 2020-11-04 | Stop reason: SDUPTHER

## 2020-02-03 NOTE — PROGRESS NOTES
CHIEF COMPLAINT  Chest Pain and Nausea      HISTORY, ASSESSMENT, and PLAN    1. Chest tightness        ASSESSMENT:  This problem is NEW. This problem appears to be complicated with uncertain prognosis and requires further workup.  Onset reported as last night a little after 11:00 a.m..  Quality described as squeezing tightness of her chest.  Associated symptoms included nausea but did not include shortness of breath.  Severity of symptoms described as moderate to moderately severely to stressing.  Timing of symptoms described as lasting less than 10 minutes and then spontaneously resolving.  Alleviating factors appear to include emesis that occurred toward the end of the episode.  She reports no exertional chest discomfort.  She is presently asymptomatic and has been so after that single episode. When asked about premature coronary artery disease in her family, she says that her father had cardiac bypass surgery in his early 50s.  She says that she has dealt with gastroesophageal reflux in the past, and this problem is very distinct from those symptoms. We discussed differential diagnosis (basis spastic angina, gastroesophageal reflux, esophageal spasm, etc.).    2. Type 2 diabetes mellitus with hyperglycemia, without long-term current use of insulin       ASSESSMENT:  Uncontrolled based on most recent A1c 8.4% in June.  She is overdue for labs and follow-up with her endocrinologist, Dr. Mario Oconnor.  She did not want to transition her diabetes management away from Dr. Oconnor.  She agreed to schedule appointment with Dr. Oconnor soon to review results of tests ordered today.    Orders Placed This Encounter    X-Ray Chest PA And Lateral    CBC auto differential    Comprehensive metabolic panel    Microalbumin/creatinine urine ratio    Hemoglobin A1c    Ambulatory Referral to Cardiology    SCHEDULED EKG 12-LEAD (to Muse)    nitroGLYCERIN (NITROSTAT) 0.4 MG SL tablet      12-lead ECG tracing independently  "reviewed by me:  Sinus rhythm at 68 bpm. Normal axis. Normal intervals. No ectopy. No hypertrophy. No significant ST-T changes. IMPRESSION:  NORMAL ELECTROCARDIOGRAM.       Problem List Items Addressed This Visit        Endocrine    Type 2 diabetes mellitus with hyperglycemia, without long-term current use of insulin    Overview     Dr Mario Oconnor follows         Current Assessment & Plan     Most recent A1c = 9.2%         Relevant Medications    insulin (LANTUS SOLOSTAR U-100 INSULIN) glargine 100 units/mL (3mL) SubQ pen    Other Relevant Orders    Comprehensive metabolic panel    Microalbumin/creatinine urine ratio    Hemoglobin A1c      Other Visit Diagnoses     Chest tightness    -  Primary    Relevant Medications    nitroGLYCERIN (NITROSTAT) 0.4 MG SL tablet    Other Relevant Orders    SCHEDULED EKG 12-LEAD (to Muse)    CBC auto differential    X-Ray Chest PA And Lateral    Ambulatory Referral to Cardiology           MEDICATION MANAGMENT    Outpatient Medications Prior to Visit   Medication Sig Dispense Refill    atorvastatin (LIPITOR) 20 MG tablet       blood sugar diagnostic Strp Use ac and hs      CANAGLIFLOZIN (INVOKANA ORAL) Take by mouth.      cyanocobalamin 1,000 mcg/mL injection INJECT 1 ML INTO THE MUSCLE EVERY 30 DAYS      insulin (LANTUS SOLOSTAR U-100 INSULIN) glargine 100 units/mL (3mL) SubQ pen Inject 15 Units into the skin.      INVOKANA 300 mg Tab tablet       melatonin 5 mg Tab Take by mouth.      metformin (GLUCOPHAGE) 500 MG tablet Take 500 mg by mouth 2 (two) times daily with meals.      multivitamin (ONE DAILY MULTIVITAMIN) per tablet Take 1 tablet by mouth.      needle, disp, 24 gauge 24 gauge x 1" Ndle 1 Dose by Misc.(Non-Drug; Combo Route) route every 30 days. 8 each 3    ONE TOUCH VERIO Strp       pen needle, diabetic (BD ULTRA-FINE SHORT PEN NEEDLE) 31 gauge x 5/16" Ndle Use up to three daily      syringe with needle 3 mL 21 gauge x 1" Syrg USE ONE Q 4 WEEKS      " "escitalopram oxalate (LEXAPRO) 10 MG tablet Take 1 tablet (10 mg total) by mouth once daily. 90 tablet 0    pravastatin (PRAVACHOL) 20 MG tablet Take 20 mg by mouth.      omeprazole (PRILOSEC) 40 MG capsule Take 1 capsule (40 mg total) by mouth once daily. (Patient not taking: Reported on 2/3/2020) 30 capsule 0     No facility-administered medications prior to visit.         Medications Discontinued During This Encounter   Medication Reason    omeprazole (PRILOSEC) 40 MG capsule Patient no longer taking        Medications Ordered This Encounter   Medications    nitroGLYCERIN (NITROSTAT) 0.4 MG SL tablet     Sig: Place 1 tablet (0.4 mg total) under the tongue every 5 (five) minutes as needed for Chest pain. Call 911 if pain persists after 2 doses.     Dispense:  25 tablet     Refill:  0        FOLLOW-UP  Follow up if symptoms worsen or fail to improve.     REVIEW OF SYSTEMS  PULMONARY: No hemoptysis or trouble breathing reported.   CONSTITUTIONAL: No fever or chills reported.   ENDOCRINE: No polyuria or polydipsia reported.   CARDIOVASCULAR: No typical angina or orthopnea reported.    PHYSICAL EXAM  Vitals:    02/03/20 1607   BP: 116/82   BP Location: Left arm   Patient Position: Sitting   BP Method: Large (Manual)   Pulse: 70   Temp: 98.4 °F (36.9 °C)   TempSrc: Oral   SpO2: 98%   Weight: 67.8 kg (149 lb 7.6 oz)   Height: 5' 5" (1.651 m)     CONSTITUTIONAL: Vital signs noted. No apparent distress. Does not appear acutely ill or septic. Appears adequately hydrated.  HEENT: External ENT grossly unremarkable. Hearing grossly intact. Oropharynx moist.  PULM: Lungs clear. Breathing unlabored.  HEART: Auscultation reveals regular rate and rhythm without murmur, gallop or rub.  ABDOMEN: Soft and nontender. Bowel sounds present.  DERM: Skin warm and moist with normal turgor.  NEURO: There are no gross focal motor deficits or gross deficits of cranial nerves III-XII.  PSYCHIATRIC: Alert and conversant. Mood grossly " "neutral. Judgment and insight not grossly compromised.      Documentation entered by me for this encounter may have been done in part using speech-recognition technology. Although I have made an effort to ensure accuracy, "sound like" errors may exist and should be interpreted in context. -LESLEE Villa MD.   "

## 2020-02-03 NOTE — TELEPHONE ENCOUNTER
Spoke with pt and she stated that she was having some chest pain last night and stood up and vomited and that it subsided afterwards. She stated that she is now experiencing some left arm pain today. I advised her to come in and be seen and scheduled her an appt today.

## 2020-02-03 NOTE — TELEPHONE ENCOUNTER
----- Message from Aleisha Tripathi MA sent at 2/3/2020  2:02 PM CST -----  Contact: Pt  Pt under care of Dr. Cee, message sent to his staff.    ----- Message -----  From: Sandee Samaniego  Sent: 2/3/2020   1:48 PM CST  To: [facility] Jr Staff    Pt is requesting call back in regards to having on and off chest pain. Pt stated that she's been having chest pains but after vomiting chest pain went away.        Pls call back at [phone]

## 2020-02-03 NOTE — LETTER
February 3, 2020        Mario Mike MD  [address] [address]             [facility]  [address] [address]  Phone: [phone]  Fax: [phone]   Patient: Yandy Cabral   MR Number: [id number]   YOB: 1970   Date of Visit: 2/3/2020       Dear Dr. Mike:    I saw Yandy in my office today for problems unrelated to her diabetes. I ordered labs for her, which you should be able to view on CareEverywhere.    She agreed to schedule appointment with you soon to discuss her diabetes management.    Take care, and be well.    Sincerely,     LESLEE Villa MD     [facility]

## 2020-02-03 NOTE — PATIENT INSTRUCTIONS
If you need help with [employer] and [employer], help is available:  · online at [email or web address]   at the O-bar in the 1st floor lobby of [facility]  · or by phone at [phone]  · by email at  [email or web address]    Here's a link to a FREEjitube video on how to send a message to your provider using [facility] [employer] buddy.  [email or web address]    Here's a link to a [employer] video on how to schedule an appointment using [facility] [employer] buddy.  [email or web address]      Uncertain Causes of Chest Pain    Chest pain can happen for a number of reasons. Sometimes the cause can't be determined. If your condition does not seem serious, and your pain does not appear to be coming from your heart, your healthcare provider may recommend watching it closely. Sometimes the signs of a serious problem take more time to appear. Many problems not related to your heart can cause chest pain.These include:  · Musculoskeletal. Costochondritis, an inflammation of the tissues around the ribs that can occur from trauma or overuse injuries  · Respiratory. Pneumonia, pneumothorax, or pneumonitis (inflammation of the lining of the chest and lungs)  · Gastrointestinal. Esophageal reflux, heartburn, or gallbladder disease  · Anxiety and panic disorders  · Nerve compression and neuritis  · Miscellaneous problems such as aortic aneurysm or pulmonary embolism (a blood clot in the lungs)  Home care  After your visit, follow these recommendations:  · Rest today and avoid strenuous activity.  · Take any prescribed medicine as directed.  · Be aware of any recurrent chest pain and notice any changes  Follow-up care  Follow up with your healthcare provider if you do not start to feel better within 24 hours, or as advised.  Call 911  Call 911 if any of these occur:  · A change in the type of pain: if it feels different, becomes more severe, lasts longer, or begins to spread into your shoulder, arm, neck, jaw  or back  · Shortness of breath or increased pain with breathing  · Weakness, dizziness, or fainting  · Rapid heart beat  · Crushing sensation in your chest  When to seek medical advice  Call your healthcare provider right away if any of the following occur:  · Cough with dark colored sputum (phlegm) or blood  · Fever of 100.4ºF (38ºC) or higher, or as directed by your healthcare provider  · Swelling, pain or redness in one leg  · Shortness of breath  Date Last Reviewed: 12/30/2015 © 2000-2017 [employer]. [address] [place] [id number]. All rights reserved. This information is not intended as a substitute for professional medical care. Always follow your healthcare professional's instructions.

## 2020-02-10 ENCOUNTER — HOSPITAL ENCOUNTER (OUTPATIENT)
Dept: RADIOLOGY | Facility: HOSPITAL | Age: 50
Discharge: HOME | End: 2020-02-10
Attending: FAMILY MEDICINE
Payer: COMMERCIAL

## 2020-02-10 ENCOUNTER — TELEPHONE (OUTPATIENT)
Dept: INTERNAL MEDICINE | Facility: CLINIC | Age: 50
End: 2020-02-10

## 2020-02-10 ENCOUNTER — OFFICE VISIT (OUTPATIENT)
Dept: CARDIOLOGY | Facility: CLINIC | Age: 50
End: 2020-02-10
Payer: COMMERCIAL

## 2020-02-10 VITALS
SYSTOLIC BLOOD PRESSURE: 116 MMHG | HEIGHT: 65 IN | DIASTOLIC BLOOD PRESSURE: 60 MMHG | OXYGEN SATURATION: 100 % | BODY MASS INDEX: 24.75 KG/M2 | WEIGHT: 148.56 LBS | HEART RATE: 80 BPM

## 2020-02-10 DIAGNOSIS — E78.5 HYPERLIPIDEMIA, UNSPECIFIED HYPERLIPIDEMIA TYPE: ICD-10-CM

## 2020-02-10 DIAGNOSIS — E11.65 TYPE 2 DIABETES MELLITUS WITH HYPERGLYCEMIA, WITHOUT LONG-TERM CURRENT USE OF INSULIN: ICD-10-CM

## 2020-02-10 DIAGNOSIS — Z82.49 FAMILY HISTORY OF CARDIOVASCULAR DISEASE: ICD-10-CM

## 2020-02-10 DIAGNOSIS — R07.89 CHEST TIGHTNESS: ICD-10-CM

## 2020-02-10 DIAGNOSIS — I20.0 ANGINA PECTORIS, UNSTABLE: ICD-10-CM

## 2020-02-10 DIAGNOSIS — I20.9 NEW-ONSET ANGINA: Primary | ICD-10-CM

## 2020-02-10 PROCEDURE — 71046 X-RAY EXAM CHEST 2 VIEWS: CPT | Mod: TC

## 2020-02-10 PROCEDURE — 99245 PR OFFICE CONSULTATION,LEVEL V: ICD-10-PCS | Mod: S$GLB,,, | Performed by: INTERNAL MEDICINE

## 2020-02-10 PROCEDURE — 99999 PR PBB SHADOW E&M-EST. PATIENT-LVL III: ICD-10-PCS | Mod: PBBFAC,,, | Performed by: INTERNAL MEDICINE

## 2020-02-10 PROCEDURE — 99245 OFF/OP CONSLTJ NEW/EST HI 55: CPT | Mod: S$GLB,,, | Performed by: INTERNAL MEDICINE

## 2020-02-10 PROCEDURE — 99999 PR PBB SHADOW E&M-EST. PATIENT-LVL III: CPT | Mod: PBBFAC,,, | Performed by: INTERNAL MEDICINE

## 2020-02-10 PROCEDURE — 71046 XR CHEST PA AND LATERAL: ICD-10-PCS | Mod: 26,,, | Performed by: RADIOLOGY

## 2020-02-10 PROCEDURE — 71046 X-RAY EXAM CHEST 2 VIEWS: CPT | Mod: 26,,, | Performed by: RADIOLOGY

## 2020-02-10 NOTE — TELEPHONE ENCOUNTER
----- Message from LESLEE Villa MD sent at 2/10/2020  2:27 PM CST -----  Please relay message: I'm happy to report that your chest x-ray is NORMAL.

## 2020-02-10 NOTE — PROGRESS NOTES
"Subjective:    Patient ID:  Yandy Cabral is a 49 y.o. female who presents for evaluation of Consult and Chest Pain        Pt referred by Dr. Luis A Bush      HPI  Pt presents for cp.  Has had 2 episodes of chest tightness, at night/nonexertional (woke pt up), 10 minutes or less, associated with nausea.  Saw PCP and prescribed sl ntg.  She has long standing DM, hyperlipidemia.  Nonsmoker.  DM HGAIC 8.5% last year.  Lipids last year, much improved on statin.   Family history of CAD (father had MI/CABG in 50's).   ecg done 2/3/20 is normal.  Has not used sl ntg.        Current Outpatient Medications:     atorvastatin (LIPITOR) 20 MG tablet, , Disp: , Rfl:     blood sugar diagnostic Strp, Use ac and hs, Disp: , Rfl:     CANAGLIFLOZIN (INVOKANA ORAL), Take by mouth., Disp: , Rfl:     cyanocobalamin 1,000 mcg/mL injection, INJECT 1 ML INTO THE MUSCLE EVERY 30 DAYS, Disp: , Rfl:     insulin (LANTUS SOLOSTAR U-100 INSULIN) glargine 100 units/mL (3mL) SubQ pen, Inject 15 Units into the skin., Disp: , Rfl:     INVOKANA 300 mg Tab tablet, , Disp: , Rfl:     melatonin 5 mg Tab, Take by mouth., Disp: , Rfl:     metformin (GLUCOPHAGE) 500 MG tablet, Take 500 mg by mouth 2 (two) times daily with meals., Disp: , Rfl:     multivitamin (ONE DAILY MULTIVITAMIN) per tablet, Take 1 tablet by mouth., Disp: , Rfl:     needle, disp, 24 gauge 24 gauge x 1" Ndle, 1 Dose by Misc.(Non-Drug; Combo Route) route every 30 days., Disp: 8 each, Rfl: 3    nitroGLYCERIN (NITROSTAT) 0.4 MG SL tablet, Place 1 tablet (0.4 mg total) under the tongue every 5 (five) minutes as needed for Chest pain. Call 911 if pain persists after 2 doses., Disp: 25 tablet, Rfl: 0    ONE TOUCH VERIO Strp, , Disp: , Rfl:     pen needle, diabetic (BD ULTRA-FINE SHORT PEN NEEDLE) 31 gauge x 5/16" Ndle, Use up to three daily, Disp: , Rfl:     syringe with needle 3 mL 21 gauge x 1" Syrg, USE ONE Q 4 WEEKS, Disp: , Rfl:     escitalopram oxalate " "(LEXAPRO) 10 MG tablet, Take 1 tablet (10 mg total) by mouth once daily., Disp: 90 tablet, Rfl: 0    pravastatin (PRAVACHOL) 20 MG tablet, Take 20 mg by mouth., Disp: , Rfl:       Review of Systems   Constitution: Negative.   HENT: Negative.    Eyes: Negative.    Cardiovascular: Positive for chest pain.   Respiratory: Negative.    Endocrine: Negative.    Hematologic/Lymphatic: Negative.    Skin: Negative.    Musculoskeletal: Negative.    Gastrointestinal: Positive for nausea.   Genitourinary: Negative.    Neurological: Negative.    Psychiatric/Behavioral: Negative.    Allergic/Immunologic: Negative.        /60 (BP Location: Left arm, Patient Position: Sitting, BP Method: Medium (Manual))   Pulse 80   Ht 5' 5" (1.651 m)   Wt 67.4 kg (148 lb 9.4 oz)   SpO2 100%   BMI 24.73 kg/m²     Wt Readings from Last 3 Encounters:   02/10/20 67.4 kg (148 lb 9.4 oz)   02/03/20 67.8 kg (149 lb 7.6 oz)   06/24/19 67.2 kg (148 lb 2.4 oz)     Temp Readings from Last 3 Encounters:   02/03/20 98.4 °F (36.9 °C) (Oral)   06/24/19 97.8 °F (36.6 °C) (Tympanic)   08/06/18 98.3 °F (36.8 °C)     BP Readings from Last 3 Encounters:   02/10/20 116/60   02/03/20 116/82   06/24/19 130/83     Pulse Readings from Last 3 Encounters:   02/10/20 80   02/03/20 70   06/24/19 86          Objective:    Physical Exam   Constitutional: She is oriented to person, place, and time. Vital signs are normal. She appears well-developed and well-nourished. She is active and cooperative. She does not have a sickly appearance. She does not appear ill. No distress.   HENT:   Head: Normocephalic.   Neck: Neck supple. Normal carotid pulses, no hepatojugular reflux and no JVD present. Carotid bruit is not present. No thyromegaly present.   Cardiovascular: Normal rate, regular rhythm, S1 normal, S2 normal, normal heart sounds and normal pulses. PMI is not displaced. Exam reveals no gallop and no friction rub.   No murmur heard.  Pulses:       Radial pulses are " 2+ on the right side, and 2+ on the left side.   Pulmonary/Chest: Effort normal and breath sounds normal. She has no wheezes. She has no rales.   Abdominal: Soft. Normal appearance, normal aorta and bowel sounds are normal. She exhibits no pulsatile liver, no abdominal bruit, no ascites and no mass. There is no splenomegaly or hepatomegaly. There is no tenderness.   Musculoskeletal: She exhibits no edema.   Lymphadenopathy:     She has no cervical adenopathy.   Neurological: She is alert and oriented to person, place, and time.   Skin: Skin is warm. She is not diaphoretic.   Psychiatric: She has a normal mood and affect. Her behavior is normal.   Nursing note and vitals reviewed.      I have reviewed all pertinent labs and cardiac studies.    Lab Results   Component Value Date    HGBA1C 7.8 (H) 07/19/2018       Chemistry        Component Value Date/Time     06/24/2019 1412    K 4.3 06/24/2019 1412     06/24/2019 1412    CO2 27 06/24/2019 1412    BUN 18 06/24/2019 1412    CREATININE 0.8 06/24/2019 1412     (H) 06/24/2019 1412        Component Value Date/Time    CALCIUM 10.4 06/24/2019 1412    ALKPHOS 125 06/24/2019 1412    AST 21 06/24/2019 1412    ALT 27 06/24/2019 1412    BILITOT 0.5 06/24/2019 1412    ESTGFRAFRICA >60.0 06/24/2019 1412    EGFRNONAA >60.0 06/24/2019 1412        Lab Results   Component Value Date    WBC 11.03 06/24/2019    HGB 13.2 06/24/2019    HCT 41.9 06/24/2019    MCV 99 (H) 06/24/2019     06/24/2019       Lab Results   Component Value Date    CHOL 235 (H) 03/27/2014    CHOL 217 (H) 08/28/2007     Lab Results   Component Value Date    HDL 53 03/27/2014    HDL 47 08/28/2007     Lab Results   Component Value Date    LDLCALC 157.8 03/27/2014    LDLCALC 146.6 (H) 08/28/2007     Lab Results   Component Value Date    TRIG 121 03/27/2014    TRIG 117 08/28/2007     Lab Results   Component Value Date    CHOLHDL 22.6 03/27/2014    CHOLHDL 21.7 08/28/2007           Assessment:        1. New-onset angina    2. Type 2 diabetes mellitus with hyperglycemia, without long-term current use of insulin    3. Hyperlipidemia, unspecified hyperlipidemia type    4. Family history of cardiovascular disease    5. Angina pectoris, unstable         Plan:               New onset angina over last 6 weeks (technically falls into unstable angina classification, however pt is stable with no active cp sxs and normal ECG last week).  Multiple risk factors for CAD, long standing DM.  No acute ecg changes.  Pt counseled on how to use sl ntg and when to go to ER.  Long discussion with pt on options of stress testing vs invasive cath workup, pros/cons of each approach discussed.  Indications, all risks/benefits of LHC, to include PCI discussed in full detail.  Pretest probability for significant CAD is elevated with strong family h/o CAD, long standing DM.  Discussed increased medical tx with Ranexa, Toprol, low dose and side effects discussed.  Needs to get her DM HGAIC to goal.  Continue statin for lipids.  Cardiac diet.  She is leaning towards doing cath but would like to discuss all of above issues with her /family and will call asap when she makes decision.  Once she decides, will either schedule stress echo asap or LHC next week (2/20/20).  Will await her decision.

## 2020-02-10 NOTE — LETTER
February 10, 2020      LESLEE Villa MD  [address]  [place] [address]           [facility]  [address]  [place] [address]  Phone: [phone]  Fax: [phone]          Patient: Yandy Cabral   MR Number: [id number]   YOB: 1970   Date of Visit: 2/10/2020       Dear Dr. LESLEE Villa:    Thank you for referring Yandy Cabral to me for evaluation. Attached you will find relevant portions of my assessment and plan of care.    If you have questions, please do not hesitate to call me. I look forward to following Yandy Cabral along with you.    Sincerely,    Martin Villatoro MD    Enclosure  CC:  No Recipients    If you would like to receive this communication electronically, please contact [email or web address] or [phone] to request more information on [employer] access.    For providers and/or their staff who would like to refer a patient to [facility], please contact us through our one-stop-shop provider referral line, [facility], at [phone].    If you feel you have received this communication in error or would no longer like to receive these types of communications, please e-mail [email or web address]

## 2020-02-10 NOTE — TELEPHONE ENCOUNTER
Spoke with patient and informed her of normal chest x-ray per Dr. Villa. She verbalized understanding.

## 2020-02-11 ENCOUNTER — TELEPHONE (OUTPATIENT)
Dept: CARDIOLOGY | Facility: CLINIC | Age: 50
End: 2020-02-11

## 2020-02-11 DIAGNOSIS — I20.9 NEW-ONSET ANGINA: Primary | ICD-10-CM

## 2020-02-11 RX ORDER — METOPROLOL SUCCINATE 25 MG/1
25 TABLET, EXTENDED RELEASE ORAL DAILY
Qty: 30 TABLET | Refills: 11 | Status: SHIPPED | OUTPATIENT
Start: 2020-02-11 | End: 2020-02-26 | Stop reason: SDUPTHER

## 2020-02-11 RX ORDER — RANOLAZINE 500 MG/1
500 TABLET, EXTENDED RELEASE ORAL 2 TIMES DAILY
Qty: 60 TABLET | Refills: 11 | Status: SHIPPED | OUTPATIENT
Start: 2020-02-11 | End: 2020-02-26 | Stop reason: SDUPTHER

## 2020-02-11 NOTE — TELEPHONE ENCOUNTER
Received call from patient stating she'd decided on heart cath  Checked with Dr. Villatoro and confirmed 2/19/20 for 9:30am  Reviewed all instructions answered all questions  Will need prescription for Toprol and Ranexa called or sent to Pharmacy confirmed [facility] [address]

## 2020-02-19 ENCOUNTER — HOSPITAL ENCOUNTER (OUTPATIENT)
Facility: HOSPITAL | Age: 50
Discharge: HOME | End: 2020-02-19
Attending: INTERNAL MEDICINE | Admitting: INTERNAL MEDICINE
Payer: COMMERCIAL

## 2020-02-19 VITALS
DIASTOLIC BLOOD PRESSURE: 59 MMHG | HEART RATE: 67 BPM | TEMPERATURE: 98 F | SYSTOLIC BLOOD PRESSURE: 111 MMHG | BODY MASS INDEX: 24.66 KG/M2 | HEIGHT: 65 IN | WEIGHT: 148 LBS | OXYGEN SATURATION: 100 % | RESPIRATION RATE: 18 BRPM

## 2020-02-19 DIAGNOSIS — I20.9 NEW-ONSET ANGINA: ICD-10-CM

## 2020-02-19 DIAGNOSIS — I20.0 UNSTABLE ANGINA: ICD-10-CM

## 2020-02-19 LAB
B-HCG UR QL: NEGATIVE
POCT GLUCOSE: 167 MG/DL (ref 70–110)

## 2020-02-19 PROCEDURE — 27200376 HC TR BAND, RAD CLOSURE DEVICE: Performed by: INTERNAL MEDICINE

## 2020-02-19 PROCEDURE — 93458 L HRT ARTERY/VENTRICLE ANGIO: CPT | Performed by: INTERNAL MEDICINE

## 2020-02-19 PROCEDURE — 99152 MOD SED SAME PHYS/QHP 5/>YRS: CPT | Performed by: INTERNAL MEDICINE

## 2020-02-19 PROCEDURE — C1769 GUIDE WIRE: HCPCS | Performed by: INTERNAL MEDICINE

## 2020-02-19 PROCEDURE — 63600175 PHARM REV CODE 636 W HCPCS

## 2020-02-19 PROCEDURE — 93458 PR CATH PLACE/CORON ANGIO, IMG SUPER/INTERP,W LEFT HEART VENTRICULOGRAPHY: ICD-10-PCS | Mod: 26,,, | Performed by: INTERNAL MEDICINE

## 2020-02-19 PROCEDURE — 99152 PR MOD CONSCIOUS SEDATION, SAME PHYS, 5+ YRS, FIRST 15 MIN: ICD-10-PCS | Mod: ,,, | Performed by: INTERNAL MEDICINE

## 2020-02-19 PROCEDURE — 25500020 PHARM REV CODE 255: Performed by: INTERNAL MEDICINE

## 2020-02-19 PROCEDURE — 99152 MOD SED SAME PHYS/QHP 5/>YRS: CPT | Mod: ,,, | Performed by: INTERNAL MEDICINE

## 2020-02-19 PROCEDURE — 81025 URINE PREGNANCY TEST: CPT

## 2020-02-19 PROCEDURE — 25000003 PHARM REV CODE 250: Performed by: INTERNAL MEDICINE

## 2020-02-19 PROCEDURE — C1887 CATHETER, GUIDING: HCPCS | Performed by: INTERNAL MEDICINE

## 2020-02-19 PROCEDURE — 99153 MOD SED SAME PHYS/QHP EA: CPT | Performed by: INTERNAL MEDICINE

## 2020-02-19 PROCEDURE — 25000003 PHARM REV CODE 250

## 2020-02-19 PROCEDURE — 63600175 PHARM REV CODE 636 W HCPCS: Performed by: INTERNAL MEDICINE

## 2020-02-19 PROCEDURE — C1894 INTRO/SHEATH, NON-LASER: HCPCS | Performed by: INTERNAL MEDICINE

## 2020-02-19 PROCEDURE — 93458 L HRT ARTERY/VENTRICLE ANGIO: CPT | Mod: 26,,, | Performed by: INTERNAL MEDICINE

## 2020-02-19 RX ORDER — LIDOCAINE HYDROCHLORIDE 20 MG/ML
INJECTION, SOLUTION EPIDURAL; INFILTRATION; INTRACAUDAL; PERINEURAL
Status: DISCONTINUED | OUTPATIENT
Start: 2020-02-19 | End: 2020-02-19 | Stop reason: HOSPADM

## 2020-02-19 RX ORDER — SODIUM CHLORIDE 9 MG/ML
INJECTION, SOLUTION INTRAVENOUS CONTINUOUS
Status: DISCONTINUED | OUTPATIENT
Start: 2020-02-19 | End: 2020-02-19 | Stop reason: HOSPADM

## 2020-02-19 RX ORDER — HEPARIN SODIUM 1000 [USP'U]/ML
INJECTION INTRAVENOUS; SUBCUTANEOUS
Status: DISCONTINUED | OUTPATIENT
Start: 2020-02-19 | End: 2020-02-19 | Stop reason: HOSPADM

## 2020-02-19 RX ORDER — VERAPAMIL HYDROCHLORIDE 2.5 MG/ML
INJECTION, SOLUTION INTRAVENOUS
Status: DISCONTINUED | OUTPATIENT
Start: 2020-02-19 | End: 2020-02-19 | Stop reason: HOSPADM

## 2020-02-19 RX ORDER — NITROGLYCERIN 5 MG/ML
INJECTION, SOLUTION INTRAVENOUS
Status: DISCONTINUED | OUTPATIENT
Start: 2020-02-19 | End: 2020-02-19 | Stop reason: HOSPADM

## 2020-02-19 RX ORDER — ASPIRIN 325 MG
325 TABLET ORAL ONCE
Status: COMPLETED | OUTPATIENT
Start: 2020-02-19 | End: 2020-02-19

## 2020-02-19 RX ORDER — FENTANYL CITRATE 50 UG/ML
INJECTION, SOLUTION INTRAMUSCULAR; INTRAVENOUS
Status: DISCONTINUED | OUTPATIENT
Start: 2020-02-19 | End: 2020-02-19 | Stop reason: HOSPADM

## 2020-02-19 RX ORDER — DIAZEPAM 5 MG/1
5 TABLET ORAL
Status: COMPLETED | OUTPATIENT
Start: 2020-02-19 | End: 2020-02-19

## 2020-02-19 RX ORDER — MIDAZOLAM HYDROCHLORIDE 1 MG/ML
INJECTION, SOLUTION INTRAMUSCULAR; INTRAVENOUS
Status: DISCONTINUED | OUTPATIENT
Start: 2020-02-19 | End: 2020-02-19 | Stop reason: HOSPADM

## 2020-02-19 RX ORDER — DIPHENHYDRAMINE HCL 50 MG
50 CAPSULE ORAL ONCE
Status: COMPLETED | OUTPATIENT
Start: 2020-02-19 | End: 2020-02-19

## 2020-02-19 RX ADMIN — DIAZEPAM 5 MG: 5 TABLET ORAL at 08:02

## 2020-02-19 RX ADMIN — Medication 50 MG: at 08:02

## 2020-02-19 RX ADMIN — SODIUM CHLORIDE: 9 INJECTION, SOLUTION INTRAVENOUS at 08:02

## 2020-02-19 RX ADMIN — Medication 325 MG: at 08:02

## 2020-02-19 NOTE — PLAN OF CARE
Patient received discharge instructions verbalized understanding able to teach back. IV removed, TR band removed with dressing clean, dry and intact to left radial site, vital signs stable. Wheeled to front of hospital to be discharged home with spouse.

## 2020-02-19 NOTE — OP NOTE
[facility] -   Cardiac Catheterization  Procedure + Discharge Note    SUMMARY     Yandy Cabral  [id number]  E Len Ramos Jr, MD    Date of Procedure: 2/19/2020    Procedure:  1.  LHC  2. LEFT VENTRICULOGRAM  3. CORONARY ANGIOGRAM    Provider: Martin Villatoro MD    Assisting Provider:  Jenny Lawton    Indications: She was referred for cardiac catheterization to further evaluate new onset angina.    Pre-Procedure Diagnosis:  New onset angina.    Post-Procedure Diagnosis:  Nonobstructive CAD    Anesthesia: RN IV Sedation    Description of the Findings of the Procedure:     The risks, benefits, complications, treatment options, and expected outcomes were discussed with the patient. The patient and/or family concurred with the proposed plan, giving informed consent. Patient was brought to the cath lab after IV hydration was begun and oral premedication was given.     Findings:  20 - 30% prox RCA  Angiographically normal vessels elsewhere  Normal EF  Left radial TR band  See report    Complications: None; patient tolerated the procedure well.    Estimated Blood Loss (EBL): Minimal           Implants: none    Specimens: none    Condition: stable    Disposition: PACU - hemodynamically stable.    Attestation: I was present and scrubbed for the entire procedure.     Recommendations:    Usual post cath care  D/c pt home  Cardiac/diabetic diet  Continue current meds  F/u clinic 1 week.

## 2020-02-19 NOTE — Clinical Note
Catheter is inserted into the ostium   left main. Angiography performed of the left coronary arteries in multiple views.Unable to engage.

## 2020-02-19 NOTE — Clinical Note
The DP pulses are 2+ bilaterally. The PT pulses are 2+ bilaterally. The radial pulses are +2 bilaterally. The brachial pulses are 2+ bilaterally.

## 2020-02-20 LAB — CATH EF QUANTITATIVE: 65 %

## 2020-02-26 ENCOUNTER — OFFICE VISIT (OUTPATIENT)
Dept: CARDIOLOGY | Facility: CLINIC | Age: 50
End: 2020-02-26
Payer: COMMERCIAL

## 2020-02-26 VITALS
SYSTOLIC BLOOD PRESSURE: 116 MMHG | HEIGHT: 65 IN | OXYGEN SATURATION: 99 % | HEART RATE: 72 BPM | WEIGHT: 151.25 LBS | BODY MASS INDEX: 25.2 KG/M2 | DIASTOLIC BLOOD PRESSURE: 70 MMHG

## 2020-02-26 DIAGNOSIS — Z82.49 FAMILY HISTORY OF CARDIOVASCULAR DISEASE: ICD-10-CM

## 2020-02-26 DIAGNOSIS — I25.10 CORONARY ARTERY DISEASE INVOLVING NATIVE CORONARY ARTERY OF NATIVE HEART WITHOUT ANGINA PECTORIS: ICD-10-CM

## 2020-02-26 DIAGNOSIS — E78.5 HYPERLIPIDEMIA, UNSPECIFIED HYPERLIPIDEMIA TYPE: ICD-10-CM

## 2020-02-26 DIAGNOSIS — E11.65 TYPE 2 DIABETES MELLITUS WITH HYPERGLYCEMIA, WITHOUT LONG-TERM CURRENT USE OF INSULIN: ICD-10-CM

## 2020-02-26 DIAGNOSIS — I20.0 ANGINA PECTORIS, UNSTABLE: ICD-10-CM

## 2020-02-26 DIAGNOSIS — I20.9 NEW-ONSET ANGINA: Primary | ICD-10-CM

## 2020-02-26 PROCEDURE — 3008F BODY MASS INDEX DOCD: CPT | Mod: CPTII,S$GLB,, | Performed by: PHYSICIAN ASSISTANT

## 2020-02-26 PROCEDURE — 99999 PR PBB SHADOW E&M-EST. PATIENT-LVL III: CPT | Mod: PBBFAC,,, | Performed by: PHYSICIAN ASSISTANT

## 2020-02-26 PROCEDURE — 99999 PR PBB SHADOW E&M-EST. PATIENT-LVL III: ICD-10-PCS | Mod: PBBFAC,,, | Performed by: PHYSICIAN ASSISTANT

## 2020-02-26 PROCEDURE — 99214 OFFICE O/P EST MOD 30 MIN: CPT | Mod: S$GLB,,, | Performed by: PHYSICIAN ASSISTANT

## 2020-02-26 PROCEDURE — 99214 PR OFFICE/OUTPT VISIT, EST, LEVL IV, 30-39 MIN: ICD-10-PCS | Mod: S$GLB,,, | Performed by: PHYSICIAN ASSISTANT

## 2020-02-26 PROCEDURE — 3052F HG A1C>EQUAL 8.0%<EQUAL 9.0%: CPT | Mod: CPTII,S$GLB,, | Performed by: PHYSICIAN ASSISTANT

## 2020-02-26 PROCEDURE — 3008F PR BODY MASS INDEX (BMI) DOCUMENTED: ICD-10-PCS | Mod: CPTII,S$GLB,, | Performed by: PHYSICIAN ASSISTANT

## 2020-02-26 PROCEDURE — 3052F PR MOST RECENT HEMOGLOBIN A1C LEVEL 8.0 - < 9.0%: ICD-10-PCS | Mod: CPTII,S$GLB,, | Performed by: PHYSICIAN ASSISTANT

## 2020-02-26 RX ORDER — METOPROLOL SUCCINATE 25 MG/1
25 TABLET, EXTENDED RELEASE ORAL DAILY
Qty: 90 TABLET | Refills: 3 | Status: SHIPPED | OUTPATIENT
Start: 2020-02-26 | End: 2020-03-27 | Stop reason: SDUPTHER

## 2020-02-26 RX ORDER — RANOLAZINE 500 MG/1
500 TABLET, EXTENDED RELEASE ORAL 2 TIMES DAILY
Qty: 180 TABLET | Refills: 3 | Status: SHIPPED | OUTPATIENT
Start: 2020-02-26 | End: 2021-05-14

## 2020-02-26 NOTE — PROGRESS NOTES
Subjective:    Patient ID:  Yandy Cabral is a 49 y.o. female who presents for follow-up of hospital follow-up      HPI   Ms. Cabral is a 49 year old female patient whose current medical conditions include DM, hyperlipidemia, and familial history of CAD who presents today for hospital follow-up. Patient recently underwent LHC by Dr. Villatoro due to anginal symptoms. LHC showed 20-30% proximal RCA disease, normal coronaries elsewhere, med mgmt recommended. She returns today and states she is feeling well overall. No recurrence of chest pain. No SOB. Occasional dizziness. No palpitations, near syncope, or syncope. No s/s suggestive of CHF. No radial access site complaints other than some mild bruising. Patient reports compliance with her medications.    LHC results reviewed/discussed.     Review of Systems   Constitution: Negative for chills, decreased appetite, fever and malaise/fatigue.   HENT: Negative for congestion, hoarse voice and sore throat.    Eyes: Negative for blurred vision and discharge.   Cardiovascular: Negative for chest pain, claudication, cyanosis, dyspnea on exertion, irregular heartbeat, leg swelling, near-syncope, orthopnea, palpitations and paroxysmal nocturnal dyspnea.   Respiratory: Negative for cough, hemoptysis, shortness of breath, snoring, sputum production and wheezing.    Endocrine: Negative for cold intolerance and heat intolerance.   Hematologic/Lymphatic: Negative for bleeding problem. Does not bruise/bleed easily.   Skin: Negative for rash.   Musculoskeletal: Negative for arthritis, back pain, joint pain, joint swelling, muscle cramps, muscle weakness and myalgias.   Gastrointestinal: Negative for abdominal pain, constipation, diarrhea, heartburn, melena and nausea.   Genitourinary: Negative for hematuria.   Neurological: Positive for dizziness. Negative for focal weakness, headaches, light-headedness, loss of balance, numbness, paresthesias, seizures and weakness.  "  Psychiatric/Behavioral: Negative for memory loss. The patient does not have insomnia.    Allergic/Immunologic: Negative for hives.     /70 (BP Location: Right arm, Patient Position: Sitting)   Pulse 72   Ht 5' 5" (1.651 m)   Wt 68.6 kg (151 lb 3.8 oz)   SpO2 99%   BMI 25.17 kg/m²     Objective:    Physical Exam   Constitutional: She is oriented to person, place, and time. She appears well-developed and well-nourished. No distress.   HENT:   Head: Normocephalic and atraumatic.   Eyes: Pupils are equal, round, and reactive to light. Right eye exhibits no discharge. Left eye exhibits no discharge.   Neck: Neck supple. No JVD present.   Cardiovascular: Normal rate, regular rhythm, S1 normal, S2 normal and normal heart sounds.   No murmur heard.  Pulmonary/Chest: Effort normal and breath sounds normal. No respiratory distress. She has no wheezes. She has no rales.   Abdominal: Soft. She exhibits no distension.   Musculoskeletal: She exhibits no edema.   Neurological: She is alert and oriented to person, place, and time.   Skin: Skin is warm and dry. She is not diaphoretic. No erythema.   Psychiatric: She has a normal mood and affect. Her behavior is normal. Thought content normal.   Nursing note and vitals reviewed.    Lab Results   Component Value Date    HGBA1C 8.4 (H) 02/10/2020     Heart Cath Results  Left Main   The vessel is angiographically normal.   Left Anterior Descending   The vessel is angiographically normal.   First Diagonal Branch   The vessel is angiographically normal.   Ramus Intermedius   The vessel is angiographically normal.   Left Circumflex   The vessel is angiographically normal.   First Obtuse Marginal Branch   The vessel is angiographically normal.   Right Coronary Artery   20% prox RCA stenosism; the rest of RCA is angiographically is normal.       Assessment:       1. New-onset angina    2. Hyperlipidemia, unspecified hyperlipidemia type    3. Angina pectoris, unstable    4. Type " 2 diabetes mellitus with hyperglycemia, without long-term current use of insulin    5. Family history of cardiovascular disease    6. Coronary artery disease involving native coronary artery of native heart without angina pectoris      Patient presents for f/u. Doing clinically well. No recurrence of chest pain since discharge. Tolerating meds. Discussed cath results/risk factor modification. ASA 81 mg daily if no bleeding issues.   Plan:   -ASA 81 mg daily  -Continue same CV meds for now  -Cardiac low salt diet  -DM control  -RTC 6 months with Dr. Villatoro with lipid, cmp, A1C

## 2020-03-27 DIAGNOSIS — I20.9 NEW-ONSET ANGINA: ICD-10-CM

## 2020-03-27 RX ORDER — METOPROLOL SUCCINATE 25 MG/1
25 TABLET, EXTENDED RELEASE ORAL DAILY
Qty: 90 TABLET | Refills: 3 | Status: SHIPPED | OUTPATIENT
Start: 2020-03-27 | End: 2021-03-04

## 2020-04-09 ENCOUNTER — PATIENT OUTREACH (OUTPATIENT)
Dept: ADMINISTRATIVE | Facility: HOSPITAL | Age: 50
End: 2020-04-09

## 2020-05-13 ENCOUNTER — PATIENT OUTREACH (OUTPATIENT)
Dept: ADMINISTRATIVE | Facility: HOSPITAL | Age: 50
End: 2020-05-13

## 2020-05-13 NOTE — PROGRESS NOTES
I called pt. And confirmed her pcp is Len Ramos Jr. Pt. States she is driving and will call back in to schedule annual. Pt. States she had mammo done at [facility] in January and will get a copy.

## 2020-06-01 ENCOUNTER — TELEPHONE (OUTPATIENT)
Dept: INTERNAL MEDICINE | Facility: CLINIC | Age: 50
End: 2020-06-01

## 2020-06-01 NOTE — TELEPHONE ENCOUNTER
S/w pt stating per psych counselor's recommendation to contact PC to be prescribed low-dose antidepressant. Advised pt since not seen >3yrs, pt would have to sched appt w/ Dr. Ramos to discuss rx. Pt voiced understanding and confirmed appt sched 06/04/20.

## 2020-06-01 NOTE — TELEPHONE ENCOUNTER
----- Message from Rufina Vasquez sent at 6/1/2020  9:19 AM CDT -----  Contact: pt   Would like to consult with nurse regarding her needing a medication for mild depression,. please give a call back at [phone].               Thanks,  Rufina AVILA

## 2020-06-18 ENCOUNTER — OFFICE VISIT (OUTPATIENT)
Dept: INTERNAL MEDICINE | Facility: CLINIC | Age: 50
End: 2020-06-18
Payer: COMMERCIAL

## 2020-06-18 VITALS
BODY MASS INDEX: 25.93 KG/M2 | HEIGHT: 65 IN | TEMPERATURE: 98 F | SYSTOLIC BLOOD PRESSURE: 102 MMHG | DIASTOLIC BLOOD PRESSURE: 62 MMHG | RESPIRATION RATE: 16 BRPM | HEART RATE: 61 BPM | OXYGEN SATURATION: 98 % | WEIGHT: 155.63 LBS

## 2020-06-18 DIAGNOSIS — E11.65 TYPE 2 DIABETES MELLITUS WITH HYPERGLYCEMIA, WITHOUT LONG-TERM CURRENT USE OF INSULIN: Primary | ICD-10-CM

## 2020-06-18 DIAGNOSIS — F41.8 ANXIETY WITH DEPRESSION: ICD-10-CM

## 2020-06-18 PROCEDURE — 99214 PR OFFICE/OUTPT VISIT, EST, LEVL IV, 30-39 MIN: ICD-10-PCS | Mod: S$GLB,,, | Performed by: PEDIATRICS

## 2020-06-18 PROCEDURE — 3008F PR BODY MASS INDEX (BMI) DOCUMENTED: ICD-10-PCS | Mod: CPTII,S$GLB,, | Performed by: PEDIATRICS

## 2020-06-18 PROCEDURE — 3052F PR MOST RECENT HEMOGLOBIN A1C LEVEL 8.0 - < 9.0%: ICD-10-PCS | Mod: CPTII,S$GLB,, | Performed by: PEDIATRICS

## 2020-06-18 PROCEDURE — 3052F HG A1C>EQUAL 8.0%<EQUAL 9.0%: CPT | Mod: CPTII,S$GLB,, | Performed by: PEDIATRICS

## 2020-06-18 PROCEDURE — 99999 PR PBB SHADOW E&M-EST. PATIENT-LVL V: ICD-10-PCS | Mod: PBBFAC,,, | Performed by: PEDIATRICS

## 2020-06-18 PROCEDURE — 3008F BODY MASS INDEX DOCD: CPT | Mod: CPTII,S$GLB,, | Performed by: PEDIATRICS

## 2020-06-18 PROCEDURE — 99999 PR PBB SHADOW E&M-EST. PATIENT-LVL V: CPT | Mod: PBBFAC,,, | Performed by: PEDIATRICS

## 2020-06-18 PROCEDURE — 99214 OFFICE O/P EST MOD 30 MIN: CPT | Mod: S$GLB,,, | Performed by: PEDIATRICS

## 2020-06-18 RX ORDER — BUSPIRONE HYDROCHLORIDE 15 MG/1
15 TABLET ORAL 2 TIMES DAILY
Qty: 60 TABLET | Refills: 11 | Status: SHIPPED | OUTPATIENT
Start: 2020-06-18 | End: 2020-07-02 | Stop reason: SDUPTHER

## 2020-06-18 RX ORDER — BUPROPION HYDROCHLORIDE 150 MG/1
150 TABLET ORAL DAILY
Qty: 30 TABLET | Refills: 11 | Status: SHIPPED | OUTPATIENT
Start: 2020-06-18 | End: 2020-07-15 | Stop reason: SDUPTHER

## 2020-06-18 NOTE — PROGRESS NOTES
Subjective:       Patient ID: Yandy Cabral is a 49 y.o. female.    Chief Complaint: Establish Care and Referral    She is here for concerns for anxiety and depression. In with counselor, stressors due to work, marriage, and demented mother. No HI/SI. Counselor feels she should be on treatment. Had been on some med many years ago briefly. No smoking, sig etoh, no drugs. Anxiety/depression symptoms through counselor. Also she is long overdue DM care, now wants establishment here.    Review of Systems   Constitutional: Negative for fever and unexpected weight change.   HENT: Negative for congestion and rhinorrhea.    Eyes: Negative for discharge and redness.   Respiratory: Negative for cough, shortness of breath and wheezing.    Cardiovascular: Negative for chest pain, palpitations and leg swelling.   Gastrointestinal: Negative for constipation, diarrhea and vomiting.   Endocrine: Negative for polydipsia, polyphagia and polyuria.        Variable glucose control   Genitourinary: Negative for decreased urine volume, difficulty urinating, flank pain, frequency, menstrual problem and urgency.   Musculoskeletal: Negative for arthralgias and joint swelling.   Skin: Negative for rash and wound.   Neurological: Negative for syncope and headaches.   Psychiatric/Behavioral: Positive for decreased concentration and dysphoric mood. Negative for behavioral problems, hallucinations, self-injury, sleep disturbance and suicidal ideas. The patient is nervous/anxious. The patient is not hyperactive.        Objective:      Physical Exam  Constitutional:       General: She is not in acute distress.     Appearance: She is well-developed.   Neck:      Thyroid: No thyromegaly.      Vascular: No JVD.   Cardiovascular:      Rate and Rhythm: Normal rate and regular rhythm.      Heart sounds: Normal heart sounds. No murmur.   Pulmonary:      Effort: Pulmonary effort is normal. No respiratory distress.      Breath sounds: Normal breath  sounds. No wheezing or rales.   Abdominal:      General: There is no distension.      Palpations: Abdomen is soft. There is no mass.      Tenderness: There is no abdominal tenderness. There is no guarding.   Musculoskeletal:      Comments: Foot hygiene was good, no ulcers, no onychomycosis, no tinea, monofilament intact   Lymphadenopathy:      Cervical: No cervical adenopathy.   Skin:     Capillary Refill: Capillary refill takes less than 2 seconds.      Findings: No rash.   Neurological:      Mental Status: She is alert and oriented to person, place, and time.      Cranial Nerves: No cranial nerve deficit.      Coordination: Coordination normal.   Psychiatric:         Behavior: Behavior normal.         Thought Content: Thought content normal.         Judgment: Judgment normal.         Assessment:       1. Type 2 diabetes mellitus with hyperglycemia, without long-term current use of insulin    2. Anxiety with depression        Plan:       Type 2 diabetes mellitus with hyperglycemia, without long-term current use of insulin  -     Hemoglobin A1C; Future; Expected date: 06/18/2020  -     Microalbumin/creatinine urine ratio; Future; Expected date: 06/18/2020  -     Comprehensive metabolic panel; Future; Expected date: 06/18/2020  -     Lipid Panel; Future; Expected date: 06/18/2020  -     TSH; Future; Expected date: 06/18/2020  -     CBC auto differential; Future; Expected date: 06/18/2020  -     Ambulatory Referral to Diabetic Education; Future; Expected date: 06/25/2020  -     Insulin, random; Future; Expected date: 06/18/2020  -     Ambulatory referral/consult to Endocrinology; Future; Expected date: 06/25/2020    Anxiety with depression  -     buPROPion (WELLBUTRIN XL) 150 MG TB24 tablet; Take 1 tablet (150 mg total) by mouth once daily.  Dispense: 30 tablet; Refill: 11  -     busPIRone (BUSPAR) 15 MG tablet; Take 1 tablet (15 mg total) by mouth 2 (two) times daily.  Dispense: 60 tablet; Refill: 11    SE of buspar  and wellbutrin d/w patient. Maintain counseling. F/U in 2 months. Set up endo appt to consider DM 1 or 2 diagnosis and after that can follow with DM program. I d/w patient G6 CGMS and Tandem Control IQ to assist patient with improved glycemic control, she is interested.

## 2020-06-23 ENCOUNTER — PATIENT OUTREACH (OUTPATIENT)
Dept: ADMINISTRATIVE | Facility: HOSPITAL | Age: 50
End: 2020-06-23

## 2020-06-23 NOTE — PROGRESS NOTES
Fax form sent to [facility] for Diabetic Eye Exam result.  1st attempt

## 2020-07-01 ENCOUNTER — TELEPHONE (OUTPATIENT)
Dept: INTERNAL MEDICINE | Facility: CLINIC | Age: 50
End: 2020-07-01

## 2020-07-01 DIAGNOSIS — Z20.822 EXPOSURE TO COVID-19 VIRUS: Primary | ICD-10-CM

## 2020-07-01 NOTE — TELEPHONE ENCOUNTER
S/w pt requesting COVID-19 test, pt denies s/s at this time. Order placed per [facility] guidelines, appt sched for apt at [facility] for testing. Pt confirmed appt date/time sched tomorrow 07/02/20.

## 2020-07-01 NOTE — TELEPHONE ENCOUNTER
----- Message from Sarwat Escobar sent at 7/1/2020 11:20 AM CDT -----  Regarding: [phone]  Pt would like to get orders for covid testing. Please call back at [phone].         Thank you,   Sarwat Escobar

## 2020-07-02 DIAGNOSIS — F41.8 ANXIETY WITH DEPRESSION: ICD-10-CM

## 2020-07-02 RX ORDER — TOBRAMYCIN AND DEXAMETHASONE 3; 1 MG/ML; MG/ML
SUSPENSION/ DROPS OPHTHALMIC
COMMUNITY
Start: 2020-06-23 | End: 2021-05-14

## 2020-07-02 RX ORDER — CLOTRIMAZOLE AND BETAMETHASONE DIPROPIONATE 10; .64 MG/G; MG/G
CREAM TOPICAL
COMMUNITY
Start: 2020-06-22 | End: 2021-05-14

## 2020-07-02 RX ORDER — BUSPIRONE HYDROCHLORIDE 15 MG/1
15 TABLET ORAL 2 TIMES DAILY
Qty: 180 TABLET | Refills: 3 | Status: SHIPPED | OUTPATIENT
Start: 2020-07-02 | End: 2021-05-14

## 2020-07-02 NOTE — TELEPHONE ENCOUNTER
Fax refill request rec;vd from pt's mail order pharm, request sent to Dr. Ramos for auth [Buspirone].    LV 06/18/20  [place] 08/18/20

## 2020-07-15 DIAGNOSIS — F41.8 ANXIETY WITH DEPRESSION: ICD-10-CM

## 2020-07-15 RX ORDER — BUPROPION HYDROCHLORIDE 150 MG/1
150 TABLET ORAL DAILY
Qty: 90 TABLET | Refills: 3 | Status: SHIPPED | OUTPATIENT
Start: 2020-07-15 | End: 2021-05-20 | Stop reason: SDUPTHER

## 2020-07-15 NOTE — TELEPHONE ENCOUNTER
Pharm refill request for #90 ct sent to Dr. Ramos for auth [Bupropion].    LV 06/18/20  NV 08/18/20

## 2020-07-15 NOTE — TELEPHONE ENCOUNTER
----- Message from Paige Franco sent at 7/15/2020 10:17 AM CDT -----  Regarding: phamracy call  .Type:  Pharmacy Calling to Clarify an RX    Name of Caller: Armando   [facility]   Prescription Name:buPROPion 150 mg  What do they need to clarify?: pt requesting 90 days, through home delivery   Best Call Back Number: [phone]   Additional Information:

## 2020-07-31 DIAGNOSIS — Z12.39 BREAST CANCER SCREENING: ICD-10-CM

## 2020-08-03 ENCOUNTER — PATIENT OUTREACH (OUTPATIENT)
Dept: ADMINISTRATIVE | Facility: OTHER | Age: 50
End: 2020-08-03

## 2020-08-04 ENCOUNTER — OFFICE VISIT (OUTPATIENT)
Dept: ENDOCRINOLOGY | Facility: CLINIC | Age: 50
End: 2020-08-04
Payer: COMMERCIAL

## 2020-08-04 VITALS
BODY MASS INDEX: 26.49 KG/M2 | SYSTOLIC BLOOD PRESSURE: 124 MMHG | HEIGHT: 64 IN | DIASTOLIC BLOOD PRESSURE: 80 MMHG | HEART RATE: 77 BPM | RESPIRATION RATE: 16 BRPM | WEIGHT: 155.19 LBS

## 2020-08-04 DIAGNOSIS — H53.9 ABNORMAL VISION: ICD-10-CM

## 2020-08-04 DIAGNOSIS — E11.65 TYPE 2 DIABETES MELLITUS WITH HYPERGLYCEMIA, WITHOUT LONG-TERM CURRENT USE OF INSULIN: ICD-10-CM

## 2020-08-04 DIAGNOSIS — E11.65 UNCONTROLLED TYPE 2 DIABETES MELLITUS WITH HYPERGLYCEMIA: Primary | ICD-10-CM

## 2020-08-04 DIAGNOSIS — R73.09 ELEVATED HEMOGLOBIN A1C: ICD-10-CM

## 2020-08-04 DIAGNOSIS — E78.5 DYSLIPIDEMIA: ICD-10-CM

## 2020-08-04 PROCEDURE — 1126F AMNT PAIN NOTED NONE PRSNT: CPT | Mod: S$GLB,,, | Performed by: INTERNAL MEDICINE

## 2020-08-04 PROCEDURE — 1126F PR PAIN SEVERITY QUANTIFIED, NO PAIN PRESENT: ICD-10-PCS | Mod: S$GLB,,, | Performed by: INTERNAL MEDICINE

## 2020-08-04 PROCEDURE — 99204 PR OFFICE/OUTPT VISIT, NEW, LEVL IV, 45-59 MIN: ICD-10-PCS | Mod: S$GLB,,, | Performed by: INTERNAL MEDICINE

## 2020-08-04 PROCEDURE — 3052F HG A1C>EQUAL 8.0%<EQUAL 9.0%: CPT | Mod: CPTII,S$GLB,, | Performed by: INTERNAL MEDICINE

## 2020-08-04 PROCEDURE — 3008F PR BODY MASS INDEX (BMI) DOCUMENTED: ICD-10-PCS | Mod: CPTII,S$GLB,, | Performed by: INTERNAL MEDICINE

## 2020-08-04 PROCEDURE — 3008F BODY MASS INDEX DOCD: CPT | Mod: CPTII,S$GLB,, | Performed by: INTERNAL MEDICINE

## 2020-08-04 PROCEDURE — 99999 PR PBB SHADOW E&M-EST. PATIENT-LVL III: CPT | Mod: PBBFAC,,, | Performed by: INTERNAL MEDICINE

## 2020-08-04 PROCEDURE — 99999 PR PBB SHADOW E&M-EST. PATIENT-LVL III: ICD-10-PCS | Mod: PBBFAC,,, | Performed by: INTERNAL MEDICINE

## 2020-08-04 PROCEDURE — 3052F PR MOST RECENT HEMOGLOBIN A1C LEVEL 8.0 - < 9.0%: ICD-10-PCS | Mod: CPTII,S$GLB,, | Performed by: INTERNAL MEDICINE

## 2020-08-04 PROCEDURE — 99204 OFFICE O/P NEW MOD 45 MIN: CPT | Mod: S$GLB,,, | Performed by: INTERNAL MEDICINE

## 2020-08-04 RX ORDER — INSULIN PUMP SYRINGE, 3 ML
EACH MISCELLANEOUS
Qty: 1 EACH | Refills: 0 | Status: SHIPPED | OUTPATIENT
Start: 2020-08-04 | End: 2022-02-03

## 2020-08-05 ENCOUNTER — LAB VISIT (OUTPATIENT)
Dept: LAB | Facility: HOSPITAL | Age: 50
End: 2020-08-05
Attending: INTERNAL MEDICINE
Payer: COMMERCIAL

## 2020-08-05 DIAGNOSIS — E11.65 UNCONTROLLED TYPE 2 DIABETES MELLITUS WITH HYPERGLYCEMIA: ICD-10-CM

## 2020-08-05 DIAGNOSIS — H53.9 ABNORMAL VISION: ICD-10-CM

## 2020-08-05 LAB
ALBUMIN SERPL BCP-MCNC: 4.2 G/DL (ref 3.5–5.2)
ALP SERPL-CCNC: 100 U/L (ref 55–135)
ALT SERPL W/O P-5'-P-CCNC: 26 U/L (ref 10–44)
ANION GAP SERPL CALC-SCNC: 8 MMOL/L (ref 8–16)
AST SERPL-CCNC: 22 U/L (ref 10–40)
BILIRUB SERPL-MCNC: 0.6 MG/DL (ref 0.1–1)
BUN SERPL-MCNC: 20 MG/DL (ref 6–20)
CALCIUM SERPL-MCNC: 9.8 MG/DL (ref 8.7–10.5)
CHLORIDE SERPL-SCNC: 105 MMOL/L (ref 95–110)
CHOLEST SERPL-MCNC: 167 MG/DL (ref 120–199)
CHOLEST/HDLC SERPL: 3 {RATIO} (ref 2–5)
CO2 SERPL-SCNC: 29 MMOL/L (ref 23–29)
CREAT SERPL-MCNC: 0.8 MG/DL (ref 0.5–1.4)
EST. GFR  (AFRICAN AMERICAN): >60 ML/MIN/1.73 M^2
EST. GFR  (NON AFRICAN AMERICAN): >60 ML/MIN/1.73 M^2
ESTIMATED AVG GLUCOSE: 160 MG/DL (ref 68–131)
GLUCOSE SERPL-MCNC: 93 MG/DL (ref 70–110)
HBA1C MFR BLD HPLC: 7.2 % (ref 4–5.6)
HDLC SERPL-MCNC: 56 MG/DL (ref 40–75)
HDLC SERPL: 33.5 % (ref 20–50)
LDLC SERPL CALC-MCNC: 92.8 MG/DL (ref 63–159)
NONHDLC SERPL-MCNC: 111 MG/DL
POTASSIUM SERPL-SCNC: 4.2 MMOL/L (ref 3.5–5.1)
PROT SERPL-MCNC: 7.5 G/DL (ref 6–8.4)
SODIUM SERPL-SCNC: 142 MMOL/L (ref 136–145)
TRIGL SERPL-MCNC: 91 MG/DL (ref 30–150)

## 2020-08-05 PROCEDURE — 80061 LIPID PANEL: CPT

## 2020-08-05 PROCEDURE — 36415 COLL VENOUS BLD VENIPUNCTURE: CPT

## 2020-08-05 PROCEDURE — 83036 HEMOGLOBIN GLYCOSYLATED A1C: CPT

## 2020-08-05 PROCEDURE — 80053 COMPREHEN METABOLIC PANEL: CPT

## 2020-08-05 PROCEDURE — 84146 ASSAY OF PROLACTIN: CPT

## 2020-08-06 LAB — PROLACTIN SERPL IA-MCNC: 4.6 NG/ML (ref 5.2–26.5)

## 2020-09-22 ENCOUNTER — OFFICE VISIT (OUTPATIENT)
Dept: PODIATRY | Facility: CLINIC | Age: 50
End: 2020-09-22
Payer: COMMERCIAL

## 2020-09-22 ENCOUNTER — HOSPITAL ENCOUNTER (OUTPATIENT)
Dept: RADIOLOGY | Facility: HOSPITAL | Age: 50
Discharge: HOME | End: 2020-09-22
Attending: PODIATRIST
Payer: COMMERCIAL

## 2020-09-22 VITALS
HEIGHT: 64 IN | HEART RATE: 69 BPM | DIASTOLIC BLOOD PRESSURE: 83 MMHG | SYSTOLIC BLOOD PRESSURE: 128 MMHG | BODY MASS INDEX: 26.61 KG/M2 | WEIGHT: 155.88 LBS

## 2020-09-22 DIAGNOSIS — M79.672 ACUTE PAIN OF LEFT FOOT: ICD-10-CM

## 2020-09-22 DIAGNOSIS — M84.375A STRESS FRACTURE OF METATARSAL BONE OF LEFT FOOT, INITIAL ENCOUNTER: Primary | ICD-10-CM

## 2020-09-22 PROCEDURE — 99999 PR PBB SHADOW E&M-EST. PATIENT-LVL IV: ICD-10-PCS | Mod: PBBFAC,,, | Performed by: PODIATRIST

## 2020-09-22 PROCEDURE — 99203 OFFICE O/P NEW LOW 30 MIN: CPT | Mod: S$GLB,,, | Performed by: PODIATRIST

## 2020-09-22 PROCEDURE — 73630 X-RAY EXAM OF FOOT: CPT | Mod: 26,LT,, | Performed by: RADIOLOGY

## 2020-09-22 PROCEDURE — 73630 X-RAY EXAM OF FOOT: CPT | Mod: TC,LT

## 2020-09-22 PROCEDURE — 99203 PR OFFICE/OUTPT VISIT, NEW, LEVL III, 30-44 MIN: ICD-10-PCS | Mod: S$GLB,,, | Performed by: PODIATRIST

## 2020-09-22 PROCEDURE — 3008F PR BODY MASS INDEX (BMI) DOCUMENTED: ICD-10-PCS | Mod: CPTII,S$GLB,, | Performed by: PODIATRIST

## 2020-09-22 PROCEDURE — 99999 PR PBB SHADOW E&M-EST. PATIENT-LVL IV: CPT | Mod: PBBFAC,,, | Performed by: PODIATRIST

## 2020-09-22 PROCEDURE — 3008F BODY MASS INDEX DOCD: CPT | Mod: CPTII,S$GLB,, | Performed by: PODIATRIST

## 2020-09-22 PROCEDURE — 73630 XR FOOT COMPLETE 3 VIEW LEFT: ICD-10-PCS | Mod: 26,LT,, | Performed by: RADIOLOGY

## 2020-09-23 NOTE — PROGRESS NOTES
Subjective:     Patient ID: Yandy Cabral is a 49 y.o. female.    Chief Complaint: Foot Pain (c/o left lateral and mid-foot pain. rates pain 10/10. wears sandals. diabetic Pt. last seen on 06/18/20 with PCP Dr. Ramos)    Yandy is a 49 y.o. female who presents to the podiatry clinic  with complaint of  left foot pain. Onset of the symptoms was a week ago. Precipitating event: none known. Current symptoms include: inability to bear weight. Aggravating factors: any weight bearing. Symptoms have gradually worsened. Patient has had no prior foot problems. Evaluation to date: none. Treatment to date: rest. Patients rates pain 10/10 on pain scale.        Patient Active Problem List   Diagnosis    Type 2 diabetes mellitus with hyperglycemia, without long-term current use of insulin    Hyperlipemia    Seasonal allergies    Dysuria    Abnormal CT scan    Cholecystitis, acute    Family history of cardiovascular disease    New-onset angina    Angina pectoris, unstable    Coronary artery disease involving native coronary artery of native heart without angina pectoris    Anxiety with depression       Medication List with Changes/Refills   Current Medications    ATORVASTATIN (LIPITOR) 20 MG TABLET        BLOOD SUGAR DIAGNOSTIC STRP    Use ac and hs    BLOOD SUGAR DIAGNOSTIC STRP    qd    BLOOD-GLUCOSE METER KIT    Use as instructed    BUPROPION (WELLBUTRIN XL) 150 MG TB24 TABLET    Take 1 tablet (150 mg total) by mouth once daily.    BUSPIRONE (BUSPAR) 15 MG TABLET    Take 1 tablet (15 mg total) by mouth 2 (two) times daily.    CLOTRIMAZOLE-BETAMETHASONE 1-0.05% (LOTRISONE) CREAM    ERNESTO EXT TO THE AFFECTED AND SURROUNDING AREAS BID IN THE MORNING AND IN THE DONNA FOR 10 DAYS    CYANOCOBALAMIN 1,000 MCG/ML INJECTION    INJECT 1 ML INTO THE MUSCLE EVERY 30 DAYS    INSULIN (LANTUS SOLOSTAR U-100 INSULIN) GLARGINE 100 UNITS/ML (3ML) SUBQ PEN    Inject 15 Units into the skin.    INVOKANA 300 MG TAB TABLET    Take 300  "mg by mouth once daily.     MELATONIN 5 MG TAB    Take 5 mg by mouth nightly.     METFORMIN (GLUCOPHAGE) 500 MG TABLET    Take 1,000 mg by mouth once daily.     METOPROLOL SUCCINATE (TOPROL-XL) 25 MG 24 HR TABLET    Take 1 tablet (25 mg total) by mouth once daily.    MULTIVITAMIN (ONE DAILY MULTIVITAMIN) PER TABLET    Take 1 tablet by mouth.    NEEDLE, DISP, 24 GAUGE 24 GAUGE X 1" NDLE    1 Dose by Misc.(Non-Drug; Combo Route) route every 30 days.    NITROGLYCERIN (NITROSTAT) 0.4 MG SL TABLET    Place 1 tablet (0.4 mg total) under the tongue every 5 (five) minutes as needed for Chest pain. Call 911 if pain persists after 2 doses.    ONE TOUCH VERIO STRP        PEN NEEDLE, DIABETIC (BD ULTRA-FINE SHORT PEN NEEDLE) 31 GAUGE X 5/16" NDLE    Use up to three daily    RANOLAZINE (RANEXA) 500 MG TB12    Take 1 tablet (500 mg total) by mouth 2 (two) times daily.    SYRINGE WITH NEEDLE 3 ML 21 GAUGE X 1" SYRG    USE ONE Q 4 WEEKS    TOBRAMYCIN-DEXAMETHASONE 0.3-0.1% (TOBRADEX) 0.3-0.1 % DRPS    PLACE 1 DROP INTO THE OS QID FOR 1 WEEK       Review of patient's allergies indicates:  No Known Allergies    Past Surgical History:   Procedure Laterality Date    ANGIOGRAM, CORONARY, WITH LEFT HEART CATHETERIZATION  2/19/2020    Procedure: Angiogram, Coronary, with Left Heart Cath;  Surgeon: Martin Villatoro MD;  Location: [facility] CATH LAB;  Service: Cardiology;;    CYST REMOVAL      ganglionic right wrist    LAPAROSCOPIC CHOLECYSTECTOMY N/A 7/20/2018    Procedure: CHOLECYSTECTOMY-LAPAROSCOPIC;  Surgeon: Louis O. Jeansonne IV, MD;  Location: [facility] OR;  Service: General;  Laterality: N/A;    LAPAROSCOPIC LYSIS OF ADHESIONS N/A 7/20/2018    Procedure: LYSIS, ADHESIONS, LAPAROSCOPIC;  Surgeon: Louis O. Jeansonne IV, MD;  Location: [facility] OR;  Service: General;  Laterality: N/A;    LEFT HEART CATHETERIZATION Left 2/19/2020    Procedure: CATHETERIZATION, HEART, LEFT;  Surgeon: Martin Villatoro MD;  Location: [facility] CATH LAB;  Service: " "Cardiology;  Laterality: Left;  930 start time per Dr. Villatoro    SLEEVE GASTROPLASTY  1/01/12    TUBAL LIGATION         Family History   Problem Relation Age of Onset    Heart disease Father     Heart attack Father     Diabetes type II Father     Testicular cancer Brother     Dementia Mother     No Known Problems Sister     No Known Problems Sister     Colon cancer Neg Hx        Social History     Socioeconomic History    Marital status:      Spouse name: Not on file    Number of children: Not on file    Years of education: Not on file    Highest education level: Not on file   Occupational History    Not on file   Social Needs    Financial resource strain: Not on file    Food insecurity     Worry: Not on file     Inability: Not on file    Transportation needs     Medical: Not on file     Non-medical: Not on file   Tobacco Use    Smoking status: Never Smoker    Smokeless tobacco: Never Used   Substance and Sexual Activity    Alcohol use: Yes     Alcohol/week: 4.0 standard drinks     Types: 4 Glasses of wine per week    Drug use: No    Sexual activity: Not on file   Lifestyle    Physical activity     Days per week: Not on file     Minutes per session: Not on file    Stress: Not on file   Relationships    Social connections     Talks on phone: Not on file     Gets together: Not on file     Attends [employer] service: Not on file     Active member of club or organization: Not on file     Attends meetings of clubs or organizations: Not on file     Relationship status: Not on file   Other Topics Concern    Not on file   Social History Narrative    No pets or smokers in household.       Vitals:    09/22/20 1309   BP: 128/83   Pulse: 69   Weight: 70.7 kg (155 lb 13.8 oz)   Height: 5' 4" (1.626 m)   PainSc: 10-Worst pain ever   PainLoc: Foot       Hemoglobin A1C   Date Value Ref Range Status   08/05/2020 7.2 (H) 4.0 - 5.6 % Final     Comment:     ADA Screening Guidelines:  5.7-6.4%  Consistent " with prediabetes  >or=6.5%  Consistent with diabetes  High levels of fetal hemoglobin interfere with the HbA1C  assay. Heterozygous hemoglobin variants (HbS, HgC, etc)do  not significantly interfere with this assay.   However, presence of multiple variants may affect accuracy.     02/10/2020 8.4 (H) 4.0 - 5.6 % Final     Comment:     ADA Screening Guidelines:  5.7-6.4%  Consistent with prediabetes  >or=6.5%  Consistent with diabetes  High levels of fetal hemoglobin interfere with the HbA1C  assay. Heterozygous hemoglobin variants (HbS, HgC, etc)do  not significantly interfere with this assay.   However, presence of multiple variants may affect accuracy.     07/19/2018 7.8 (H) 4.0 - 5.6 % Final     Comment:     ADA Screening Guidelines:  5.7-6.4%  Consistent with prediabetes  >or=6.5%  Consistent with diabetes  High levels of fetal hemoglobin interfere with the HbA1C  assay. Heterozygous hemoglobin variants (HbS, HgC, etc)do  not significantly interfere with this assay.   However, presence of multiple variants may affect accuracy.         Review of Systems   Constitutional: Negative for chills and fever.   Respiratory: Negative for shortness of breath.    Cardiovascular: Negative for chest pain, palpitations, orthopnea, claudication and leg swelling (left dorsal foot).   Gastrointestinal: Negative for diarrhea, nausea and vomiting.   Musculoskeletal: Negative for joint pain.   Skin: Negative for rash.   Neurological: Negative for dizziness, tingling, sensory change, focal weakness and weakness.   Psychiatric/Behavioral: Negative.              Objective:       PHYSICAL EXAM: Apperance: Alert and orient in no distress,well developed, and with good attention to grooming and body habits  Patient presents ambulating in sandals.   Lower Extremity Physical Exam:  VASCULAR: Dorsalis pedis pulses 2/4 bilateral and Posterior Tibial pulses 2/4 bilateral. Capillary fill time <4 seconds bilateral. Mild edema observed left.  Varicosities absent bilateral. Skin temperature of the lower extremities is warm to warm, proximal to distal. Hair growth WNL bilateral.  DERMATOLOGICAL: No skin rashes, subcutaneous nodules, lesions, or ulcers observed bilateral.  NEUROLOGICAL: Light touch, sharp-dull, proprioception all present and equal bilaterally.    MUSCULOSKELETAL: Muscle strength is 5/5 for foot inverters, everters, plantarflexors, and dorsiflexors. Muscle tone is normal. (+) pain on palpation of left dorsal lateral forefoot over the 4th and 5th metatarsal.          Assessment:       Encounter Diagnoses   Name Primary?    Stress fracture of metatarsal bone of left foot, initial encounter Yes    Acute pain of left foot          Plan:   Stress fracture of metatarsal bone of left foot, initial encounter    Acute pain of left foot  -     X-Ray Foot Complete Left; Future; Expected date: 09/22/2020      I counseled the patient on her conditions, regarding findings of my examination, my impressions, and usual treatment plan.   Ordered left foot x-rays to be completed today.   Patient instructed on adequate icing techniques. Patient should ice the affected area at least once per day x 10 minutes for 10 days . I advised the  patient that extra icing would also be beneficial to ensure adequate anti inflammatory effect.   Patient was fitted with surgical shoe and instructed on proper usage. This should be worn daily for a minimum of 2 weeks at all times when ambulating. Patient instructed not to drive with walking boot if on right foot.   Patient to return in 1 month or sooner if needed.             Perla Hernandez DPM  [facility]

## 2020-10-06 ENCOUNTER — PATIENT MESSAGE (OUTPATIENT)
Dept: ADMINISTRATIVE | Facility: HOSPITAL | Age: 50
End: 2020-10-06

## 2020-10-21 ENCOUNTER — PATIENT OUTREACH (OUTPATIENT)
Dept: ADMINISTRATIVE | Facility: OTHER | Age: 50
End: 2020-10-21

## 2020-10-29 ENCOUNTER — PATIENT MESSAGE (OUTPATIENT)
Dept: OBSTETRICS AND GYNECOLOGY | Facility: CLINIC | Age: 50
End: 2020-10-29

## 2020-10-29 ENCOUNTER — TELEPHONE (OUTPATIENT)
Dept: OBSTETRICS AND GYNECOLOGY | Facility: CLINIC | Age: 50
End: 2020-10-29

## 2020-10-29 RX ORDER — CLOTRIMAZOLE AND BETAMETHASONE DIPROPIONATE 10; .64 MG/G; MG/G
CREAM TOPICAL
Qty: 15 G | Refills: 2 | Status: SHIPPED | OUTPATIENT
Start: 2020-10-29 | End: 2020-12-14 | Stop reason: SDUPTHER

## 2020-11-04 ENCOUNTER — OFFICE VISIT (OUTPATIENT)
Dept: ENDOCRINOLOGY | Facility: CLINIC | Age: 50
End: 2020-11-04
Payer: COMMERCIAL

## 2020-11-04 ENCOUNTER — TELEPHONE (OUTPATIENT)
Dept: INTERNAL MEDICINE | Facility: CLINIC | Age: 50
End: 2020-11-04

## 2020-11-04 VITALS
DIASTOLIC BLOOD PRESSURE: 72 MMHG | SYSTOLIC BLOOD PRESSURE: 100 MMHG | WEIGHT: 154.56 LBS | HEIGHT: 65 IN | HEART RATE: 67 BPM | RESPIRATION RATE: 18 BRPM | BODY MASS INDEX: 25.75 KG/M2

## 2020-11-04 DIAGNOSIS — E11.65 TYPE 2 DIABETES MELLITUS WITH HYPERGLYCEMIA, WITHOUT LONG-TERM CURRENT USE OF INSULIN: ICD-10-CM

## 2020-11-04 DIAGNOSIS — R73.09 ELEVATED HEMOGLOBIN A1C: ICD-10-CM

## 2020-11-04 DIAGNOSIS — E78.5 DYSLIPIDEMIA: ICD-10-CM

## 2020-11-04 DIAGNOSIS — E11.65 UNCONTROLLED TYPE 2 DIABETES MELLITUS WITH HYPERGLYCEMIA: Primary | ICD-10-CM

## 2020-11-04 LAB — GLUCOSE SERPL-MCNC: 221 MG/DL (ref 70–110)

## 2020-11-04 PROCEDURE — 99999 PR PBB SHADOW E&M-EST. PATIENT-LVL IV: ICD-10-PCS | Mod: PBBFAC,,, | Performed by: INTERNAL MEDICINE

## 2020-11-04 PROCEDURE — 3008F BODY MASS INDEX DOCD: CPT | Mod: CPTII,S$GLB,, | Performed by: INTERNAL MEDICINE

## 2020-11-04 PROCEDURE — 99214 OFFICE O/P EST MOD 30 MIN: CPT | Mod: S$GLB,,, | Performed by: INTERNAL MEDICINE

## 2020-11-04 PROCEDURE — 3051F HG A1C>EQUAL 7.0%<8.0%: CPT | Mod: CPTII,S$GLB,, | Performed by: INTERNAL MEDICINE

## 2020-11-04 PROCEDURE — 3051F PR MOST RECENT HEMOGLOBIN A1C LEVEL 7.0 - < 8.0%: ICD-10-PCS | Mod: CPTII,S$GLB,, | Performed by: INTERNAL MEDICINE

## 2020-11-04 PROCEDURE — 99214 PR OFFICE/OUTPT VISIT, EST, LEVL IV, 30-39 MIN: ICD-10-PCS | Mod: S$GLB,,, | Performed by: INTERNAL MEDICINE

## 2020-11-04 PROCEDURE — 99999 PR PBB SHADOW E&M-EST. PATIENT-LVL IV: CPT | Mod: PBBFAC,,, | Performed by: INTERNAL MEDICINE

## 2020-11-04 PROCEDURE — 3008F PR BODY MASS INDEX (BMI) DOCUMENTED: ICD-10-PCS | Mod: CPTII,S$GLB,, | Performed by: INTERNAL MEDICINE

## 2020-11-04 PROCEDURE — 82962 GLUCOSE BLOOD TEST: CPT | Mod: S$GLB,,, | Performed by: INTERNAL MEDICINE

## 2020-11-04 PROCEDURE — 82962 POCT GLUCOSE, HAND-HELD DEVICE: ICD-10-PCS | Mod: S$GLB,,, | Performed by: INTERNAL MEDICINE

## 2020-11-04 RX ORDER — FLASH GLUCOSE SCANNING READER
EACH MISCELLANEOUS
Qty: 1 EACH | Refills: 0 | Status: SHIPPED | OUTPATIENT
Start: 2020-11-04 | End: 2021-02-02 | Stop reason: SDUPTHER

## 2020-11-04 RX ORDER — CANAGLIFLOZIN 300 MG/1
300 TABLET, FILM COATED ORAL DAILY
Qty: 30 TABLET | Refills: 3 | Status: SHIPPED | OUTPATIENT
Start: 2020-11-04 | End: 2021-05-20 | Stop reason: SDUPTHER

## 2020-11-04 RX ORDER — INSULIN GLARGINE 100 [IU]/ML
INJECTION, SOLUTION SUBCUTANEOUS
Qty: 15 ML | Refills: 3 | Status: SHIPPED | OUTPATIENT
Start: 2020-11-04 | End: 2021-06-03

## 2020-11-04 RX ORDER — GLIMEPIRIDE 2 MG/1
TABLET ORAL
OUTPATIENT
Start: 2020-11-04

## 2020-11-04 RX ORDER — METFORMIN HYDROCHLORIDE 500 MG/1
1000 TABLET ORAL DAILY
Qty: 60 TABLET | Refills: 2 | Status: SHIPPED | OUTPATIENT
Start: 2020-11-04 | End: 2020-11-09 | Stop reason: SDUPTHER

## 2020-11-04 RX ORDER — FLASH GLUCOSE SENSOR
KIT MISCELLANEOUS
Qty: 2 KIT | Refills: 6 | Status: SHIPPED | OUTPATIENT
Start: 2020-11-04 | End: 2021-05-20 | Stop reason: SDUPTHER

## 2020-11-04 NOTE — PROGRESS NOTES
PCP: LEONID Ramos Jr, MD     Patient ID: Yandy Cabral is a 49 y.o. female.    Chief Complaint:   Follow-up (NO  LOG NO METER WOULD LIKE FREESTYLE CARMEN) and Diabetes (coffee with sugar this am)      HPI    Lab Results   Component Value Date    HGBA1C 7.2 (H) 08/05/2020    HGBA1C 8.4 (H) 02/10/2020    HGBA1C 7.8 (H) 07/19/2018   Pt was managed by Dr. Mike, an endocrinologist since 2003 per patient.  She visited Dr. Ramos recently.  Today is the patient's 2nd visit to me.  ------------  CBG readings have been:  Has not been taking  Any low blood sugar/ hypoglycemia occurred since last visit? None noted  Any changes on the diabetic medication since last visit?no changes    Is there a sensor?no   Downloaded?na    Any significant changes in health occurred since last visit? none  -------------  DM diagnosed: 2003  Metformin 500 mg bid  Invokana 300 mg qd  ( was started again in ? Feb)  Lantus 15 units q hs  (since Feb) was not taking just prior to last visit  Frequency of CBGS: about once a week  POCT glucose 221 after having just coffee with little sugar  No Hypoglycemia symptoms  Patient is requesting the glucose sensor  Eye exam done recently  Kidney problem: no  Pain or numbness in feet: no  Taking blood pressure medication: yes  Taking cholesterol medication: yes  Following diet for diabetes: some  Exercise: some  History of diabetes education : yes  Minor blockages in 2020  No complaints of  dysphagia, n/v, cp, sob, abd pain, rash, or edema.            at [employer]  Past Medical History:   Diagnosis Date    CAD (coronary artery disease)     Chronic constipation     DM II (diabetes mellitus, type II), controlled        Past Surgical History:   Procedure Laterality Date    ANGIOGRAM, CORONARY, WITH LEFT HEART CATHETERIZATION  2/19/2020    Procedure: Angiogram, Coronary, with Left Heart Cath;  Surgeon: Martin Villatoro MD;  Location: [facility] CATH LAB;  Service: Cardiology;;     CYST REMOVAL      ganglionic right wrist    LAPAROSCOPIC CHOLECYSTECTOMY N/A 7/20/2018    Procedure: CHOLECYSTECTOMY-LAPAROSCOPIC;  Surgeon: Louis O. Jeansonne IV, MD;  Location: [facility] OR;  Service: General;  Laterality: N/A;    LAPAROSCOPIC LYSIS OF ADHESIONS N/A 7/20/2018    Procedure: LYSIS, ADHESIONS, LAPAROSCOPIC;  Surgeon: Louis O. Jeansonne IV, MD;  Location: [facility] OR;  Service: General;  Laterality: N/A;    LEFT HEART CATHETERIZATION Left 2/19/2020    Procedure: CATHETERIZATION, HEART, LEFT;  Surgeon: Martin Villatoro MD;  Location: [facility] CATH LAB;  Service: Cardiology;  Laterality: Left;  930 start time per Dr. Villatoro    SLEEVE GASTROPLASTY  1/01/12    TUBAL LIGATION         Social History     Socioeconomic History    Marital status:      Spouse name: Not on file    Number of children: Not on file    Years of education: Not on file    Highest education level: Not on file   Occupational History    Not on file   Social Needs    Financial resource strain: Not on file    Food insecurity     Worry: Not on file     Inability: Not on file    Transportation needs     Medical: Not on file     Non-medical: Not on file   Tobacco Use    Smoking status: Never Smoker    Smokeless tobacco: Never Used   Substance and Sexual Activity    Alcohol use: Yes     Alcohol/week: 4.0 standard drinks     Types: 4 Glasses of wine per week    Drug use: No    Sexual activity: Not on file   Lifestyle    Physical activity     Days per week: Not on file     Minutes per session: Not on file    Stress: Not on file   Relationships    Social connections     Talks on phone: Not on file     Gets together: Not on file     Attends Buddhism service: Not on file     Active member of club or organization: Not on file     Attends meetings of clubs or organizations: Not on file     Relationship status: Not on file   Other Topics Concern    Not on file   Social History Narrative    No pets or smokers in household.       ROS:    Diabetes  No hypoglycemia  Little blockage treated c med  ROS otherwise neg except for what is mentioned in the PMH, PSH and HPI    PE:  Vitals:    11/04/20 0824   BP: 100/72   Pulse: 67   Resp: 18     Alert and oriented  No acute distress  No redness in face  Mild Abd obesity  Body mass index is 25.72 kg/m².  No Proptosis or conjunctivitis  No rash on tongue, + teeth  No goitre by inspection  Thyroid gland is not palpable  No cervical lymphadenopathy  Heart reg, no gallop  Lungs cta, no wheezing  No edema in lower legs  Speech normal  Behavior normal  No tremor      Lab Review:     Lab Results   Component Value Date    CHOL 167 08/05/2020    TRIG 91 08/05/2020    HDL 56 08/05/2020    CHOLHDL 33.5 08/05/2020    TOTALCHOLEST 3.0 08/05/2020    NONHDLCHOL 111 08/05/2020     BMP  Lab Results   Component Value Date     08/05/2020    K 4.2 08/05/2020     08/05/2020    CO2 29 08/05/2020    BUN 20 08/05/2020    CREATININE 0.8 08/05/2020    CALCIUM 9.8 08/05/2020    ANIONGAP 8 08/05/2020    ESTGFRAFRICA >60.0 08/05/2020    EGFRNONAA >60.0 08/05/2020     Lab Results   Component Value Date    CREATRANDUR 85.0 02/10/2020    MICALBCREAT 12.9 02/10/2020       A/P  Type 2 diabetes mellitus with hyperglycemia, with long-term current use of insulin  Uncontrolled type 2 diabetes mellitus with hyperglycemia  Elevated hemoglobin A1c  Dyslipidemia    -     flash glucose scanning reader (FREESTYLE CARMEN 14 DAY READER) Misc; To use 2-10 times a day  Dispense: 1 each; Refill: 0  -     flash glucose sensor (FREESTYLE CARMEN 14 DAY SENSOR) Kit; For use 2-10 times a day  Dispense: 2 kit; Refill: 6     Increasing Lantus dose to 25 units and later on to 32 units if needed discussed.  Cutting down the dose from 25 units to 22 units if blood sugar started going down to less than 120 or so discussed.  On Invokana  ON METFORMIN 500 MG TWICE A DAY  If no significant improvement in blood sugar occurs then will consider changing Invokana  to another med.    Patient will be discharged from my endocrine service.  She agrees to make follow-up w Dr. Ramos in 3 months.    Patient understands and agrees on the plan.

## 2020-11-04 NOTE — TELEPHONE ENCOUNTER
----- Message from Marylu Cota LPN sent at 11/4/2020 10:10 AM CST -----  Yes January 30 I think is last day   ----- Message -----  From: Aleisha Tripathi MA  Sent: 11/4/2020   9:48 AM CST  To: CHRISTIANO Tao Dr. wanted to confirm when is Dr. Hale leaving? Dr. Ramos is the head of DM and was not aware Dr. Hale was leaving.  ----- Message -----  From: Marylu Cota LPN  Sent: 11/4/2020   9:24 AM CST  To: Richard Harrington  Staff    Good morning  This patient saw Dr. Hale for DM type 2 as Dr. Hale is leaving she will need a 3 month follow up with Dr. Ramos  Thank you

## 2020-11-04 NOTE — PATIENT INSTRUCTIONS
Lantus  25 units each [facility]  Check your blood sugar daily for now, at least for one week    If morning blood sugar gets to less than 115  Then cut down Lantus dose to 22 units     If your blood sugar in one week remains above 150  Then increase your Lantus dose to 32

## 2020-11-04 NOTE — TELEPHONE ENCOUNTER
S/w pt and sched Fasting lab appt 01/27/21 and f/u with Dr. Ramos 02/03/21. Pt confirmed appts and to CB PRN.

## 2020-11-11 RX ORDER — METFORMIN HYDROCHLORIDE 500 MG/1
TABLET ORAL
Qty: 60 TABLET | Refills: 3 | Status: SHIPPED | OUTPATIENT
Start: 2020-11-11 | End: 2021-01-15

## 2020-11-11 NOTE — TELEPHONE ENCOUNTER
Confirm with the pt that she has been taking metformin 500 mg  One tablet twice a day  (2 tablets twice a day).  If she has been taking 2 tablets twice a day, then I need to send another prescription to the pharmacy.

## 2020-11-19 PROBLEM — I20.9 NEW-ONSET ANGINA: Status: RESOLVED | Noted: 2020-02-10 | Resolved: 2020-11-19

## 2020-11-19 PROBLEM — I20.9 AP (ANGINA PECTORIS): Status: ACTIVE | Noted: 2020-11-19

## 2020-11-19 PROBLEM — I20.0 ANGINA PECTORIS, UNSTABLE: Status: RESOLVED | Noted: 2020-02-10 | Resolved: 2020-11-19

## 2020-11-19 PROBLEM — I25.10 CAD IN NATIVE ARTERY: Status: ACTIVE | Noted: 2020-11-19

## 2020-12-14 ENCOUNTER — OFFICE VISIT (OUTPATIENT)
Dept: OBSTETRICS AND GYNECOLOGY | Facility: CLINIC | Age: 50
End: 2020-12-14
Payer: COMMERCIAL

## 2020-12-14 ENCOUNTER — PATIENT OUTREACH (OUTPATIENT)
Dept: ADMINISTRATIVE | Facility: OTHER | Age: 50
End: 2020-12-14

## 2020-12-14 VITALS
BODY MASS INDEX: 26.12 KG/M2 | WEIGHT: 156.75 LBS | DIASTOLIC BLOOD PRESSURE: 66 MMHG | SYSTOLIC BLOOD PRESSURE: 94 MMHG | HEIGHT: 65 IN

## 2020-12-14 DIAGNOSIS — Z01.419 WELL WOMAN EXAM WITH ROUTINE GYNECOLOGICAL EXAM: ICD-10-CM

## 2020-12-14 DIAGNOSIS — Z12.31 ENCOUNTER FOR SCREENING MAMMOGRAM FOR MALIGNANT NEOPLASM OF BREAST: Primary | ICD-10-CM

## 2020-12-14 PROCEDURE — 1126F PR PAIN SEVERITY QUANTIFIED, NO PAIN PRESENT: ICD-10-PCS | Mod: S$GLB,,, | Performed by: OBSTETRICS & GYNECOLOGY

## 2020-12-14 PROCEDURE — 3008F BODY MASS INDEX DOCD: CPT | Mod: CPTII,S$GLB,, | Performed by: OBSTETRICS & GYNECOLOGY

## 2020-12-14 PROCEDURE — 99386 PR PREVENTIVE VISIT,NEW,40-64: ICD-10-PCS | Mod: S$GLB,,, | Performed by: OBSTETRICS & GYNECOLOGY

## 2020-12-14 PROCEDURE — 1126F AMNT PAIN NOTED NONE PRSNT: CPT | Mod: S$GLB,,, | Performed by: OBSTETRICS & GYNECOLOGY

## 2020-12-14 PROCEDURE — 87624 HPV HI-RISK TYP POOLED RSLT: CPT

## 2020-12-14 PROCEDURE — 3008F PR BODY MASS INDEX (BMI) DOCUMENTED: ICD-10-PCS | Mod: CPTII,S$GLB,, | Performed by: OBSTETRICS & GYNECOLOGY

## 2020-12-14 PROCEDURE — 99386 PREV VISIT NEW AGE 40-64: CPT | Mod: S$GLB,,, | Performed by: OBSTETRICS & GYNECOLOGY

## 2020-12-14 PROCEDURE — 88175 CYTOPATH C/V AUTO FLUID REDO: CPT

## 2020-12-14 PROCEDURE — 99999 PR PBB SHADOW E&M-EST. PATIENT-LVL IV: CPT | Mod: PBBFAC,,, | Performed by: OBSTETRICS & GYNECOLOGY

## 2020-12-14 PROCEDURE — 99999 PR PBB SHADOW E&M-EST. PATIENT-LVL IV: ICD-10-PCS | Mod: PBBFAC,,, | Performed by: OBSTETRICS & GYNECOLOGY

## 2020-12-14 RX ORDER — CLOTRIMAZOLE AND BETAMETHASONE DIPROPIONATE 10; .64 MG/G; MG/G
CREAM TOPICAL
Qty: 15 G | Refills: 2 | Status: SHIPPED | OUTPATIENT
Start: 2020-12-14 | End: 2021-02-12 | Stop reason: SDUPTHER

## 2020-12-14 NOTE — PROGRESS NOTES
Health Maintenance Due   Topic Date Due    HIV Screening  12/11/1984    TETANUS VACCINE  12/11/1987    Eye Exam  08/24/2017    Cervical Cancer Screening  06/01/2018    Mammogram  11/01/2018    Shingles Vaccine (1 of 2) 12/11/2019    Influenza Vaccine (1) 08/01/2020    Urine Microalbumin  02/10/2021     Updates were requested from care everywhere.  Chart was reviewed for overdue Proactive [facility] Encounters (JOHN) topics (CRS, Breast Cancer Screening, Eye exam)  Health Maintenance has been updated.  LINKS immunization registry triggered.  Immunizations were reconciled.

## 2020-12-15 NOTE — PROGRESS NOTES
CHIEF COMPLAINT:   Gynecologic Exam  Chief Complaint   Patient presents with    Annual Exam       HISTORY OF PRESENT ILLNESS  Yandy Cabral   presents for annual exam. The patient has no complaints today.     Postmenopausal     GYN screening history: last Pap: was normal. Never had any abnormal Pap smears in past.     Reports no bowel movement irregularities from baseline, bloating, or weight loss.   HRT:   None       Health Maintenance   Topic Date Due    TETANUS VACCINE  1987    Eye Exam  2017    Mammogram  2018    Urine Microalbumin  02/10/2021    Hemoglobin A1c  2021    Foot Exam  2021    Lipid Panel  2021    High Dose Statin  2021    Hepatitis C Screening  Completed    Pneumococcal Vaccine (Medium Risk)  Completed       HISTORY  Patient Active Problem List   Diagnosis    Type 2 diabetes mellitus with hyperglycemia, without long-term current use of insulin    Hyperlipemia    Seasonal allergies    Dysuria    Abnormal CT scan    Cholecystitis, acute    Family history of cardiovascular disease    Anxiety with depression    AP (angina pectoris)    CAD in native artery       Past Medical History:   Diagnosis Date    Abnormal Pap smear of cervix     CAD (coronary artery disease)     Chronic constipation     DM II (diabetes mellitus, type II), controlled     Hx of colposcopy with cervical biopsy        Past Surgical History:   Procedure Laterality Date    ANGIOGRAM, CORONARY, WITH LEFT HEART CATHETERIZATION  2020    Procedure: Angiogram, Coronary, with Left Heart Cath;  Surgeon: Martin Villatoro MD;  Location: [facility] CATH LAB;  Service: Cardiology;;    CYST REMOVAL      ganglionic right wrist    LAPAROSCOPIC CHOLECYSTECTOMY N/A 2018    Procedure: CHOLECYSTECTOMY-LAPAROSCOPIC;  Surgeon: Louis O. Jeansonne IV, MD;  Location: [facility] OR;  Service: General;  Laterality: N/A;    LAPAROSCOPIC LYSIS OF ADHESIONS N/A 2018     Procedure: LYSIS, ADHESIONS, LAPAROSCOPIC;  Surgeon: Louis O. Jeansonne IV, MD;  Location: [facility] OR;  Service: General;  Laterality: N/A;    LEFT HEART CATHETERIZATION Left 2/19/2020    Procedure: CATHETERIZATION, HEART, LEFT;  Surgeon: Martin Villatoro MD;  Location: [facility] CATH LAB;  Service: Cardiology;  Laterality: Left;  930 start time per Dr. Villatoro    SLEEVE GASTROPLASTY  1/01/12    TUBAL LIGATION         Family History   Problem Relation Age of Onset    Heart disease Father     Heart attack Father     Diabetes type II Father     Testicular cancer Brother     Dementia Mother     No Known Problems Sister     No Known Problems Sister     Breast cancer Maternal Cousin     Colon cancer Neg Hx        Social History     Socioeconomic History    Marital status:      Spouse name: Not on file    Number of children: Not on file    Years of education: Not on file    Highest education level: Not on file   Occupational History    Not on file   Social Needs    Financial resource strain: Not on file    Food insecurity     Worry: Not on file     Inability: Not on file    Transportation needs     Medical: Not on file     Non-medical: Not on file   Tobacco Use    Smoking status: Never Smoker    Smokeless tobacco: Never Used   Substance and Sexual Activity    Alcohol use: Yes     Alcohol/week: 4.0 standard drinks     Types: 4 Glasses of wine per week    Drug use: No    Sexual activity: Not on file   Lifestyle    Physical activity     Days per week: Not on file     Minutes per session: Not on file    Stress: Not on file   Relationships    Social connections     Talks on phone: Not on file     Gets together: Not on file     Attends Synagogue service: Not on file     Active member of club or organization: Not on file     Attends meetings of clubs or organizations: Not on file     Relationship status: Not on file   Other Topics Concern    Not on file   Social History Narrative    No pets or smokers in  "household.       Current Outpatient Medications   Medication Sig Dispense Refill    atorvastatin (LIPITOR) 20 MG tablet       blood sugar diagnostic Strp Use ac and hs      blood sugar diagnostic Strp qd 50 strip 5    blood-glucose meter kit Use as instructed 1 each 0    buPROPion (WELLBUTRIN XL) 150 MG TB24 tablet Take 1 tablet (150 mg total) by mouth once daily. 90 tablet 3    busPIRone (BUSPAR) 15 MG tablet Take 1 tablet (15 mg total) by mouth 2 (two) times daily. 180 tablet 3    clotrimazole-betamethasone 1-0.05% (LOTRISONE) cream Apply to affected area 2 times daily as needed for irritation 15 g 2    cyanocobalamin 1,000 mcg/mL injection INJECT 1 ML INTO THE MUSCLE EVERY 30 DAYS      flash glucose scanning reader (FREESTYLE CARMEN 14 DAY READER) Misc To use 2-10 times a day 1 each 0    flash glucose sensor (FREESTYLE CARMEN 14 DAY SENSOR) Kit For use 2-10 times a day 2 kit 6    glimepiride (AMARYL) 2 MG tablet       insulin (LANTUS SOLOSTAR U-100 INSULIN) glargine 100 units/mL (3mL) SubQ pen 25-32 units daily 15 mL 3    INVOKANA 300 mg Tab tablet Take 1 tablet (300 mg total) by mouth once daily. 30 tablet 3    melatonin 5 mg Tab Take 5 mg by mouth nightly.       metFORMIN (GLUCOPHAGE) 500 MG tablet One tab bid 60 tablet 3    metoprolol succinate (TOPROL-XL) 25 MG 24 hr tablet Take 1 tablet (25 mg total) by mouth once daily. 90 tablet 3    multivitamin (ONE DAILY MULTIVITAMIN) per tablet Take 1 tablet by mouth.      needle, disp, 24 gauge 24 gauge x 1" Ndle 1 Dose by Misc.(Non-Drug; Combo Route) route every 30 days. 8 each 3    nitroGLYCERIN (NITROSTAT) 0.4 MG SL tablet Place 1 tablet (0.4 mg total) under the tongue every 5 (five) minutes as needed for Chest pain. Call 911 if pain persists after 2 doses. 25 tablet 0    ONE TOUCH VERIO Strp       pen needle, diabetic (BD ULTRA-FINE SHORT PEN NEEDLE) 31 gauge x 5/16" Ndle Use up to three daily      syringe with needle 3 mL 21 gauge x 1" Syrg " "USE ONE Q 4 WEEKS      clotrimazole-betamethasone 1-0.05% (LOTRISONE) cream ERNESTO EXT TO THE AFFECTED AND SURROUNDING AREAS BID IN THE MORNING AND IN THE DONNA FOR 10 DAYS      ranolazine (RANEXA) 500 MG Tb12 Take 1 tablet (500 mg total) by mouth 2 (two) times daily. (Patient not taking: Reported on 6/18/2020) 180 tablet 3    tobramycin-dexamethasone 0.3-0.1% (TOBRADEX) 0.3-0.1 % DrpS PLACE 1 DROP INTO THE OS QID FOR 1 WEEK       No current facility-administered medications for this visit.        Review of patient's allergies indicates:  No Known Allergies        PHYSICAL EXAM     Vitals:    12/14/20 1109   BP: 94/66   Weight: 71.1 kg (156 lb 12 oz)   Height: 5' 5" (1.651 m)   PainSc: 0-No pain        PAIN SCALE: 0/10 None    ROS:  GENERAL: No fever, chills, fatigability or weight loss.  ABDOMEN: Appetite fine. No weight loss. Denies diarrhea, abdominal pain, hematemesis or blood in stool.  No change in bowel movement pattern.  URINARY: No flank pain, dysuria or hematuria.  REPRODUCTIVE: No abnormal vaginal bleeding.  BREASTS: Breasts symmetric, nontender and no lumps detected.    PE:   APPEARANCE: Well nourished, well developed, in no acute distress.  NECK: Neck symmetric without masses or thyromegaly.    NODES: No inguinal lymph node enlargement.  ABDOMEN: Soft. No tenderness or masses.  No hernias.  BREASTS: Symmetrical, no skin changes or visible lesions. No palpable masses, nipple discharge or adenopathy bilaterally.    PELVIC:   VULVA: No lesions. Normal female genitalia.  URETHRAL MEATUS: Normal size and location, no lesions, no prolapse.  URETHRA: No masses, tenderness, prolapse or scarring.  VAGINA: Moist and well rugated, no discharge, no significant cystocele or rectocele.  CERVIX: No lesions, normal diameter, no stenosis, no cervical motion tenderness.  UTERUS: 8 week size, regular shape, mobile, non-tender, normal position, good support.  ADNEXA: No masses, tenderness or CDS nodularity.  ANUS PERINEUM: " No lesions, no relaxation, external hemorrhoids noted.        DIAGNOSIS:         1. Encounter for screening mammogram for malignant neoplasm of breast    2. Well woman exam with routine gynecological exam        PLAN:   Orders Placed This Encounter   Procedures    HPV High Risk Genotypes, PCR     Order Specific Question:   Source     Answer:   Cervix    Mammo Digital Screening Bilat w/ Gabriel     Standing Status:   Future     Standing Expiration Date:   2/14/2022     Order Specific Question:   May the Radiologist modify the order per protocol to meet the clinical needs of the patient?     Answer:   Yes        MEDICATIONS PRESCRIBED:   Medications Ordered This Encounter   Medications    clotrimazole-betamethasone 1-0.05% (LOTRISONE) cream     Sig: Apply to affected area 2 times daily as needed for irritation     Dispense:  15 g     Refill:  2    to use prn refill per request     Calcium vitamin D and weight bearing exercise reviewed    COUNSELING:  Patient was counseled today on A.C.S. Pap guidelines and recommendations for yearly pelvic exams, mammograms and monthly self breast exams; to see her PCP for other health maintenance.   Pt counseled on signs and symptoms of cancer of the pelvic organs including ovarian and uterine, and to alert myself and my office if pt has any vaginal bleeding, pelvic/abdominal pain, persistent bloating, unexplained constipation, unexplained appetite and/or weight loss.     FOLLOW-UP: With me in 1 year. Pap smear every 3 years.

## 2020-12-18 LAB
HPV HR 12 DNA SPEC QL NAA+PROBE: NEGATIVE
HPV16 AG SPEC QL: NEGATIVE
HPV18 DNA SPEC QL NAA+PROBE: NEGATIVE

## 2021-01-22 LAB
FINAL PATHOLOGIC DIAGNOSIS: NORMAL
Lab: NORMAL

## 2021-02-02 RX ORDER — FLASH GLUCOSE SCANNING READER
EACH MISCELLANEOUS
Qty: 1 EACH | Refills: 11 | Status: SHIPPED | OUTPATIENT
Start: 2021-02-02 | End: 2022-02-05

## 2021-03-01 ENCOUNTER — PATIENT OUTREACH (OUTPATIENT)
Dept: ADMINISTRATIVE | Facility: HOSPITAL | Age: 51
End: 2021-03-01

## 2021-03-03 DIAGNOSIS — I20.9 NEW-ONSET ANGINA: ICD-10-CM

## 2021-03-04 RX ORDER — METOPROLOL SUCCINATE 25 MG/1
TABLET, EXTENDED RELEASE ORAL
Qty: 90 TABLET | Refills: 0 | Status: SHIPPED | OUTPATIENT
Start: 2021-03-04 | End: 2021-05-14 | Stop reason: SDUPTHER

## 2021-03-19 ENCOUNTER — HOSPITAL ENCOUNTER (OUTPATIENT)
Dept: RADIOLOGY | Facility: HOSPITAL | Age: 51
Discharge: HOME | End: 2021-03-19
Attending: PEDIATRICS
Payer: COMMERCIAL

## 2021-03-19 VITALS — HEIGHT: 65 IN | WEIGHT: 156.75 LBS | BODY MASS INDEX: 26.12 KG/M2

## 2021-03-19 DIAGNOSIS — Z12.39 BREAST CANCER SCREENING: ICD-10-CM

## 2021-03-19 PROCEDURE — 77063 BREAST TOMOSYNTHESIS BI: CPT | Mod: 26,,, | Performed by: RADIOLOGY

## 2021-03-19 PROCEDURE — 77067 SCR MAMMO BI INCL CAD: CPT | Mod: 26,,, | Performed by: RADIOLOGY

## 2021-03-19 PROCEDURE — 77067 MAMMO DIGITAL SCREENING BILAT WITH TOMOSYNTHESIS_CAD: ICD-10-PCS | Mod: 26,,, | Performed by: RADIOLOGY

## 2021-03-19 PROCEDURE — 77067 SCR MAMMO BI INCL CAD: CPT | Mod: TC

## 2021-03-19 PROCEDURE — 77063 MAMMO DIGITAL SCREENING BILAT WITH TOMOSYNTHESIS_CAD: ICD-10-PCS | Mod: 26,,, | Performed by: RADIOLOGY

## 2021-05-05 ENCOUNTER — TELEPHONE (OUTPATIENT)
Dept: CARDIOLOGY | Facility: CLINIC | Age: 51
End: 2021-05-05

## 2021-05-05 DIAGNOSIS — I25.10 CORONARY ARTERY DISEASE, ANGINA PRESENCE UNSPECIFIED, UNSPECIFIED VESSEL OR LESION TYPE, UNSPECIFIED WHETHER NATIVE OR TRANSPLANTED HEART: Primary | ICD-10-CM

## 2021-05-14 ENCOUNTER — OFFICE VISIT (OUTPATIENT)
Dept: CARDIOLOGY | Facility: CLINIC | Age: 51
End: 2021-05-14
Payer: COMMERCIAL

## 2021-05-14 ENCOUNTER — HOSPITAL ENCOUNTER (OUTPATIENT)
Dept: CARDIOLOGY | Facility: HOSPITAL | Age: 51
Discharge: HOME | End: 2021-05-14
Attending: INTERNAL MEDICINE
Payer: COMMERCIAL

## 2021-05-14 VITALS
HEART RATE: 63 BPM | WEIGHT: 153.44 LBS | SYSTOLIC BLOOD PRESSURE: 107 MMHG | OXYGEN SATURATION: 97 % | DIASTOLIC BLOOD PRESSURE: 68 MMHG | HEIGHT: 65 IN | BODY MASS INDEX: 25.56 KG/M2

## 2021-05-14 DIAGNOSIS — Z82.49 FAMILY HISTORY OF CARDIOVASCULAR DISEASE: ICD-10-CM

## 2021-05-14 DIAGNOSIS — E11.65 TYPE 2 DIABETES MELLITUS WITH HYPERGLYCEMIA, WITHOUT LONG-TERM CURRENT USE OF INSULIN: ICD-10-CM

## 2021-05-14 DIAGNOSIS — I25.10 CORONARY ARTERY DISEASE, ANGINA PRESENCE UNSPECIFIED, UNSPECIFIED VESSEL OR LESION TYPE, UNSPECIFIED WHETHER NATIVE OR TRANSPLANTED HEART: ICD-10-CM

## 2021-05-14 DIAGNOSIS — E78.2 MIXED HYPERLIPIDEMIA: Primary | ICD-10-CM

## 2021-05-14 DIAGNOSIS — I25.10 CAD IN NATIVE ARTERY: ICD-10-CM

## 2021-05-14 DIAGNOSIS — I20.9 NEW-ONSET ANGINA: ICD-10-CM

## 2021-05-14 DIAGNOSIS — I20.9 AP (ANGINA PECTORIS): ICD-10-CM

## 2021-05-14 PROCEDURE — 93010 ELECTROCARDIOGRAM REPORT: CPT | Mod: ,,, | Performed by: INTERNAL MEDICINE

## 2021-05-14 PROCEDURE — 99214 OFFICE O/P EST MOD 30 MIN: CPT | Mod: S$GLB,,, | Performed by: INTERNAL MEDICINE

## 2021-05-14 PROCEDURE — 3051F HG A1C>EQUAL 7.0%<8.0%: CPT | Mod: CPTII,S$GLB,, | Performed by: INTERNAL MEDICINE

## 2021-05-14 PROCEDURE — 99999 PR PBB SHADOW E&M-EST. PATIENT-LVL IV: CPT | Mod: PBBFAC,,, | Performed by: INTERNAL MEDICINE

## 2021-05-14 PROCEDURE — 3008F PR BODY MASS INDEX (BMI) DOCUMENTED: ICD-10-PCS | Mod: CPTII,S$GLB,, | Performed by: INTERNAL MEDICINE

## 2021-05-14 PROCEDURE — 1126F AMNT PAIN NOTED NONE PRSNT: CPT | Mod: S$GLB,,, | Performed by: INTERNAL MEDICINE

## 2021-05-14 PROCEDURE — 1126F PR PAIN SEVERITY QUANTIFIED, NO PAIN PRESENT: ICD-10-PCS | Mod: S$GLB,,, | Performed by: INTERNAL MEDICINE

## 2021-05-14 PROCEDURE — 99214 PR OFFICE/OUTPT VISIT, EST, LEVL IV, 30-39 MIN: ICD-10-PCS | Mod: S$GLB,,, | Performed by: INTERNAL MEDICINE

## 2021-05-14 PROCEDURE — 93010 EKG 12-LEAD: ICD-10-PCS | Mod: ,,, | Performed by: INTERNAL MEDICINE

## 2021-05-14 PROCEDURE — 3008F BODY MASS INDEX DOCD: CPT | Mod: CPTII,S$GLB,, | Performed by: INTERNAL MEDICINE

## 2021-05-14 PROCEDURE — 93005 ELECTROCARDIOGRAM TRACING: CPT

## 2021-05-14 PROCEDURE — 3051F PR MOST RECENT HEMOGLOBIN A1C LEVEL 7.0 - < 8.0%: ICD-10-PCS | Mod: CPTII,S$GLB,, | Performed by: INTERNAL MEDICINE

## 2021-05-14 PROCEDURE — 99999 PR PBB SHADOW E&M-EST. PATIENT-LVL IV: ICD-10-PCS | Mod: PBBFAC,,, | Performed by: INTERNAL MEDICINE

## 2021-05-14 RX ORDER — ASPIRIN 81 MG/1
81 TABLET ORAL DAILY
COMMUNITY

## 2021-05-14 RX ORDER — ATORVASTATIN CALCIUM 20 MG/1
20 TABLET, FILM COATED ORAL NIGHTLY
Qty: 90 TABLET | Refills: 4 | Status: SHIPPED | OUTPATIENT
Start: 2021-05-14 | End: 2021-06-04 | Stop reason: SDUPTHER

## 2021-05-14 RX ORDER — METFORMIN HYDROCHLORIDE 500 MG/1
TABLET ORAL
Qty: 180 TABLET | Refills: 0 | Status: SHIPPED | OUTPATIENT
Start: 2021-05-14 | End: 2021-05-20 | Stop reason: SDUPTHER

## 2021-05-14 RX ORDER — METOPROLOL SUCCINATE 25 MG/1
25 TABLET, EXTENDED RELEASE ORAL DAILY
Qty: 90 TABLET | Refills: 4 | Status: SHIPPED | OUTPATIENT
Start: 2021-05-14 | End: 2022-08-08

## 2021-05-20 DIAGNOSIS — F41.8 ANXIETY WITH DEPRESSION: ICD-10-CM

## 2021-05-20 DIAGNOSIS — E11.65 TYPE 2 DIABETES MELLITUS WITH HYPERGLYCEMIA, WITHOUT LONG-TERM CURRENT USE OF INSULIN: ICD-10-CM

## 2021-05-20 RX ORDER — BUPROPION HYDROCHLORIDE 150 MG/1
150 TABLET ORAL DAILY
Qty: 90 TABLET | Refills: 1 | Status: SHIPPED | OUTPATIENT
Start: 2021-05-20 | End: 2021-06-01 | Stop reason: SDUPTHER

## 2021-05-20 RX ORDER — FLASH GLUCOSE SENSOR
KIT MISCELLANEOUS
Qty: 2 KIT | Refills: 6 | Status: SHIPPED | OUTPATIENT
Start: 2021-05-20 | End: 2021-06-01 | Stop reason: SDUPTHER

## 2021-05-20 RX ORDER — METFORMIN HYDROCHLORIDE 500 MG/1
TABLET ORAL
Qty: 180 TABLET | Refills: 1 | Status: SHIPPED | OUTPATIENT
Start: 2021-05-20 | End: 2021-06-01 | Stop reason: SDUPTHER

## 2021-05-21 ENCOUNTER — LAB VISIT (OUTPATIENT)
Dept: LAB | Facility: HOSPITAL | Age: 51
End: 2021-05-21
Attending: PEDIATRICS
Payer: COMMERCIAL

## 2021-05-21 DIAGNOSIS — E11.65 TYPE 2 DIABETES MELLITUS WITH HYPERGLYCEMIA, WITHOUT LONG-TERM CURRENT USE OF INSULIN: ICD-10-CM

## 2021-05-21 LAB
ALBUMIN SERPL BCP-MCNC: 4 G/DL (ref 3.5–5.2)
ALP SERPL-CCNC: 97 U/L (ref 55–135)
ALT SERPL W/O P-5'-P-CCNC: 43 U/L (ref 10–44)
ANION GAP SERPL CALC-SCNC: 10 MMOL/L (ref 8–16)
AST SERPL-CCNC: 35 U/L (ref 10–40)
BASOPHILS # BLD AUTO: 0.03 K/UL (ref 0–0.2)
BASOPHILS NFR BLD: 0.6 % (ref 0–1.9)
BILIRUB SERPL-MCNC: 0.3 MG/DL (ref 0.1–1)
BUN SERPL-MCNC: 21 MG/DL (ref 6–20)
CALCIUM SERPL-MCNC: 10 MG/DL (ref 8.7–10.5)
CHLORIDE SERPL-SCNC: 102 MMOL/L (ref 95–110)
CHOLEST SERPL-MCNC: 235 MG/DL (ref 120–199)
CHOLEST/HDLC SERPL: 4.2 {RATIO} (ref 2–5)
CO2 SERPL-SCNC: 28 MMOL/L (ref 23–29)
CREAT SERPL-MCNC: 0.8 MG/DL (ref 0.5–1.4)
DIFFERENTIAL METHOD: ABNORMAL
EOSINOPHIL # BLD AUTO: 0.1 K/UL (ref 0–0.5)
EOSINOPHIL NFR BLD: 2.6 % (ref 0–8)
ERYTHROCYTE [DISTWIDTH] IN BLOOD BY AUTOMATED COUNT: 12.9 % (ref 11.5–14.5)
EST. GFR  (AFRICAN AMERICAN): >60 ML/MIN/1.73 M^2
EST. GFR  (NON AFRICAN AMERICAN): >60 ML/MIN/1.73 M^2
ESTIMATED AVG GLUCOSE: 140 MG/DL (ref 68–131)
GLUCOSE SERPL-MCNC: 139 MG/DL (ref 70–110)
HBA1C MFR BLD: 6.5 % (ref 4–5.6)
HCT VFR BLD AUTO: 40.7 % (ref 37–48.5)
HDLC SERPL-MCNC: 56 MG/DL (ref 40–75)
HDLC SERPL: 23.8 % (ref 20–50)
HGB BLD-MCNC: 13.1 G/DL (ref 12–16)
IMM GRANULOCYTES # BLD AUTO: 0.01 K/UL (ref 0–0.04)
IMM GRANULOCYTES NFR BLD AUTO: 0.2 % (ref 0–0.5)
INSULIN COLLECTION INTERVAL: NORMAL
INSULIN SERPL-ACNC: 3.6 UU/ML
LDLC SERPL CALC-MCNC: 152 MG/DL (ref 63–159)
LYMPHOCYTES # BLD AUTO: 1.5 K/UL (ref 1–4.8)
LYMPHOCYTES NFR BLD: 30.7 % (ref 18–48)
MCH RBC QN AUTO: 31.1 PG (ref 27–31)
MCHC RBC AUTO-ENTMCNC: 32.2 G/DL (ref 32–36)
MCV RBC AUTO: 97 FL (ref 82–98)
MONOCYTES # BLD AUTO: 0.5 K/UL (ref 0.3–1)
MONOCYTES NFR BLD: 10.2 % (ref 4–15)
NEUTROPHILS # BLD AUTO: 2.8 K/UL (ref 1.8–7.7)
NEUTROPHILS NFR BLD: 55.7 % (ref 38–73)
NONHDLC SERPL-MCNC: 179 MG/DL
NRBC BLD-RTO: 0 /100 WBC
PLATELET # BLD AUTO: 267 K/UL (ref 150–450)
PMV BLD AUTO: 10.7 FL (ref 9.2–12.9)
POTASSIUM SERPL-SCNC: 4.2 MMOL/L (ref 3.5–5.1)
PROT SERPL-MCNC: 7.5 G/DL (ref 6–8.4)
RBC # BLD AUTO: 4.21 M/UL (ref 4–5.4)
SODIUM SERPL-SCNC: 140 MMOL/L (ref 136–145)
TRIGL SERPL-MCNC: 135 MG/DL (ref 30–150)
TSH SERPL DL<=0.005 MIU/L-ACNC: 0.77 UIU/ML (ref 0.4–4)
WBC # BLD AUTO: 4.99 K/UL (ref 3.9–12.7)

## 2021-05-21 PROCEDURE — 85025 COMPLETE CBC W/AUTO DIFF WBC: CPT | Performed by: PEDIATRICS

## 2021-05-21 PROCEDURE — 83036 HEMOGLOBIN GLYCOSYLATED A1C: CPT | Performed by: PEDIATRICS

## 2021-05-21 PROCEDURE — 80053 COMPREHEN METABOLIC PANEL: CPT | Performed by: PEDIATRICS

## 2021-05-21 PROCEDURE — 83525 ASSAY OF INSULIN: CPT | Performed by: PEDIATRICS

## 2021-05-21 PROCEDURE — 84443 ASSAY THYROID STIM HORMONE: CPT | Performed by: PEDIATRICS

## 2021-05-21 PROCEDURE — 80061 LIPID PANEL: CPT | Performed by: PEDIATRICS

## 2021-06-01 DIAGNOSIS — E11.65 TYPE 2 DIABETES MELLITUS WITH HYPERGLYCEMIA, WITHOUT LONG-TERM CURRENT USE OF INSULIN: ICD-10-CM

## 2021-06-01 DIAGNOSIS — F41.8 ANXIETY WITH DEPRESSION: ICD-10-CM

## 2021-06-01 RX ORDER — FLASH GLUCOSE SENSOR
KIT MISCELLANEOUS
Qty: 2 KIT | Refills: 6 | Status: SHIPPED | OUTPATIENT
Start: 2021-06-01 | End: 2023-01-18 | Stop reason: ALTCHOICE

## 2021-06-01 RX ORDER — METFORMIN HYDROCHLORIDE 500 MG/1
TABLET ORAL
Qty: 180 TABLET | Refills: 1 | Status: SHIPPED | OUTPATIENT
Start: 2021-06-01 | End: 2022-01-24

## 2021-06-01 RX ORDER — BUPROPION HYDROCHLORIDE 150 MG/1
150 TABLET ORAL DAILY
Qty: 90 TABLET | Refills: 1 | Status: SHIPPED | OUTPATIENT
Start: 2021-06-01 | End: 2021-11-28

## 2021-06-03 ENCOUNTER — OFFICE VISIT (OUTPATIENT)
Dept: INTERNAL MEDICINE | Facility: CLINIC | Age: 51
End: 2021-06-03
Payer: COMMERCIAL

## 2021-06-03 VITALS
OXYGEN SATURATION: 98 % | WEIGHT: 151.88 LBS | SYSTOLIC BLOOD PRESSURE: 112 MMHG | DIASTOLIC BLOOD PRESSURE: 70 MMHG | BODY MASS INDEX: 25.28 KG/M2 | HEART RATE: 74 BPM | TEMPERATURE: 99 F

## 2021-06-03 DIAGNOSIS — E11.65 TYPE 2 DIABETES MELLITUS WITH HYPERGLYCEMIA, WITHOUT LONG-TERM CURRENT USE OF INSULIN: Primary | ICD-10-CM

## 2021-06-03 DIAGNOSIS — F41.8 ANXIETY WITH DEPRESSION: ICD-10-CM

## 2021-06-03 DIAGNOSIS — I25.10 CAD IN NATIVE ARTERY: ICD-10-CM

## 2021-06-03 DIAGNOSIS — E78.2 MIXED HYPERLIPIDEMIA: ICD-10-CM

## 2021-06-03 PROCEDURE — 99214 OFFICE O/P EST MOD 30 MIN: CPT | Mod: S$GLB,,, | Performed by: PEDIATRICS

## 2021-06-03 PROCEDURE — 3008F BODY MASS INDEX DOCD: CPT | Mod: CPTII,S$GLB,, | Performed by: PEDIATRICS

## 2021-06-03 PROCEDURE — 3044F HG A1C LEVEL LT 7.0%: CPT | Mod: CPTII,S$GLB,, | Performed by: PEDIATRICS

## 2021-06-03 PROCEDURE — 99999 PR PBB SHADOW E&M-EST. PATIENT-LVL V: ICD-10-PCS | Mod: PBBFAC,,, | Performed by: PEDIATRICS

## 2021-06-03 PROCEDURE — 3008F PR BODY MASS INDEX (BMI) DOCUMENTED: ICD-10-PCS | Mod: CPTII,S$GLB,, | Performed by: PEDIATRICS

## 2021-06-03 PROCEDURE — 3044F PR MOST RECENT HEMOGLOBIN A1C LEVEL <7.0%: ICD-10-PCS | Mod: CPTII,S$GLB,, | Performed by: PEDIATRICS

## 2021-06-03 PROCEDURE — 99214 PR OFFICE/OUTPT VISIT, EST, LEVL IV, 30-39 MIN: ICD-10-PCS | Mod: S$GLB,,, | Performed by: PEDIATRICS

## 2021-06-03 PROCEDURE — 99999 PR PBB SHADOW E&M-EST. PATIENT-LVL V: CPT | Mod: PBBFAC,,, | Performed by: PEDIATRICS

## 2021-06-04 ENCOUNTER — PATIENT MESSAGE (OUTPATIENT)
Dept: INTERNAL MEDICINE | Facility: CLINIC | Age: 51
End: 2021-06-04

## 2021-06-04 DIAGNOSIS — I25.10 CAD IN NATIVE ARTERY: ICD-10-CM

## 2021-06-04 DIAGNOSIS — E78.2 MIXED HYPERLIPIDEMIA: ICD-10-CM

## 2021-06-04 RX ORDER — ATORVASTATIN CALCIUM 40 MG/1
40 TABLET, FILM COATED ORAL NIGHTLY
Qty: 90 TABLET | Refills: 3 | Status: SHIPPED | OUTPATIENT
Start: 2021-06-04 | End: 2021-11-01

## 2021-08-11 ENCOUNTER — TELEPHONE (OUTPATIENT)
Dept: PEDIATRICS | Facility: HOSPITAL | Age: 51
End: 2021-08-11

## 2021-08-11 RX ORDER — ALPRAZOLAM 0.5 MG/1
TABLET ORAL
Qty: 4 TABLET | Refills: 0 | Status: SHIPPED | OUTPATIENT
Start: 2021-08-11 | End: 2021-11-02 | Stop reason: SDUPTHER

## 2021-09-12 NOTE — NURSING
Pt complaining of SOB and unable to catch breath, pt is tachypneic, BP elevated, O2 sat 100% on room air. Sat pt up in bed and instructed to take slow deep breaths, pt also complaining of nausea. Pt received dose of phenergan, clonidine po for BP, and ramelteon to help her rest. Notified MILENA Tony, ordered to monitor pt for now.    12-Sep-2021 14:56

## 2021-11-01 DIAGNOSIS — E78.2 MIXED HYPERLIPIDEMIA: ICD-10-CM

## 2021-11-01 DIAGNOSIS — I25.10 CAD IN NATIVE ARTERY: ICD-10-CM

## 2021-11-01 RX ORDER — ATORVASTATIN CALCIUM 40 MG/1
40 TABLET, FILM COATED ORAL DAILY
Qty: 90 TABLET | Refills: 3 | Status: SHIPPED | OUTPATIENT
Start: 2021-11-01 | End: 2022-10-10

## 2021-11-02 RX ORDER — ALPRAZOLAM 0.5 MG/1
TABLET ORAL
Qty: 7 TABLET | Refills: 0 | Status: SHIPPED | OUTPATIENT
Start: 2021-11-02 | End: 2022-01-12

## 2021-12-01 ENCOUNTER — OFFICE VISIT (OUTPATIENT)
Dept: CARDIOLOGY | Facility: CLINIC | Age: 51
End: 2021-12-01
Payer: COMMERCIAL

## 2021-12-01 VITALS
HEART RATE: 81 BPM | BODY MASS INDEX: 24.69 KG/M2 | OXYGEN SATURATION: 98 % | WEIGHT: 148.38 LBS | SYSTOLIC BLOOD PRESSURE: 110 MMHG | DIASTOLIC BLOOD PRESSURE: 70 MMHG

## 2021-12-01 DIAGNOSIS — Z82.49 FAMILY HISTORY OF CARDIOVASCULAR DISEASE: ICD-10-CM

## 2021-12-01 DIAGNOSIS — R00.2 PALPITATIONS: ICD-10-CM

## 2021-12-01 DIAGNOSIS — I25.10 CAD IN NATIVE ARTERY: Primary | ICD-10-CM

## 2021-12-01 DIAGNOSIS — I20.9 AP (ANGINA PECTORIS): ICD-10-CM

## 2021-12-01 DIAGNOSIS — E11.65 TYPE 2 DIABETES MELLITUS WITH HYPERGLYCEMIA, WITHOUT LONG-TERM CURRENT USE OF INSULIN: ICD-10-CM

## 2021-12-01 DIAGNOSIS — E78.2 MIXED HYPERLIPIDEMIA: ICD-10-CM

## 2021-12-01 DIAGNOSIS — I25.118 CORONARY ARTERY DISEASE OF NATIVE ARTERY OF NATIVE HEART WITH STABLE ANGINA PECTORIS: ICD-10-CM

## 2021-12-01 PROCEDURE — 3066F NEPHROPATHY DOC TX: CPT | Mod: CPTII,S$GLB,, | Performed by: INTERNAL MEDICINE

## 2021-12-01 PROCEDURE — 3061F NEG MICROALBUMINURIA REV: CPT | Mod: CPTII,S$GLB,, | Performed by: INTERNAL MEDICINE

## 2021-12-01 PROCEDURE — 99999 PR PBB SHADOW E&M-EST. PATIENT-LVL IV: ICD-10-PCS | Mod: PBBFAC,,, | Performed by: INTERNAL MEDICINE

## 2021-12-01 PROCEDURE — 99999 PR PBB SHADOW E&M-EST. PATIENT-LVL IV: CPT | Mod: PBBFAC,,, | Performed by: INTERNAL MEDICINE

## 2021-12-01 PROCEDURE — 99214 OFFICE O/P EST MOD 30 MIN: CPT | Mod: S$GLB,,, | Performed by: INTERNAL MEDICINE

## 2021-12-01 PROCEDURE — 3066F PR DOCUMENTATION OF TREATMENT FOR NEPHROPATHY: ICD-10-PCS | Mod: CPTII,S$GLB,, | Performed by: INTERNAL MEDICINE

## 2021-12-01 PROCEDURE — 99214 PR OFFICE/OUTPT VISIT, EST, LEVL IV, 30-39 MIN: ICD-10-PCS | Mod: S$GLB,,, | Performed by: INTERNAL MEDICINE

## 2021-12-01 PROCEDURE — 3061F PR NEG MICROALBUMINURIA RESULT DOCUMENTED/REVIEW: ICD-10-PCS | Mod: CPTII,S$GLB,, | Performed by: INTERNAL MEDICINE

## 2021-12-09 ENCOUNTER — LAB VISIT (OUTPATIENT)
Dept: LAB | Facility: HOSPITAL | Age: 51
End: 2021-12-09
Attending: PEDIATRICS
Payer: COMMERCIAL

## 2021-12-09 DIAGNOSIS — E11.65 TYPE 2 DIABETES MELLITUS WITH HYPERGLYCEMIA, WITHOUT LONG-TERM CURRENT USE OF INSULIN: ICD-10-CM

## 2021-12-09 LAB
ALBUMIN SERPL BCP-MCNC: 4.3 G/DL (ref 3.5–5.2)
ALP SERPL-CCNC: 100 U/L (ref 55–135)
ALT SERPL W/O P-5'-P-CCNC: 46 U/L (ref 10–44)
ANION GAP SERPL CALC-SCNC: 9 MMOL/L (ref 8–16)
AST SERPL-CCNC: 33 U/L (ref 10–40)
BILIRUB SERPL-MCNC: 0.6 MG/DL (ref 0.1–1)
BUN SERPL-MCNC: 19 MG/DL (ref 6–20)
CALCIUM SERPL-MCNC: 10.1 MG/DL (ref 8.7–10.5)
CHLORIDE SERPL-SCNC: 104 MMOL/L (ref 95–110)
CHOLEST SERPL-MCNC: 143 MG/DL (ref 120–199)
CHOLEST/HDLC SERPL: 2.8 {RATIO} (ref 2–5)
CO2 SERPL-SCNC: 29 MMOL/L (ref 23–29)
CREAT SERPL-MCNC: 0.8 MG/DL (ref 0.5–1.4)
EST. GFR  (AFRICAN AMERICAN): >60 ML/MIN/1.73 M^2
EST. GFR  (NON AFRICAN AMERICAN): >60 ML/MIN/1.73 M^2
ESTIMATED AVG GLUCOSE: 177 MG/DL (ref 68–131)
GLUCOSE SERPL-MCNC: 145 MG/DL (ref 70–110)
HBA1C MFR BLD: 7.8 % (ref 4–5.6)
HDLC SERPL-MCNC: 51 MG/DL (ref 40–75)
HDLC SERPL: 35.7 % (ref 20–50)
LDLC SERPL CALC-MCNC: 75 MG/DL (ref 63–159)
NONHDLC SERPL-MCNC: 92 MG/DL
POTASSIUM SERPL-SCNC: 4.7 MMOL/L (ref 3.5–5.1)
PROT SERPL-MCNC: 7 G/DL (ref 6–8.4)
SODIUM SERPL-SCNC: 142 MMOL/L (ref 136–145)
TRIGL SERPL-MCNC: 85 MG/DL (ref 30–150)

## 2021-12-09 PROCEDURE — 80053 COMPREHEN METABOLIC PANEL: CPT | Performed by: PEDIATRICS

## 2021-12-09 PROCEDURE — 83036 HEMOGLOBIN GLYCOSYLATED A1C: CPT | Performed by: PEDIATRICS

## 2021-12-09 PROCEDURE — 80061 LIPID PANEL: CPT | Performed by: PEDIATRICS

## 2021-12-09 PROCEDURE — 36415 COLL VENOUS BLD VENIPUNCTURE: CPT | Performed by: PEDIATRICS

## 2021-12-15 ENCOUNTER — OFFICE VISIT (OUTPATIENT)
Dept: INTERNAL MEDICINE | Facility: CLINIC | Age: 51
End: 2021-12-15
Payer: COMMERCIAL

## 2021-12-15 VITALS
WEIGHT: 151.88 LBS | DIASTOLIC BLOOD PRESSURE: 62 MMHG | SYSTOLIC BLOOD PRESSURE: 100 MMHG | TEMPERATURE: 99 F | RESPIRATION RATE: 16 BRPM | HEART RATE: 92 BPM | BODY MASS INDEX: 25.28 KG/M2 | OXYGEN SATURATION: 98 %

## 2021-12-15 DIAGNOSIS — E78.2 MIXED HYPERLIPIDEMIA: ICD-10-CM

## 2021-12-15 DIAGNOSIS — E11.65 TYPE 2 DIABETES MELLITUS WITH HYPERGLYCEMIA, WITHOUT LONG-TERM CURRENT USE OF INSULIN: Primary | ICD-10-CM

## 2021-12-15 DIAGNOSIS — F41.8 ANXIETY WITH DEPRESSION: ICD-10-CM

## 2021-12-15 DIAGNOSIS — I25.10 CAD IN NATIVE ARTERY: ICD-10-CM

## 2021-12-15 DIAGNOSIS — I25.118 CORONARY ARTERY DISEASE OF NATIVE ARTERY OF NATIVE HEART WITH STABLE ANGINA PECTORIS: ICD-10-CM

## 2021-12-15 DIAGNOSIS — R23.3 EASY BRUISING: ICD-10-CM

## 2021-12-15 PROCEDURE — 99214 OFFICE O/P EST MOD 30 MIN: CPT | Mod: S$GLB,,, | Performed by: NURSE PRACTITIONER

## 2021-12-15 PROCEDURE — 3066F PR DOCUMENTATION OF TREATMENT FOR NEPHROPATHY: ICD-10-PCS | Mod: CPTII,S$GLB,, | Performed by: NURSE PRACTITIONER

## 2021-12-15 PROCEDURE — 99214 PR OFFICE/OUTPT VISIT, EST, LEVL IV, 30-39 MIN: ICD-10-PCS | Mod: S$GLB,,, | Performed by: NURSE PRACTITIONER

## 2021-12-15 PROCEDURE — 3061F PR NEG MICROALBUMINURIA RESULT DOCUMENTED/REVIEW: ICD-10-PCS | Mod: CPTII,S$GLB,, | Performed by: NURSE PRACTITIONER

## 2021-12-15 PROCEDURE — 99999 PR PBB SHADOW E&M-EST. PATIENT-LVL V: CPT | Mod: PBBFAC,,, | Performed by: NURSE PRACTITIONER

## 2021-12-15 PROCEDURE — 99999 PR PBB SHADOW E&M-EST. PATIENT-LVL V: ICD-10-PCS | Mod: PBBFAC,,, | Performed by: NURSE PRACTITIONER

## 2021-12-15 PROCEDURE — 3066F NEPHROPATHY DOC TX: CPT | Mod: CPTII,S$GLB,, | Performed by: NURSE PRACTITIONER

## 2021-12-15 PROCEDURE — 3061F NEG MICROALBUMINURIA REV: CPT | Mod: CPTII,S$GLB,, | Performed by: NURSE PRACTITIONER

## 2021-12-16 ENCOUNTER — LAB VISIT (OUTPATIENT)
Dept: LAB | Facility: HOSPITAL | Age: 51
End: 2021-12-16
Attending: PEDIATRICS
Payer: COMMERCIAL

## 2021-12-16 DIAGNOSIS — R23.3 EASY BRUISING: ICD-10-CM

## 2021-12-16 LAB
APTT BLDCRRT: 24 SEC (ref 21–32)
BASOPHILS # BLD AUTO: 0.04 K/UL (ref 0–0.2)
BASOPHILS NFR BLD: 0.7 % (ref 0–1.9)
DIFFERENTIAL METHOD: ABNORMAL
EOSINOPHIL # BLD AUTO: 0.1 K/UL (ref 0–0.5)
EOSINOPHIL NFR BLD: 1.7 % (ref 0–8)
ERYTHROCYTE [DISTWIDTH] IN BLOOD BY AUTOMATED COUNT: 12.5 % (ref 11.5–14.5)
HCT VFR BLD AUTO: 38.7 % (ref 37–48.5)
HGB BLD-MCNC: 12.2 G/DL (ref 12–16)
IMM GRANULOCYTES # BLD AUTO: 0.01 K/UL (ref 0–0.04)
IMM GRANULOCYTES NFR BLD AUTO: 0.2 % (ref 0–0.5)
INR PPP: 0.9 (ref 0.8–1.2)
LYMPHOCYTES # BLD AUTO: 1.5 K/UL (ref 1–4.8)
LYMPHOCYTES NFR BLD: 25.1 % (ref 18–48)
MCH RBC QN AUTO: 31 PG (ref 27–31)
MCHC RBC AUTO-ENTMCNC: 31.5 G/DL (ref 32–36)
MCV RBC AUTO: 99 FL (ref 82–98)
MONOCYTES # BLD AUTO: 0.5 K/UL (ref 0.3–1)
MONOCYTES NFR BLD: 9 % (ref 4–15)
NEUTROPHILS # BLD AUTO: 3.8 K/UL (ref 1.8–7.7)
NEUTROPHILS NFR BLD: 63.3 % (ref 38–73)
NRBC BLD-RTO: 0 /100 WBC
PLATELET # BLD AUTO: 300 K/UL (ref 150–450)
PMV BLD AUTO: 10.7 FL (ref 9.2–12.9)
PROTHROMBIN TIME: 10 SEC (ref 9–12.5)
RBC # BLD AUTO: 3.93 M/UL (ref 4–5.4)
WBC # BLD AUTO: 6.02 K/UL (ref 3.9–12.7)

## 2021-12-16 PROCEDURE — 36415 COLL VENOUS BLD VENIPUNCTURE: CPT | Performed by: NURSE PRACTITIONER

## 2021-12-16 PROCEDURE — 85610 PROTHROMBIN TIME: CPT | Performed by: NURSE PRACTITIONER

## 2021-12-16 PROCEDURE — 85025 COMPLETE CBC W/AUTO DIFF WBC: CPT | Performed by: NURSE PRACTITIONER

## 2021-12-16 PROCEDURE — 85730 THROMBOPLASTIN TIME PARTIAL: CPT | Performed by: NURSE PRACTITIONER

## 2021-12-27 ENCOUNTER — PATIENT OUTREACH (OUTPATIENT)
Dept: ADMINISTRATIVE | Facility: OTHER | Age: 51
End: 2021-12-27
Payer: COMMERCIAL

## 2022-01-03 ENCOUNTER — OFFICE VISIT (OUTPATIENT)
Dept: OPHTHALMOLOGY | Facility: CLINIC | Age: 52
End: 2022-01-03
Payer: COMMERCIAL

## 2022-01-03 DIAGNOSIS — E11.65 TYPE 2 DIABETES MELLITUS WITH HYPERGLYCEMIA, WITHOUT LONG-TERM CURRENT USE OF INSULIN: Primary | ICD-10-CM

## 2022-01-03 DIAGNOSIS — H52.13 MYOPIA OF BOTH EYES: ICD-10-CM

## 2022-01-03 DIAGNOSIS — H52.223 REGULAR ASTIGMATISM OF BOTH EYES: ICD-10-CM

## 2022-01-03 PROCEDURE — 92310 PR CONTACT LENS FITTING (NO CHANGE): ICD-10-PCS | Mod: CSM,S$GLB,, | Performed by: OPTOMETRIST

## 2022-01-03 PROCEDURE — 99999 PR PBB SHADOW E&M-EST. PATIENT-LVL III: CPT | Mod: PBBFAC,,, | Performed by: OPTOMETRIST

## 2022-01-03 PROCEDURE — 2023F PR DILATED RETINAL EXAM W/O EVID OF RETINOPATHY: ICD-10-PCS | Mod: CPTII,S$GLB,, | Performed by: OPTOMETRIST

## 2022-01-03 PROCEDURE — 1159F MED LIST DOCD IN RCRD: CPT | Mod: CPTII,S$GLB,, | Performed by: OPTOMETRIST

## 2022-01-03 PROCEDURE — 92310 CONTACT LENS FITTING OU: CPT | Mod: CSM,S$GLB,, | Performed by: OPTOMETRIST

## 2022-01-03 PROCEDURE — 92002 INTRM OPH EXAM NEW PATIENT: CPT | Mod: S$GLB,,, | Performed by: OPTOMETRIST

## 2022-01-03 PROCEDURE — 1159F PR MEDICATION LIST DOCUMENTED IN MEDICAL RECORD: ICD-10-PCS | Mod: CPTII,S$GLB,, | Performed by: OPTOMETRIST

## 2022-01-03 PROCEDURE — 2023F DILAT RTA XM W/O RTNOPTHY: CPT | Mod: CPTII,S$GLB,, | Performed by: OPTOMETRIST

## 2022-01-03 PROCEDURE — 1160F RVW MEDS BY RX/DR IN RCRD: CPT | Mod: CPTII,S$GLB,, | Performed by: OPTOMETRIST

## 2022-01-03 PROCEDURE — 92002 PR EYE EXAM, NEW PATIENT,INTERMED: ICD-10-PCS | Mod: S$GLB,,, | Performed by: OPTOMETRIST

## 2022-01-03 PROCEDURE — 99999 PR PBB SHADOW E&M-EST. PATIENT-LVL III: ICD-10-PCS | Mod: PBBFAC,,, | Performed by: OPTOMETRIST

## 2022-01-03 PROCEDURE — 92015 PR REFRACTION: ICD-10-PCS | Mod: S$GLB,,, | Performed by: OPTOMETRIST

## 2022-01-03 PROCEDURE — 92015 DETERMINE REFRACTIVE STATE: CPT | Mod: S$GLB,,, | Performed by: OPTOMETRIST

## 2022-01-03 PROCEDURE — 1160F PR REVIEW ALL MEDS BY PRESCRIBER/CLIN PHARMACIST DOCUMENTED: ICD-10-PCS | Mod: CPTII,S$GLB,, | Performed by: OPTOMETRIST

## 2022-01-03 NOTE — PROGRESS NOTES
HPI     NP to DKT  Patient here today for yearly eye exam  Diagnosed with diabetes in 2002  Lab Results       Component                Value               Date                       HGBA1C                   7.8 (H)             12/09/2021            Vision changes since last eye exam?: Yes at near   Patient wears CTL full-time wears OTC readers when reading small print     Any eye pain today: No    Other ocular symptoms: No    Interested in contact lens fitting today? Yes                Last edited by Joceline Ortiz, PCT on 1/3/2022 10:53 AM. (History)            Assessment /Plan     For exam results, see Encounter Report.    Type 2 diabetes mellitus with hyperglycemia, without long-term current use of insulin  -     Ambulatory referral/consult to Optometry  20 years, last A1c 7.8 Stressed importance of DM control to preserve vision. No diabetic retinopathy was seen in either eye today. Continue strict blood glucose control.  Reviewed importance of yearly dilated eye exams. Continue close care with Dr Ramos regarding diabetes.    Regular astigmatism of both eyes  OU distance CTL, not in stock will order lenses today with [employer] [employer]. +2.00 OTC readers on top for near tasks    Myopia of both eyes  Eyeglass Final Rx     Eyeglass Final Rx       Sphere Cylinder Axis Add    Right -3.25 +1.75 010 +2.25    Left -3.75 +3.50 165 +2.25    Type: PAL or SV              RTC when CTL trials arrive for dispense.

## 2022-01-12 ENCOUNTER — OFFICE VISIT (OUTPATIENT)
Dept: OBSTETRICS AND GYNECOLOGY | Facility: CLINIC | Age: 52
End: 2022-01-12
Payer: COMMERCIAL

## 2022-01-12 VITALS
DIASTOLIC BLOOD PRESSURE: 68 MMHG | HEIGHT: 65 IN | BODY MASS INDEX: 25.3 KG/M2 | SYSTOLIC BLOOD PRESSURE: 120 MMHG | WEIGHT: 151.88 LBS

## 2022-01-12 DIAGNOSIS — Z12.31 ENCOUNTER FOR SCREENING MAMMOGRAM FOR MALIGNANT NEOPLASM OF BREAST: ICD-10-CM

## 2022-01-12 DIAGNOSIS — N90.89 VULVAR IRRITATION: ICD-10-CM

## 2022-01-12 DIAGNOSIS — N95.1 VAGINAL DRYNESS, MENOPAUSAL: ICD-10-CM

## 2022-01-12 DIAGNOSIS — Z01.419 ROUTINE GYNECOLOGICAL EXAMINATION: Primary | ICD-10-CM

## 2022-01-12 PROCEDURE — 99396 PR PREVENTIVE VISIT,EST,40-64: ICD-10-PCS | Mod: S$GLB,,, | Performed by: NURSE PRACTITIONER

## 2022-01-12 PROCEDURE — 99999 PR PBB SHADOW E&M-EST. PATIENT-LVL IV: CPT | Mod: PBBFAC,,, | Performed by: NURSE PRACTITIONER

## 2022-01-12 PROCEDURE — 1159F MED LIST DOCD IN RCRD: CPT | Mod: CPTII,S$GLB,, | Performed by: NURSE PRACTITIONER

## 2022-01-12 PROCEDURE — 1160F PR REVIEW ALL MEDS BY PRESCRIBER/CLIN PHARMACIST DOCUMENTED: ICD-10-PCS | Mod: CPTII,S$GLB,, | Performed by: NURSE PRACTITIONER

## 2022-01-12 PROCEDURE — 3078F DIAST BP <80 MM HG: CPT | Mod: CPTII,S$GLB,, | Performed by: NURSE PRACTITIONER

## 2022-01-12 PROCEDURE — 1160F RVW MEDS BY RX/DR IN RCRD: CPT | Mod: CPTII,S$GLB,, | Performed by: NURSE PRACTITIONER

## 2022-01-12 PROCEDURE — 99396 PREV VISIT EST AGE 40-64: CPT | Mod: S$GLB,,, | Performed by: NURSE PRACTITIONER

## 2022-01-12 PROCEDURE — 99999 PR PBB SHADOW E&M-EST. PATIENT-LVL IV: ICD-10-PCS | Mod: PBBFAC,,, | Performed by: NURSE PRACTITIONER

## 2022-01-12 PROCEDURE — 3008F BODY MASS INDEX DOCD: CPT | Mod: CPTII,S$GLB,, | Performed by: NURSE PRACTITIONER

## 2022-01-12 PROCEDURE — 3008F PR BODY MASS INDEX (BMI) DOCUMENTED: ICD-10-PCS | Mod: CPTII,S$GLB,, | Performed by: NURSE PRACTITIONER

## 2022-01-12 PROCEDURE — 1159F PR MEDICATION LIST DOCUMENTED IN MEDICAL RECORD: ICD-10-PCS | Mod: CPTII,S$GLB,, | Performed by: NURSE PRACTITIONER

## 2022-01-12 PROCEDURE — 3074F SYST BP LT 130 MM HG: CPT | Mod: CPTII,S$GLB,, | Performed by: NURSE PRACTITIONER

## 2022-01-12 PROCEDURE — 3078F PR MOST RECENT DIASTOLIC BLOOD PRESSURE < 80 MM HG: ICD-10-PCS | Mod: CPTII,S$GLB,, | Performed by: NURSE PRACTITIONER

## 2022-01-12 PROCEDURE — 3074F PR MOST RECENT SYSTOLIC BLOOD PRESSURE < 130 MM HG: ICD-10-PCS | Mod: CPTII,S$GLB,, | Performed by: NURSE PRACTITIONER

## 2022-01-12 RX ORDER — CLOTRIMAZOLE AND BETAMETHASONE DIPROPIONATE 10; .64 MG/G; MG/G
CREAM TOPICAL
Qty: 15 G | Refills: 1 | Status: SHIPPED | OUTPATIENT
Start: 2022-01-12 | End: 2022-03-25

## 2022-01-12 RX ORDER — ESTRADIOL 0.1 MG/G
1 CREAM VAGINAL DAILY
Qty: 42.5 G | Refills: 1 | Status: SHIPPED | OUTPATIENT
Start: 2022-01-12 | End: 2022-03-25

## 2022-01-12 NOTE — PROGRESS NOTES
CC: Well woman exam    HPI  Yandy Cabral is a 51 y.o. female  presents for a well woman exam.  LMP: Patient's last menstrual period was 2014..  No issues, problems, or complaints.    Past Medical History:   Diagnosis Date    Abnormal Pap smear of cervix     CAD (coronary artery disease)     Chronic constipation     DM II (diabetes mellitus, type II), controlled     Hx of colposcopy with cervical biopsy      Past Surgical History:   Procedure Laterality Date    ANGIOGRAM, CORONARY, WITH LEFT HEART CATHETERIZATION  2020    Procedure: Angiogram, Coronary, with Left Heart Cath;  Surgeon: Martin Villatoro MD;  Location: [facility] CATH LAB;  Service: Cardiology;;    CYST REMOVAL      ganglionic right wrist    LAPAROSCOPIC CHOLECYSTECTOMY N/A 2018    Procedure: CHOLECYSTECTOMY-LAPAROSCOPIC;  Surgeon: Louis O. Jeansonne IV, MD;  Location: [facility] OR;  Service: General;  Laterality: N/A;    LAPAROSCOPIC LYSIS OF ADHESIONS N/A 2018    Procedure: LYSIS, ADHESIONS, LAPAROSCOPIC;  Surgeon: Louis O. Jeansonne IV, MD;  Location: [facility] OR;  Service: General;  Laterality: N/A;    LEFT HEART CATHETERIZATION Left 2020    Procedure: CATHETERIZATION, HEART, LEFT;  Surgeon: Martin Villatoro MD;  Location: [facility] CATH LAB;  Service: Cardiology;  Laterality: Left;  930 start time per Dr. Villatoro    SLEEVE GASTROPLASTY  12    TOTAL REDUCTION MAMMOPLASTY Bilateral     TUBAL LIGATION       Social History     Socioeconomic History    Marital status:    Tobacco Use    Smoking status: Never Smoker    Smokeless tobacco: Never Used   Substance and Sexual Activity    Alcohol use: Yes     Alcohol/week: 4.0 standard drinks     Types: 4 Glasses of wine per week    Drug use: No   Social History Narrative    No pets or smokers in household.     Family History   Problem Relation Age of Onset    Heart disease Father     Heart attack Father     Diabetes type II Father     Testicular cancer  "Brother     Dementia Mother     No Known Problems Sister     No Known Problems Sister     Breast cancer Maternal Cousin     Colon cancer Neg Hx      OB History        3    Para   3    Term   3            AB        Living   3       SAB        IAB        Ectopic        Multiple        Live Births   3                 Current Outpatient Medications:     atorvastatin (LIPITOR) 40 MG tablet, Take 1 tablet (40 mg total) by mouth once daily., Disp: 90 tablet, Rfl: 3    blood sugar diagnostic Strp, Use ac and hs, Disp: , Rfl:     blood sugar diagnostic Strp, qd, Disp: 50 strip, Rfl: 5    buPROPion (WELLBUTRIN XL) 150 MG TB24 tablet, TAKE 1 TABLET DAILY, Disp: 90 tablet, Rfl: 3    cyanocobalamin 1,000 mcg/mL injection, INJECT 1 ML INTO THE MUSCLE EVERY 30 DAYS, Disp: , Rfl:     flash glucose scanning reader (FREESTYLE CARMEN 14 DAY READER) Misc, To use 2-10 times a day, Disp: 1 each, Rfl: 11    flash glucose sensor (FREESTYLE CARMEN 14 DAY SENSOR) Kit, For use to test Blood Sugar 2-10 times a day., Disp: 2 kit, Rfl: 6    glimepiride (AMARYL) 2 MG tablet, , Disp: , Rfl:     INVOKANA 300 mg Tab tablet, TAKE 1 TABLET DAILY, Disp: 90 tablet, Rfl: 3    melatonin 5 mg Tab, Take 5 mg by mouth nightly. , Disp: , Rfl:     metFORMIN (GLUCOPHAGE) 500 MG tablet, TAKE 2 TABLETS ONCE DAILY., Disp: 180 tablet, Rfl: 1    metoprolol succinate (TOPROL-XL) 25 MG 24 hr tablet, Take 1 tablet (25 mg total) by mouth once daily., Disp: 90 tablet, Rfl: 4    multivitamin (THERAGRAN) per tablet, Take 1 tablet by mouth., Disp: , Rfl:     needle, disp, 24 gauge 24 gauge x 1" Ndle, 1 Dose by Misc.(Non-Drug; Combo Route) route every 30 days., Disp: 8 each, Rfl: 3    ONE TOUCH VERIO Strp, , Disp: , Rfl:     pen needle, diabetic 31 gauge x 5/16" Ndle, Use up to three daily, Disp: , Rfl:     syringe with needle 3 mL 21 gauge x 1" Syrg, USE ONE Q 4 WEEKS, Disp: , Rfl:     aspirin (ECOTRIN) 81 MG EC tablet, Take 81 mg by mouth " "once daily., Disp: , Rfl:     blood-glucose meter kit, Use as instructed, Disp: 1 each, Rfl: 0    clotrimazole-betamethasone 1-0.05% (LOTRISONE) cream, Apply to affected area 2 times daily, Disp: 15 g, Rfl: 1    estradioL (ESTRACE) 0.01 % (0.1 mg/gram) vaginal cream, Place 1 g vaginally once daily., Disp: 42.5 g, Rfl: 1    nitroGLYCERIN (NITROSTAT) 0.4 MG SL tablet, Place 1 tablet (0.4 mg total) under the tongue every 5 (five) minutes as needed for Chest pain. Call 911 if pain persists after 2 doses., Disp: 25 tablet, Rfl: 0    GYNECOLOGY HISTORY:  Postmenopausal   Desires cream for vaginal dryness   And a refills on cream for vulva irritation   DATA REVIEWED:  Last pap: 2020 normal     /68   Ht 5' 5" (1.651 m)   Wt 68.9 kg (151 lb 14.4 oz)   LMP 03/05/2014   BMI 25.28 kg/m²     Review of Systems  See HPI  Physical Exam  Constitutional:       Appearance: Normal appearance.   Neurological:      Mental Status: She is alert and oriented to person, place, and time.   Skin:     General: Skin is warm and dry.   Psychiatric:         Behavior: Behavior normal.           Routine gynecological examination    Encounter for screening mammogram for malignant neoplasm of breast  -     Mammo Digital Screening Bilat w/ Garbiel; Future; Expected date: 01/12/2022    Vaginal dryness, menopausal  -     estradioL (ESTRACE) 0.01 % (0.1 mg/gram) vaginal cream; Place 1 g vaginally once daily.  Dispense: 42.5 g; Refill: 1    Vulvar irritation  -     clotrimazole-betamethasone 1-0.05% (LOTRISONE) cream; Apply to affected area 2 times daily  Dispense: 15 g; Refill: 1    Discussed to importance of having PCP as well as GYN   Discussed the difference between WWE and pap smear   WWE due every year.  Pap smear and Breast exam due every three years    Patient was counseled today on A.C.S. Pap guidelines (Q3) and recommendations for yearly pelvic exams, yearly mammograms starting age 40, and clinical breast exams; to see her PCP for " other health maintenance.

## 2022-01-13 ENCOUNTER — TELEPHONE (OUTPATIENT)
Dept: HEMATOLOGY/ONCOLOGY | Facility: CLINIC | Age: 52
End: 2022-01-13
Payer: COMMERCIAL

## 2022-01-13 ENCOUNTER — LAB VISIT (OUTPATIENT)
Dept: PRIMARY CARE CLINIC | Facility: OTHER | Age: 52
End: 2022-01-13
Attending: INTERNAL MEDICINE
Payer: COMMERCIAL

## 2022-01-13 DIAGNOSIS — Z20.822 ENCOUNTER FOR LABORATORY TESTING FOR COVID-19 VIRUS: ICD-10-CM

## 2022-01-13 PROCEDURE — U0003 INFECTIOUS AGENT DETECTION BY NUCLEIC ACID (DNA OR RNA); SEVERE ACUTE RESPIRATORY SYNDROME CORONAVIRUS 2 (SARS-COV-2) (CORONAVIRUS DISEASE [COVID-19]), AMPLIFIED PROBE TECHNIQUE, MAKING USE OF HIGH THROUGHPUT TECHNOLOGIES AS DESCRIBED BY CMS-2020-01-R: HCPCS | Performed by: INTERNAL MEDICINE

## 2022-01-13 NOTE — PROGRESS NOTES
[facility] Medicine  History & Physical    Patient Name: Felipe Gregorio  MRN: [id number]  Admission Date: 2017  Code Status: Full Code   Primary Care Physician: Mannie Cm Jr, MD  Principal Problem: jaundice    Patient information was obtained from parent    Subjective:     HPI:   Felipe is 6 do male infant of Dr Cm that presented to the office on day 5 for jaundice recheck.  Bili at 82 hours was 14.3, but increased to 21.5 with direct of 1.2 on day of admission.weight down to 7#1.1oz  Mother reports breast feeding and latching well. Stooling and voiding well.  He will be admitted for phototherapy     Chief Complaint:  Jaundice     History reviewed. No pertinent past medical history.    : 2017  At 04:45  Birth weight 7 # 9 oz.  Now- 7 # 1.5 oz  Breastfeeding  Mother is O positive,  Baby is A positive, but james negative    Past Surgical History:   Procedure Laterality Date    CIRCUMCISION         Review of patient's allergies indicates:  No Known Allergies    No current facility-administered medications on file prior to encounter.      No current outpatient prescriptions on file prior to encounter.        Family History     None          Social History Main Topics    Smoking status: Never Smoker    Smokeless tobacco: Never Used    Alcohol use Not on file    Drug use: Unknown    Sexual activity: Not on file       Review of Systems   Constitutional: Negative.    HENT: Negative.    Eyes:        Yellow eyes    Respiratory: Negative.    Cardiovascular: Negative.    Gastrointestinal: Negative.    Genitourinary: Negative.    Musculoskeletal: Negative.    Skin: Positive for color change.   Allergic/Immunologic: Negative.    Neurological: Negative.    Hematological: Negative.        Objective:     Physical Exam   Constitutional: He appears well-developed and well-nourished. He is active. He has a strong cry.   HENT:   Head: Normocephalic. Anterior  Swabbed 01/13/2022     fontanelle is flat.   Right Ear: Tympanic membrane normal.   Left Ear: Tympanic membrane normal.   Nose: Nose normal.   Mouth/Throat: Mucous membranes are moist. Oropharynx is clear.   Eyes: EOM and lids are normal. Pupils are equal, round, and reactive to light. Scleral icterus is present.   Neck: Normal range of motion. Neck supple.   Cardiovascular: Normal rate.  Pulses are strong.    No murmur heard.  Pulmonary/Chest: Effort normal and breath sounds normal.   Abdominal: Soft. Bowel sounds are normal.   Musculoskeletal: Normal range of motion.   Neurological: He is alert. He has normal strength. He exhibits normal muscle tone. Suck normal. Symmetric [place].   Skin: Skin is warm and dry. Capillary refill takes less than 2 seconds. Turgor is normal.   Skin shane        Temp:  [97.6 °F (36.4 °C)-98.8 °F (37.1 °C)]   Pulse:  [123-135]   Resp:  [38-62]   BP: (79-88)/(36-54)   SpO2:  [99 %-100 %]      Body mass index is 13.41 kg/m².    Significant Labs: Results for CARLOS ALBERTO SEVILLA (MRN [id number]) as of 2017 07:49   Ref. Range 2017 19:43   Bilirubin, Total -  Latest Ref Range: 0.1 - 12.0 mg/dL 19.0 (HH)   Bilirubin, Direct Latest Ref Range: 0.1 - 0.6 mg/dL 0.5       Significant Imaging: no further       Assessment and Plan:     Fluids/Electrolytes/Nutrition/GI   *  jaundice    Double phototherapy  Repeat bili level this am  Monitor intake and output  Discussed plan of care with parents                 Jeanne B Dakin, NP  Pediatric Hospital Medicine   [facility]

## 2022-01-14 DIAGNOSIS — U07.1 COVID-19 VIRUS DETECTED: ICD-10-CM

## 2022-01-14 LAB
SARS-COV-2 RNA RESP QL NAA+PROBE: DETECTED
SARS-COV-2- CYCLE NUMBER: 31

## 2022-01-16 DIAGNOSIS — E11.65 TYPE 2 DIABETES MELLITUS WITH HYPERGLYCEMIA, WITHOUT LONG-TERM CURRENT USE OF INSULIN: ICD-10-CM

## 2022-01-17 NOTE — TELEPHONE ENCOUNTER
No new care gaps identified.  Powered by [employer].. Reference number: [id number].   1/16/2022 11:38:24 PM CST

## 2022-01-24 RX ORDER — METFORMIN HYDROCHLORIDE 500 MG/1
TABLET ORAL
Qty: 180 TABLET | Refills: 1 | Status: SHIPPED | OUTPATIENT
Start: 2022-01-24 | End: 2022-07-15

## 2022-01-24 NOTE — TELEPHONE ENCOUNTER
Refill Authorization Note   Yandy Cabral  is requesting a refill authorization.  Brief Assessment and Rationale for Refill:  Approve     Medication Therapy Plan:       Medication Reconciliation Completed: No   Comments:   --->Care Gap information included below if applicable.       Requested Prescriptions   Pending Prescriptions Disp Refills    metFORMIN (GLUCOPHAGE) 500 MG tablet [Pharmacy Med Name: METFORMIN HCL TABS 500MG] 180 tablet 1     Sig: TAKE 2 TABLETS ONCE DAILY       Endocrinology:  Diabetes - Biguanides Passed - 1/16/2022 11:37 PM        Passed - Patient is at least 18 years old        Passed - Valid encounter within last 15 months     Recent Visits  Date Type Provider Dept   06/03/21 Office Visit CHLOE Ramos Jr., MD [facility] Internal Medicine   06/18/20 Office Visit CHLOE Ramos Jr., MD [facility] Internal Medicine   Showing recent visits within past 720 days and meeting all other requirements  Future Appointments  No visits were found meeting these conditions.  Showing future appointments within next 150 days and meeting all other requirements      Future Appointments              In 1 month ON MAMMO2 [place] [facility], [place]    In 4 months CARDIOVASCULAR 1, ON [place] - Special Cardiology, [facility]    In 4 months SPECIMEN, [facility] - Lab [facility] (Hospital), [place]    In 4 months LABORATORY, [facility] - Lab & Imaging, [place]    In 4 months [facility]ator[facility], [facility]    In 4 months CHLOE Ramos Jr., MD [facility] Internal [address], [place]                Passed - Cr is 1.39 or below and within 360 days     Lab Results   Component Value Date    CREATININE 0.8 12/09/2021    CREATININE 0.8 05/21/2021    CREATININE 0.8 08/05/2020              Passed - HBA1C within 180 days     Lab Results   Component Value Date    HGBA1C 7.8 (H) 12/09/2021    HGBA1C 6.5 (H) 05/21/2021    HGBA1C 7.2 (H) 08/05/2020              Passed - eGFR is  45 or above and within 360 days     Lab Results   Component Value Date    EGFRNONAA >60.0 12/09/2021    EGFRNONAA >60.0 05/21/2021    EGFRNONAA >60.0 08/05/2020                    Appointments  past 12m or future 3m with PCP    Date Provider   Last Visit   6/3/2021 CHLOE Ramos Jr., MD   Next Visit   6/15/2022 CHLOE Ramos Jr., MD   ED visits in past 90 days: 0     Note composed:8:33 AM 01/24/2022

## 2022-02-04 NOTE — TELEPHONE ENCOUNTER
No new care gaps identified.  Powered by [employer]. Reference number: [id number].   2/04/2022 10:28:55 AM CST

## 2022-02-04 NOTE — TELEPHONE ENCOUNTER
Encounter details require adjustment(s)/ updating by [facility] Staff  As of this time Protocols and CDM: did not populate or display   Adjustment(s) made: Department  CDM should display. Medication(s) delegated by the OR.  Will resend refill request encounter to P Centralized Refill Staff Pool.   [facility]   Note composed:10:28 AM 02/04/2022

## 2022-02-05 RX ORDER — FLASH GLUCOSE SCANNING READER
EACH MISCELLANEOUS
Qty: 6 EACH | Refills: 3 | Status: SHIPPED | OUTPATIENT
Start: 2022-02-05 | End: 2023-01-18 | Stop reason: ALTCHOICE

## 2022-02-05 NOTE — TELEPHONE ENCOUNTER
Refill Authorization Note   Yandy Cabral  is requesting a refill authorization.  Brief Assessment and Rationale for Refill:  Approve     Medication Therapy Plan:       Medication Reconciliation Completed: No   Comments:   --->Care Gap information included below if applicable.   Orders Placed This Encounter    flash glucose scanning reader (FREESTYLE CARMEN 14 DAY READER) [facility] [place]      Requested Prescriptions   Signed Prescriptions Disp Refills    flash glucose scanning reader (FREESTYLE CARMEN 14 DAY READER) Misc 6 each 3     Sig: USE 2 TO 10 TIMES DAILY       Diabetic Supplies Protocol Passed - 2/3/2022  8:15 AM        Passed - Visit with authorizing provider in past 12 months or upcoming 90 days         Passed - HgA1c on file in past 12 months     Hemoglobin A1C   Date Value Ref Range Status   12/09/2021 7.8 (H) 4.0 - 5.6 % Final     Comment:     ADA Screening Guidelines:  5.7-6.4%  Consistent with prediabetes  >or=6.5%  Consistent with diabetes    High levels of fetal hemoglobin interfere with the HbA1C  assay. Heterozygous hemoglobin variants (HbS, HgC, etc)do  not significantly interfere with this assay.   However, presence of multiple variants may affect accuracy.     05/21/2021 6.5 (H) 4.0 - 5.6 % Final     Comment:     ADA Screening Guidelines:  5.7-6.4%  Consistent with prediabetes  >or=6.5%  Consistent with diabetes    High levels of fetal hemoglobin interfere with the HbA1C  assay. Heterozygous hemoglobin variants (HbS, HgC, etc)do  not significantly interfere with this assay.   However, presence of multiple variants may affect accuracy.     08/05/2020 7.2 (H) 4.0 - 5.6 % Final     Comment:     ADA Screening Guidelines:  5.7-6.4%  Consistent with prediabetes  >or=6.5%  Consistent with diabetes  High levels of fetal hemoglobin interfere with the HbA1C  assay. Heterozygous hemoglobin variants (HbS, HgC, etc)do  not significantly interfere with this assay.   However, presence of multiple variants may affect  accuracy.              Endocrinology: Diabetes - Supplies Passed - 2/4/2022 10:28 AM        Passed - Patient is at least 18 years old        Passed - Valid encounter within last 15 months     Recent Visits  Date Type Provider Dept   06/03/21 Office Visit CHLOE Ramos Jr., MD [facility] Internal Medicine   06/18/20 Office Visit CHLOE Ramos Jr., MD [facility] Internal Medicine   Showing recent visits within past 720 days and meeting all other requirements  Future Appointments  No visits were found meeting these conditions.  Showing future appointments within next 150 days and meeting all other requirements      Future Appointments              In 1 month Count includes the [facility] MAMMO2 [place] [facility] [place], [place]    In 3 months CARDIOVASCULAR 1, Count includes the [facility] [place] - Special Cardiology, [facility]    In 4 months SPECIMEN, [facility] [place] [facility] Lab & [facility] ([facility]), [place]    In 4 months LABORATORY, [facility] [place] - Lab & Imaging, [place]    In 4 months Martin Villatoro MD [place] - Cardiology, [facility]    In 4 months CHLOE Ramos Jr., MD [facility] - Internal Med [facility], [place]                Passed - HBA1C within 1080 days     Lab Results   Component Value Date    HGBA1C 7.8 (H) 12/09/2021    HGBA1C 6.5 (H) 05/21/2021    HGBA1C 7.2 (H) 08/05/2020                  Appointments  past 12m or future 3m with PCP    Date Provider   Last Visit   6/3/2021 CHLOE Ramos Jr., MD   Next Visit   6/15/2022 CHLOE Ramos Jr., MD   ED visits in past 90 days: 0     Note composed:1:10 PM 02/05/2022

## 2022-02-10 ENCOUNTER — TELEPHONE (OUTPATIENT)
Dept: INTERNAL MEDICINE | Facility: CLINIC | Age: 52
End: 2022-02-10
Payer: COMMERCIAL

## 2022-02-10 RX ORDER — DAPAGLIFLOZIN 10 MG/1
10 TABLET, FILM COATED ORAL DAILY
Qty: 90 TABLET | Refills: 3 | Status: SHIPPED | OUTPATIENT
Start: 2022-02-10 | End: 2023-04-21

## 2022-03-02 ENCOUNTER — PATIENT MESSAGE (OUTPATIENT)
Dept: RESEARCH | Facility: HOSPITAL | Age: 52
End: 2022-03-02
Payer: COMMERCIAL

## 2022-03-21 ENCOUNTER — OFFICE VISIT (OUTPATIENT)
Dept: OPHTHALMOLOGY | Facility: CLINIC | Age: 52
End: 2022-03-21
Payer: COMMERCIAL

## 2022-03-21 DIAGNOSIS — H52.223 REGULAR ASTIGMATISM OF BOTH EYES: Primary | ICD-10-CM

## 2022-03-21 DIAGNOSIS — E11.65 TYPE 2 DIABETES MELLITUS WITH HYPERGLYCEMIA, WITHOUT LONG-TERM CURRENT USE OF INSULIN: ICD-10-CM

## 2022-03-21 PROCEDURE — 99499 UNLISTED E&M SERVICE: CPT | Mod: S$GLB,,, | Performed by: OPTOMETRIST

## 2022-03-21 PROCEDURE — 1159F PR MEDICATION LIST DOCUMENTED IN MEDICAL RECORD: ICD-10-PCS | Mod: CPTII,S$GLB,, | Performed by: OPTOMETRIST

## 2022-03-21 PROCEDURE — 92499 PR CONTACT LENS F/U LEV 1: ICD-10-PCS | Mod: ,,, | Performed by: OPTOMETRIST

## 2022-03-21 PROCEDURE — 99499 NO LOS: ICD-10-PCS | Mod: S$GLB,,, | Performed by: OPTOMETRIST

## 2022-03-21 PROCEDURE — 1159F MED LIST DOCD IN RCRD: CPT | Mod: CPTII,S$GLB,, | Performed by: OPTOMETRIST

## 2022-03-21 PROCEDURE — 92499 UNLISTED OPH SVC/PROCEDURE: CPT | Mod: ,,, | Performed by: OPTOMETRIST

## 2022-03-22 NOTE — PROGRESS NOTES
HPI     Patient here for CTL follow-up.     Pt states OS is irritating her.     Last edited by Risa Blanco MA on 3/21/2022  4:23 PM. (History)            Assessment /Plan     For exam results, see Encounter Report.    Regular astigmatism of both eyes  OU distance CTL dispensed today, +1.75 DS readers on top for near tasks as needed. CTL hygiene reviewed. Patient OK to finalize CL Rx after wearing lenses for 1 week.  Contact Lens Final Rx     Final Contact Lens Rx       Brand Base Curve Diameter Sphere Cylinder Axis    Right Biofinity Toric 8.7 14.5 -1.50 -1.75 100    Left Biofinity Toric XR 8.4 14.5 -0.50 -3.25 075    Expiration Date: 3/22/2023    Replacement: Monthly    Solutions: OptiFree PureMoist    Wearing Schedule: Daily Wear                Type 2 diabetes mellitus with hyperglycemia, without long-term current use of insulin  No retinopathy next DFE 1/2023     RTC 1 year for CEE with DFE sooner if any changes to vision or worsening symptoms.

## 2022-03-23 ENCOUNTER — HOSPITAL ENCOUNTER (OUTPATIENT)
Dept: RADIOLOGY | Facility: HOSPITAL | Age: 52
Discharge: HOME | End: 2022-03-23
Attending: NURSE PRACTITIONER
Payer: COMMERCIAL

## 2022-03-23 DIAGNOSIS — Z12.31 ENCOUNTER FOR SCREENING MAMMOGRAM FOR MALIGNANT NEOPLASM OF BREAST: ICD-10-CM

## 2022-03-23 PROCEDURE — 77063 BREAST TOMOSYNTHESIS BI: CPT | Mod: 26,,, | Performed by: RADIOLOGY

## 2022-03-23 PROCEDURE — 77063 MAMMO DIGITAL SCREENING BILAT WITH TOMO: ICD-10-PCS | Mod: 26,,, | Performed by: RADIOLOGY

## 2022-03-23 PROCEDURE — 77067 SCR MAMMO BI INCL CAD: CPT | Mod: TC

## 2022-03-23 PROCEDURE — 77067 SCR MAMMO BI INCL CAD: CPT | Mod: 26,,, | Performed by: RADIOLOGY

## 2022-03-23 PROCEDURE — 77063 BREAST TOMOSYNTHESIS BI: CPT | Mod: TC

## 2022-03-23 PROCEDURE — 77067 MAMMO DIGITAL SCREENING BILAT WITH TOMO: ICD-10-PCS | Mod: 26,,, | Performed by: RADIOLOGY

## 2022-03-24 ENCOUNTER — PATIENT MESSAGE (OUTPATIENT)
Dept: OPHTHALMOLOGY | Facility: CLINIC | Age: 52
End: 2022-03-24
Payer: COMMERCIAL

## 2022-04-06 ENCOUNTER — OFFICE VISIT (OUTPATIENT)
Dept: OPHTHALMOLOGY | Facility: CLINIC | Age: 52
End: 2022-04-06
Payer: COMMERCIAL

## 2022-04-06 DIAGNOSIS — H52.203 MYOPIA OF BOTH EYES WITH ASTIGMATISM: Primary | ICD-10-CM

## 2022-04-06 DIAGNOSIS — H52.13 MYOPIA OF BOTH EYES WITH ASTIGMATISM: Primary | ICD-10-CM

## 2022-04-06 PROCEDURE — 92012 PR EYE EXAM, EST PATIENT,INTERMED: ICD-10-PCS | Mod: S$GLB,,, | Performed by: OPTOMETRIST

## 2022-04-06 PROCEDURE — 92499 PR CONTACT LENS F/U LEV 1: ICD-10-PCS | Mod: ,,, | Performed by: OPTOMETRIST

## 2022-04-06 PROCEDURE — 92499 UNLISTED OPH SVC/PROCEDURE: CPT | Mod: ,,, | Performed by: OPTOMETRIST

## 2022-04-06 PROCEDURE — 1159F PR MEDICATION LIST DOCUMENTED IN MEDICAL RECORD: ICD-10-PCS | Mod: CPTII,S$GLB,, | Performed by: OPTOMETRIST

## 2022-04-06 PROCEDURE — 92012 INTRM OPH EXAM EST PATIENT: CPT | Mod: S$GLB,,, | Performed by: OPTOMETRIST

## 2022-04-06 PROCEDURE — 1159F MED LIST DOCD IN RCRD: CPT | Mod: CPTII,S$GLB,, | Performed by: OPTOMETRIST

## 2022-04-06 NOTE — PROGRESS NOTES
HPI     Last seen by KRISTIN   Patient here today for 3/21/22  Patient would like to switch from multifocal to distance and wear OTC   readers        Last edited by Joceline Ortiz, PCT on 4/6/2022  2:31 PM. (History)            Assessment /Plan     For exam results, see Encounter Report.    Myopia of both eyes with astigmatism      Contact Lens Final Rx     Final Contact Lens Rx       Brand Base Curve Diameter Sphere Cylinder Axis    Right Acuvue Oasys for Astigmatism 8.6 14.5 -1.50 -1.75 100    Left Acuvue Oasys for Astigmatism 8.6 14.5 -2.00 -1.75 070    Expiration Date: 4/6/2023    Replacement: Every 2 weeks    Solutions: OptiFree PureMoist    Wearing Schedule: Daily Wear              Trialed biofinity toric XR, for OS, patient unable to adapt.   OU distance patient will use OTC readers on top for near tasks

## 2022-05-09 ENCOUNTER — TELEPHONE (OUTPATIENT)
Dept: INTERNAL MEDICINE | Facility: CLINIC | Age: 52
End: 2022-05-09
Payer: COMMERCIAL

## 2022-05-09 ENCOUNTER — PATIENT MESSAGE (OUTPATIENT)
Dept: INTERNAL MEDICINE | Facility: CLINIC | Age: 52
End: 2022-05-09
Payer: COMMERCIAL

## 2022-05-09 NOTE — TELEPHONE ENCOUNTER
pt stated she didn't know that her invokana was replaced by farxiga in feb. Pt state she waill check med she just recieved and call back if need to.

## 2022-05-13 ENCOUNTER — HOSPITAL ENCOUNTER (OUTPATIENT)
Dept: RADIOLOGY | Facility: HOSPITAL | Age: 52
Discharge: HOME | End: 2022-05-13
Attending: PEDIATRICS
Payer: COMMERCIAL

## 2022-05-13 ENCOUNTER — LAB VISIT (OUTPATIENT)
Dept: LAB | Facility: HOSPITAL | Age: 52
End: 2022-05-13
Attending: INTERNAL MEDICINE
Payer: COMMERCIAL

## 2022-05-13 ENCOUNTER — OFFICE VISIT (OUTPATIENT)
Dept: INTERNAL MEDICINE | Facility: CLINIC | Age: 52
End: 2022-05-13
Payer: COMMERCIAL

## 2022-05-13 VITALS
DIASTOLIC BLOOD PRESSURE: 68 MMHG | WEIGHT: 148.38 LBS | OXYGEN SATURATION: 100 % | BODY MASS INDEX: 24.72 KG/M2 | HEART RATE: 71 BPM | RESPIRATION RATE: 16 BRPM | SYSTOLIC BLOOD PRESSURE: 112 MMHG | HEIGHT: 65 IN | TEMPERATURE: 98 F

## 2022-05-13 DIAGNOSIS — S22.42XA CLOSED FRACTURE OF MULTIPLE RIBS OF LEFT SIDE, INITIAL ENCOUNTER: Primary | ICD-10-CM

## 2022-05-13 DIAGNOSIS — E11.65 TYPE 2 DIABETES MELLITUS WITH HYPERGLYCEMIA, WITHOUT LONG-TERM CURRENT USE OF INSULIN: ICD-10-CM

## 2022-05-13 DIAGNOSIS — Z78.0 ENCOUNTER FOR OSTEOPOROSIS SCREENING IN ASYMPTOMATIC POSTMENOPAUSAL PATIENT: ICD-10-CM

## 2022-05-13 DIAGNOSIS — S22.42XA CLOSED FRACTURE OF MULTIPLE RIBS OF LEFT SIDE, INITIAL ENCOUNTER: ICD-10-CM

## 2022-05-13 DIAGNOSIS — I20.9 AP (ANGINA PECTORIS): ICD-10-CM

## 2022-05-13 DIAGNOSIS — I25.10 CAD IN NATIVE ARTERY: ICD-10-CM

## 2022-05-13 DIAGNOSIS — Z82.49 FAMILY HISTORY OF CARDIOVASCULAR DISEASE: ICD-10-CM

## 2022-05-13 DIAGNOSIS — Z13.820 ENCOUNTER FOR OSTEOPOROSIS SCREENING IN ASYMPTOMATIC POSTMENOPAUSAL PATIENT: ICD-10-CM

## 2022-05-13 DIAGNOSIS — E78.2 MIXED HYPERLIPIDEMIA: ICD-10-CM

## 2022-05-13 LAB
ALBUMIN SERPL BCP-MCNC: 3.9 G/DL (ref 3.5–5.2)
ALP SERPL-CCNC: 96 U/L (ref 55–135)
ALT SERPL W/O P-5'-P-CCNC: 30 U/L (ref 10–44)
ANION GAP SERPL CALC-SCNC: 7 MMOL/L (ref 8–16)
AST SERPL-CCNC: 26 U/L (ref 10–40)
BASOPHILS # BLD AUTO: 0.03 K/UL (ref 0–0.2)
BASOPHILS NFR BLD: 0.6 % (ref 0–1.9)
BILIRUB SERPL-MCNC: 0.5 MG/DL (ref 0.1–1)
BUN SERPL-MCNC: 16 MG/DL (ref 6–20)
CALCIUM SERPL-MCNC: 9.7 MG/DL (ref 8.7–10.5)
CHLORIDE SERPL-SCNC: 102 MMOL/L (ref 95–110)
CHOLEST SERPL-MCNC: 251 MG/DL (ref 120–199)
CHOLEST/HDLC SERPL: 3.9 {RATIO} (ref 2–5)
CO2 SERPL-SCNC: 29 MMOL/L (ref 23–29)
CREAT SERPL-MCNC: 0.8 MG/DL (ref 0.5–1.4)
DIFFERENTIAL METHOD: ABNORMAL
EOSINOPHIL # BLD AUTO: 0.1 K/UL (ref 0–0.5)
EOSINOPHIL NFR BLD: 2.5 % (ref 0–8)
ERYTHROCYTE [DISTWIDTH] IN BLOOD BY AUTOMATED COUNT: 12.6 % (ref 11.5–14.5)
EST. GFR  (AFRICAN AMERICAN): >60 ML/MIN/1.73 M^2
EST. GFR  (NON AFRICAN AMERICAN): >60 ML/MIN/1.73 M^2
ESTIMATED AVG GLUCOSE: 192 MG/DL (ref 68–131)
GLUCOSE SERPL-MCNC: 148 MG/DL (ref 70–110)
HBA1C MFR BLD: 8.3 % (ref 4–5.6)
HCT VFR BLD AUTO: 40.3 % (ref 37–48.5)
HDLC SERPL-MCNC: 65 MG/DL (ref 40–75)
HDLC SERPL: 25.9 % (ref 20–50)
HGB BLD-MCNC: 12.8 G/DL (ref 12–16)
IMM GRANULOCYTES # BLD AUTO: 0 K/UL (ref 0–0.04)
IMM GRANULOCYTES NFR BLD AUTO: 0 % (ref 0–0.5)
LDLC SERPL CALC-MCNC: 158.2 MG/DL (ref 63–159)
LYMPHOCYTES # BLD AUTO: 1.5 K/UL (ref 1–4.8)
LYMPHOCYTES NFR BLD: 28.3 % (ref 18–48)
MCH RBC QN AUTO: 30.8 PG (ref 27–31)
MCHC RBC AUTO-ENTMCNC: 31.8 G/DL (ref 32–36)
MCV RBC AUTO: 97 FL (ref 82–98)
MONOCYTES # BLD AUTO: 0.4 K/UL (ref 0.3–1)
MONOCYTES NFR BLD: 8.1 % (ref 4–15)
NEUTROPHILS # BLD AUTO: 3.1 K/UL (ref 1.8–7.7)
NEUTROPHILS NFR BLD: 60.5 % (ref 38–73)
NONHDLC SERPL-MCNC: 186 MG/DL
NRBC BLD-RTO: 0 /100 WBC
PLATELET # BLD AUTO: 273 K/UL (ref 150–450)
PMV BLD AUTO: 10.6 FL (ref 9.2–12.9)
POTASSIUM SERPL-SCNC: 4.8 MMOL/L (ref 3.5–5.1)
PROT SERPL-MCNC: 7.1 G/DL (ref 6–8.4)
RBC # BLD AUTO: 4.15 M/UL (ref 4–5.4)
SODIUM SERPL-SCNC: 138 MMOL/L (ref 136–145)
TRIGL SERPL-MCNC: 139 MG/DL (ref 30–150)
WBC # BLD AUTO: 5.16 K/UL (ref 3.9–12.7)

## 2022-05-13 PROCEDURE — 3008F BODY MASS INDEX DOCD: CPT | Mod: CPTII,S$GLB,, | Performed by: PEDIATRICS

## 2022-05-13 PROCEDURE — 83036 HEMOGLOBIN GLYCOSYLATED A1C: CPT | Performed by: INTERNAL MEDICINE

## 2022-05-13 PROCEDURE — 80053 COMPREHEN METABOLIC PANEL: CPT | Performed by: INTERNAL MEDICINE

## 2022-05-13 PROCEDURE — 1159F PR MEDICATION LIST DOCUMENTED IN MEDICAL RECORD: ICD-10-PCS | Mod: CPTII,S$GLB,, | Performed by: PEDIATRICS

## 2022-05-13 PROCEDURE — 80061 LIPID PANEL: CPT | Performed by: INTERNAL MEDICINE

## 2022-05-13 PROCEDURE — 3078F DIAST BP <80 MM HG: CPT | Mod: CPTII,S$GLB,, | Performed by: PEDIATRICS

## 2022-05-13 PROCEDURE — 85025 COMPLETE CBC W/AUTO DIFF WBC: CPT | Performed by: INTERNAL MEDICINE

## 2022-05-13 PROCEDURE — 71100 X-RAY EXAM RIBS UNI 2 VIEWS: CPT | Mod: TC,LT

## 2022-05-13 PROCEDURE — 99999 PR PBB SHADOW E&M-EST. PATIENT-LVL V: ICD-10-PCS | Mod: PBBFAC,,, | Performed by: PEDIATRICS

## 2022-05-13 PROCEDURE — 1159F MED LIST DOCD IN RCRD: CPT | Mod: CPTII,S$GLB,, | Performed by: PEDIATRICS

## 2022-05-13 PROCEDURE — 3074F PR MOST RECENT SYSTOLIC BLOOD PRESSURE < 130 MM HG: ICD-10-PCS | Mod: CPTII,S$GLB,, | Performed by: PEDIATRICS

## 2022-05-13 PROCEDURE — 71100 X-RAY EXAM RIBS UNI 2 VIEWS: CPT | Mod: 26,LT,, | Performed by: RADIOLOGY

## 2022-05-13 PROCEDURE — 3008F PR BODY MASS INDEX (BMI) DOCUMENTED: ICD-10-PCS | Mod: CPTII,S$GLB,, | Performed by: PEDIATRICS

## 2022-05-13 PROCEDURE — 99999 PR PBB SHADOW E&M-EST. PATIENT-LVL V: CPT | Mod: PBBFAC,,, | Performed by: PEDIATRICS

## 2022-05-13 PROCEDURE — 99213 OFFICE O/P EST LOW 20 MIN: CPT | Mod: S$GLB,,, | Performed by: PEDIATRICS

## 2022-05-13 PROCEDURE — 1160F RVW MEDS BY RX/DR IN RCRD: CPT | Mod: CPTII,S$GLB,, | Performed by: PEDIATRICS

## 2022-05-13 PROCEDURE — 1160F PR REVIEW ALL MEDS BY PRESCRIBER/CLIN PHARMACIST DOCUMENTED: ICD-10-PCS | Mod: CPTII,S$GLB,, | Performed by: PEDIATRICS

## 2022-05-13 PROCEDURE — 71100 XR RIBS 2 VIEW LEFT: ICD-10-PCS | Mod: 26,LT,, | Performed by: RADIOLOGY

## 2022-05-13 PROCEDURE — 3078F PR MOST RECENT DIASTOLIC BLOOD PRESSURE < 80 MM HG: ICD-10-PCS | Mod: CPTII,S$GLB,, | Performed by: PEDIATRICS

## 2022-05-13 PROCEDURE — 36415 COLL VENOUS BLD VENIPUNCTURE: CPT | Performed by: INTERNAL MEDICINE

## 2022-05-13 PROCEDURE — 3074F SYST BP LT 130 MM HG: CPT | Mod: CPTII,S$GLB,, | Performed by: PEDIATRICS

## 2022-05-13 PROCEDURE — 99213 PR OFFICE/OUTPT VISIT, EST, LEVL III, 20-29 MIN: ICD-10-PCS | Mod: S$GLB,,, | Performed by: PEDIATRICS

## 2022-05-13 RX ORDER — CHOLECALCIFEROL (VITAMIN D3) 25 MCG
1000 TABLET ORAL DAILY
COMMUNITY

## 2022-05-13 NOTE — PROGRESS NOTES
Subjective:       Patient ID: Yandy Cabral is a 51 y.o. female.    Chief Complaint: Rib Injury    Yandy Cabral is a 51 y.o. female who presents to clinic for rib fracture concerns. Says within 2mo she cracked 2 ribs. First was with bending over bathtub cleaning it. The next was 4 weeks after due to her daughter hugging and picking her up. Currently says she is not in any pain. She is concerned for osteoporosis risk. She post menopausal and DM. Has FHx.     Review of Systems   Constitutional: Negative for activity change, appetite change, chills, diaphoresis, fatigue, fever and unexpected weight change.   HENT: Negative for nasal congestion, ear pain, mouth sores, nosebleeds, postnasal drip, rhinorrhea, sneezing and sore throat.    Eyes: Negative for photophobia, pain, discharge, redness and visual disturbance.   Respiratory: Negative for cough, chest tightness, shortness of breath, wheezing and stridor.    Cardiovascular: Negative for chest pain, palpitations and leg swelling.   Gastrointestinal: Negative for constipation, diarrhea, nausea and vomiting.   Genitourinary: Negative for decreased urine volume, difficulty urinating, dysuria, flank pain, frequency, hematuria and urgency.   Musculoskeletal: Negative for arthralgias, back pain, joint swelling, neck pain and neck stiffness.   Integumentary:  Negative for color change and rash.   Neurological: Negative for dizziness, syncope, speech difficulty, weakness, light-headedness and headaches.   Hematological: Negative for adenopathy. Does not bruise/bleed easily.   Psychiatric/Behavioral: Negative for confusion, decreased concentration, dysphoric mood, hallucinations, sleep disturbance and suicidal ideas. The patient is not nervous/anxious.    All other systems reviewed and are negative.        Objective:      Physical Exam  Vitals and nursing note reviewed.   Constitutional:       General: She is not in acute distress.     Appearance: She is  well-developed.   Neck:      Thyroid: No thyromegaly.      Vascular: No JVD.   Cardiovascular:      Rate and Rhythm: Normal rate and regular rhythm.      Heart sounds: Normal heart sounds. No murmur heard.  Pulmonary:      Effort: Pulmonary effort is normal. No respiratory distress.      Breath sounds: Normal breath sounds. No wheezing or rales.   Abdominal:      General: There is no distension.      Palpations: Abdomen is soft. There is no mass.      Tenderness: There is no abdominal tenderness. There is no guarding.   Musculoskeletal:      Right lower leg: No edema.      Left lower leg: No edema.   Lymphadenopathy:      Cervical: No cervical adenopathy.   Skin:     Capillary Refill: Capillary refill takes less than 2 seconds.      Findings: No rash.   Neurological:      General: No focal deficit present.      Mental Status: She is alert and oriented to person, place, and time.      Cranial Nerves: No cranial nerve deficit.      Coordination: Coordination normal.   Psychiatric:         Mood and Affect: Mood normal.         Behavior: Behavior normal.         Thought Content: Thought content normal.         Judgment: Judgment normal.         Assessment:       Problem List Items Addressed This Visit    None     Visit Diagnoses     Closed fracture of multiple ribs of left side, initial encounter    -  Primary    Relevant Orders    X-Ray Ribs 2 View Left (Completed)    Encounter for osteoporosis screening in asymptomatic postmenopausal patient        Relevant Orders    DXA Bone Density Spine And Hip          Plan:     Closed fracture of multiple ribs of left side, initial encounter  -     X-Ray Ribs 2 View Left; Future; Expected date: 05/13/2022    Encounter for osteoporosis screening in asymptomatic postmenopausal patient  -     DXA Bone Density Spine And Hip; Future; Expected date: 05/13/2022    Ribs healed. Risk factors for osteoporosis, postmenopausal, dm, family hx, await dexascan. 2 oscalD a day and continued  anurag Toussaint Attestation:   I, Leopoldo Doherty, am scribing for, and in the presence of, Dr. Jaycob Ramos Jr. I performed the above scribed service and the documentation accurately describes the services I performed. I attest to the accuracy of the note.    I, Dr. Jaycob Ramos Jr, reviewed documentation as scribed above. I personally performed the services described in this documentation.  I agree that the record reflects my personal performance and is accurate and complete. Jaycob Ramos Jr., MD.  05/13/2022

## 2022-06-03 ENCOUNTER — PATIENT MESSAGE (OUTPATIENT)
Dept: INTERNAL MEDICINE | Facility: CLINIC | Age: 52
End: 2022-06-03
Payer: COMMERCIAL

## 2022-06-03 RX ORDER — NITROFURANTOIN (MACROCRYSTALS) 100 MG/1
100 CAPSULE ORAL EVERY 12 HOURS
Qty: 28 CAPSULE | Refills: 0 | Status: SHIPPED | OUTPATIENT
Start: 2022-06-03 | End: 2022-06-10

## 2022-06-10 ENCOUNTER — APPOINTMENT (OUTPATIENT)
Dept: RADIOLOGY | Facility: HOSPITAL | Age: 52
End: 2022-06-10
Attending: PEDIATRICS
Payer: COMMERCIAL

## 2022-06-10 DIAGNOSIS — Z13.820 ENCOUNTER FOR OSTEOPOROSIS SCREENING IN ASYMPTOMATIC POSTMENOPAUSAL PATIENT: ICD-10-CM

## 2022-06-10 DIAGNOSIS — M81.0 AGE RELATED OSTEOPOROSIS, UNSPECIFIED PATHOLOGICAL FRACTURE PRESENCE: Primary | ICD-10-CM

## 2022-06-10 DIAGNOSIS — Z78.0 ENCOUNTER FOR OSTEOPOROSIS SCREENING IN ASYMPTOMATIC POSTMENOPAUSAL PATIENT: ICD-10-CM

## 2022-06-10 PROCEDURE — 77080 DEXA BONE DENSITY SPINE HIP: ICD-10-PCS | Mod: 26,,, | Performed by: RADIOLOGY

## 2022-06-10 PROCEDURE — 77080 DXA BONE DENSITY AXIAL: CPT | Mod: TC

## 2022-06-10 PROCEDURE — 77080 DXA BONE DENSITY AXIAL: CPT | Mod: 26,,, | Performed by: RADIOLOGY

## 2022-06-10 RX ORDER — ALENDRONATE SODIUM 70 MG/1
70 TABLET ORAL
Qty: 4 TABLET | Refills: 11 | Status: SHIPPED | OUTPATIENT
Start: 2022-06-10 | End: 2022-09-13

## 2022-06-20 ENCOUNTER — PATIENT MESSAGE (OUTPATIENT)
Dept: INTERNAL MEDICINE | Facility: CLINIC | Age: 52
End: 2022-06-20
Payer: COMMERCIAL

## 2022-06-20 RX ORDER — CLONAZEPAM 0.5 MG/1
TABLET ORAL
Qty: 10 TABLET | Refills: 0 | Status: SHIPPED | OUTPATIENT
Start: 2022-06-20 | End: 2022-11-02 | Stop reason: SDUPTHER

## 2022-06-23 ENCOUNTER — PATIENT MESSAGE (OUTPATIENT)
Dept: OBSTETRICS AND GYNECOLOGY | Facility: CLINIC | Age: 52
End: 2022-06-23
Payer: COMMERCIAL

## 2022-06-23 DIAGNOSIS — N90.89 VULVAR IRRITATION: ICD-10-CM

## 2022-06-23 RX ORDER — CLOTRIMAZOLE AND BETAMETHASONE DIPROPIONATE 10; .64 MG/G; MG/G
CREAM TOPICAL
Qty: 45 G | Refills: 1 | Status: SHIPPED | OUTPATIENT
Start: 2022-06-23 | End: 2022-10-25

## 2022-07-14 DIAGNOSIS — E11.65 TYPE 2 DIABETES MELLITUS WITH HYPERGLYCEMIA, WITHOUT LONG-TERM CURRENT USE OF INSULIN: ICD-10-CM

## 2022-07-15 RX ORDER — METFORMIN HYDROCHLORIDE 500 MG/1
TABLET ORAL
Qty: 180 TABLET | Refills: 1 | Status: SHIPPED | OUTPATIENT
Start: 2022-07-15 | End: 2023-01-11

## 2022-07-15 NOTE — TELEPHONE ENCOUNTER
No new care gaps identified.  [facility] Embedded Care Gaps. Reference number: [id number]. 7/14/2022   11:19:27 PM CDT

## 2022-07-15 NOTE — TELEPHONE ENCOUNTER
Refill Decision Note   Yandy Cabral  is requesting a refill authorization.  Brief Assessment and Rationale for Refill:  Approve     Medication Therapy Plan:       Medication Reconciliation Completed: No   Comments:     No Care Gaps recommended.     Note composed:6:15 PM 07/15/2022

## 2022-07-22 ENCOUNTER — HOSPITAL ENCOUNTER (OUTPATIENT)
Dept: CARDIOLOGY | Facility: HOSPITAL | Age: 52
Discharge: HOME | End: 2022-07-22
Attending: INTERNAL MEDICINE
Payer: COMMERCIAL

## 2022-07-22 VITALS
HEART RATE: 66 BPM | DIASTOLIC BLOOD PRESSURE: 63 MMHG | WEIGHT: 148 LBS | HEIGHT: 65 IN | BODY MASS INDEX: 24.66 KG/M2 | SYSTOLIC BLOOD PRESSURE: 103 MMHG

## 2022-07-22 DIAGNOSIS — E11.65 TYPE 2 DIABETES MELLITUS WITH HYPERGLYCEMIA, WITHOUT LONG-TERM CURRENT USE OF INSULIN: ICD-10-CM

## 2022-07-22 DIAGNOSIS — I20.9 AP (ANGINA PECTORIS): ICD-10-CM

## 2022-07-22 DIAGNOSIS — I25.118 CORONARY ARTERY DISEASE OF NATIVE ARTERY OF NATIVE HEART WITH STABLE ANGINA PECTORIS: ICD-10-CM

## 2022-07-22 DIAGNOSIS — Z82.49 FAMILY HISTORY OF CARDIOVASCULAR DISEASE: ICD-10-CM

## 2022-07-22 DIAGNOSIS — I25.10 CAD IN NATIVE ARTERY: ICD-10-CM

## 2022-07-22 LAB
AORTIC ROOT ANNULUS: 2.8 CM
AV INDEX (PROSTH): 0.74
AV MEAN GRADIENT: 3 MMHG
AV PEAK GRADIENT: 6 MMHG
AV VALVE AREA: 2.13 CM2
AV VELOCITY RATIO: 0.73
BSA FOR ECHO PROCEDURE: 1.75 M2
CV ECHO LV RWT: 0.44 CM
CV STRESS BASE HR: 64 BPM
DIASTOLIC BLOOD PRESSURE: 63 MMHG
DOP CALC AO PEAK VEL: 1.2 M/S
DOP CALC AO VTI: 29.3 CM
DOP CALC LVOT AREA: 2.9 CM2
DOP CALC LVOT DIAMETER: 1.92 CM
DOP CALC LVOT PEAK VEL: 0.88 M/S
DOP CALC LVOT STROKE VOLUME: 62.51 CM3
DOP CALC RVOT PEAK VEL: 0.66 M/S
DOP CALC RVOT VTI: 19.5 CM
DOP CALCLVOT PEAK VEL VTI: 21.6 CM
E WAVE DECELERATION TIME: 187.6 MSEC
E/A RATIO: 1.21
E/E' RATIO: 10.4 M/S
ECHO LV POSTERIOR WALL: 0.87 CM (ref 0.6–1.1)
EJECTION FRACTION: 60 %
FRACTIONAL SHORTENING: 32 % (ref 28–44)
INTERVENTRICULAR SEPTUM: 0.8 CM (ref 0.6–1.1)
IVRT: 99.9 MSEC
LA MAJOR: 3.35 CM
LA MINOR: 3.82 CM
LA WIDTH: 3.4 CM
LEFT ATRIUM SIZE: 3.33 CM
LEFT ATRIUM VOLUME INDEX MOD: 21.7 ML/M2
LEFT ATRIUM VOLUME INDEX: 19.7 ML/M2
LEFT ATRIUM VOLUME MOD: 37.78 CM3
LEFT ATRIUM VOLUME: 34.35 CM3
LEFT INTERNAL DIMENSION IN SYSTOLE: 2.69 CM (ref 2.1–4)
LEFT VENTRICLE DIASTOLIC VOLUME INDEX: 39.59 ML/M2
LEFT VENTRICLE DIASTOLIC VOLUME: 68.89 ML
LEFT VENTRICLE MASS INDEX: 56 G/M2
LEFT VENTRICLE SYSTOLIC VOLUME INDEX: 15.3 ML/M2
LEFT VENTRICLE SYSTOLIC VOLUME: 26.7 ML
LEFT VENTRICULAR INTERNAL DIMENSION IN DIASTOLE: 3.97 CM (ref 3.5–6)
LEFT VENTRICULAR MASS: 97.81 G
LV LATERAL E/E' RATIO: 8.67 M/S
LV SEPTAL E/E' RATIO: 13 M/S
LVOT MG: 1.7 MMHG
LVOT MV: 0.61 CM/S
MV PEAK A VEL: 0.86 M/S
MV PEAK E VEL: 1.04 M/S
MV STENOSIS PRESSURE HALF TIME: 54.4 MS
MV VALVE AREA P 1/2 METHOD: 4.04 CM2
OHS CV CPX 1 MINUTE RECOVERY HEART RATE: 85 BPM
OHS CV CPX 85 PERCENT MAX PREDICTED HEART RATE MALE: 136
OHS CV CPX ESTIMATED METS: 10
OHS CV CPX MAX PREDICTED HEART RATE: 160
OHS CV CPX PATIENT IS FEMALE: 1
OHS CV CPX PATIENT IS MALE: 0
OHS CV CPX PEAK DIASTOLIC BLOOD PRESSURE: 69 MMHG
OHS CV CPX PEAK HEAR RATE: 111 BPM
OHS CV CPX PEAK RATE PRESSURE PRODUCT: NORMAL
OHS CV CPX PEAK SYSTOLIC BLOOD PRESSURE: 170 MMHG
OHS CV CPX PERCENT MAX PREDICTED HEART RATE ACHIEVED: 69
OHS CV CPX RATE PRESSURE PRODUCT PRESENTING: 6592
PISA TR MAX VEL: 2.14 M/S
PULM VEIN S/D RATIO: 0.8
PV MEAN GRADIENT: 1.22 MMHG
PV PEAK D VEL: 0.48 M/S
PV PEAK S VEL: 0.38 M/S
PV PEAK VELOCITY: 0.8 CM/S
RA MAJOR: 3.56 CM
RA WIDTH: 2.74 CM
RIGHT VENTRICULAR END-DIASTOLIC DIMENSION: 2.97 CM
SINUS: 2.82 CM
STJ: 2.82 CM
STRESS ANGINA INDEX: 0
STRESS ECHO POST EXERCISE DUR MIN: 7 MINUTES
STRESS ECHO POST EXERCISE DUR SEC: 12 SECONDS
SYSTOLIC BLOOD PRESSURE: 103 MMHG
TDI LATERAL: 0.12 M/S
TDI SEPTAL: 0.08 M/S
TDI: 0.1 M/S
TR MAX PG: 18 MMHG
TRICUSPID ANNULAR PLANE SYSTOLIC EXCURSION: 2.08 CM

## 2022-07-22 PROCEDURE — 93351 STRESS TTE COMPLETE: CPT

## 2022-07-22 PROCEDURE — 93320 STRESS ECHO (CUPID ONLY): ICD-10-PCS | Mod: 26,,, | Performed by: INTERNAL MEDICINE

## 2022-07-22 PROCEDURE — 93351 STRESS TTE COMPLETE: CPT | Mod: 26,,, | Performed by: INTERNAL MEDICINE

## 2022-07-22 PROCEDURE — 93320 DOPPLER ECHO COMPLETE: CPT | Mod: 26,,, | Performed by: INTERNAL MEDICINE

## 2022-07-22 PROCEDURE — 93351 STRESS ECHO (CUPID ONLY): ICD-10-PCS | Mod: 26,,, | Performed by: INTERNAL MEDICINE

## 2022-07-22 PROCEDURE — 93325 DOPPLER ECHO COLOR FLOW MAPG: CPT | Mod: 26,,, | Performed by: INTERNAL MEDICINE

## 2022-07-22 PROCEDURE — 93325 STRESS ECHO (CUPID ONLY): ICD-10-PCS | Mod: 26,,, | Performed by: INTERNAL MEDICINE

## 2022-07-24 ENCOUNTER — TELEPHONE (OUTPATIENT)
Dept: CARDIOLOGY | Facility: CLINIC | Age: 52
End: 2022-07-24
Payer: COMMERCIAL

## 2022-07-24 NOTE — TELEPHONE ENCOUNTER
Please call pt.  She passed her stress test.  No blockages noted.  Normal heart strength.  F/u next scheduled appt in Oct.    Dr Villatoro

## 2022-07-25 NOTE — TELEPHONE ENCOUNTER
Per Irving    Please call pt.  She passed her stress test.  No blockages noted.  Normal heart strength.  F/u next scheduled appt in Oct.     Dr Villatoro     No answer lvm and portal message to return call

## 2022-08-01 ENCOUNTER — PATIENT MESSAGE (OUTPATIENT)
Dept: OPTOMETRY | Facility: CLINIC | Age: 52
End: 2022-08-01
Payer: COMMERCIAL

## 2022-09-15 DIAGNOSIS — E11.9 TYPE 2 DIABETES MELLITUS WITHOUT COMPLICATION: ICD-10-CM

## 2022-10-05 ENCOUNTER — OFFICE VISIT (OUTPATIENT)
Dept: CARDIOLOGY | Facility: CLINIC | Age: 52
End: 2022-10-05
Payer: COMMERCIAL

## 2022-10-05 VITALS
HEIGHT: 65 IN | BODY MASS INDEX: 25.38 KG/M2 | DIASTOLIC BLOOD PRESSURE: 64 MMHG | SYSTOLIC BLOOD PRESSURE: 102 MMHG | HEART RATE: 82 BPM | WEIGHT: 152.31 LBS | OXYGEN SATURATION: 98 %

## 2022-10-05 DIAGNOSIS — I25.118 CORONARY ARTERY DISEASE OF NATIVE ARTERY OF NATIVE HEART WITH STABLE ANGINA PECTORIS: Primary | ICD-10-CM

## 2022-10-05 DIAGNOSIS — E66.3 OVERWEIGHT (BMI 25.0-29.9): ICD-10-CM

## 2022-10-05 DIAGNOSIS — Z82.49 FAMILY HISTORY OF CARDIOVASCULAR DISEASE: ICD-10-CM

## 2022-10-05 DIAGNOSIS — I20.9 AP (ANGINA PECTORIS): ICD-10-CM

## 2022-10-05 DIAGNOSIS — E78.2 MIXED HYPERLIPIDEMIA: ICD-10-CM

## 2022-10-05 DIAGNOSIS — I34.0 NONRHEUMATIC MITRAL VALVE REGURGITATION: ICD-10-CM

## 2022-10-05 DIAGNOSIS — E11.65 TYPE 2 DIABETES MELLITUS WITH HYPERGLYCEMIA, WITHOUT LONG-TERM CURRENT USE OF INSULIN: ICD-10-CM

## 2022-10-05 PROCEDURE — 3008F PR BODY MASS INDEX (BMI) DOCUMENTED: ICD-10-PCS | Mod: CPTII,S$GLB,, | Performed by: INTERNAL MEDICINE

## 2022-10-05 PROCEDURE — 3074F SYST BP LT 130 MM HG: CPT | Mod: CPTII,S$GLB,, | Performed by: INTERNAL MEDICINE

## 2022-10-05 PROCEDURE — 99214 PR OFFICE/OUTPT VISIT, EST, LEVL IV, 30-39 MIN: ICD-10-PCS | Mod: S$GLB,,, | Performed by: INTERNAL MEDICINE

## 2022-10-05 PROCEDURE — 3008F BODY MASS INDEX DOCD: CPT | Mod: CPTII,S$GLB,, | Performed by: INTERNAL MEDICINE

## 2022-10-05 PROCEDURE — 3078F DIAST BP <80 MM HG: CPT | Mod: CPTII,S$GLB,, | Performed by: INTERNAL MEDICINE

## 2022-10-05 PROCEDURE — 3078F PR MOST RECENT DIASTOLIC BLOOD PRESSURE < 80 MM HG: ICD-10-PCS | Mod: CPTII,S$GLB,, | Performed by: INTERNAL MEDICINE

## 2022-10-05 PROCEDURE — 3074F PR MOST RECENT SYSTOLIC BLOOD PRESSURE < 130 MM HG: ICD-10-PCS | Mod: CPTII,S$GLB,, | Performed by: INTERNAL MEDICINE

## 2022-10-05 PROCEDURE — 99214 OFFICE O/P EST MOD 30 MIN: CPT | Mod: S$GLB,,, | Performed by: INTERNAL MEDICINE

## 2022-10-05 PROCEDURE — 3052F PR MOST RECENT HEMOGLOBIN A1C LEVEL 8.0 - < 9.0%: ICD-10-PCS | Mod: CPTII,S$GLB,, | Performed by: INTERNAL MEDICINE

## 2022-10-05 PROCEDURE — 99999 PR PBB SHADOW E&M-EST. PATIENT-LVL III: ICD-10-PCS | Mod: PBBFAC,,, | Performed by: INTERNAL MEDICINE

## 2022-10-05 PROCEDURE — 3052F HG A1C>EQUAL 8.0%<EQUAL 9.0%: CPT | Mod: CPTII,S$GLB,, | Performed by: INTERNAL MEDICINE

## 2022-10-05 PROCEDURE — 99999 PR PBB SHADOW E&M-EST. PATIENT-LVL III: CPT | Mod: PBBFAC,,, | Performed by: INTERNAL MEDICINE

## 2022-10-05 NOTE — PROGRESS NOTES
Subjective:    Patient ID:  Yandy Cabral is a 51 y.o. female who presents for evaluation of Hyperlipidemia, Coronary Artery Disease, and Risk Factor Management For Atherosclerosis      HPIPt presents for eval.  Her current med conditions include CAD, DM, hyperlipidemia.  Nonsmoker.  Family history of CAD (father had MI/CABG in 50's).   Past hx pertinent for following:  Pt seen 2020 for cp, angina.  ecg done 2/3/20 is normal.  S/p LHC 2/20: 20% prox RCA, normal elsewhere. Normal EF.  ecg 5/14/21 NSR, normal ecg.  Now here.  CAD is stable.  No active angina.  Has not used sl ntg in some time now.  No CHF sxs.  Pilates twice/week.  Weight up some.  DM poorly controlled last check.  Lipids poorly controlled last check.  PCP made adjustments in tx per pt.  Stress echo July 2022: avg exercise capacity, achieved 69% THR (she is on beta blocker tx) mild MR, normal LVEF, no ischemia noted.  Rare palpitations.      Past Medical History:   Diagnosis Date    Abnormal Pap smear of cervix     CAD (coronary artery disease)     Chronic constipation     DM II (diabetes mellitus, type II), controlled     Hx of colposcopy with cervical biopsy      Current Outpatient Medications   Medication Instructions    alendronate (FOSAMAX) 70 mg, Oral, Every 7 days    aspirin (ECOTRIN) 81 mg, Oral, Daily    atorvastatin (LIPITOR) 40 mg, Oral, Daily    blood sugar diagnostic (FREESTYLE LITE STRIPS) Strp USE DAILY    blood sugar diagnostic Strp Use ac and hs    blood-glucose meter (FREESTYLE LITE METER) kit USE AS INSTRUCTED    buPROPion (WELLBUTRIN XL) 150 MG TB24 tablet TAKE 1 TABLET DAILY    CALCIUM ORAL 1 tablet, Oral, Daily    clonazePAM (KLONOPIN) 0.5 MG tablet Take one every 8 hours for plane trip and 1 at night for jetlag.    clotrimazole-betamethasone 1-0.05% (LOTRISONE) cream APPLY TO AFFECTED AREA TWICE DAILY    cyanocobalamin 1,000 mcg/mL injection INJECT 1 ML INTO THE MUSCLE EVERY 30 DAYS    estradioL (ESTRACE) 0.01 % (0.1  "mg/gram) vaginal cream INSERT 1 GRAM VAGINALLY ONCE DAILY    FARXIGA 10 mg, Oral, Daily, Replaces invokana due to formulary    flash glucose scanning reader (FREESTYLE CARMEN 14 DAY READER) Misc USE 2 TO 10 TIMES DAILY    flash glucose sensor (FREESTYLE CARMEN 14 DAY SENSOR) Kit For use to test Blood Sugar 2-10 times a day.    glimepiride (AMARYL) 2 MG tablet No dose, route, or frequency recorded.    melatonin (MELATIN) 5 mg, Oral, Nightly    metFORMIN (GLUCOPHAGE) 500 MG tablet TAKE 2 TABLETS ONCE DAILY    metoprolol succinate (TOPROL-XL) 25 MG 24 hr tablet TAKE 1 TABLET DAILY    multivitamin (THERAGRAN) per tablet 1 tablet, Oral    needle, disp, 24 gauge 24 gauge x 1" Ndle 1 Dose, Misc.(Non-Drug; Combo Route), Every 30 days    nitroGLYCERIN (NITROSTAT) 0.4 mg, Sublingual, Every 5 min PRN, Call 911 if pain persists after 2 doses.    ONE TOUCH VERIO Strp No dose, route, or frequency recorded.    pen needle, diabetic 31 gauge x 5/16" Ndle Use up to three daily    syringe with needle 3 mL 21 gauge x 1" Syrg USE ONE Q 4 WEEKS    vitamin D (VITAMIN D3) 1,000 Units, Oral, Daily       Review of Systems   Constitutional: Positive for weight gain.   HENT: Negative.     Eyes: Negative.    Cardiovascular: Negative.    Respiratory: Negative.     Endocrine: Negative.    Hematologic/Lymphatic: Negative.    Skin: Negative.    Musculoskeletal: Negative.    Gastrointestinal: Negative.    Genitourinary: Negative.    Neurological: Negative.    Psychiatric/Behavioral: Negative.     Allergic/Immunologic: Negative.         /64 (BP Location: Right arm, Patient Position: Sitting, BP Method: Medium (Manual))   Pulse 82   Ht 5' 5" (1.651 m)   Wt 69.1 kg (152 lb 5.4 oz)   LMP 03/05/2014   SpO2 98%   BMI 25.35 kg/m²     Wt Readings from Last 3 Encounters:   10/05/22 69.1 kg (152 lb 5.4 oz)   07/22/22 67.1 kg (148 lb)   05/13/22 67.3 kg (148 lb 5.9 oz)     Temp Readings from Last 3 Encounters:   05/13/22 98.2 °F (36.8 °C) (Oral) "   12/15/21 98.9 °F (37.2 °C)   06/03/21 98.6 °F (37 °C)     BP Readings from Last 3 Encounters:   10/05/22 102/64   07/22/22 103/63   05/13/22 112/68     Pulse Readings from Last 3 Encounters:   10/05/22 82   07/22/22 66   05/13/22 71       Objective:    Physical Exam  Vitals and nursing note reviewed.   Constitutional:       General: She is not in acute distress.     Appearance: Normal appearance. She is well-developed. She is not ill-appearing or diaphoretic.   HENT:      Head: Normocephalic.   Neck:      Thyroid: No thyromegaly.      Vascular: No carotid bruit or JVD.   Cardiovascular:      Rate and Rhythm: Normal rate and regular rhythm.      Pulses: Normal pulses.           Radial pulses are 2+ on the right side and 2+ on the left side.      Heart sounds: Normal heart sounds, S1 normal and S2 normal. No murmur heard.    No friction rub. No gallop.   Pulmonary:      Effort: Pulmonary effort is normal.      Breath sounds: Normal breath sounds. No wheezing or rales.   Abdominal:      General: Bowel sounds are normal. There is no abdominal bruit.      Palpations: Abdomen is soft.      Tenderness: There is no abdominal tenderness.   Musculoskeletal:      Cervical back: Neck supple.   Lymphadenopathy:      Cervical: No cervical adenopathy.   Skin:     General: Skin is warm.   Neurological:      Mental Status: She is alert and oriented to person, place, and time.   Psychiatric:         Behavior: Behavior normal. Behavior is cooperative.       I have reviewed all pertinent labs and cardiac studies.      Chemistry        Component Value Date/Time     05/13/2022 0911    K 4.8 05/13/2022 0911     05/13/2022 0911    CO2 29 05/13/2022 0911    BUN 16 05/13/2022 0911    CREATININE 0.8 05/13/2022 0911     (H) 05/13/2022 0911        Component Value Date/Time    CALCIUM 9.7 05/13/2022 0911    ALKPHOS 96 05/13/2022 0911    AST 26 05/13/2022 0911    ALT 30 05/13/2022 0911    BILITOT 0.5 05/13/2022 0911     ESTGFRAFRICA >60.0 05/13/2022 0911    EGFRNONAA >60.0 05/13/2022 0911        Lab Results   Component Value Date    WBC 5.16 05/13/2022    HGB 12.8 05/13/2022    HCT 40.3 05/13/2022    MCV 97 05/13/2022     05/13/2022       Lab Results   Component Value Date    HGBA1C 8.3 (H) 05/13/2022         Lab Results   Component Value Date    CHOL 251 (H) 05/13/2022    CHOL 143 12/09/2021    CHOL 235 (H) 05/21/2021     Lab Results   Component Value Date    HDL 65 05/13/2022    HDL 51 12/09/2021    HDL 56 05/21/2021     Lab Results   Component Value Date    LDLCALC 158.2 05/13/2022    LDLCALC 75.0 12/09/2021    LDLCALC 152.0 05/21/2021     Lab Results   Component Value Date    TRIG 139 05/13/2022    TRIG 85 12/09/2021    TRIG 135 05/21/2021     Lab Results   Component Value Date    CHOLHDL 25.9 05/13/2022    CHOLHDL 35.7 12/09/2021    CHOLHDL 23.8 05/21/2021     Summary    Concentric remodeling and normal systolic function.  The patient requested the test to be stopped.  Sensitivity is impaired due to the patient's failure to achieve target heart rate.  There were no arrhythmias during stress.  The estimated ejection fraction is 60%.  Normal left ventricular diastolic function.  Normal right ventricular size with normal right ventricular systolic function.  Mild mitral regurgitation.  The stress echo portion of this study is negative for myocardial ischemia.  The ECG portion of this study is negative for myocardial ischemia.     Study Details    A complete echo was performed using complete 2D, color flow Doppler and spectral Doppler. During the study, the apical, parasternal, subcostal and suprasternal views were captured.     Echocardiography Findings      Left Ventricle    The left ventricle is  with normal systolic function. The estimated ejection fraction is 60%. There is normal diastolic function.     Right Ventricle    Normal cavity size, wall thickness and systolic function. Wall motion normal.     Left  Atrium    The left atrial volume index is normal. Left atrial volume index is 19.7 mL/m2.     Right Atrium    The right atrium is normal in size.     Aortic Valve    The aortic valve appears structurally normal. There is normal leaflet mobility.     Mitral Valve    The mitral valve appears structurally normal. There is normal leaflet mobility.  There is mild mitral valve regurgitation. The estimated mitral valve area by pressure half time is 4.04 cm2.     Tricuspid Valve    The tricuspid valve appears structurally normal. The estimated PA systolic pressure is greater than 18 mmHg.       Stress Measurements    Baseline Vitals   HR at rest 64 bpm         Systolic blood pressure 103 mmHg         Diastolic blood pressure 63 mmHg         Stress Vitals   Peak  bpm         Peak Systolic  mmHg         Peak Diatolic BP 69 mmHg         Estimated METs 10          % Max HR Achieved 69          1 Minute Recovery HR 85 bpm              ECG/Stress      ECG    The baseline electrocardiogram reveals normal sinus rhythm at a rate of 64 bpm. The baseline ECG has normal ST segments.     Stress Findings    The patient exercised for 7 minutes and 12 seconds on a Terell protocol, corresponding to a functional capacity of 10 METS, achieving a peak heart rate of 111 bpm, which is 69% of the age predicted maximum heart rate. The patient reported no symptoms during stress. The patient experienced no angina during the stress test.   The blood pressure response to stress was normal.  Sensitivity is impaired due to the patient's failure to achieve target heart rate and beta blocker therapy.   The patient requested the test to be stopped.   The peak rate pressure product was 26571. Patient requested to stop due to leg fatigue.     ECG Peak    The ECG portion of this study was negative for myocardial ischemia. There was no ST segment deviation noted during stress.     The electrocardiogram revealed sinus tachycardia at peak stress.  There were no arrhythmias during stress.           Assessment:       1. Coronary artery disease of native artery of native heart with stable angina pectoris    2. AP (angina pectoris)    3. Family history of cardiovascular disease    4. Mixed hyperlipidemia    5. Type 2 diabetes mellitus with hyperglycemia, without long-term current use of insulin    6. Overweight (BMI 25.0-29.9)    7. Nonrheumatic mitral valve regurgitation         Plan:             Stable cardiovascular conditions at present time on current medical treatment.  No ischemia on stress test.  Continue med tx and risk factor modification.  Sl ntg prn.  Reviewed all tests and above medical conditions with patient in detail and formulated treatment plan.  Continue optimal medical treatment for cardiovascular conditions.  Cardiac low salt diet advised.  Daily exercise encouraged, with the goal 30 +  minutes aerobic exercise as tolerated.  Maintaining healthy weight and weight loss goals (if needed) were discussed in clinic.  Need for BP control and HTN goals (if needed) were discussed and tx plan formulated.  Importance of optimal lipid control were discussed in detail as well as possible pharmacologic and lifestyle changes that may be needed.  Statin tx.  Pt counseled on need to get DM HGAIC to goal.  F/u in 6 months.      I have reviewed all pertinent labs and cardiac studies independently. Plans and recommendations have been formulated under my direct supervision. All questions answered and patient voiced understanding.

## 2022-10-19 ENCOUNTER — LAB VISIT (OUTPATIENT)
Dept: LAB | Facility: HOSPITAL | Age: 52
End: 2022-10-19
Attending: PEDIATRICS
Payer: COMMERCIAL

## 2022-10-19 ENCOUNTER — OFFICE VISIT (OUTPATIENT)
Dept: INTERNAL MEDICINE | Facility: CLINIC | Age: 52
End: 2022-10-19
Payer: COMMERCIAL

## 2022-10-19 VITALS
HEART RATE: 71 BPM | OXYGEN SATURATION: 99 % | TEMPERATURE: 98 F | HEIGHT: 65 IN | WEIGHT: 153.69 LBS | SYSTOLIC BLOOD PRESSURE: 104 MMHG | BODY MASS INDEX: 25.61 KG/M2 | DIASTOLIC BLOOD PRESSURE: 68 MMHG

## 2022-10-19 DIAGNOSIS — E78.2 MIXED HYPERLIPIDEMIA: ICD-10-CM

## 2022-10-19 DIAGNOSIS — L97.422 DIABETIC ULCER OF LEFT HEEL ASSOCIATED WITH TYPE 2 DIABETES MELLITUS, WITH FAT LAYER EXPOSED: ICD-10-CM

## 2022-10-19 DIAGNOSIS — E11.65 TYPE 2 DIABETES MELLITUS WITH HYPERGLYCEMIA, WITHOUT LONG-TERM CURRENT USE OF INSULIN: Primary | ICD-10-CM

## 2022-10-19 DIAGNOSIS — E11.621 DIABETIC ULCER OF LEFT HEEL ASSOCIATED WITH TYPE 2 DIABETES MELLITUS, WITH FAT LAYER EXPOSED: ICD-10-CM

## 2022-10-19 DIAGNOSIS — I25.118 CORONARY ARTERY DISEASE OF NATIVE ARTERY OF NATIVE HEART WITH STABLE ANGINA PECTORIS: ICD-10-CM

## 2022-10-19 DIAGNOSIS — E11.65 TYPE 2 DIABETES MELLITUS WITH HYPERGLYCEMIA, WITHOUT LONG-TERM CURRENT USE OF INSULIN: ICD-10-CM

## 2022-10-19 DIAGNOSIS — F41.8 ANXIETY WITH DEPRESSION: ICD-10-CM

## 2022-10-19 PROBLEM — I25.10 CAD IN NATIVE ARTERY: Status: RESOLVED | Noted: 2020-11-19 | Resolved: 2022-10-19

## 2022-10-19 LAB
ALBUMIN SERPL BCP-MCNC: 4.2 G/DL (ref 3.5–5.2)
ALP SERPL-CCNC: 84 U/L (ref 55–135)
ALT SERPL W/O P-5'-P-CCNC: 39 U/L (ref 10–44)
ANION GAP SERPL CALC-SCNC: 8 MMOL/L (ref 8–16)
AST SERPL-CCNC: 33 U/L (ref 10–40)
BILIRUB SERPL-MCNC: 0.5 MG/DL (ref 0.1–1)
BUN SERPL-MCNC: 16 MG/DL (ref 6–20)
CALCIUM SERPL-MCNC: 10.2 MG/DL (ref 8.7–10.5)
CHLORIDE SERPL-SCNC: 103 MMOL/L (ref 95–110)
CHOLEST SERPL-MCNC: 167 MG/DL (ref 120–199)
CHOLEST/HDLC SERPL: 3 {RATIO} (ref 2–5)
CO2 SERPL-SCNC: 28 MMOL/L (ref 23–29)
CREAT SERPL-MCNC: 0.8 MG/DL (ref 0.5–1.4)
EST. GFR  (NO RACE VARIABLE): >60 ML/MIN/1.73 M^2
ESTIMATED AVG GLUCOSE: 194 MG/DL (ref 68–131)
GLUCOSE SERPL-MCNC: 213 MG/DL (ref 70–110)
HBA1C MFR BLD: 8.4 % (ref 4–5.6)
HDLC SERPL-MCNC: 55 MG/DL (ref 40–75)
HDLC SERPL: 32.9 % (ref 20–50)
LDLC SERPL CALC-MCNC: 92 MG/DL (ref 63–159)
NONHDLC SERPL-MCNC: 112 MG/DL
POTASSIUM SERPL-SCNC: 4.4 MMOL/L (ref 3.5–5.1)
PROT SERPL-MCNC: 7.1 G/DL (ref 6–8.4)
SODIUM SERPL-SCNC: 139 MMOL/L (ref 136–145)
TRIGL SERPL-MCNC: 100 MG/DL (ref 30–150)

## 2022-10-19 PROCEDURE — 99999 PR PBB SHADOW E&M-EST. PATIENT-LVL V: ICD-10-PCS | Mod: PBBFAC,,, | Performed by: PEDIATRICS

## 2022-10-19 PROCEDURE — 90471 IMMUNIZATION ADMIN: CPT | Mod: S$GLB,,, | Performed by: PEDIATRICS

## 2022-10-19 PROCEDURE — 80053 COMPREHEN METABOLIC PANEL: CPT | Performed by: PEDIATRICS

## 2022-10-19 PROCEDURE — 80061 LIPID PANEL: CPT | Performed by: PEDIATRICS

## 2022-10-19 PROCEDURE — 1160F PR REVIEW ALL MEDS BY PRESCRIBER/CLIN PHARMACIST DOCUMENTED: ICD-10-PCS | Mod: CPTII,S$GLB,, | Performed by: PEDIATRICS

## 2022-10-19 PROCEDURE — 90686 IIV4 VACC NO PRSV 0.5 ML IM: CPT | Mod: S$GLB,,, | Performed by: PEDIATRICS

## 2022-10-19 PROCEDURE — 1160F RVW MEDS BY RX/DR IN RCRD: CPT | Mod: CPTII,S$GLB,, | Performed by: PEDIATRICS

## 2022-10-19 PROCEDURE — 3074F SYST BP LT 130 MM HG: CPT | Mod: CPTII,S$GLB,, | Performed by: PEDIATRICS

## 2022-10-19 PROCEDURE — 36415 COLL VENOUS BLD VENIPUNCTURE: CPT | Performed by: PEDIATRICS

## 2022-10-19 PROCEDURE — 90686 FLU VACCINE (QUAD) GREATER THAN OR EQUAL TO 3YO PRESERVATIVE FREE IM: ICD-10-PCS | Mod: S$GLB,,, | Performed by: PEDIATRICS

## 2022-10-19 PROCEDURE — 99214 PR OFFICE/OUTPT VISIT, EST, LEVL IV, 30-39 MIN: ICD-10-PCS | Mod: 25,S$GLB,, | Performed by: PEDIATRICS

## 2022-10-19 PROCEDURE — 83036 HEMOGLOBIN GLYCOSYLATED A1C: CPT | Performed by: PEDIATRICS

## 2022-10-19 PROCEDURE — 1159F PR MEDICATION LIST DOCUMENTED IN MEDICAL RECORD: ICD-10-PCS | Mod: CPTII,S$GLB,, | Performed by: PEDIATRICS

## 2022-10-19 PROCEDURE — 3074F PR MOST RECENT SYSTOLIC BLOOD PRESSURE < 130 MM HG: ICD-10-PCS | Mod: CPTII,S$GLB,, | Performed by: PEDIATRICS

## 2022-10-19 PROCEDURE — 3078F PR MOST RECENT DIASTOLIC BLOOD PRESSURE < 80 MM HG: ICD-10-PCS | Mod: CPTII,S$GLB,, | Performed by: PEDIATRICS

## 2022-10-19 PROCEDURE — 99214 OFFICE O/P EST MOD 30 MIN: CPT | Mod: 25,S$GLB,, | Performed by: PEDIATRICS

## 2022-10-19 PROCEDURE — 1159F MED LIST DOCD IN RCRD: CPT | Mod: CPTII,S$GLB,, | Performed by: PEDIATRICS

## 2022-10-19 PROCEDURE — 90471 FLU VACCINE (QUAD) GREATER THAN OR EQUAL TO 3YO PRESERVATIVE FREE IM: ICD-10-PCS | Mod: S$GLB,,, | Performed by: PEDIATRICS

## 2022-10-19 PROCEDURE — 99999 PR PBB SHADOW E&M-EST. PATIENT-LVL V: CPT | Mod: PBBFAC,,, | Performed by: PEDIATRICS

## 2022-10-19 PROCEDURE — 3052F HG A1C>EQUAL 8.0%<EQUAL 9.0%: CPT | Mod: CPTII,S$GLB,, | Performed by: PEDIATRICS

## 2022-10-19 PROCEDURE — 3052F PR MOST RECENT HEMOGLOBIN A1C LEVEL 8.0 - < 9.0%: ICD-10-PCS | Mod: CPTII,S$GLB,, | Performed by: PEDIATRICS

## 2022-10-19 PROCEDURE — 3078F DIAST BP <80 MM HG: CPT | Mod: CPTII,S$GLB,, | Performed by: PEDIATRICS

## 2022-10-19 NOTE — Clinical Note
Elaine, she has been remiss. Goint to [place] in about a week. If y'all can get her in before that would be wonderful, otherwise as soon as possible. Will need ERNESTO too.

## 2022-10-19 NOTE — PROGRESS NOTES
Subjective:       Patient ID: Yandy Cabral is a 51 y.o. female.    Chief Complaint: Wound Check    Yandy Cabral is a 51 y.o. female who presents to clinic to resume care and for a wound on her L foot due to breaking in new shoes.  Has been to would clinic for two weeks and going today, on bactrim . Has been lax on follow up and DM self care.     1) DM: no hyper/hypoglycemic symptoms. Admits that she has not been following DM diet. Not checking BS. Taking oral, not taking insulin. [facility]. A1C in wound clinic 8.7  2) LIPIDS: tolerating and compliant with med(s).   3) Anxiety/Depression: Currently doing well on Wellbutrin.   4) CAD: no CP, SOB,  Sees Cardiology here.     Review of Systems   Constitutional:  Negative for activity change, appetite change, chills, diaphoresis, fatigue, fever and unexpected weight change.   HENT:  Negative for nasal congestion, ear pain, mouth sores, nosebleeds, postnasal drip, rhinorrhea, sneezing and sore throat.    Eyes:  Negative for photophobia, pain, discharge, redness and visual disturbance.   Respiratory:  Negative for cough, chest tightness, shortness of breath, wheezing and stridor.    Cardiovascular:  Negative for chest pain, palpitations and leg swelling.   Gastrointestinal:  Negative for constipation, diarrhea, nausea and vomiting.   Genitourinary:  Negative for decreased urine volume, difficulty urinating, dysuria, flank pain, frequency, hematuria and urgency.   Musculoskeletal:  Negative for arthralgias, back pain, joint swelling, neck pain and neck stiffness.   Integumentary:  Positive for wound. Negative for color change and rash.   Neurological:  Negative for dizziness, syncope, speech difficulty, weakness, light-headedness and headaches.   Hematological:  Negative for adenopathy. Does not bruise/bleed easily.   Psychiatric/Behavioral:  Negative for confusion, decreased concentration, dysphoric mood, hallucinations, sleep disturbance  and suicidal ideas. The patient is not nervous/anxious.    All other systems reviewed and are negative.      Objective:      Physical Exam  Vitals and nursing note reviewed.   Constitutional:       General: She is not in acute distress.     Appearance: Normal appearance. She is well-developed. She is not ill-appearing.   Neck:      Thyroid: No thyromegaly.      Vascular: No JVD.   Skin:     Comments: 1cm ulcer w/ clean base no evidence of infection on back of L heel, monofilament intact   Neurological:      General: No focal deficit present.      Mental Status: She is alert and oriented to person, place, and time.      Cranial Nerves: No cranial nerve deficit.      Coordination: Coordination normal.   Psychiatric:         Mood and Affect: Mood normal.         Behavior: Behavior normal.         Thought Content: Thought content normal.         Judgment: Judgment normal.       Assessment:       Problem List Items Addressed This Visit       Anxiety with depression    CAD (coronary artery disease)    Hyperlipemia    Type 2 diabetes mellitus with hyperglycemia, without long-term current use of insulin - Primary    Relevant Orders    Comprehensive Metabolic Panel    Lipid Panel    Microalbumin/Creatinine Ratio, Urine    Hemoglobin A1C    Ambulatory Referral to Diabetic Education    Comprehensive Metabolic Panel    Lipid Panel    Hemoglobin A1C     Other Visit Diagnoses       Diabetic ulcer of left heel associated with type 2 diabetes mellitus, with fat layer exposed                Plan:     Type 2 diabetes mellitus with hyperglycemia, without long-term current use of insulin  -     Comprehensive Metabolic Panel; Future; Expected date: 10/19/2022  -     Lipid Panel; Future; Expected date: 10/19/2022  -     Microalbumin/Creatinine Ratio, Urine; Future; Expected date: 10/19/2022  -     Hemoglobin A1C; Future; Expected date: 10/19/2022  -     Ambulatory Referral to Diabetic Education; Future; Expected date: 10/26/2022  -      Comprehensive Metabolic Panel; Future; Expected date: 10/19/2022  -     Lipid Panel; Future; Expected date: 10/19/2022  -     Hemoglobin A1C; Future; Expected date: 10/19/2022    Mixed hyperlipidemia    Anxiety with depression    Coronary artery disease of native artery of native heart with stable angina pectoris    Diabetic ulcer of left heel associated with type 2 diabetes mellitus, with fat layer exposed    Other orders  -     (In Office Administered) Pneumococcal Conjugate Vaccine (20 Valent) (IM)    Recheck labs. Importance of participation in self care discussed .enroll in [facility] program. Maintain meds. Continue with wound clinic. Vaccines discussed. Follow up 6mo with labs.     Scribe Attestation:   I, Leopoldo Doherty, am scribing for, and in the presence of, Dr. Jaycob Ramos Jr. I performed the above scribed service and the documentation accurately describes the services I performed. I attest to the accuracy of the note.    I, Dr. Jaycob Ramos Jr, reviewed documentation as scribed above. I personally performed the services described in this documentation.  I agree that the record reflects my personal performance and is accurate and complete. Jaycob Ramos Jr., MD.  10/19/2022

## 2022-10-31 ENCOUNTER — CLINICAL SUPPORT (OUTPATIENT)
Dept: DIABETES | Facility: CLINIC | Age: 52
End: 2022-10-31
Payer: COMMERCIAL

## 2022-10-31 DIAGNOSIS — E11.65 TYPE 2 DIABETES MELLITUS WITH HYPERGLYCEMIA, WITHOUT LONG-TERM CURRENT USE OF INSULIN: Primary | ICD-10-CM

## 2022-10-31 PROCEDURE — 99999 PR PBB SHADOW E&M-EST. PATIENT-LVL III: CPT | Mod: PBBFAC,,, | Performed by: DIETITIAN, REGISTERED

## 2022-10-31 PROCEDURE — G0108 DIAB MANAGE TRN  PER INDIV: HCPCS | Mod: S$GLB,,, | Performed by: DIETITIAN, REGISTERED

## 2022-10-31 PROCEDURE — 99999 PR PBB SHADOW E&M-EST. PATIENT-LVL III: ICD-10-PCS | Mod: PBBFAC,,, | Performed by: DIETITIAN, REGISTERED

## 2022-10-31 PROCEDURE — G0108 PR DIAB MANAGE TRN  PER INDIV: ICD-10-PCS | Mod: S$GLB,,, | Performed by: DIETITIAN, REGISTERED

## 2022-10-31 NOTE — PROGRESS NOTES
Diabetes Care Specialist Follow-up Note  Author: Federica Han RD, CDE  Date: 10/31/2022    Program Intake  Reason for Diabetes Program Visit:: Initial Diabetes Assessment  Current diabetes risk level:: moderate  In the last 12 months, have you:: none  Permission to speak with others about care:: no    Patient has had diabetes for 20+ years.     Lab Results   Component Value Date    HGBA1C 8.4 (H) 10/19/2022         Clinical         Problem Review  Reviewed Problem List with Patient: yes  Active comorbidities affecting diabetes self-care.: yes    Clinical Assessment  Current Diabetes Treatment: Oral Medication, Diet  Have you ever experienced hypoglycemia (low blood sugar)?: yes  In the last month, how often have you experienced low blood sugar?: other (see comments) (not recently)  Are you able to tell when your blood sugar is low?: Yes  What symptoms do you experience?: Dizzy/Light-headed, Sweaty  Have you ever been hospitalized because your blood sugar was too low?: no    Medication Information  How do you obtain your medications?: Patient drives  How many days a week do you miss your medications?: Never  Do you sometimes have difficulty refilling your medications?: No  Medication adherence impacting ability to self-manage diabetes?: No    Labs  Do you have regular lab work to monitor your medications?: Yes  Type of Regular Lab Work: A1c, Microalbumin, CBC  Where do you get your labs drawn?: [facility]  Lab Compliance Barriers: No    Nutritional Status  Diet: Regular  Meal Plan 24 Hour Recall: Breakfast, Lunch, Dinner, Snack  Meal Plan 24 Hour Recall - Breakfast: can't remember // normally has homemade egg sandwich, coffee with creamer, water  Meal Plan 24 Hour Recall - Lunch: leftovers from night before  Meal Plan 24 Hour Recall - Dinner: tuna fish, no bread or crackers, pickles, olive, cheese // usually has a protein, veggie, and starch  Meal Plan 24 Hour Recall - Snack: 1-2 diet dr. peppers per day  Change in  appetite?: No  Dentation:: Intact  Current nutritional status an area of need that is impacting patient's ability to self-manage diabetes?: No    Additional Social History    Support  Does anyone support you with your diabetes care?: yes  Who supports you?: self  Who takes you to your medical appointments?: self  Does the current support meet the patient's needs?: Yes  Is Support an area impacting ability to self-manage diabetes?: No    Access to Mass Media & Technology  Does the patient have access to any of the following devices or technologies?: Smart phone    Cognitive/Behavioral Health  Alert and Oriented: Yes  Difficulty Thinking: No  Requires Prompting: No  Requires assistance for routine expression?: No  Cognitive or behavioral barriers impacting ability to self-manage diabetes?: No    Culture/Episcopalian  Culture or Buddhism beliefs that may impact ability to access healthcare: No    Communication  Language preference: English  Hearing Problems: No  Vision Problems: No  Communication needs impacting ability to self-manage diabetes?: No    Health Literacy  Preferred Learning Method: Face to Face  How often do you need to have someone help you read instructions, pamphlets, or written material from your doctor or pharmacy?: Never  Health literacy needs impacting ability to self-manage diabetes?: No      Diabetes Self-Management Skills Assessment     Diabetes Disease Process/Treatment Options  Patient/caregiver able to state what happens when someone has diabetes.: yes  Patient/caregiver knows what type of diabetes they have.: yes  Diabetes Type : Type II  Patient/caregiver able to identify at least three signs and symptoms of diabetes.: yes  Identified signs and symptoms:: frequent urination, increased thirst (slow healing)  Patient able to identify at least three risk factors for diabetes.: yes  Identified risk factors:: family history  Diabetes Disease Process/Treatment Options: Skills Assessment Completed:  Yes  Assessment indicates:: Adequate understanding  Area of need?: No    Nutrition/Healthy Eating  Method of carbohydrate measurement:: carb counting/reading labels  Patient can identify foods that impact blood sugar.: yes  Patient-identified foods:: sweets, soda  Nutrition/Healthy Eating Skills Assessment Completed:: Yes  Assessment indicates:: Instruction Needed  Area of need?: Yes    Physical Activity/Exercise  Patient's daily activity level:: moderately active  Patient formally exercises outside of work.: yes  Exercise Type: exercise class (Pilates 2 x week)  Intensity: Moderate  Frequency: twice a week  Duration: 1 hour  Patient can identify forms of physical activity.: yes  Stated forms of physical activity:: any movement performed by muscles that uses energy  Patient can identify reasons why exercise/physical activity is important in diabetes management.: yes  Identified reasons:: keeps body and joints flexible  Physical Activity/Exercise Skills Assessment Completed: : Yes  Assessment indicates:: Adequate understanding  Area of need?: No    Medications  Patient is able to describe current diabetes management routine.: yes  Diabetes management routine:: diet, oral medications  Patient has worn Medtronic pump in the past. D/c'd because she was able to control BG without insulin.   Patient is able to identify current diabetes medications, dosages, and appropriate timing of medications.: yes (Metformin 1000mg QD // Farxiga 10mg QD // glimepiride 2mg QD)  Patient understands the purpose of the medications taken for diabetes.: yes  Patient reports problems or concerns with current medication regimen.: yes  Medication regimen problems/concerns:: does not feel like regimen is working  Medication Skills Assessment Completed:: Yes  Assessment indicates:: Instruction Needed  Area of need?: Yes    Home Blood Glucose Monitoring  Patient states that blood sugar is checked at home daily.: no  Reasons for not monitoring::  equipment outdated  Home Blood Glucose Monitoring Skills Assessment Completed: : Yes  Assessment indicates:: Instruction Needed  Area of need?: Yes    Acute Complications  Patient is able to identify types of acute complications: Yes  Patient Identified:: Hyperglycemia  Patient is able to state the basic meaning of hyperglycemia?: Yes  Patient able to state proper treatment of hyperglycemia?: yes  Patient identified:: increase water intake, take medication as recommended  Acute Complications Skills Assessment Completed: : Yes  Assessment indicates:: Instruction Needed  Area of need?: Yes    Chronic Complications  Patient can identify major chronic complications of diabetes.: yes  Stated chronic complications:: neuropathy/nerve damage, retinopathy, heart disease/heart attack  Patient can identify ways to prevent or delay diabetes complications.: yes  Stated ways to prevent complications:: controlling blood sugar  Patient is aware that having diabetes increases risk of heart disease?: Yes  Patient is taking statin?: Yes  Chronic Complications Skills Assessment Completed: : Yes  Assessment indicates:: Instruction Needed  Area of need?: Yes           During today's follow-up visit,  the following areas required further assessment and content was provided/reviewed.    Based on today's diabetes care assessment, the following areas of need were identified:      Social 10/31/2022   Support No   Cognitive/Behavioral Health No   Culture/Mormonism No   Communication No   Health Literacy No        Clinical 10/31/2022   Medication Adherence No   Lab Compliance No   Nutritional Status No        Diabetes Self-Management Skills 10/31/2022   Diabetes Disease Process/Treatment Options No- Reviewed insulin resistance and ways to improve. Reviewed importance of good BG control to prevent complications.    Nutrition/Healthy Eating Yes- Patient is knowledgeable of diabetic diet, got off track over the past couple of years.   Patient has  already made changes to improve diet, avoiding added sugars.   Reviewed sources of carbohydrates and goals for meals and snacks.    Provided sample meal plans and list of appropriate low carb snacks.    Physical Activity/Exercise No   Medication Yes- Sending message to [facility] staff to schedule patient for first available appt for medication management. Patient would like to discuss injectables. Educated on MOA of GLP-1 and benefits.    Home Blood Glucose Monitoring Yes- Sending order for PCP for insurance approved meter and strips.    Acute Complications Yes- Reviewed blood glucose goals, prevention, detection, signs and symptoms, and treatment of hypoglycemia and hyperglycemia, and when to contact the clinic.     Chronic Complications Yes- Discussed importance of A1c less than 7 to reduce risk of micro and macro complications, including nephropathy, neuropathy, retinopathy, heart attack and stroke. Reviewed the importance of controlled Blood Pressure and Cholesterol Lab Values in preventing disease.   Health maintenance reviewed          Today's interventions were provided through individual discussion, instruction, and written materials were provided.    Patient verbalized understanding of instruction and written materials.  Pt was able to return back demonstration of instructions today. Patient understood key points, needs reinforcement and further instruction.     Diabetes Self-Management Care Plan Review:      During today's follow-up Yandy's Diabetes Self-Management Care Plan progress was reviewed and progress was evaluated including his/her input. Yandy has agreed to continue his/her journey to improve/maintain overall diabetes control by continuing to set health goals. See care plan progress below.      Care Plan: Diabetes Management   Updates made since 10/1/2022 12:00 AM        Problem: Medications         Goal: Patient will see [facility] for medication management.    Start Date: 10/31/2022    Expected End Date: 5/31/2023   Priority: High   Barriers: No Barriers Identified        Task: Reviewed with patient all current diabetes medications and provided basic review of the purpose, dosage, frequency, side effects, and storage of both oral and injectable diabetes medications. Completed 10/31/2022        Task: Scheduling patient to see [facility] for medication management. Completed 10/31/2022        Goal: Patient agrees to check and record blood sugars at least 2 times per day.    Start Date: 10/31/2022   Expected End Date: 5/31/2023   Priority: Medium   Barriers: No Barriers Identified        Task: Sending order to PCP for insurance approved meter and strips. Completed 10/31/2022        Task: Reviewed the importance of self-monitoring blood glucose and keeping logs. Completed 10/31/2022        Task: Provided patient with blood glucose logs, reviewed appropriate timing and frequency to SMBG, education on parameters on when to notify provider and advised patient to bring logs to all appts with PCP/Endocrinologist/Diabetes Care Specialist. Completed 10/31/2022          Follow Up Plan     Follow up in about 3 months (around 1/31/2023) for E11.65.    Today's care plan and follow up schedule was discussed with patient.  Yandy verbalized understanding of the care plan, goals, and agrees to follow up plan.        The patient was encouraged to communicate with his/her health care provider/physician and care team regarding his/her condition(s) and treatment.  I provided the patient with my contact information today and encouraged to contact me via phone or [facility] Patient Portal as needed.

## 2022-11-01 ENCOUNTER — TELEPHONE (OUTPATIENT)
Dept: DIABETES | Facility: CLINIC | Age: 52
End: 2022-11-01
Payer: COMMERCIAL

## 2022-11-01 RX ORDER — LANCETS
1 EACH MISCELLANEOUS 3 TIMES DAILY
Qty: 100 EACH | Refills: 11 | Status: SHIPPED | OUTPATIENT
Start: 2022-11-01 | End: 2022-12-19 | Stop reason: ALTCHOICE

## 2022-11-01 RX ORDER — INSULIN PUMP SYRINGE, 3 ML
EACH MISCELLANEOUS
Qty: 1 EACH | Refills: 3 | Status: SHIPPED | OUTPATIENT
Start: 2022-11-01 | End: 2022-12-19 | Stop reason: ALTCHOICE

## 2022-11-01 NOTE — TELEPHONE ENCOUNTER
----- Message from Federica Han RD, CDE sent at 10/31/2022  3:36 PM CDT -----  Regarding: est care  This patient needs to see [facility] to est care. Willing to do virtual visit. Is interested in CGM and GLP-1.   Thanks!

## 2022-11-01 NOTE — TELEPHONE ENCOUNTER
Called patient about appt to est care with DM Management. No answer, lvm. Patient scheduled virtual 12/12 at 12:30pm

## 2022-11-02 ENCOUNTER — PATIENT MESSAGE (OUTPATIENT)
Dept: INTERNAL MEDICINE | Facility: CLINIC | Age: 52
End: 2022-11-02
Payer: COMMERCIAL

## 2022-11-02 ENCOUNTER — TELEPHONE (OUTPATIENT)
Dept: INTERNAL MEDICINE | Facility: CLINIC | Age: 52
End: 2022-11-02
Payer: COMMERCIAL

## 2022-11-02 RX ORDER — CLONAZEPAM 0.5 MG/1
TABLET ORAL
Qty: 10 TABLET | Refills: 0 | Status: SHIPPED | OUTPATIENT
Start: 2022-11-02 | End: 2023-02-07 | Stop reason: SDUPTHER

## 2022-11-02 NOTE — TELEPHONE ENCOUNTER
----- Message from Imelda Field sent at 11/2/2022  8:36 AM CDT -----  Patient is traveling to [place] and she is requesting some sort of sedative for the flight,she will be gone 10 days,Please call back at [phone].Thanks

## 2022-11-22 DIAGNOSIS — F41.8 ANXIETY WITH DEPRESSION: ICD-10-CM

## 2022-11-23 RX ORDER — BUPROPION HYDROCHLORIDE 150 MG/1
TABLET ORAL
Qty: 90 TABLET | Refills: 3 | Status: SHIPPED | OUTPATIENT
Start: 2022-11-23 | End: 2023-11-20

## 2022-11-23 NOTE — TELEPHONE ENCOUNTER
No new care gaps identified.  Health system Embedded Care Gaps. Reference number: [id number]. 11/22/2022   11:09:48 PM CST

## 2022-11-23 NOTE — TELEPHONE ENCOUNTER
Refill Decision Note   Yandy Cabral  is requesting a refill authorization.  Brief Assessment and Rationale for Refill:  Approve     Medication Therapy Plan:       Medication Reconciliation Completed: No   Comments:     No Care Gaps recommended.     Note composed:5:34 PM 11/23/2022

## 2022-12-07 ENCOUNTER — TELEPHONE (OUTPATIENT)
Dept: DIABETES | Facility: CLINIC | Age: 52
End: 2022-12-07
Payer: COMMERCIAL

## 2022-12-07 NOTE — TELEPHONE ENCOUNTER
Called pt and rescheduled appt pt was satisfied ----- Message from Harini Bella PA-C sent at 12/5/2022  2:48 PM CST -----  Offer her sooner est care visit with Lissette Pearson. Can also copy and paste this into a [employer] message as below to pt:       Hello,     We are reaching out to offer you a sooner establish care appt with the Diabetes team with Lissette Pearson NP. Please respond here or call [phone] to be scheduled for a sooner appt.

## 2022-12-19 ENCOUNTER — OFFICE VISIT (OUTPATIENT)
Dept: DIABETES | Facility: CLINIC | Age: 52
End: 2022-12-19
Payer: COMMERCIAL

## 2022-12-19 DIAGNOSIS — E66.3 OVERWEIGHT (BMI 25.0-29.9): ICD-10-CM

## 2022-12-19 DIAGNOSIS — E78.2 MIXED HYPERLIPIDEMIA: ICD-10-CM

## 2022-12-19 DIAGNOSIS — I25.118 CORONARY ARTERY DISEASE OF NATIVE ARTERY OF NATIVE HEART WITH STABLE ANGINA PECTORIS: ICD-10-CM

## 2022-12-19 DIAGNOSIS — E11.65 TYPE 2 DIABETES MELLITUS WITH HYPERGLYCEMIA, WITHOUT LONG-TERM CURRENT USE OF INSULIN: Primary | ICD-10-CM

## 2022-12-19 PROCEDURE — 99204 OFFICE O/P NEW MOD 45 MIN: CPT | Mod: 95,,, | Performed by: NURSE PRACTITIONER

## 2022-12-19 PROCEDURE — 1159F MED LIST DOCD IN RCRD: CPT | Mod: CPTII,95,, | Performed by: NURSE PRACTITIONER

## 2022-12-19 PROCEDURE — 3052F HG A1C>EQUAL 8.0%<EQUAL 9.0%: CPT | Mod: CPTII,95,, | Performed by: NURSE PRACTITIONER

## 2022-12-19 PROCEDURE — 1159F PR MEDICATION LIST DOCUMENTED IN MEDICAL RECORD: ICD-10-PCS | Mod: CPTII,95,, | Performed by: NURSE PRACTITIONER

## 2022-12-19 PROCEDURE — 3052F PR MOST RECENT HEMOGLOBIN A1C LEVEL 8.0 - < 9.0%: ICD-10-PCS | Mod: CPTII,95,, | Performed by: NURSE PRACTITIONER

## 2022-12-19 PROCEDURE — 1160F PR REVIEW ALL MEDS BY PRESCRIBER/CLIN PHARMACIST DOCUMENTED: ICD-10-PCS | Mod: CPTII,95,, | Performed by: NURSE PRACTITIONER

## 2022-12-19 PROCEDURE — 1160F RVW MEDS BY RX/DR IN RCRD: CPT | Mod: CPTII,95,, | Performed by: NURSE PRACTITIONER

## 2022-12-19 PROCEDURE — 99204 PR OFFICE/OUTPT VISIT, NEW, LEVL IV, 45-59 MIN: ICD-10-PCS | Mod: 95,,, | Performed by: NURSE PRACTITIONER

## 2022-12-19 RX ORDER — SEMAGLUTIDE 1.34 MG/ML
INJECTION, SOLUTION SUBCUTANEOUS
Qty: 2 PEN | Refills: 0 | Status: SHIPPED | OUTPATIENT
Start: 2022-12-19 | End: 2023-03-09

## 2022-12-19 RX ORDER — LANCETS
1 EACH MISCELLANEOUS 2 TIMES DAILY
Qty: 100 EACH | Refills: 11 | Status: SHIPPED | OUTPATIENT
Start: 2022-12-19 | End: 2023-01-18

## 2022-12-19 RX ORDER — BLOOD-GLUCOSE,RECEIVER,CONT
1 EACH MISCELLANEOUS DAILY
Qty: 1 EACH | Refills: 0 | Status: SHIPPED | OUTPATIENT
Start: 2022-12-19 | End: 2023-02-27

## 2022-12-19 RX ORDER — BLOOD-GLUCOSE SENSOR
1 EACH MISCELLANEOUS
Qty: 3 EACH | Refills: 11 | Status: SHIPPED | OUTPATIENT
Start: 2022-12-19 | End: 2023-02-06 | Stop reason: SDUPTHER

## 2022-12-19 RX ORDER — BLOOD-GLUCOSE TRANSMITTER
1 EACH MISCELLANEOUS
Qty: 1 EACH | Refills: 3 | Status: SHIPPED | OUTPATIENT
Start: 2022-12-19 | End: 2023-02-06 | Stop reason: SDUPTHER

## 2022-12-19 RX ORDER — INSULIN PUMP SYRINGE, 3 ML
EACH MISCELLANEOUS
Qty: 1 EACH | Refills: 0 | Status: SHIPPED | OUTPATIENT
Start: 2022-12-19 | End: 2023-06-30 | Stop reason: SDUPTHER

## 2022-12-19 NOTE — Clinical Note
4 WKS, IN-PERSON VISIT - [facility], FASTING LABS AND URINE 1 WEEK PRIOR TO APPOINTMENT, DEXCOM ORDER SENT TO [facility]

## 2022-12-19 NOTE — PATIENT INSTRUCTIONS
PATIENT INSTRUCTIONS    Refer to podiatry for diabetic foot care and history of diabetic foot ulcer. Diabetic Foot Exam deferred today due to virtual visit. Will plan to be done at next in-person visit.    Continue Farxiga 10 mg by mouth daily.   Continue Amaryl 2 mg by mouth daily.   Continue Metformin 1000 mg by mouth twice daily.   Start Ozempic 0.25 mg subcutaneously once weekly. Take 0.25 mg weekly x 4 weeks, then increase dose to 0.5 mg subcutaneously weekly x 4 weeks.   Fasting labs to be done 1 week prior to follow up appointment with me.    Dexcom G6 CGM ordered. Patient has demonstrated an understanding of technology and is motivated to use the device correctly. Patient is expected to adhere to a diabetes treatment plan and is capable of recognizing alerts and alarms. Patient has had A1c >7 x 2 in the last 6 months.   Please let me know as soon as you receive your continuous glucose monitoring supplies so we can get your training scheduled with our diabetic educators.    8. Continue Lifestyle modification with diabetic diet and at least 30 minutes of physical activity daily as recommended.   9. Check blood sugar twice daily. Every morning when you wake up and 1-2 hours after main meal of the day. Keep log and upload to [facility] prior to each visit.    10. Blood Sugar Goals:       Fastin-130.       1-2 hours after a meal: Less than 180.

## 2022-12-19 NOTE — PROGRESS NOTES
Telemedicine Visit    The patient location is home  The chief complaint leading to consultation is: Diabetes    Visit type: audiovisual    Face to Face time with patient: 34  53 minutes of total time spent on the encounter, which includes face to face time and non-face to face time preparing to see the patient (eg, review of tests), Obtaining and/or reviewing separately obtained history, Documenting clinical information in the electronic or other health record, Independently interpreting results (not separately reported) and communicating results to the patient/family/caregiver, or Care coordination (not separately reported).  Each patient to whom he or she provides medical services by telemedicine is:  (1) informed of the relationship between the physician and patient and the respective role of any other health care provider with respect to management of the patient; and (2) notified that he or she may decline to receive medical services by telemedicine and may withdraw from such care at any time.  Notes:         Yandy Cabral is a 52 y.o. female who  has a past medical history of Abnormal Pap smear of cervix, CAD (coronary artery disease), Chronic constipation, DM II (diabetes mellitus, type II), controlled, and colposcopy with cervical biopsy., who present for an initial evaluation of Type 2 diabetes mellitus.     CHIEF COMPLAINT: Diabetes Consultation    PCP: LEONID Ramos Jr, MD     The patient was initially diagnosed with diabetes 2004, polyuria/polydipsia, unintentional weight loss, weakness, neuropathy and vision changes. Diagnosed with DM type 1.5.     Previous failed treatments include:  Was on insulin previously, was eventually weaned off.   Invokana - insurance stopped paying.  Insulin pump - at [facility] in [id number]. [employer] [place].     Social Documentation:  Patient lives with  and daughter.   Occupation:  for nonprofit organization.   Patient prefers In-person Visits.    and Video Virtual Visits.   Current Diet: No fried foot, avoids dairy. Drinks diet dr. Azul, drinking more water.   Exercise: Pilates twice a week.     Current Diabetes related symptoms/problems include foot ulcerations and have been improving.   Known Diabetes complications: peripheral neuropathy and cardiovascular disease.   Cardiovascular Risk Factors: family history of premature cardiovascular disease.    Current monitoring regimen:  does not have testing strips.       Any episodes of hypoglycemia?  Yes. Hypoglycemia treatment reviewed with patient and education to be provided in AVS.  Voltaire faint, lethargic.   Hypoglycemia Unawareness? no  Severe hypoglycemia requiring 3rd party? yes. Has passed out once or twice 10+ years ago.   Seen by Diabetes Education in last year? yes    Diabetes Medications               dapagliflozin (FARXIGA) 10 mg tablet Take 1 tablet (10 mg total) by mouth once daily. Replaces invokana due to formulary    glimepiride (AMARYL) 2 MG tablet     metFORMIN (GLUCOPHAGE) 500 MG tablet TAKE 2 TABLETS ONCE DAILY         GLP-1 Therapy Initiation Considerations:   Family history of pancreatic cancer in first-degree relative: no  Personal history of pancreatitis: no  Family history of MTC/MEN/endocrine tumors: no  Personal history of Gastroparesis: no  Past use of GLP-1 Therapy with adverse effects: no  Past Bariatric Surgery: yes. Had gastric sleeve surgery 2012  Personal history of Diabetic Retinopathy: no  Most Recent GFR: >60 completed on 10/19/2022.   Need for Cardiovascular Risk Reduction: yes  Need for Potential Weight Loss: yes    DIABETES DISEASE MANAGEMENT STATUS  Statin: Taking  ACE/ARB: Not taking  Screening or Prevention Patient's value Goal Complete/Controlled?   HgA1C Testing and Control   Lab Results   Component Value Date    HGBA1C 8.4 (H) 10/19/2022      Annually/Less than 8% No     Lipid profile : 10/19/2022 Annually Yes     LDL control Lab Results   Component Value Date     LDLCALC 92.0 10/19/2022    Annually/Less than 100 mg/dl  Yes     Nephropathy screening Lab Results   Component Value Date    LABMICR 18.0 05/21/2021     Lab Results   Component Value Date    PROTEINUA 1+ (A) 06/24/2019     No results found for: UTPCR   Annually No     Blood pressure BP Readings from Last 1 Encounters:   10/19/22 104/68    Less than 140/90 Yes     Dilated retinal exam : 04/06/2022 Annually Yes     Foot exam   : 06/03/2021 Annually No     Patient's medications, allergies, past medical, surgical, social and family histories were reviewed and updated as appropriate.     Review of Systems   Constitutional:  Negative for weight loss.   Eyes:  Negative for blurred vision and double vision.   Cardiovascular:  Negative for chest pain.   Gastrointestinal:  Negative for nausea and vomiting.   Genitourinary:  Negative for frequency.   Musculoskeletal:  Negative for falls.   Neurological:  Negative for dizziness and weakness.   Endo/Heme/Allergies:  Negative for polydipsia.   Psychiatric/Behavioral:  Negative for depression.    All other systems reviewed and are negative.          Physical Exam  Constitutional:       Appearance: Normal appearance.   HENT:      Head: Normocephalic and atraumatic.   Pulmonary:      Effort: No respiratory distress.   Musculoskeletal:      Cervical back: Normal range of motion.   Neurological:      Mental Status: She is alert and oriented to person, place, and time.   Psychiatric:         Mood and Affect: Mood normal.         Behavior: Behavior normal.      Last menstrual period 03/05/2014.  Wt Readings from Last 3 Encounters:   10/19/22 69.7 kg (153 lb 10.6 oz)   10/05/22 69.1 kg (152 lb 5.4 oz)   07/22/22 67.1 kg (148 lb)       LAB REVIEW  Lab Results   Component Value Date     10/19/2022    K 4.4 10/19/2022     10/19/2022    CO2 28 10/19/2022    BUN 16 10/19/2022    CREATININE 0.8 10/19/2022    CALCIUM 10.2 10/19/2022    ANIONGAP 8 10/19/2022    EGFRNORACEVR >60.0  10/19/2022     No results found for: CPEPTIDE, EVY06JT, INSLNABS  Hemoglobin A1C   Date Value Ref Range Status   10/19/2022 8.4 (H) 4.0 - 5.6 % Final     Comment:     ADA Screening Guidelines:  5.7-6.4%  Consistent with prediabetes  >or=6.5%  Consistent with diabetes    High levels of fetal hemoglobin interfere with the HbA1C  assay. Heterozygous hemoglobin variants (HbS, HgC, etc)do  not significantly interfere with this assay.   However, presence of multiple variants may affect accuracy.     05/13/2022 8.3 (H) 4.0 - 5.6 % Final     Comment:     ADA Screening Guidelines:  5.7-6.4%  Consistent with prediabetes  >or=6.5%  Consistent with diabetes    High levels of fetal hemoglobin interfere with the HbA1C  assay. Heterozygous hemoglobin variants (HbS, HgC, etc)do  not significantly interfere with this assay.   However, presence of multiple variants may affect accuracy.     12/09/2021 7.8 (H) 4.0 - 5.6 % Final     Comment:     ADA Screening Guidelines:  5.7-6.4%  Consistent with prediabetes  >or=6.5%  Consistent with diabetes    High levels of fetal hemoglobin interfere with the HbA1C  assay. Heterozygous hemoglobin variants (HbS, HgC, etc)do  not significantly interfere with this assay.   However, presence of multiple variants may affect accuracy.         ASSESSMENT    ICD-10-CM ICD-9-CM   1. Type 2 diabetes mellitus with hyperglycemia, without long-term current use of insulin  E11.65 250.00     790.29   2. Coronary artery disease of native artery of native heart with stable angina pectoris  I25.118 414.01     413.9   3. Mixed hyperlipidemia  E78.2 272.2   4. Overweight (BMI 25.0-29.9)  E66.3 278.02        PLAN  Diagnoses and all orders for this visit:    Type 2 diabetes mellitus with hyperglycemia, without long-term current use of insulin  -     Ambulatory referral/consult to Podiatry; Future  -     blood-glucose meter,continuous (DEXCOM G6 ) Misc; 1 each by Misc.(Non-Drug; Combo Route) route once daily.  -      blood-glucose sensor (DEXCOM G6 SENSOR) Lindsay; 1 each by Misc.(Non-Drug; Combo Route) route every 10 days.  -     blood-glucose transmitter (DEXCOM G6 TRANSMITTER) Lindsay; 1 each by Misc.(Non-Drug; Combo Route) route every 3 (three) months.  -     blood sugar diagnostic (BLOOD GLUCOSE TEST) Strp; 1 strip by Misc.(Non-Drug; Combo Route) route 2 (two) times a day.  -     blood-glucose meter kit; Use as instructed  -     lancets Misc; 1 each by Misc.(Non-Drug; Combo Route) route 2 (two) times a day.  -     semaglutide (OZEMPIC) 0.25 mg or 0.5 mg(2 mg/1.5 mL) pen injector; Inject 0.25 mg into the skin every 7 days for 28 days, THEN 0.5 mg every 7 days.  -     Comprehensive Metabolic Panel; Future  -     Hemoglobin A1C; Future  -     Microalbumin/Creatinine Ratio, Urine; Future  -     C-Peptide; Future    Coronary artery disease of native artery of native heart with stable angina pectoris    Mixed hyperlipidemia    Overweight (BMI 25.0-29.9)        Reviewed pathophysiology of diabetes, complications related to the disease, importance of annual dilated eye exam and daily foot examination. Explained MOA, SE, dosage of medications. Written instructions given and reviewed with patient and patient verbalizes understanding.     Payor: BLUE CROSS BLUE SHIELD / Plan: BCBS ALL OUT OF STATE / Product Type: PPO /       PATIENT INSTRUCTIONS    Refer to podiatry for diabetic foot care and history of diabetic foot ulcer. Diabetic Foot Exam deferred today due to virtual visit. Will plan to be done at next in-person visit.    Continue Farxiga 10 mg by mouth daily.   Continue Amaryl 2 mg by mouth daily.   Continue Metformin 1000 mg by mouth twice daily.   Start Ozempic 0.25 mg subcutaneously once weekly. Take 0.25 mg weekly x 4 weeks, then increase dose to 0.5 mg subcutaneously weekly x 4 weeks.   Fasting labs to be done 1 week prior to follow up appointment with me.    Dexcom G6 CGM ordered. Patient has demonstrated an understanding of  technology and is motivated to use the device correctly. Patient is expected to adhere to a diabetes treatment plan and is capable of recognizing alerts and alarms. Patient has had A1c >7 x 2 in the last 6 months.   Please let me know as soon as you receive your continuous glucose monitoring supplies so we can get your training scheduled with our diabetic educators.    8. Continue Lifestyle modification with diabetic diet and at least 30 minutes of physical activity daily as recommended.   9. Check blood sugar twice daily. Every morning when you wake up and 1-2 hours after main meal of the day. Keep log and upload to [employer] prior to each visit.    10. Blood Sugar Goals:       Fastin-130.       1-2 hours after a meal: Less than 180.     Follow up in about 4 weeks (around 2023) for IN-PERSON VISIT - RIZWANA'JESSICA, FASTING LABS 1 WEEK PRIOR TO APPOINTMENT, DEXCOM.    Portions of this note were prepared with [employer] Naturally Speaking voice recognition transcription software. Grammatical errors, including garbled syntax, mangle pronouns, and other bizarre constructions may be attributed to that software system.

## 2023-01-11 ENCOUNTER — PATIENT MESSAGE (OUTPATIENT)
Dept: DIABETES | Facility: CLINIC | Age: 53
End: 2023-01-11
Payer: COMMERCIAL

## 2023-01-11 DIAGNOSIS — E11.65 TYPE 2 DIABETES MELLITUS WITH HYPERGLYCEMIA, WITHOUT LONG-TERM CURRENT USE OF INSULIN: Primary | ICD-10-CM

## 2023-01-17 ENCOUNTER — TELEPHONE (OUTPATIENT)
Dept: DIABETES | Facility: CLINIC | Age: 53
End: 2023-01-17
Payer: COMMERCIAL

## 2023-01-17 NOTE — TELEPHONE ENCOUNTER
Called pt to confirm appt for tomorrow. Pt did not answer lvm for pt to call back if need to reschedule appt.

## 2023-01-18 ENCOUNTER — OFFICE VISIT (OUTPATIENT)
Dept: DIABETES | Facility: CLINIC | Age: 53
End: 2023-01-18
Payer: COMMERCIAL

## 2023-01-18 VITALS
HEART RATE: 81 BPM | WEIGHT: 149.06 LBS | BODY MASS INDEX: 24.83 KG/M2 | DIASTOLIC BLOOD PRESSURE: 73 MMHG | HEIGHT: 65 IN | SYSTOLIC BLOOD PRESSURE: 116 MMHG

## 2023-01-18 DIAGNOSIS — E78.2 MIXED HYPERLIPIDEMIA: ICD-10-CM

## 2023-01-18 DIAGNOSIS — E11.65 TYPE 2 DIABETES MELLITUS WITH HYPERGLYCEMIA, WITHOUT LONG-TERM CURRENT USE OF INSULIN: Primary | ICD-10-CM

## 2023-01-18 DIAGNOSIS — Z97.8 USES SELF-APPLIED CONTINUOUS GLUCOSE MONITORING DEVICE: ICD-10-CM

## 2023-01-18 DIAGNOSIS — I25.118 CORONARY ARTERY DISEASE OF NATIVE ARTERY OF NATIVE HEART WITH STABLE ANGINA PECTORIS: ICD-10-CM

## 2023-01-18 LAB — GLUCOSE SERPL-MCNC: 172 MG/DL (ref 70–110)

## 2023-01-18 PROCEDURE — 3074F PR MOST RECENT SYSTOLIC BLOOD PRESSURE < 130 MM HG: ICD-10-PCS | Mod: CPTII,S$GLB,, | Performed by: NURSE PRACTITIONER

## 2023-01-18 PROCEDURE — 1159F MED LIST DOCD IN RCRD: CPT | Mod: CPTII,S$GLB,, | Performed by: NURSE PRACTITIONER

## 2023-01-18 PROCEDURE — 99214 OFFICE O/P EST MOD 30 MIN: CPT | Mod: S$GLB,,, | Performed by: NURSE PRACTITIONER

## 2023-01-18 PROCEDURE — 1159F PR MEDICATION LIST DOCUMENTED IN MEDICAL RECORD: ICD-10-PCS | Mod: CPTII,S$GLB,, | Performed by: NURSE PRACTITIONER

## 2023-01-18 PROCEDURE — 3008F BODY MASS INDEX DOCD: CPT | Mod: CPTII,S$GLB,, | Performed by: NURSE PRACTITIONER

## 2023-01-18 PROCEDURE — 82962 GLUCOSE BLOOD TEST: CPT | Mod: S$GLB,,, | Performed by: NURSE PRACTITIONER

## 2023-01-18 PROCEDURE — 3008F PR BODY MASS INDEX (BMI) DOCUMENTED: ICD-10-PCS | Mod: CPTII,S$GLB,, | Performed by: NURSE PRACTITIONER

## 2023-01-18 PROCEDURE — 3078F DIAST BP <80 MM HG: CPT | Mod: CPTII,S$GLB,, | Performed by: NURSE PRACTITIONER

## 2023-01-18 PROCEDURE — 3074F SYST BP LT 130 MM HG: CPT | Mod: CPTII,S$GLB,, | Performed by: NURSE PRACTITIONER

## 2023-01-18 PROCEDURE — 99999 PR PBB SHADOW E&M-EST. PATIENT-LVL III: ICD-10-PCS | Mod: PBBFAC,,, | Performed by: NURSE PRACTITIONER

## 2023-01-18 PROCEDURE — 3072F PR LOW RISK FOR RETINOPATHY: ICD-10-PCS | Mod: CPTII,S$GLB,, | Performed by: NURSE PRACTITIONER

## 2023-01-18 PROCEDURE — 3078F PR MOST RECENT DIASTOLIC BLOOD PRESSURE < 80 MM HG: ICD-10-PCS | Mod: CPTII,S$GLB,, | Performed by: NURSE PRACTITIONER

## 2023-01-18 PROCEDURE — 99999 PR PBB SHADOW E&M-EST. PATIENT-LVL III: CPT | Mod: PBBFAC,,, | Performed by: NURSE PRACTITIONER

## 2023-01-18 PROCEDURE — 99214 PR OFFICE/OUTPT VISIT, EST, LEVL IV, 30-39 MIN: ICD-10-PCS | Mod: S$GLB,,, | Performed by: NURSE PRACTITIONER

## 2023-01-18 PROCEDURE — 3072F LOW RISK FOR RETINOPATHY: CPT | Mod: CPTII,S$GLB,, | Performed by: NURSE PRACTITIONER

## 2023-01-18 PROCEDURE — 82962 POCT GLUCOSE, HAND-HELD DEVICE: ICD-10-PCS | Mod: S$GLB,,, | Performed by: NURSE PRACTITIONER

## 2023-01-18 RX ORDER — LANCETS 33 GAUGE
EACH MISCELLANEOUS 2 TIMES DAILY
COMMUNITY
Start: 2022-12-19 | End: 2023-06-30

## 2023-01-18 RX ORDER — BUPROPION HYDROCHLORIDE 100 MG/1
TABLET, EXTENDED RELEASE ORAL
COMMUNITY
End: 2023-01-18

## 2023-01-18 RX ORDER — COVID-19 ANTIGEN TEST
KIT MISCELLANEOUS
COMMUNITY
Start: 2023-01-04

## 2023-01-18 NOTE — PROGRESS NOTES
Yandy Cabral is a 52 y.o. female who presents for a follow up evaluation of Type 2 diabetes mellitus.     CHIEF COMPLAINT: Diabetes Consultation    PCP: LEONID Ramos Jr, MD     Initial visit with me - 12/19/2022    The patient was initially diagnosed with diabetes 2004, polyuria/polydipsia, unintentional weight loss, weakness, neuropathy and vision changes. Diagnosed with DM type 1.5.      Previous failed treatments include:  Was on insulin previously, was eventually weaned off.   Invokana - insurance stopped paying.  Insulin pump - at [facility] in [id number]. [employer].      Social Documentation:  Patient lives with  and daughter.   Occupation:  for nonprofit organization.   Patient prefers In-person Visits.   and Video Virtual Visits.   Current Diet: No fried foot, avoids dairy. Drinks diet dr. Azul, drinking more water.   Exercise: Pilates twice a week.      Known Diabetes complications: peripheral neuropathy and cardiovascular disease.   Cardiovascular Risk Factors: family history of premature cardiovascular disease.  Diabetes Medications               dapagliflozin (FARXIGA) 10 mg tablet Take 1 tablet (10 mg total) by mouth once daily. Replaces invokana due to formulary    glimepiride (AMARYL) 2 MG tablet     metFORMIN (GLUCOPHAGE) 500 MG tablet TAKE 2 TABLETS ONCE DAILY    semaglutide (OZEMPIC) 0.25 mg or 0.5 mg(2 mg/1.5 mL) pen injector Inject 0.25 mg into the skin every 7 days for 28 days, THEN 0.5 mg every 7 days.     Current monitoring regimen:  not checking.  Has dexcom, scheduled for training on Monday.  Recent hypoglycemic episodes: No.     Blood glucose today in clinic:   Lab Results   Component Value Date    POCGLU 172 (A) 01/18/2023     Patient compliant with glucose checks and medication administration? Yes    DIABETES MANAGEMENT STATUS  Statin: Taking  ACE/ARB: Not taking  Screening or Prevention Patient's value Goal Complete/Controlled?   HgA1C Testing and  Control   Lab Results   Component Value Date    HGBA1C 8.4 (H) 10/19/2022      Annually/Less than 8% No     Lipid profile : 10/19/2022 Annually Yes     LDL control Lab Results   Component Value Date    LDLCALC 92.0 10/19/2022    Annually/Less than 100 mg/dl  Yes     Nephropathy screening Lab Results   Component Value Date    LABMICR 18.0 05/21/2021     Lab Results   Component Value Date    PROTEINUA 1+ (A) 06/24/2019     No results found for: UTPCR   Annually No     Blood pressure BP Readings from Last 1 Encounters:   01/18/23 116/73    Less than 140/90 Yes     Dilated retinal exam : 04/06/2022 Annually Yes     Foot exam   : 06/03/2021 Annually No     Patient's medications, allergies, surgical, social and family histories were reviewed and updated as appropriate.     Review of Systems   Constitutional:  Negative for weight loss.   Eyes:  Negative for blurred vision and double vision.   Cardiovascular:  Negative for chest pain.   Gastrointestinal:  Negative for nausea and vomiting.   Genitourinary:  Negative for frequency.   Musculoskeletal:  Negative for falls.   Neurological:  Negative for dizziness and weakness.   Endo/Heme/Allergies:  Negative for polydipsia.   Psychiatric/Behavioral:  Negative for depression.    All other systems reviewed and are negative.     Physical Exam  Constitutional:       Appearance: Normal appearance.   HENT:      Head: Normocephalic and atraumatic.   Eyes:      Extraocular Movements: Extraocular movements intact.      Pupils: Pupils are equal, round, and reactive to light.   Cardiovascular:      Rate and Rhythm: Normal rate and regular rhythm.      Heart sounds: Normal heart sounds.   Pulmonary:      Effort: Pulmonary effort is normal. No respiratory distress.      Breath sounds: Normal breath sounds.   Musculoskeletal:         General: No swelling.      Cervical back: Normal range of motion.   Skin:     General: Skin is warm and dry.   Neurological:      Mental Status: She is alert  "and oriented to person, place, and time.   Psychiatric:         Mood and Affect: Mood normal.         Behavior: Behavior normal.      Blood pressure 116/73, pulse 81, height 5' 5" (1.651 m), weight 67.6 kg (149 lb 0.5 oz), last menstrual period 2014.  Wt Readings from Last 3 Encounters:   23 67.6 kg (149 lb 0.5 oz)   10/19/22 69.7 kg (153 lb 10.6 oz)   10/05/22 69.1 kg (152 lb 5.4 oz)         ASSESSMENT    ICD-10-CM ICD-9-CM   1. Type 2 diabetes mellitus with hyperglycemia, without long-term current use of insulin  E11.65 250.00     790.29   2. Coronary artery disease of native artery of native heart with stable angina pectoris  I25.118 414.01     413.9   3. Mixed hyperlipidemia  E78.2 272.2   4. CGM - DEXCOM - SHARING  Z97.8 V49.89       PLAN  Diagnoses and all orders for this visit:    Type 2 diabetes mellitus with hyperglycemia, without long-term current use of insulin  -     POCT Glucose, Hand-Held Device    Coronary artery disease of native artery of native heart with stable angina pectoris    Mixed hyperlipidemia    CGM - DEXCOM - SHARING        Reviewed pathophysiology of diabetes, complications related to the disease, importance of annual dilated eye exam and daily foot examination. Explained MOA, SE, dosage of medications. Written instructions given and reviewed with patient and patient verbalizes understanding.        PATIENT INSTRUCTIONS    Continue Farxiga 10 mg by mouth daily.   Continue Metformin 500 mg by mouth twice daily with meals.   Continue Ozempic 0.25 mg subcutaneously weekly x 3 more weeks, then increase to 0.5 mg subcutaneously weekly.   Continue Amaryl 2 mg by mouth daily.   A1c and urine for microalbumin today.   Keep scheduled appointmetn with education on Monday for Dexcom training. Will add to our Clarity system.   Blood Sugar Goals:       Fastin-130.       1-2 hours after a meal: Less than 180.     Follow up in about 4 weeks (around 2/15/2023) for VIRTUAL VIDEO VISIT, " DEXCOM, PT SHARING CGM/PUMP DATA.    Portions of this note were prepared with [employer] Naturally Speaking voice recognition transcription software. Grammatical errors, including garbled syntax, mangle pronouns, and other bizarre constructions may be attributed to that software system.

## 2023-01-18 NOTE — PATIENT INSTRUCTIONS
PATIENT INSTRUCTIONS    Continue Farxiga 10 mg by mouth daily.   Continue Metformin 500 mg by mouth twice daily with meals.   Continue Ozempic 0.25 mg subcutaneously weekly x 3 more weeks, then increase to 0.5 mg subcutaneously weekly.   Continue Amaryl 2 mg by mouth daily.   A1c and urine for microalbumin today.   Keep scheduled appointmetn with education on Monday for Dexcom training. Will add to our Clarity system.   Blood Sugar Goals:       Fastin-130.       1-2 hours after a meal: Less than 180.

## 2023-01-23 ENCOUNTER — CLINICAL SUPPORT (OUTPATIENT)
Dept: DIABETES | Facility: CLINIC | Age: 53
End: 2023-01-23
Payer: COMMERCIAL

## 2023-01-23 ENCOUNTER — PATIENT MESSAGE (OUTPATIENT)
Dept: DIABETES | Facility: CLINIC | Age: 53
End: 2023-01-23
Payer: COMMERCIAL

## 2023-01-23 DIAGNOSIS — E11.65 TYPE 2 DIABETES MELLITUS WITH HYPERGLYCEMIA, WITHOUT LONG-TERM CURRENT USE OF INSULIN: Primary | ICD-10-CM

## 2023-01-23 DIAGNOSIS — E11.65 TYPE 2 DIABETES MELLITUS WITH HYPERGLYCEMIA, WITHOUT LONG-TERM CURRENT USE OF INSULIN: ICD-10-CM

## 2023-01-23 PROCEDURE — G0108 PR DIAB MANAGE TRN  PER INDIV: ICD-10-PCS | Mod: S$GLB,,, | Performed by: DIETITIAN, REGISTERED

## 2023-01-23 PROCEDURE — 99999 PR PBB SHADOW E&M-EST. PATIENT-LVL II: ICD-10-PCS | Mod: PBBFAC,,, | Performed by: DIETITIAN, REGISTERED

## 2023-01-23 PROCEDURE — 99999 PR PBB SHADOW E&M-EST. PATIENT-LVL II: CPT | Mod: PBBFAC,,, | Performed by: DIETITIAN, REGISTERED

## 2023-01-23 PROCEDURE — G0108 DIAB MANAGE TRN  PER INDIV: HCPCS | Mod: S$GLB,,, | Performed by: DIETITIAN, REGISTERED

## 2023-01-23 NOTE — PROGRESS NOTES
Diabetes Care Specialist Progress Note  Author: Federica Han RD, CDE  Date: 1/23/2023    Program Intake  Reason for Diabetes Program Visit:: Intervention  Type of Intervention:: Individual  Individual: Device Training  Device Training: Personal CGM  Current diabetes risk level:: moderate  In the last 12 months, have you:: none  Permission to speak with others about care:: no    Lab Results   Component Value Date    HGBA1C 8.4 (H) 10/19/2022           Diabetes Self-Management Skills Assessment      Current Diabetes Medications:   Farxiga 10mg QD  Amaryl 2mg QD  Ozempic .25mg for 4 weeks, increase to .5mg q7d     Home Blood Glucose Monitoring  Patient states that blood sugar is checked at home daily.: no  Home Blood Glucose Monitoring Skills Assessment Completed: : Yes  Assessment indicates:: Instruction Needed  Area of need?: Yes         Assessment Summary and Plan    Based on today's diabetes care assessment, the following areas of need were identified:      Social 10/31/2022   Support No   Cognitive/Behavioral Health No   Culture/Taoism No   Communication No   Health Literacy No        Clinical 10/31/2022   Medication Adherence No   Lab Compliance No   Nutritional Status No        Diabetes Self-Management Skills 1/23/2023   Diabetes Disease Process/Treatment Options -   Nutrition/Healthy Eating -   Physical Activity/Exercise -   Medication Patient met goal, established care with Diabetes ERNESTO, Lissette Pearson.    Home Blood Glucose Monitoring Yes- see care plan update.   Dexcom training today. Sharing data with clinic. Will run report in one week and review.    Acute Complications -   Chronic Complications -          Today's interventions were provided through individual discussion, instruction, and written materials were provided.      Patient verbalized understanding of instruction and written materials.  Pt was able to return back demonstration of instructions today. Patient understood key points, needs  "reinforcement and further instruction.     Diabetes Self-Management Care Plan:    Today's Diabetes Self-Management Care Plan was developed with Yandy's input. Yandy has agreed to work toward the following goal(s) to improve his/her overall diabetes control.      Care Plan: Diabetes Management   Updates made since 12/24/2022 12:00 AM        Problem: Medications         Goal: Patient will see Diabetes ERNESTO for medication management.    Start Date: 10/31/2022   Expected End Date: 5/31/2023   This Visit's Progress: Met   Priority: High   Barriers: No Barriers Identified        Goal: Patient agrees to check and record blood sugars at least 2 times per day.    Start Date: 10/31/2022   Expected End Date: 5/31/2023   This Visit's Progress: No change   Priority: Medium   Barriers: No Barriers Identified        Task: Trained patient to use Dexcom G6 today. Completed 1/23/2023   Note:    DEXCOM G6 SENSOR START     Patient referred to  clinic today for  Dexcom G6 continuous glucose sensor system training.  Patient using smart phone as the .   Education provided using  "Quick Start Guide" per Dexcom protocol.   Overview:  5min glucose reading updates, trending arrows, BG graph screens, battery life indicator, Blue Tooth Symbol. No calibration needed.    Menus: trend Graph, start sensor, enter BG, events, Alerts, Settings, Shutdown, Stop Sensor.    Screens and prompts:    * 2 hr warm-up after starting sensor    * Low battery Notification     The  transmitter ID programmed in .   Settings:    * Low alert: 70 mg/dl    * High alert: 250 mg/dl    * Rise rate: OFF    * Fall Rate: OFF    * Urgent low:  ON    * Urgent low soon: ON  Reviewed sensor site selection. Site selected and prepped using aseptic technique Inserted to abdomen. Transmitter placed in pod and secured.  Practiced sensor pod/transmitter removal from site, and removal of transmitter from sensor pod.  Patient able to " demonstrate without difficulty.  Encouraged to review manual prior to starting another sensor.   Reviewed problem solving aspects of sensor transmission/ variables that can disrupt RF transmission.  range 20 ft from transmitter.  Pt instructed on lag time of interstitial fluid from CBG and was advised to tx hypoglycemia and dose insulin only when a glucose reading and trend arrow is present.   Dexcom technical support contact number given and examples of when to contact them discussed.   Patient successfully sharing data with clinic.                Follow Up Plan     Follow up in about 1 week (around 1/30/2023) for E11.65.    Today's care plan and follow up schedule was discussed with patient.  Yandy verbalized understanding of the care plan, goals, and agrees to follow up plan.        The patient was encouraged to communicate with his/her health care provider/physician and care team regarding his/her condition(s) and treatment.  I provided the patient with my contact information today and encouraged to contact me via phone or [facility] Patient Portal as needed.     Length of Visit   Total Time: 60 Minutes

## 2023-01-30 ENCOUNTER — PATIENT MESSAGE (OUTPATIENT)
Dept: DIABETES | Facility: CLINIC | Age: 53
End: 2023-01-30
Payer: COMMERCIAL

## 2023-01-30 ENCOUNTER — PATIENT MESSAGE (OUTPATIENT)
Dept: INTERNAL MEDICINE | Facility: CLINIC | Age: 53
End: 2023-01-30
Payer: COMMERCIAL

## 2023-01-30 ENCOUNTER — PATIENT MESSAGE (OUTPATIENT)
Dept: OBSTETRICS AND GYNECOLOGY | Facility: CLINIC | Age: 53
End: 2023-01-30
Payer: COMMERCIAL

## 2023-01-30 DIAGNOSIS — E11.65 TYPE 2 DIABETES MELLITUS WITH HYPERGLYCEMIA, WITHOUT LONG-TERM CURRENT USE OF INSULIN: Primary | ICD-10-CM

## 2023-01-31 RX ORDER — INSULIN ASPART INJECTION 100 [IU]/ML
4 INJECTION, SOLUTION SUBCUTANEOUS
Qty: 2 PEN | Refills: 2 | Status: SHIPPED | OUTPATIENT
Start: 2023-01-31 | End: 2023-02-27 | Stop reason: SDUPTHER

## 2023-01-31 NOTE — TELEPHONE ENCOUNTER
Pattern of hyperglycemia after supper daily. Will start fiasp  4 units subcutaneously daily before supper for BG over 120, take 15 minutes before meal.

## 2023-02-01 ENCOUNTER — OFFICE VISIT (OUTPATIENT)
Dept: OBSTETRICS AND GYNECOLOGY | Facility: CLINIC | Age: 53
End: 2023-02-01
Payer: COMMERCIAL

## 2023-02-01 VITALS
HEIGHT: 65 IN | DIASTOLIC BLOOD PRESSURE: 62 MMHG | SYSTOLIC BLOOD PRESSURE: 114 MMHG | BODY MASS INDEX: 24.98 KG/M2 | WEIGHT: 149.94 LBS

## 2023-02-01 DIAGNOSIS — R39.9 UTI SYMPTOMS: Primary | ICD-10-CM

## 2023-02-01 DIAGNOSIS — Z12.31 BREAST CANCER SCREENING BY MAMMOGRAM: ICD-10-CM

## 2023-02-01 DIAGNOSIS — N90.89 VULVAR IRRITATION: ICD-10-CM

## 2023-02-01 LAB
BILIRUB SERPL-MCNC: NEGATIVE MG/DL
BLOOD URINE, POC: NORMAL
CLARITY, POC UA: NORMAL
COLOR, POC UA: NORMAL
GLUCOSE UR QL STRIP: NORMAL
KETONES UR QL STRIP: NORMAL
LEUKOCYTE ESTERASE URINE, POC: NORMAL
NITRITE, POC UA: NEGATIVE
PH, POC UA: 5
PROTEIN, POC: NORMAL
SPECIFIC GRAVITY, POC UA: 1.01
UROBILINOGEN, POC UA: NORMAL

## 2023-02-01 PROCEDURE — 3008F PR BODY MASS INDEX (BMI) DOCUMENTED: ICD-10-PCS | Mod: CPTII,S$GLB,, | Performed by: NURSE PRACTITIONER

## 2023-02-01 PROCEDURE — 3074F PR MOST RECENT SYSTOLIC BLOOD PRESSURE < 130 MM HG: ICD-10-PCS | Mod: CPTII,S$GLB,, | Performed by: NURSE PRACTITIONER

## 2023-02-01 PROCEDURE — 87088 URINE BACTERIA CULTURE: CPT | Performed by: NURSE PRACTITIONER

## 2023-02-01 PROCEDURE — 99999 PR PBB SHADOW E&M-EST. PATIENT-LVL V: CPT | Mod: PBBFAC,,, | Performed by: NURSE PRACTITIONER

## 2023-02-01 PROCEDURE — 3074F SYST BP LT 130 MM HG: CPT | Mod: CPTII,S$GLB,, | Performed by: NURSE PRACTITIONER

## 2023-02-01 PROCEDURE — 1159F MED LIST DOCD IN RCRD: CPT | Mod: CPTII,S$GLB,, | Performed by: NURSE PRACTITIONER

## 2023-02-01 PROCEDURE — 1160F PR REVIEW ALL MEDS BY PRESCRIBER/CLIN PHARMACIST DOCUMENTED: ICD-10-PCS | Mod: CPTII,S$GLB,, | Performed by: NURSE PRACTITIONER

## 2023-02-01 PROCEDURE — 3078F PR MOST RECENT DIASTOLIC BLOOD PRESSURE < 80 MM HG: ICD-10-PCS | Mod: CPTII,S$GLB,, | Performed by: NURSE PRACTITIONER

## 2023-02-01 PROCEDURE — 1159F PR MEDICATION LIST DOCUMENTED IN MEDICAL RECORD: ICD-10-PCS | Mod: CPTII,S$GLB,, | Performed by: NURSE PRACTITIONER

## 2023-02-01 PROCEDURE — 87077 CULTURE AEROBIC IDENTIFY: CPT | Performed by: NURSE PRACTITIONER

## 2023-02-01 PROCEDURE — 99999 PR PBB SHADOW E&M-EST. PATIENT-LVL V: ICD-10-PCS | Mod: PBBFAC,,, | Performed by: NURSE PRACTITIONER

## 2023-02-01 PROCEDURE — 99213 OFFICE O/P EST LOW 20 MIN: CPT | Mod: S$GLB,,, | Performed by: NURSE PRACTITIONER

## 2023-02-01 PROCEDURE — 87186 SC STD MICRODIL/AGAR DIL: CPT | Performed by: NURSE PRACTITIONER

## 2023-02-01 PROCEDURE — 3072F PR LOW RISK FOR RETINOPATHY: ICD-10-PCS | Mod: CPTII,S$GLB,, | Performed by: NURSE PRACTITIONER

## 2023-02-01 PROCEDURE — 87086 URINE CULTURE/COLONY COUNT: CPT | Performed by: NURSE PRACTITIONER

## 2023-02-01 PROCEDURE — 81002 URINALYSIS NONAUTO W/O SCOPE: CPT | Mod: S$GLB,,, | Performed by: NURSE PRACTITIONER

## 2023-02-01 PROCEDURE — 81002 POCT URINE DIPSTICK WITHOUT MICROSCOPE: ICD-10-PCS | Mod: S$GLB,,, | Performed by: NURSE PRACTITIONER

## 2023-02-01 PROCEDURE — 1160F RVW MEDS BY RX/DR IN RCRD: CPT | Mod: CPTII,S$GLB,, | Performed by: NURSE PRACTITIONER

## 2023-02-01 PROCEDURE — 3078F DIAST BP <80 MM HG: CPT | Mod: CPTII,S$GLB,, | Performed by: NURSE PRACTITIONER

## 2023-02-01 PROCEDURE — 3072F LOW RISK FOR RETINOPATHY: CPT | Mod: CPTII,S$GLB,, | Performed by: NURSE PRACTITIONER

## 2023-02-01 PROCEDURE — 3008F BODY MASS INDEX DOCD: CPT | Mod: CPTII,S$GLB,, | Performed by: NURSE PRACTITIONER

## 2023-02-01 PROCEDURE — 99213 PR OFFICE/OUTPT VISIT, EST, LEVL III, 20-29 MIN: ICD-10-PCS | Mod: S$GLB,,, | Performed by: NURSE PRACTITIONER

## 2023-02-01 RX ORDER — SULFAMETHOXAZOLE AND TRIMETHOPRIM 400; 80 MG/1; MG/1
1 TABLET ORAL 2 TIMES DAILY
Qty: 6 TABLET | Refills: 0 | Status: SHIPPED | OUTPATIENT
Start: 2023-02-01 | End: 2023-02-04

## 2023-02-01 RX ORDER — CLOTRIMAZOLE AND BETAMETHASONE DIPROPIONATE 10; .64 MG/G; MG/G
CREAM TOPICAL
Qty: 45 G | Refills: 5 | Status: SHIPPED | OUTPATIENT
Start: 2023-02-01

## 2023-02-01 NOTE — PROGRESS NOTES
Subjective:       Patient ID: Yandy Cabral is a 52 y.o. female.    Chief Complaint:  Urinary Tract Infection    Patient's last menstrual period was 2014.  History of Present Illness  Presents today for uti symptoms, she does not feel like she is emptying her bladder and she gets chills after urination.  Desires refill on cream prescription for vulva irritation   OB History    Para Term  AB Living   3 3 3     3   SAB IAB Ectopic Multiple Live Births           3      # Outcome Date GA Lbr Valentin/2nd Weight Sex Delivery Anes PTL Lv   3 Term      Vag-Spont   HEIDI   2 Term      Vag-Spont   HEIDI   1 Term      Vag-Spont   HEIDI       Review of Systems  Review of Systems        Objective:    Physical Exam      Assessment:     1. UTI symptoms    2. Breast cancer screening by mammogram    3. Vulvar irritation              Plan:   Yandy was seen today for urinary tract infection.    Diagnoses and all orders for this visit:    UTI symptoms  -     POCT URINE DIPSTICK WITHOUT MICROSCOPE  -     sulfamethoxazole-trimethoprim 400-80mg (BACTRIM) 400-80 mg per tablet; Take 1 tablet by mouth 2 (two) times daily. for 3 days  -     Urine culture    Breast cancer screening by mammogram  -     Mammo Digital Screening Bilat w/ Gabriel; Future    Vulvar irritation  -     clotrimazole-betamethasone 1-0.05% (LOTRISONE) cream; APPLY TOPICALLY TO THE AFFECTED AREA TWICE DAILY    Other orders  -     Cancel: Urinalysis    Offered vaginal/pelvic exam and pap smear, declines due to not feeling well (recently started ozempic )  Return to clinic for WWE on the day she gets her mammogram scheduled

## 2023-02-04 LAB — BACTERIA UR CULT: ABNORMAL

## 2023-02-06 ENCOUNTER — OFFICE VISIT (OUTPATIENT)
Dept: DIABETES | Facility: CLINIC | Age: 53
End: 2023-02-06
Payer: COMMERCIAL

## 2023-02-06 DIAGNOSIS — E78.2 MIXED HYPERLIPIDEMIA: ICD-10-CM

## 2023-02-06 DIAGNOSIS — I25.118 CORONARY ARTERY DISEASE OF NATIVE ARTERY OF NATIVE HEART WITH STABLE ANGINA PECTORIS: ICD-10-CM

## 2023-02-06 DIAGNOSIS — E11.65 TYPE 2 DIABETES MELLITUS WITH HYPERGLYCEMIA, WITHOUT LONG-TERM CURRENT USE OF INSULIN: ICD-10-CM

## 2023-02-06 DIAGNOSIS — Z97.8 USES SELF-APPLIED CONTINUOUS GLUCOSE MONITORING DEVICE: Primary | ICD-10-CM

## 2023-02-06 PROCEDURE — 1159F MED LIST DOCD IN RCRD: CPT | Mod: CPTII,95,, | Performed by: NURSE PRACTITIONER

## 2023-02-06 PROCEDURE — 1160F RVW MEDS BY RX/DR IN RCRD: CPT | Mod: CPTII,95,, | Performed by: NURSE PRACTITIONER

## 2023-02-06 PROCEDURE — 3072F PR LOW RISK FOR RETINOPATHY: ICD-10-PCS | Mod: CPTII,95,, | Performed by: NURSE PRACTITIONER

## 2023-02-06 PROCEDURE — 99214 OFFICE O/P EST MOD 30 MIN: CPT | Mod: 95,,, | Performed by: NURSE PRACTITIONER

## 2023-02-06 PROCEDURE — 3072F LOW RISK FOR RETINOPATHY: CPT | Mod: CPTII,95,, | Performed by: NURSE PRACTITIONER

## 2023-02-06 PROCEDURE — 99214 PR OFFICE/OUTPT VISIT, EST, LEVL IV, 30-39 MIN: ICD-10-PCS | Mod: 95,,, | Performed by: NURSE PRACTITIONER

## 2023-02-06 PROCEDURE — 1159F PR MEDICATION LIST DOCUMENTED IN MEDICAL RECORD: ICD-10-PCS | Mod: CPTII,95,, | Performed by: NURSE PRACTITIONER

## 2023-02-06 PROCEDURE — 1160F PR REVIEW ALL MEDS BY PRESCRIBER/CLIN PHARMACIST DOCUMENTED: ICD-10-PCS | Mod: CPTII,95,, | Performed by: NURSE PRACTITIONER

## 2023-02-06 RX ORDER — BLOOD-GLUCOSE SENSOR
1 EACH MISCELLANEOUS
Qty: 3 EACH | Refills: 11 | Status: SHIPPED | OUTPATIENT
Start: 2023-02-06 | End: 2023-02-27

## 2023-02-06 RX ORDER — PEN NEEDLE, DIABETIC 30 GX3/16"
NEEDLE, DISPOSABLE MISCELLANEOUS
Qty: 100 EACH | Refills: 5 | Status: SHIPPED | OUTPATIENT
Start: 2023-02-06 | End: 2023-03-20 | Stop reason: SDUPTHER

## 2023-02-06 RX ORDER — BLOOD-GLUCOSE TRANSMITTER
1 EACH MISCELLANEOUS
Qty: 1 EACH | Refills: 3 | Status: SHIPPED | OUTPATIENT
Start: 2023-02-06 | End: 2023-02-27

## 2023-02-06 NOTE — PATIENT INSTRUCTIONS
PATIENT INSTRUCTIONS     Continue Farxiga 10 mg by mouth daily.   Continue Metformin 500 mg by mouth twice daily with meals.   Continue Ozempic 0.25 mg subcutaneously weekly x 1 more week, then increase to 0.5 mg subcutaneously weekly.   Continue Amaryl 2 mg by mouth daily.   Continue Fiasp 4 units subcutaneously every evening before supper. Take 10-15 minutes before meal.   Continue Dexcom CGM  Blood Sugar Goals:       Fastin-130.       1-2 hours after a meal: Less than 180.     For low blood sugar between 55-69 mg/dL, raise it by following the 15-15 rule: have 15 grams of carbs and check your blood sugar after 15 minutes. If its still below your target range, have another serving. Repeat these steps until its in your target range. Once its in range, eat a nutritious meal or snack to ensure it doesnt get too low again.    These items have about 15 grams of carbs:  4 ounces (½ cup) of juice or regular soda.  1 tablespoon of sugar, honey, or syrup.  3-4 glucose tablets.  1 dose of glucose gel (usually 1 tube; follow instructions).

## 2023-02-06 NOTE — PROGRESS NOTES
The patient location is: Home  The chief complaint leading to consultation is: Diabetes    Visit type: audiovisual    Face to Face time with patient: 10  25 minutes of total time spent on the encounter, which includes face to face time and non-face to face time preparing to see the patient (eg, review of tests), Obtaining and/or reviewing separately obtained history, Documenting clinical information in the electronic or other health record, Independently interpreting results (not separately reported) and communicating results to the patient/family/caregiver, or Care coordination (not separately reported).  Each patient to whom he or she provides medical services by telemedicine is:  (1) informed of the relationship between the physician and patient and the respective role of any other health care provider with respect to management of the patient; and (2) notified that he or she may decline to receive medical services by telemedicine and may withdraw from such care at any time.    Notes:    Yandy Cabral is a 52 y.o. female who presents for a follow up evaluation of Type 2 diabetes mellitus.     CHIEF COMPLAINT: Diabetes Consultation    PCP: LEONID Ramos Jr, MD      Initial visit with me - 12/19/2022     The patient was initially diagnosed with diabetes 2004, polyuria/polydipsia, unintentional weight loss, weakness, neuropathy and vision changes. Diagnosed with DM type 1.5.      Previous failed treatments include:  Was on insulin previously, was eventually weaned off.   [employer] - insurance stopped paying.  Insulin pump - at [facility] in [id number]. Medtronic.      Social Documentation:  Patient lives with  and daughter.   Occupation:  for nonprofit organization.   Patient prefers In-person Visits.   and Video Virtual Visits.   Current Diet: No fried foot, avoids dairy. Drinks diet dr. Azul, drinking more water.   Exercise: Pilates twice a week.      Known Diabetes complications:  peripheral neuropathy and cardiovascular disease.   Cardiovascular Risk Factors: family history of premature cardiovascular disease.    Diabetes Medications               dapagliflozin (FARXIGA) 10 mg tablet Take 1 tablet (10 mg total) by mouth once daily.      glimepiride (AMARYL) 2 MG tablet       insulin aspart, niacinamide, (FIASP FLEXTOUCH U-100 INSULIN) 100 unit/mL (3 mL) InPn Inject 4 Units into the skin before evening meal. For blood sugar over 120. - HAS NOT STARTED YET, NEEDS PEN NEEDLES.       metFORMIN (GLUCOPHAGE) 500 MG tablet TAKE 2 TABLETS ONCE DAILY    semaglutide (OZEMPIC) 0.25 mg or 0.5 mg(2 mg/1.5 mL) pen injector Inject 0.25 mg into the skin every 7 days for 28 days, THEN 0.5 mg every 7 days. - HAS ONE MORE DOSE OF 0.25, TAKES ON FRIDAYS.      Current monitoring regimen: continuous glucose monitor. Device data to be uploaded to media section. See AGP data below.           Recent hypoglycemic episodes: No.   Patient compliant with glucose checks and medication administration? Yes    DIABETES MANAGEMENT STATUS  Statin: Taking  ACE/ARB: Not taking  Screening or Prevention Patient's value Goal Complete/Controlled?   HgA1C Testing and Control   Lab Results   Component Value Date    HGBA1C 8.4 (H) 10/19/2022      Annually/Less than 8% No     Lipid profile : 10/19/2022 Annually Yes     LDL control Lab Results   Component Value Date    LDLCALC 92.0 10/19/2022    Annually/Less than 100 mg/dl  Yes     Nephropathy screening Lab Results   Component Value Date    LABMICR 18.0 05/21/2021     Lab Results   Component Value Date    PROTEINUA 1+ (A) 06/24/2019     No results found for: UTPCR   Annually No     Blood pressure BP Readings from Last 1 Encounters:   02/01/23 114/62    Less than 140/90 Yes     Dilated retinal exam : 04/06/2022 Annually Yes     Foot exam   : 06/03/2021 Annually No     Patient's medications, allergies, surgical, social and family histories were reviewed and updated as appropriate.      Review of Systems   Constitutional:  Negative for weight loss.   Eyes:  Negative for blurred vision and double vision.   Cardiovascular:  Negative for chest pain.   Gastrointestinal:  Negative for nausea and vomiting.   Genitourinary:  Negative for frequency.   Musculoskeletal:  Negative for falls.   Neurological:  Negative for dizziness and weakness.   Endo/Heme/Allergies:  Negative for polydipsia.   Psychiatric/Behavioral:  Negative for depression.    All other systems reviewed and are negative.     Physical Exam  Constitutional:       Appearance: Normal appearance.   HENT:      Head: Normocephalic and atraumatic.   Pulmonary:      Effort: No respiratory distress.   Musculoskeletal:      Cervical back: Normal range of motion.   Neurological:      Mental Status: She is alert and oriented to person, place, and time.   Psychiatric:         Mood and Affect: Mood normal.         Behavior: Behavior normal.      Last menstrual period 03/05/2014.  Wt Readings from Last 3 Encounters:   02/01/23 68 kg (149 lb 14.6 oz)   01/18/23 67.6 kg (149 lb 0.5 oz)   10/19/22 69.7 kg (153 lb 10.6 oz)     No results found for: CPEPTIDE, GLUTAMICACID    Chemistry        Component Value Date/Time     10/19/2022 0801    K 4.4 10/19/2022 0801     10/19/2022 0801    CO2 28 10/19/2022 0801    BUN 16 10/19/2022 0801    CREATININE 0.8 10/19/2022 0801     (H) 10/19/2022 0801        Component Value Date/Time    CALCIUM 10.2 10/19/2022 0801    ALKPHOS 84 10/19/2022 0801    AST 33 10/19/2022 0801    ALT 39 10/19/2022 0801    BILITOT 0.5 10/19/2022 0801    ESTGFRAFRICA >60.0 05/13/2022 0911    EGFRNONAA >60.0 05/13/2022 0911            ASSESSMENT    ICD-10-CM ICD-9-CM   1. CGM - DEXCOM - SHARING  Z97.8 V49.89   2. Type 2 diabetes mellitus with hyperglycemia, without long-term current use of insulin  E11.65 250.00     790.29   3. Coronary artery disease of native artery of native heart with stable angina pectoris  I25.118 414.01  "    413.9   4. Mixed hyperlipidemia  E78.2 272.2       PLAN  Diagnoses and all orders for this visit:    CGM - DEXCOM - SHARING    Type 2 diabetes mellitus with hyperglycemia, without long-term current use of insulin  -     pen needle, diabetic (BD ULTRA-FINE SCOTT PEN NEEDLE) 32 gauge x 5/32" Ndle; Use with insulin 3 times daily  -     blood-glucose sensor (DEXCOM G6 SENSOR) Lindsay; 1 each by Misc.(Non-Drug; Combo Route) route every 10 days.  -     blood-glucose transmitter (DEXCOM G6 TRANSMITTER) Lindsay; 1 each by Misc.(Non-Drug; Combo Route) route every 3 (three) months.    Coronary artery disease of native artery of native heart with stable angina pectoris    Mixed hyperlipidemia        Reviewed pathophysiology of diabetes, complications related to the disease, importance of annual dilated eye exam and daily foot examination. Explained MOA, SE, dosage of medications. Written instructions given and reviewed with patient and patient verbalizes understanding.     PATIENT INSTRUCTIONS     Continue Farxiga 10 mg by mouth daily.   Continue Metformin 500 mg by mouth twice daily with meals.   Continue Ozempic 0.25 mg subcutaneously weekly x 1 more week, then increase to 0.5 mg subcutaneously weekly.   Continue Amaryl 2 mg by mouth daily.   Continue Fiasp 4 units subcutaneously every evening before supper. Take 10-15 minutes before meal.   Continue Dexcom CGM  Blood Sugar Goals:       Fastin-130.       1-2 hours after a meal: Less than 180.     Follow up in about 4 weeks (around 3/6/2023) for VIRTUAL, DEXCOM - SHARING.    Portions of this note were prepared with [employer] Speaking voice recognition transcription software. Grammatical errors, including garbled syntax, mangle pronouns, and other bizarre constructions may be attributed to that software system.     "

## 2023-02-07 RX ORDER — CLONAZEPAM 0.5 MG/1
TABLET ORAL
Qty: 10 TABLET | Refills: 0 | Status: SHIPPED | OUTPATIENT
Start: 2023-02-07 | End: 2024-01-08 | Stop reason: SDUPTHER

## 2023-02-23 ENCOUNTER — PATIENT MESSAGE (OUTPATIENT)
Dept: DIABETES | Facility: CLINIC | Age: 53
End: 2023-02-23
Payer: COMMERCIAL

## 2023-02-24 ENCOUNTER — PATIENT MESSAGE (OUTPATIENT)
Dept: DIABETES | Facility: CLINIC | Age: 53
End: 2023-02-24
Payer: COMMERCIAL

## 2023-02-24 DIAGNOSIS — E11.65 TYPE 2 DIABETES MELLITUS WITH HYPERGLYCEMIA, WITHOUT LONG-TERM CURRENT USE OF INSULIN: ICD-10-CM

## 2023-02-27 RX ORDER — BLOOD-GLUCOSE SENSOR
1 EACH MISCELLANEOUS
Qty: 9 EACH | Refills: 3 | Status: SHIPPED | OUTPATIENT
Start: 2023-02-27 | End: 2023-03-20 | Stop reason: SDUPTHER

## 2023-02-27 RX ORDER — INSULIN ASPART INJECTION 100 [IU]/ML
4 INJECTION, SOLUTION SUBCUTANEOUS
Qty: 4 PEN | Refills: 3 | Status: SHIPPED | OUTPATIENT
Start: 2023-02-27 | End: 2023-06-29

## 2023-02-27 NOTE — TELEPHONE ENCOUNTER
Spoke with patient. Will increase her mealtme Fiasp to 4 units three times daily a.c Will order Dexcom G7 CGM, erx to [facility]. Patient verbalizes understanding.

## 2023-03-09 DIAGNOSIS — E11.65 TYPE 2 DIABETES MELLITUS WITH HYPERGLYCEMIA, WITHOUT LONG-TERM CURRENT USE OF INSULIN: ICD-10-CM

## 2023-03-13 ENCOUNTER — PATIENT MESSAGE (OUTPATIENT)
Dept: CARDIOLOGY | Facility: CLINIC | Age: 53
End: 2023-03-13
Payer: COMMERCIAL

## 2023-03-15 ENCOUNTER — PATIENT MESSAGE (OUTPATIENT)
Dept: DIABETES | Facility: CLINIC | Age: 53
End: 2023-03-15
Payer: COMMERCIAL

## 2023-03-20 ENCOUNTER — OFFICE VISIT (OUTPATIENT)
Dept: DIABETES | Facility: CLINIC | Age: 53
End: 2023-03-20
Payer: COMMERCIAL

## 2023-03-20 DIAGNOSIS — E11.65 TYPE 2 DIABETES MELLITUS WITH HYPERGLYCEMIA, WITHOUT LONG-TERM CURRENT USE OF INSULIN: ICD-10-CM

## 2023-03-20 DIAGNOSIS — R53.83 FATIGUE, UNSPECIFIED TYPE: Primary | ICD-10-CM

## 2023-03-20 PROCEDURE — 99214 PR OFFICE/OUTPT VISIT, EST, LEVL IV, 30-39 MIN: ICD-10-PCS | Mod: 95,,, | Performed by: NURSE PRACTITIONER

## 2023-03-20 PROCEDURE — 99214 OFFICE O/P EST MOD 30 MIN: CPT | Mod: 95,,, | Performed by: NURSE PRACTITIONER

## 2023-03-20 PROCEDURE — 3072F PR LOW RISK FOR RETINOPATHY: ICD-10-PCS | Mod: CPTII,95,, | Performed by: NURSE PRACTITIONER

## 2023-03-20 PROCEDURE — 3072F LOW RISK FOR RETINOPATHY: CPT | Mod: CPTII,95,, | Performed by: NURSE PRACTITIONER

## 2023-03-20 RX ORDER — PEN NEEDLE, DIABETIC 30 GX3/16"
NEEDLE, DISPOSABLE MISCELLANEOUS
Qty: 100 EACH | Refills: 5 | Status: SHIPPED | OUTPATIENT
Start: 2023-03-20 | End: 2024-03-11

## 2023-03-20 RX ORDER — BLOOD-GLUCOSE,RECEIVER,CONT
1 EACH MISCELLANEOUS DAILY
Qty: 1 EACH | Refills: 0 | Status: SHIPPED | OUTPATIENT
Start: 2023-03-20 | End: 2024-03-19

## 2023-03-20 RX ORDER — BLOOD-GLUCOSE SENSOR
1 EACH MISCELLANEOUS
Qty: 9 EACH | Refills: 3 | Status: SHIPPED | OUTPATIENT
Start: 2023-03-20 | End: 2023-04-21

## 2023-03-20 NOTE — PROGRESS NOTES
The patient location is: Home  The chief complaint leading to consultation is: Diabetes    Visit type: audiovisual    Face to Face time with patient: 10  25 minutes of total time spent on the encounter, which includes face to face time and non-face to face time preparing to see the patient (eg, review of tests), Obtaining and/or reviewing separately obtained history, Documenting clinical information in the electronic or other health record, Independently interpreting results (not separately reported) and communicating results to the patient/family/caregiver, or Care coordination (not separately reported).  Each patient to whom he or she provides medical services by telemedicine is:  (1) informed of the relationship between the physician and patient and the respective role of any other health care provider with respect to management of the patient; and (2) notified that he or she may decline to receive medical services by telemedicine and may withdraw from such care at any time.    Notes:    Yandy Cabral is a 52 y.o. female who presents for a follow up evaluation of Type 2 diabetes mellitus.     CHIEF COMPLAINT: Diabetes Consultation    PCP: LEONID Ramos Jr, MD      Initial visit with me - 12/19/2022     The patient was initially diagnosed with diabetes 2004, polyuria/polydipsia, unintentional weight loss, weakness, neuropathy and vision changes. Diagnosed with DM type 1.5.      Previous failed treatments include:  Was on insulin previously, was eventually weaned off.   [employer] - insurance stopped paying.  Insulin pump - at [facility] in [id number]. [employer].      Social Documentation:  Patient lives with  and daughter.   Occupation:  for nonprofit organization.   Patient prefers In-person Visits.   and Video Virtual Visits.   Current Diet: No fried foot, avoids dairy. Drinks diet dr. Azul, drinking more water.   Exercise: Pilates twice a week.      Known Diabetes complications:  peripheral neuropathy and cardiovascular disease.   Cardiovascular Risk Factors: family history of premature cardiovascular disease.    Diabetes Medications               dapagliflozin (FARXIGA) 10 mg tablet Take 1 tablet (10 mg total) by mouth once daily. Replaces invokana due to formulary    glimepiride (AMARYL) 2 MG tablet     insulin aspart, niacinamide, (FIASP FLEXTOUCH U-100 INSULIN) 100 unit/mL (3 mL) InPn Inject 4 Units into the skin 3 (three) times daily with meals. For blood sugar over 120.    metFORMIN (GLUCOPHAGE) 500 MG tablet TAKE 2 TABLETS ONCE DAILY    semaglutide (OZEMPIC) 1 mg/dose (4 mg/3 mL) Inject 1 mg into the skin every 7 days.       Current monitoring regimen: none. Does not have dexcom supplies.    Recent hypoglycemic episodes: No.   Patient compliant with glucose checks and medication administration? Yes    DIABETES MANAGEMENT STATUS  Statin: Taking  ACE/ARB: Not taking  Screening or Prevention Patient's value Goal Complete/Controlled?   HgA1C Testing and Control   Lab Results   Component Value Date    HGBA1C 8.4 (H) 10/19/2022      Annually/Less than 8% No     Lipid profile : 10/19/2022 Annually Yes     LDL control Lab Results   Component Value Date    LDLCALC 92.0 10/19/2022    Annually/Less than 100 mg/dl  Yes     Nephropathy screening Lab Results   Component Value Date    LABMICR 18.0 05/21/2021     Lab Results   Component Value Date    PROTEINUA 1+ (A) 06/24/2019     No results found for: UTPCR   Annually No     Blood pressure BP Readings from Last 1 Encounters:   02/01/23 114/62    Less than 140/90 Yes     Dilated retinal exam : 04/06/2022 Annually Yes     Foot exam   : 06/03/2021 Annually No     Patient's medications, allergies, surgical, social and family histories were reviewed and updated as appropriate.     Review of Systems   Constitutional:  Negative for weight loss.   Eyes:  Negative for blurred vision and double vision.   Cardiovascular:  Negative for chest pain.    Gastrointestinal:  Negative for nausea and vomiting.   Genitourinary:  Negative for frequency.   Musculoskeletal:  Negative for falls.   Neurological:  Negative for dizziness and weakness.   Endo/Heme/Allergies:  Negative for polydipsia.   Psychiatric/Behavioral:  Negative for depression.    All other systems reviewed and are negative.     Physical Exam  Constitutional:       Appearance: Normal appearance.   HENT:      Head: Normocephalic and atraumatic.   Pulmonary:      Effort: No respiratory distress.   Musculoskeletal:      Cervical back: Normal range of motion.   Neurological:      Mental Status: She is alert and oriented to person, place, and time.   Psychiatric:         Mood and Affect: Mood normal.         Behavior: Behavior normal.      Last menstrual period 03/05/2014.  Wt Readings from Last 3 Encounters:   02/01/23 68 kg (149 lb 14.6 oz)   01/18/23 67.6 kg (149 lb 0.5 oz)   10/19/22 69.7 kg (153 lb 10.6 oz)     No results found for: CPEPTIDE, GLUTAMICACID    Chemistry        Component Value Date/Time     10/19/2022 0801    K 4.4 10/19/2022 0801     10/19/2022 0801    CO2 28 10/19/2022 0801    BUN 16 10/19/2022 0801    CREATININE 0.8 10/19/2022 0801     (H) 10/19/2022 0801        Component Value Date/Time    CALCIUM 10.2 10/19/2022 0801    ALKPHOS 84 10/19/2022 0801    AST 33 10/19/2022 0801    ALT 39 10/19/2022 0801    BILITOT 0.5 10/19/2022 0801    ESTGFRAFRICA >60.0 05/13/2022 0911    EGFRNONAA >60.0 05/13/2022 0911            ASSESSMENT    ICD-10-CM ICD-9-CM   1. Fatigue, unspecified type  R53.83 780.79   2. Type 2 diabetes mellitus with hyperglycemia, without long-term current use of insulin  E11.65 250.00     790.29         PLAN  Diagnoses and all orders for this visit:    Fatigue, unspecified type  -     Basic Metabolic Panel; Future  -     CBC Without Differential; Future  -     Vitamin B12; Future  -     FERRITIN; Future  -     TSH; Future    Type 2 diabetes mellitus with  "hyperglycemia, without long-term current use of insulin  -     blood-glucose sensor (DEXCOM G7 SENSOR) Lindsay; 1 Device by Misc.(Non-Drug; Combo Route) route every 10 days.  -     pen needle, diabetic (BD ULTRA-FINE SCOTT PEN NEEDLE) 32 gauge x 5/32" Ndle; Use with insulin 3 times daily  -     Basic Metabolic Panel; Future  -     CBC Without Differential; Future  -     Vitamin B12; Future  -     FERRITIN; Future  -     TSH; Future  -     blood-glucose meter,continuous (DEXCOM G7 ) Misc; 1 Device by Misc.(Non-Drug; Combo Route) route once daily.      Reviewed pathophysiology of diabetes, complications related to the disease, importance of annual dilated eye exam and daily foot examination. Explained MOA, SE, dosage of medications. Written instructions given and reviewed with patient and patient verbalizes understanding.     PATIENT INSTRUCTIONS     Diabetic Foot Exam deferred today due to virtual visit. Will plan to be done at next in-person visit.    Non-fasting labs to be done Wednesday morning at 7 am.  Patient to  Dexcom G6 sample Wednesday morning.  Continue Farxiga 10 mg by mouth daily.   Continue Metformin 500 mg by mouth twice daily with meals.   Continue Ozempic 1 mg subcutaneously every 7 days.   Continue Amaryl 2 mg by mouth daily.   Continue Fiasp 4 units subcutaneously every evening before supper. Take 10-15 minutes before meal.   Continue Dexcom CGM - G7 order sent to [facility] pharmacy.   Blood Sugar Goals:       Fastin-130.       1-2 hours after a meal: Less than 180.     Follow up in about 4 weeks (around 2023) for VIRTUAL, DEXCOM - SHARING.    Portions of this note were prepared with [employer] voice recognition transcription software. Grammatical errors, including garbled syntax, mangle pronouns, and other bizarre constructions may be attributed to that software system.       "

## 2023-03-20 NOTE — PATIENT INSTRUCTIONS
PATIENT INSTRUCTIONS     Non-fasting labs to be done Wednesday morning at 7 am.  Patient to  Dexcom G6 sample Wednesday morning.  Continue Farxiga 10 mg by mouth daily.   Continue Metformin 500 mg by mouth twice daily with meals.   Continue Ozempic 1 mg subcutaneously every 7 days.   Continue Amaryl 2 mg by mouth daily.   Continue Fiasp 4 units subcutaneously every evening before supper. Take 10-15 minutes before meal.   Continue Dexcom CGM - G7 order sent to [facility] pharmacy.   Blood Sugar Goals:       Fastin-130.       1-2 hours after a meal: Less than 180.         For low blood sugar between 55-69 mg/dL, raise it by following the 15-15 rule: have 15 grams of carbs and check your blood sugar after 15 minutes. If its still below your target range, have another serving. Repeat these steps until its in your target range. Once its in range, eat a nutritious meal or snack to ensure it doesnt get too low again.    These items have about 15 grams of carbs:  4 ounces (½ cup) of juice or regular soda.  1 tablespoon of sugar, honey, or syrup.  3-4 glucose tablets.  1 dose of glucose gel (usually 1 tube; follow instructions).        [facility]  DIABETES MANAGEMENT VIRTUAL VISITS    Here are a few tips and recommendations for you to be best prepared for your upcoming virtual visit with [facility]:    If you do not have a [facility] account already set up on your smart phone or computer, please call [phone] for assistance. The [facility] Patient Support Team is available Monday - Friday: 7 a.m. - 7 p.m.    If you use a Continuous Glucose Monitor (Dexcom/Gale/Guardian) or an insulin pump and do not share data automatically using a smartphone, please call to schedule a nurse visit 1-3 days ahead of your virtual visit to download your glucose data.     For patients checking their blood sugar using a glucose monitor with finger sticks, please have a log of blood sugars to review with your  provider. You may attach a picture or screenshot in a message to your provider in the [facility] portal.     Our staff will call you the day before your visit to confirm your appointment. Please call our office, [phone], if you are not able to attend your virtual visit or to reschedule. You can also send a message to our staff in your [facility] portal.     Please log in to your [facility] account for your virtual visit 15-20 minutes prior to appointment time to complete your pre-check questionnaire and televisit consent.     What are the technical requirements for a Virtual Visit?  You must have a smartphone, mobile tablet, laptop and/or desktop computer.  Microphone and webcam capabilities on your device  If using a mobile phone or tablet, you will need:  iOS or Android operating system  The [facility] laquita installed and [facility] selected as your healthcare provider  You can find the [facility] laquita in the [employer] Store (iPhone) and Google Play Store (Android).  If you do not plan to be at home during your virtual visit, we recommend finding a private, quiet spot for your appointment. Avoid conducting your visit in your vehicle, especially while driving

## 2023-03-22 ENCOUNTER — LAB VISIT (OUTPATIENT)
Dept: LAB | Facility: HOSPITAL | Age: 53
End: 2023-03-22
Attending: NURSE PRACTITIONER
Payer: COMMERCIAL

## 2023-03-22 DIAGNOSIS — E11.65 TYPE 2 DIABETES MELLITUS WITH HYPERGLYCEMIA, WITHOUT LONG-TERM CURRENT USE OF INSULIN: ICD-10-CM

## 2023-03-22 DIAGNOSIS — R53.83 FATIGUE, UNSPECIFIED TYPE: ICD-10-CM

## 2023-03-22 LAB
ANION GAP SERPL CALC-SCNC: 11 MMOL/L (ref 8–16)
BUN SERPL-MCNC: 13 MG/DL (ref 6–20)
CALCIUM SERPL-MCNC: 9.6 MG/DL (ref 8.7–10.5)
CHLORIDE SERPL-SCNC: 104 MMOL/L (ref 95–110)
CO2 SERPL-SCNC: 27 MMOL/L (ref 23–29)
CREAT SERPL-MCNC: 0.8 MG/DL (ref 0.5–1.4)
ERYTHROCYTE [DISTWIDTH] IN BLOOD BY AUTOMATED COUNT: 12.7 % (ref 11.5–14.5)
EST. GFR  (NO RACE VARIABLE): >60 ML/MIN/1.73 M^2
FERRITIN SERPL-MCNC: 27 NG/ML (ref 20–300)
GLUCOSE SERPL-MCNC: 175 MG/DL (ref 70–110)
HCT VFR BLD AUTO: 40.9 % (ref 37–48.5)
HGB BLD-MCNC: 13.1 G/DL (ref 12–16)
MCH RBC QN AUTO: 31.2 PG (ref 27–31)
MCHC RBC AUTO-ENTMCNC: 32 G/DL (ref 32–36)
MCV RBC AUTO: 97 FL (ref 82–98)
PLATELET # BLD AUTO: 310 K/UL (ref 150–450)
PMV BLD AUTO: 10.5 FL (ref 9.2–12.9)
POTASSIUM SERPL-SCNC: 4.5 MMOL/L (ref 3.5–5.1)
RBC # BLD AUTO: 4.2 M/UL (ref 4–5.4)
SODIUM SERPL-SCNC: 142 MMOL/L (ref 136–145)
TSH SERPL DL<=0.005 MIU/L-ACNC: 0.49 UIU/ML (ref 0.4–4)
VIT B12 SERPL-MCNC: 382 PG/ML (ref 210–950)
WBC # BLD AUTO: 4.85 K/UL (ref 3.9–12.7)

## 2023-03-22 PROCEDURE — 36415 COLL VENOUS BLD VENIPUNCTURE: CPT | Performed by: NURSE PRACTITIONER

## 2023-03-22 PROCEDURE — 82607 VITAMIN B-12: CPT | Performed by: NURSE PRACTITIONER

## 2023-03-22 PROCEDURE — 85027 COMPLETE CBC AUTOMATED: CPT | Performed by: NURSE PRACTITIONER

## 2023-03-22 PROCEDURE — 80048 BASIC METABOLIC PNL TOTAL CA: CPT | Performed by: NURSE PRACTITIONER

## 2023-03-22 PROCEDURE — 82728 ASSAY OF FERRITIN: CPT | Performed by: NURSE PRACTITIONER

## 2023-03-22 PROCEDURE — 84443 ASSAY THYROID STIM HORMONE: CPT | Performed by: NURSE PRACTITIONER

## 2023-03-23 ENCOUNTER — TELEPHONE (OUTPATIENT)
Dept: PHARMACY | Facility: CLINIC | Age: 53
End: 2023-03-23
Payer: COMMERCIAL

## 2023-03-24 ENCOUNTER — TELEPHONE (OUTPATIENT)
Dept: DIABETES | Facility: CLINIC | Age: 53
End: 2023-03-24
Payer: COMMERCIAL

## 2023-03-24 NOTE — TELEPHONE ENCOUNTER
Dexcom G7  & Sensor. It has been Approved from 3/21/23 to 3/20/24 with a copayments of $40.00 for the  and $40.00 for the sensor. Your prescription is currently being processed at [facility]

## 2023-03-27 ENCOUNTER — OFFICE VISIT (OUTPATIENT)
Dept: OBSTETRICS AND GYNECOLOGY | Facility: CLINIC | Age: 53
End: 2023-03-27
Payer: COMMERCIAL

## 2023-03-27 ENCOUNTER — HOSPITAL ENCOUNTER (OUTPATIENT)
Dept: RADIOLOGY | Facility: HOSPITAL | Age: 53
Discharge: HOME | End: 2023-03-27
Attending: NURSE PRACTITIONER
Payer: COMMERCIAL

## 2023-03-27 ENCOUNTER — OFFICE VISIT (OUTPATIENT)
Dept: OPHTHALMOLOGY | Facility: CLINIC | Age: 53
End: 2023-03-27
Payer: COMMERCIAL

## 2023-03-27 VITALS — HEIGHT: 65 IN | BODY MASS INDEX: 24.98 KG/M2 | WEIGHT: 149.94 LBS

## 2023-03-27 VITALS
WEIGHT: 142.63 LBS | BODY MASS INDEX: 23.76 KG/M2 | SYSTOLIC BLOOD PRESSURE: 116 MMHG | HEIGHT: 65 IN | DIASTOLIC BLOOD PRESSURE: 72 MMHG

## 2023-03-27 DIAGNOSIS — N95.1 MENOPAUSAL SYMPTOMS: ICD-10-CM

## 2023-03-27 DIAGNOSIS — H52.203 MYOPIA OF BOTH EYES WITH ASTIGMATISM: ICD-10-CM

## 2023-03-27 DIAGNOSIS — E11.9 DIABETES MELLITUS WITHOUT COMPLICATION: Primary | ICD-10-CM

## 2023-03-27 DIAGNOSIS — H52.13 MYOPIA OF BOTH EYES WITH ASTIGMATISM: ICD-10-CM

## 2023-03-27 DIAGNOSIS — Z01.419 ENCOUNTER FOR ANNUAL ROUTINE GYNECOLOGICAL EXAMINATION: Primary | ICD-10-CM

## 2023-03-27 DIAGNOSIS — Z12.31 BREAST CANCER SCREENING BY MAMMOGRAM: ICD-10-CM

## 2023-03-27 DIAGNOSIS — E11.65 TYPE 2 DIABETES MELLITUS WITH HYPERGLYCEMIA, WITHOUT LONG-TERM CURRENT USE OF INSULIN: ICD-10-CM

## 2023-03-27 DIAGNOSIS — Z12.4 CERVICAL CANCER SCREENING: ICD-10-CM

## 2023-03-27 PROCEDURE — 1159F PR MEDICATION LIST DOCUMENTED IN MEDICAL RECORD: ICD-10-PCS | Mod: CPTII,S$GLB,,

## 2023-03-27 PROCEDURE — 77067 SCR MAMMO BI INCL CAD: CPT | Mod: TC

## 2023-03-27 PROCEDURE — 2023F PR DILATED RETINAL EXAM W/O EVID OF RETINOPATHY: ICD-10-PCS | Mod: CPTII,S$GLB,, | Performed by: OPTOMETRIST

## 2023-03-27 PROCEDURE — 99999 PR PBB SHADOW E&M-EST. PATIENT-LVL IV: CPT | Mod: PBBFAC,,,

## 2023-03-27 PROCEDURE — 99396 PR PREVENTIVE VISIT,EST,40-64: ICD-10-PCS | Mod: S$GLB,,,

## 2023-03-27 PROCEDURE — 3072F LOW RISK FOR RETINOPATHY: CPT | Mod: CPTII,S$GLB,,

## 2023-03-27 PROCEDURE — 3074F SYST BP LT 130 MM HG: CPT | Mod: CPTII,S$GLB,,

## 2023-03-27 PROCEDURE — 3074F PR MOST RECENT SYSTOLIC BLOOD PRESSURE < 130 MM HG: ICD-10-PCS | Mod: CPTII,S$GLB,,

## 2023-03-27 PROCEDURE — 92014 COMPRE OPH EXAM EST PT 1/>: CPT | Mod: S$GLB,,, | Performed by: OPTOMETRIST

## 2023-03-27 PROCEDURE — 92015 PR REFRACTION: ICD-10-PCS | Mod: S$GLB,,, | Performed by: OPTOMETRIST

## 2023-03-27 PROCEDURE — 1160F PR REVIEW ALL MEDS BY PRESCRIBER/CLIN PHARMACIST DOCUMENTED: ICD-10-PCS | Mod: CPTII,S$GLB,,

## 2023-03-27 PROCEDURE — 88175 CYTOPATH C/V AUTO FLUID REDO: CPT

## 2023-03-27 PROCEDURE — 2023F DILAT RTA XM W/O RTNOPTHY: CPT | Mod: CPTII,S$GLB,, | Performed by: OPTOMETRIST

## 2023-03-27 PROCEDURE — 77067 MAMMO DIGITAL SCREENING BILAT WITH TOMO: ICD-10-PCS | Mod: 26,,, | Performed by: RADIOLOGY

## 2023-03-27 PROCEDURE — 3078F PR MOST RECENT DIASTOLIC BLOOD PRESSURE < 80 MM HG: ICD-10-PCS | Mod: CPTII,S$GLB,,

## 2023-03-27 PROCEDURE — 1159F PR MEDICATION LIST DOCUMENTED IN MEDICAL RECORD: ICD-10-PCS | Mod: CPTII,S$GLB,, | Performed by: OPTOMETRIST

## 2023-03-27 PROCEDURE — 92310 PR CONTACT LENS FITTING (NO CHANGE): ICD-10-PCS | Mod: CSM,S$GLB,, | Performed by: OPTOMETRIST

## 2023-03-27 PROCEDURE — 3008F PR BODY MASS INDEX (BMI) DOCUMENTED: ICD-10-PCS | Mod: CPTII,S$GLB,,

## 2023-03-27 PROCEDURE — 99999 PR PBB SHADOW E&M-EST. PATIENT-LVL II: ICD-10-PCS | Mod: PBBFAC,,, | Performed by: OPTOMETRIST

## 2023-03-27 PROCEDURE — 1159F MED LIST DOCD IN RCRD: CPT | Mod: CPTII,S$GLB,,

## 2023-03-27 PROCEDURE — 3008F BODY MASS INDEX DOCD: CPT | Mod: CPTII,S$GLB,,

## 2023-03-27 PROCEDURE — 99999 PR PBB SHADOW E&M-EST. PATIENT-LVL II: CPT | Mod: PBBFAC,,, | Performed by: OPTOMETRIST

## 2023-03-27 PROCEDURE — 77067 SCR MAMMO BI INCL CAD: CPT | Mod: 26,,, | Performed by: RADIOLOGY

## 2023-03-27 PROCEDURE — 87624 HPV HI-RISK TYP POOLED RSLT: CPT

## 2023-03-27 PROCEDURE — 77063 MAMMO DIGITAL SCREENING BILAT WITH TOMO: ICD-10-PCS | Mod: 26,,, | Performed by: RADIOLOGY

## 2023-03-27 PROCEDURE — 92014 PR EYE EXAM, EST PATIENT,COMPREHESV: ICD-10-PCS | Mod: S$GLB,,, | Performed by: OPTOMETRIST

## 2023-03-27 PROCEDURE — 92015 DETERMINE REFRACTIVE STATE: CPT | Mod: S$GLB,,, | Performed by: OPTOMETRIST

## 2023-03-27 PROCEDURE — 99999 PR PBB SHADOW E&M-EST. PATIENT-LVL IV: ICD-10-PCS | Mod: PBBFAC,,,

## 2023-03-27 PROCEDURE — 1160F RVW MEDS BY RX/DR IN RCRD: CPT | Mod: CPTII,S$GLB,, | Performed by: OPTOMETRIST

## 2023-03-27 PROCEDURE — 3078F DIAST BP <80 MM HG: CPT | Mod: CPTII,S$GLB,,

## 2023-03-27 PROCEDURE — 92310 CONTACT LENS FITTING OU: CPT | Mod: CSM,S$GLB,, | Performed by: OPTOMETRIST

## 2023-03-27 PROCEDURE — 1160F RVW MEDS BY RX/DR IN RCRD: CPT | Mod: CPTII,S$GLB,,

## 2023-03-27 PROCEDURE — 1159F MED LIST DOCD IN RCRD: CPT | Mod: CPTII,S$GLB,, | Performed by: OPTOMETRIST

## 2023-03-27 PROCEDURE — 1160F PR REVIEW ALL MEDS BY PRESCRIBER/CLIN PHARMACIST DOCUMENTED: ICD-10-PCS | Mod: CPTII,S$GLB,, | Performed by: OPTOMETRIST

## 2023-03-27 PROCEDURE — 77063 BREAST TOMOSYNTHESIS BI: CPT | Mod: 26,,, | Performed by: RADIOLOGY

## 2023-03-27 PROCEDURE — 99396 PREV VISIT EST AGE 40-64: CPT | Mod: S$GLB,,,

## 2023-03-27 PROCEDURE — 3072F PR LOW RISK FOR RETINOPATHY: ICD-10-PCS | Mod: CPTII,S$GLB,,

## 2023-03-27 RX ORDER — ESTRADIOL 1 MG/1
1 TABLET ORAL DAILY
Qty: 30 TABLET | Refills: 11 | Status: SHIPPED | OUTPATIENT
Start: 2023-03-27 | End: 2024-03-26
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
## 2023-03-27 NOTE — PROGRESS NOTES
HPI    Patient here today for yearly eye exam  Diagnosed with diabetes 20 years ago  Lab Results       Component                Value               Date                       HGBA1C                   8.4 (H)             10/19/2022            Vision changes since last eye exam?: None noticed  Wears CTL full-time     Any eye pain today: No    Other ocular symptoms: No    Interested in contact lens fitting today? Yes              Last edited by Joceline Ortiz, PCT on 3/27/2023  1:17 PM.            Assessment /Plan     For exam results, see Encounter Report.    Diabetes mellitus without complication  20 years, last A1c 8.4 Stressed importance of DM control to preserve vision. No diabetic retinopathy was seen in either eye today. Continue strict blood glucose control.  Reviewed importance of yearly dilated eye exams. Continue close care with PCP regarding diabetes.    Type 2 diabetes mellitus with hyperglycemia, without long-term current use of insulin    Myopia of both eyes with astigmatism  Eyeglass Final Rx       Eyeglass Final Rx         Sphere Cylinder Axis Add    Right -3.75 +2.50 004 +2.25    Left -4.50 +3.75 165 +2.25      Type: PAL    Expiration Date: 3/27/2024                  Contact Lens Final Rx       Final Contact Lens Rx         Brand Base Curve Diameter Sphere Cylinder Axis    Right Acuvue Oasys 1 Day for Astigmatism 8.5 14.3 -1.50 -1.75 100    Left Acuvue Oasys 1 Day for Astigmatism 8.5 14.3 -2.00 -1.75 070      Expiration Date: 3/27/2024    Replacement: Daily    Wearing Schedule: Daily Wear              Final Contact Lens Rx #2         Brand Base Curve Diameter Sphere Cylinder Axis    Right Acuvue Oasys for Astigmatism 8.6 14.5 -1.50 -1.75 100    Left Acuvue Oasys for Astigmatism 8.6 14.5 -2.00 -1.75 070      Expiration Date: 3/27/2024    Replacement: Every 2 weeks    Solutions: OptiFree PureMoist    Wearing Schedule: Daily Wear                OU distance, patient will require OTC readers for near  tasks. Trialed XR lenses in the past, difficulty with adaptation.   Contact lens trials fitted in office today. Contact lens hygiene reviewed. Patient able to insert the lenses themselves with minimal difficulty. Patient ok to finalize Contact lens after 1 week of wear. RTC if still having difficulty with CTL trial after 1 week.       RTC 1 yr for dilated eye exam or sooner if any changes to vision.   Discussed above and answered questions.

## 2023-03-31 ENCOUNTER — PATIENT MESSAGE (OUTPATIENT)
Dept: OPTOMETRY | Facility: CLINIC | Age: 53
End: 2023-03-31
Payer: COMMERCIAL

## 2023-03-31 LAB
FINAL PATHOLOGIC DIAGNOSIS: NORMAL
Lab: NORMAL

## 2023-04-03 ENCOUNTER — PATIENT MESSAGE (OUTPATIENT)
Dept: PHARMACY | Facility: CLINIC | Age: 53
End: 2023-04-03
Payer: COMMERCIAL

## 2023-04-10 ENCOUNTER — PATIENT OUTREACH (OUTPATIENT)
Dept: ADMINISTRATIVE | Facility: HOSPITAL | Age: 53
End: 2023-04-10
Payer: COMMERCIAL

## 2023-04-17 ENCOUNTER — TELEPHONE (OUTPATIENT)
Dept: DIABETES | Facility: CLINIC | Age: 53
End: 2023-04-17
Payer: COMMERCIAL

## 2023-04-17 DIAGNOSIS — E11.65 TYPE 2 DIABETES MELLITUS WITH HYPERGLYCEMIA, WITHOUT LONG-TERM CURRENT USE OF INSULIN: Primary | ICD-10-CM

## 2023-04-17 RX ORDER — TIRZEPATIDE 5 MG/.5ML
5 INJECTION, SOLUTION SUBCUTANEOUS
Qty: 4 PEN | Refills: 0 | Status: SHIPPED | OUTPATIENT
Start: 2023-04-17 | End: 2023-04-21

## 2023-04-17 NOTE — TELEPHONE ENCOUNTER
Spoke with patient. She will  G7 this week. She will send [employer] message when she has equipment and we can schedule her for training. She is interested in Mounjaro, will see if approved by her insurance. We will get her rescheduled once she has completed G7 training.

## 2023-04-19 ENCOUNTER — PATIENT MESSAGE (OUTPATIENT)
Dept: ADMINISTRATIVE | Facility: HOSPITAL | Age: 53
End: 2023-04-19
Payer: COMMERCIAL

## 2023-04-21 RX ORDER — DAPAGLIFLOZIN 10 MG/1
TABLET, FILM COATED ORAL
Qty: 90 TABLET | Refills: 0 | Status: SHIPPED | OUTPATIENT
Start: 2023-04-21 | End: 2023-07-20

## 2023-04-21 NOTE — TELEPHONE ENCOUNTER
Refill Routing Note   Medication(s) are not appropriate for processing by [facility] for the following reason(s):      Required labs outdated    [facility] action(s):  Defer            Appointments  past 12m or future 3m with PCP    Date Provider   Last Visit   10/19/2022 CHLOE Ramos Jr., MD   Next Visit   Visit date not found CHLOE Ramos Jr., MD   ED visits in past 90 days: 0        Note composed:5:01 AM 04/21/2023

## 2023-04-21 NOTE — TELEPHONE ENCOUNTER
No new care gaps identified.  Health [facility] Embedded Care Gaps. Reference number: [id number]. 4/21/2023   5:01:40 AM CDT

## 2023-04-25 ENCOUNTER — PATIENT MESSAGE (OUTPATIENT)
Dept: PHARMACY | Facility: CLINIC | Age: 53
End: 2023-04-25
Payer: COMMERCIAL

## 2023-05-26 DIAGNOSIS — E11.65 TYPE 2 DIABETES MELLITUS WITH HYPERGLYCEMIA, WITHOUT LONG-TERM CURRENT USE OF INSULIN: Primary | ICD-10-CM

## 2023-05-26 RX ORDER — BLOOD-GLUCOSE SENSOR
1 EACH MISCELLANEOUS
Qty: 9 EACH | Refills: 1 | Status: SHIPPED | OUTPATIENT
Start: 2023-05-26 | End: 2023-09-13 | Stop reason: SDUPTHER

## 2023-06-12 DIAGNOSIS — E11.65 TYPE 2 DIABETES MELLITUS WITH HYPERGLYCEMIA, WITHOUT LONG-TERM CURRENT USE OF INSULIN: Primary | ICD-10-CM

## 2023-06-19 ENCOUNTER — CLINICAL SUPPORT (OUTPATIENT)
Dept: DIABETES | Facility: CLINIC | Age: 53
End: 2023-06-19
Payer: COMMERCIAL

## 2023-06-19 DIAGNOSIS — E11.65 TYPE 2 DIABETES MELLITUS WITH HYPERGLYCEMIA, WITHOUT LONG-TERM CURRENT USE OF INSULIN: ICD-10-CM

## 2023-06-19 PROCEDURE — 99999 PR PBB SHADOW E&M-EST. PATIENT-LVL II: ICD-10-PCS | Mod: PBBFAC,,,

## 2023-06-19 PROCEDURE — 99999 PR PBB SHADOW E&M-EST. PATIENT-LVL II: CPT | Mod: PBBFAC,,,

## 2023-06-19 NOTE — PROGRESS NOTES
Diabetes Care Specialist Progress Note  Author: Lillian Leiva RD  Date: 6/19/2023    Program Intake  Reason for Diabetes Program Visit:: Intervention  Type of Intervention:: Individual  Individual: Device Training  Device Training: Personal CGM  Permission to speak with others about care:: no    Lab Results   Component Value Date    HGBA1C 8.4 (H) 10/19/2022       Diabetes Self-Management Skills Assessment    Home Blood Glucose Monitoring  Assessment indicates:: Instruction Needed  Area of need?: Yes    Assessment Summary and Plan    Based on today's diabetes care assessment, the following areas of need were identified:      Social 10/31/2022   Support No   Cognitive/Behavioral Health No   Culture/[facility]aritan No   Communication No   Health Literacy No        Clinical 10/31/2022   Medication Adherence No   Lab Compliance No   Nutritional Status No        Diabetes Self-Management Skills 6/19/2023   Diabetes Disease Process/Treatment Options -   Nutrition/Healthy Eating -   Physical Activity/Exercise -   Medication -   Home Blood Glucose Monitoring Yes-See Diabetes Self-Management Care Plan   Acute Complications -   Chronic Complications -          Today's interventions were provided through individual discussion, instruction, and written materials were provided.      Patient verbalized understanding of instruction and written materials.  Pt was able to return back demonstration of instructions today. Patient understood key points, needs reinforcement and further instruction.     Diabetes Self-Management Care Plan:    Today's Diabetes Self-Management Care Plan was developed with Yandy's input. Yandy has agreed to work toward the following goal(s) to improve his/her overall diabetes control.      Care Plan: Diabetes Management   Updates made since 5/20/2023 12:00 AM        Problem: Medications         Goal: Patient agrees to check and record blood sugars at least 2 times per day. Completed 6/19/2023   Start Date:  10/31/2022   Expected End Date: 5/31/2023   Recent Progress: No change   Priority: Medium   Barriers: No Barriers Identified        Problem: Blood Glucose Self-Monitoring         Goal: Patient agrees to monitor blood sugar using Dexcom G7.    Start Date: 6/19/2023   Expected End Date: 12/19/2023   Priority: Medium   Barriers: No Barriers Identified   Note:    6/19/23: DEXCOM G7 CGM TRAINING  Dexcom G7 buddy downloaded to phone. [facility] clinic data share was already set-up as patient has been using Dexcom G6.      Overview:  5min glucose reading updates, trending arrows, BG graph screens, battery life indicator, Blue Tooth Symbol.  Menus: Trend graph, Start sensor, Events, Alerts, Settings, Shutdown, Stop Sensor   Settings: Imported from G6 when patient logged in     Patient paired sensor with iPhone.    Reviewed where to find sensor insertion time and sensor expiration date.   Reviewed appropriate calibration techniques.  Reviewed sensor site selection. Patient selected and prepped site using aseptic technique, inserted sensor and started session.   Discussed sensor removal from site.  Patient able to demonstrate without difficulty.  Encouraged to review manual prior to starting another sensor.   Reviewed problem solving aspects of sensor transmission/variables that can disrupt transmission.  range 20 feet.  Patient instructed on lag time and encouraged patient to use meter if sensor blood sugar does not match symptoms.  Dexcom technical support contact number given and examples of when to contact them discussed.          Task: Provided patient with a meter today and sent Rx request to provider to send to patients pharmacy.         Task: Reviewed the importance of self-monitoring blood glucose and keeping logs.         Task: Instructed on how to self-monitor blood glucose using a home glucometer, how to properly dispose of used strips and lancets after use, and how to appropriately store meter and  supplies.         Task: Provided patient with blood glucose logs, reviewed appropriate timing and frequency to SMBG, education on parameters on when to notify provider and advised patient to bring logs to all appts with PCP/Endocrinologist/Diabetes Care Specialist.         Task: Discussed ways to minimize pain when monitoring blood glucose.           Follow Up Plan     Follow up if symptoms worsen or fail to improve.    Today's care plan and follow up schedule was discussed with patient.  Yandy verbalized understanding of the care plan, goals, and agrees to follow up plan.        The patient was encouraged to communicate with his/her health care provider/physician and care team regarding his/her condition(s) and treatment.  I provided the patient with my contact information today and encouraged to contact me via phone or [facility] as needed.     Length of Visit   Total Time: 15 Minutes

## 2023-06-20 DIAGNOSIS — E11.65 TYPE 2 DIABETES MELLITUS WITH HYPERGLYCEMIA, WITHOUT LONG-TERM CURRENT USE OF INSULIN: ICD-10-CM

## 2023-06-20 RX ORDER — TIRZEPATIDE 5 MG/.5ML
5 INJECTION, SOLUTION SUBCUTANEOUS WEEKLY
Qty: 12 PEN | Refills: 0 | Status: CANCELLED | OUTPATIENT
Start: 2023-06-20

## 2023-06-21 ENCOUNTER — PATIENT MESSAGE (OUTPATIENT)
Dept: DIABETES | Facility: CLINIC | Age: 53
End: 2023-06-21
Payer: COMMERCIAL

## 2023-06-25 DIAGNOSIS — E11.65 TYPE 2 DIABETES MELLITUS WITH HYPERGLYCEMIA, WITHOUT LONG-TERM CURRENT USE OF INSULIN: ICD-10-CM

## 2023-06-26 RX ORDER — TIRZEPATIDE 5 MG/.5ML
INJECTION, SOLUTION SUBCUTANEOUS
Refills: 3 | OUTPATIENT
Start: 2023-06-26

## 2023-06-29 ENCOUNTER — OFFICE VISIT (OUTPATIENT)
Dept: DIABETES | Facility: CLINIC | Age: 53
End: 2023-06-29
Payer: COMMERCIAL

## 2023-06-29 DIAGNOSIS — Z97.8 USES SELF-APPLIED CONTINUOUS GLUCOSE MONITORING DEVICE: ICD-10-CM

## 2023-06-29 DIAGNOSIS — E11.65 TYPE 2 DIABETES MELLITUS WITH HYPERGLYCEMIA, WITHOUT LONG-TERM CURRENT USE OF INSULIN: Primary | ICD-10-CM

## 2023-06-29 PROCEDURE — 3072F PR LOW RISK FOR RETINOPATHY: ICD-10-PCS | Mod: CPTII,95,, | Performed by: NURSE PRACTITIONER

## 2023-06-29 PROCEDURE — 99214 PR OFFICE/OUTPT VISIT, EST, LEVL IV, 30-39 MIN: ICD-10-PCS | Mod: 95,,, | Performed by: NURSE PRACTITIONER

## 2023-06-29 PROCEDURE — 3072F LOW RISK FOR RETINOPATHY: CPT | Mod: CPTII,95,, | Performed by: NURSE PRACTITIONER

## 2023-06-29 PROCEDURE — 99214 OFFICE O/P EST MOD 30 MIN: CPT | Mod: 95,,, | Performed by: NURSE PRACTITIONER

## 2023-06-29 RX ORDER — INSULIN ASPART INJECTION 100 [IU]/ML
5 INJECTION, SOLUTION SUBCUTANEOUS
Qty: 5 PEN | Refills: 3 | Status: SHIPPED | OUTPATIENT
Start: 2023-06-29 | End: 2023-12-11 | Stop reason: SDUPTHER

## 2023-06-29 NOTE — PROGRESS NOTES
The patient location is: Home  The chief complaint leading to consultation is: Diabetes    Visit type: audiovisual    Face to Face time with patient: 10  25 minutes of total time spent on the encounter, which includes face to face time and non-face to face time preparing to see the patient (eg, review of tests), Obtaining and/or reviewing separately obtained history, Documenting clinical information in the electronic or other health record, Independently interpreting results (not separately reported) and communicating results to the patient/family/caregiver, or Care coordination (not separately reported).  Each patient to whom he or she provides medical services by telemedicine is:  (1) informed of the relationship between the physician and patient and the respective role of any other health care provider with respect to management of the patient; and (2) notified that he or she may decline to receive medical services by telemedicine and may withdraw from such care at any time.    Notes:    Yandy Cabral is a 52 y.o. female who presents for a follow up evaluation of Type 2 diabetes mellitus.     CHIEF COMPLAINT: Diabetes Consultation    PCP: LEONID Ramos Jr, MD      Initial visit with me - 12/19/2022     The patient was initially diagnosed with diabetes 2004, polyuria/polydipsia, unintentional weight loss, weakness, neuropathy and vision changes. Diagnosed with DM type 1.5.      Previous failed treatments include:  Was on insulin previously, was eventually weaned off.   Datto - insurance stopped paying.  Insulin pump - at [facility] in [phone]. [employer].      Social Documentation:  Patient lives with  and daughter.   Occupation:  for nonprofit organization.   Patient prefers In-person Visits.   and Video Virtual Visits.   Current Diet: No fried foot, avoids dairy. Drinks diet dr. Azul, drinking more water.   Exercise: Pilates twice a week.      Known Diabetes complications:  peripheral neuropathy and cardiovascular disease.   Cardiovascular Risk Factors: family history of premature cardiovascular disease.    Diabetes Medications               dapagliflozin (FARXIGA) 10 mg tablet TAKE 1 TABLET DAILY         glimepiride (AMARYL) 2 MG tablet       insulin aspart, niacinamide, (FIASP FLEXTOUCH U-100 INSULIN) 100 unit/mL (3 mL) InPn Inject 4 Units into the skin 3 (three) times daily with meals. For blood sugar over 120.         metFORMIN (GLUCOPHAGE) 500 MG tablet TAKE 2 TABLETS ONCE DAILY         tirzepatide 7.5 mg/0.5 mL PnIj Inject 7.5 mg into the skin every 7 days.       Current monitoring regimen: Dexcom G7 Sharing    The patient's Dexcom CGM was downloaded and reviewed. For the past 14 days, patient average glucose was 198 mg/dL, with standard deviation of 36. She was above range 50% of the time, in range 50% of the time, and below range 0% of the time. The target range for this patient was 70 - 180 mg/dL. Overall, there was a pattern of post prandial hyperglycemia.       Recent hypoglycemic episodes: No.   Patient compliant with glucose checks and medication administration? Yes    DIABETES MANAGEMENT STATUS  Statin: Taking  ACE/ARB: Not taking  Screening or Prevention Patient's value Goal Complete/Controlled?   HgA1C Testing and Control   Lab Results   Component Value Date    HGBA1C 8.4 (H) 10/19/2022      Annually/Less than 8% No     Lipid profile : 10/19/2022 Annually Yes     LDL control Lab Results   Component Value Date    LDLCALC 92.0 10/19/2022    Annually/Less than 100 mg/dl  Yes     Nephropathy screening Lab Results   Component Value Date    LABMICR 18.0 05/21/2021     Lab Results   Component Value Date    PROTEINUA 1+ (A) 06/24/2019     No results found for: UTPCR   Annually No     Blood pressure BP Readings from Last 1 Encounters:   03/27/23 116/72    Less than 140/90 Yes     Dilated retinal exam : 03/27/2023 Annually Yes     Foot exam   : 06/03/2021 Annually No      Patient's medications, allergies, surgical, social and family histories were reviewed and updated as appropriate.     Review of Systems   Constitutional:  Negative for weight loss.   Eyes:  Negative for blurred vision and double vision.   Cardiovascular:  Negative for chest pain.   Gastrointestinal:  Negative for nausea and vomiting.   Genitourinary:  Negative for frequency.   Musculoskeletal:  Negative for falls.   Neurological:  Negative for dizziness and weakness.   Endo/Heme/Allergies:  Negative for polydipsia.   Psychiatric/Behavioral:  Negative for depression.    All other systems reviewed and are negative.     Physical Exam  Constitutional:       Appearance: Normal appearance.   HENT:      Head: Normocephalic and atraumatic.   Pulmonary:      Effort: No respiratory distress.   Musculoskeletal:      Cervical back: Normal range of motion.   Neurological:      Mental Status: She is alert and oriented to person, place, and time.   Psychiatric:         Mood and Affect: Mood normal.         Behavior: Behavior normal.      Last menstrual period 03/05/2014.  Wt Readings from Last 3 Encounters:   03/27/23 68 kg (149 lb 14.6 oz)   03/27/23 64.7 kg (142 lb 10.2 oz)   02/01/23 68 kg (149 lb 14.6 oz)     No results found for: CPEPTIDE, GLUTAMICACID    Chemistry        Component Value Date/Time     03/22/2023 0735    K 4.5 03/22/2023 0735     03/22/2023 0735    CO2 27 03/22/2023 0735    BUN 13 03/22/2023 0735    CREATININE 0.8 03/22/2023 0735     (H) 03/22/2023 0735        Component Value Date/Time    CALCIUM 9.6 03/22/2023 0735    ALKPHOS 84 10/19/2022 0801    AST 33 10/19/2022 0801    ALT 39 10/19/2022 0801    BILITOT 0.5 10/19/2022 0801    ESTGFRAFRICA >60.0 05/13/2022 0911    EGFRNONAA >60.0 05/13/2022 0911            ASSESSMENT    ICD-10-CM ICD-9-CM   1. Type 2 diabetes mellitus with hyperglycemia, without long-term current use of insulin  E11.65 250.00     790.29   2. CGM - DEXCOM G7 - SHARING   Z97.8 V49.89         PLAN  Diagnoses and all orders for this visit:    Type 2 diabetes mellitus with hyperglycemia, without long-term current use of insulin  -     insulin aspart, niacinamide, (FIASP FLEXTOUCH U-100 INSULIN) 100 unit/mL (3 mL) InPn; Inject 5 Units into the skin 3 (three) times daily with meals. For blood sugar over 120.  -     tirzepatide 10 mg/0.5 mL PnIj; Inject 10 mg into the skin every 7 days.    CGM - DEXCOM G7 - SHARING      Reviewed pathophysiology of diabetes, complications related to the disease, importance of annual dilated eye exam and daily foot examination. Explained MOA, SE, dosage of medications. Written instructions given and reviewed with patient and patient verbalizes understanding.     PATIENT INSTRUCTIONS     Lifestyle modification with diabetic diet and at least 30 minutes of physical activity daily recommended.   Diabetic Foot Exam deferred today due to virtual visit. Will plan to be done at next in-person visit.    Non-fasting labs to be done Monday morning at [facility]    Increase Mounjaro to 7.5 mg subcutaneously every 7 days, then 10 mg subcutaneously every 7 days.   Continue Farxiga 10 mg by mouth daily.   Continue Metformin 500 mg by mouth twice daily with meals.   Continue Amaryl 2 mg by mouth daily.   Change Fiasp 4 units subcutaneously three times daily with meals for glucose over 120 or for heavy carb meal.   Take 10-15 minutes before meal.     Continue Dexcom CGM G7.  Blood Sugar Goals:       Fastin-130.       1-2 hours after a meal: Less than 180.     Follow up in about 4 weeks (around 2023) for VIRTUAL, DEXCOM - SHARING, NON-FASTING LABS 1 WEEK PRIOR TO APPOINTMENT..    Portions of this note were prepared with [employer] voice recognition transcription software. Grammatical errors, including garbled syntax, mangle pronouns, and other bizarre constructions may be attributed to that software system.

## 2023-06-29 NOTE — PATIENT INSTRUCTIONS
PATIENT INSTRUCTIONS     Lifestyle modification with diabetic diet and at least 30 minutes of physical activity daily recommended.   Diabetic Foot Exam deferred today due to virtual visit. Will plan to be done at next in-person visit.    Non-fasting labs to be done Monday morning at [facility]    Increase Mounjaro to 7.5 mg subcutaneously every 7 days, then 10 mg subcutaneously every 7 days.   Continue Farxiga 10 mg by mouth daily.   Continue Metformin 500 mg by mouth twice daily with meals.   Continue Amaryl 2 mg by mouth daily.   Change Fiasp 4 units subcutaneously three times daily with meals for glucose over 120 or for heavy carb meal.   Take 10-15 minutes before meal.     Continue Dexcom CGM G7.  Blood Sugar Goals:       Fastin-130.       1-2 hours after a meal: Less than 180.

## 2023-06-30 ENCOUNTER — PATIENT MESSAGE (OUTPATIENT)
Dept: DIABETES | Facility: CLINIC | Age: 53
End: 2023-06-30
Payer: COMMERCIAL

## 2023-06-30 ENCOUNTER — TELEPHONE (OUTPATIENT)
Dept: DIABETES | Facility: CLINIC | Age: 53
End: 2023-06-30
Payer: COMMERCIAL

## 2023-06-30 DIAGNOSIS — E11.65 TYPE 2 DIABETES MELLITUS WITH HYPERGLYCEMIA, WITHOUT LONG-TERM CURRENT USE OF INSULIN: Primary | ICD-10-CM

## 2023-06-30 DIAGNOSIS — E11.65 TYPE 2 DIABETES MELLITUS WITH HYPERGLYCEMIA, WITHOUT LONG-TERM CURRENT USE OF INSULIN: ICD-10-CM

## 2023-06-30 RX ORDER — INSULIN PUMP SYRINGE, 3 ML
EACH MISCELLANEOUS
Qty: 1 EACH | Refills: 0 | Status: SHIPPED | OUTPATIENT
Start: 2023-06-30 | End: 2023-06-30

## 2023-06-30 RX ORDER — DEXTROSE 4 G
TABLET,CHEWABLE ORAL
Qty: 1 EACH | Refills: 0 | Status: SHIPPED | OUTPATIENT
Start: 2023-06-30

## 2023-06-30 RX ORDER — LANCETS 33 GAUGE
EACH MISCELLANEOUS
Qty: 100 EACH | Refills: 1 | Status: SHIPPED | OUTPATIENT
Start: 2023-06-30

## 2023-07-03 ENCOUNTER — PATIENT OUTREACH (OUTPATIENT)
Dept: ADMINISTRATIVE | Facility: HOSPITAL | Age: 53
End: 2023-07-03
Payer: COMMERCIAL

## 2023-07-09 DIAGNOSIS — E11.65 TYPE 2 DIABETES MELLITUS WITH HYPERGLYCEMIA, WITHOUT LONG-TERM CURRENT USE OF INSULIN: ICD-10-CM

## 2023-07-10 RX ORDER — METFORMIN HYDROCHLORIDE 500 MG/1
TABLET ORAL
Qty: 180 TABLET | Refills: 3 | Status: SHIPPED | OUTPATIENT
Start: 2023-07-10 | End: 2023-09-13 | Stop reason: ALTCHOICE

## 2023-07-10 NOTE — TELEPHONE ENCOUNTER
Care Due:                  Date            Visit Type   Department     Provider  --------------------------------------------------------------------------------                                EP -                              PRIMARY      [facility] INTER[facility]  Last Visit: 10-      CARE ([facility])   MEDICINE       Jaycob Ramos  Next Visit: None Scheduled  None         None Found                                                            Last  Test          Frequency    Reason                     Performed    Due Date  --------------------------------------------------------------------------------    HBA1C.......  6 months...  dapagliflozin, metFORMIN.  10-   04-    [facility] Embedded Care Due Messages. Reference number: [id number].   7/09/2023 11:40:30 PM CDT

## 2023-07-10 NOTE — TELEPHONE ENCOUNTER
Refill Routing Note   Medication(s) are not appropriate for processing by [facility] for the following reason(s):      Required labs outdated    [facility] action(s):  Defer Labs due          Appointments  past 12m or future 3m with PCP    Date Provider   Last Visit   10/19/2022 Jaycob Ramos MD   Next Visit   Visit date not found Jaycob Ramos MD   ED visits in past 90 days: 0        Note composed:12:17 PM 07/10/2023

## 2023-07-13 DIAGNOSIS — I25.118 CORONARY ARTERY DISEASE OF NATIVE ARTERY OF NATIVE HEART WITH STABLE ANGINA PECTORIS: ICD-10-CM

## 2023-07-13 DIAGNOSIS — I20.9 AP (ANGINA PECTORIS): ICD-10-CM

## 2023-07-13 DIAGNOSIS — Z82.49 FAMILY HISTORY OF CARDIOVASCULAR DISEASE: Primary | ICD-10-CM

## 2023-07-19 ENCOUNTER — PATIENT MESSAGE (OUTPATIENT)
Dept: ADMINISTRATIVE | Facility: HOSPITAL | Age: 53
End: 2023-07-19
Payer: COMMERCIAL

## 2023-07-20 RX ORDER — DAPAGLIFLOZIN 10 MG/1
TABLET, FILM COATED ORAL
Qty: 90 TABLET | Refills: 0 | Status: SHIPPED | OUTPATIENT
Start: 2023-07-20 | End: 2023-09-13 | Stop reason: ALTCHOICE

## 2023-07-20 NOTE — TELEPHONE ENCOUNTER
Care Due:                  Date            Visit Type   Department     Provider  --------------------------------------------------------------------------------                                EP -                              PRIMARY      [facility]  Last Visit: 10-      CARE ([facility])   MEDICINE       Jaycob Ramos  Next Visit: None Scheduled  None         None Found                                                            Last  Test          Frequency    Reason                     Performed    Due Date  --------------------------------------------------------------------------------    Office Visit  12 months..  atorvastatin, buPROPion,   10-   10-                             dapagliflozin............    CMP.........  12 months..  atorvastatin.............  10-   10-    Lipid Panel.  12 months..  atorvastatin.............  10-   10-    [facility] Embedded Care Due Messages. Reference number: [id number].   7/19/2023 11:28:40 PM CDT

## 2023-07-27 ENCOUNTER — PATIENT MESSAGE (OUTPATIENT)
Dept: DIABETES | Facility: CLINIC | Age: 53
End: 2023-07-27
Payer: COMMERCIAL

## 2023-07-27 DIAGNOSIS — M81.0 AGE RELATED OSTEOPOROSIS, UNSPECIFIED PATHOLOGICAL FRACTURE PRESENCE: ICD-10-CM

## 2023-07-28 NOTE — TELEPHONE ENCOUNTER
Refill Routing Note   Medication(s) are not appropriate for processing by [facility] for the following reason(s):      Medication outside of protocol    [facility] action(s):  Route Care Due:  None identified            Appointments  past 12m or future 3m with PCP    Date Provider   Last Visit   10/19/2022 Jaycob Ramos MD   Next Visit   Visit date not found Jaycob Ramos MD   ED visits in past 90 days: 0        Note composed:9:03 AM 07/28/2023

## 2023-07-30 RX ORDER — ALENDRONATE SODIUM 70 MG/1
TABLET ORAL
Qty: 12 TABLET | Refills: 3 | Status: SHIPPED | OUTPATIENT
Start: 2023-07-30

## 2023-08-01 DIAGNOSIS — I20.9 NEW-ONSET ANGINA: ICD-10-CM

## 2023-08-01 DIAGNOSIS — I25.10 CAD IN NATIVE ARTERY: ICD-10-CM

## 2023-08-01 DIAGNOSIS — I20.9 AP (ANGINA PECTORIS): ICD-10-CM

## 2023-08-02 RX ORDER — METOPROLOL SUCCINATE 25 MG/1
TABLET, EXTENDED RELEASE ORAL
Qty: 90 TABLET | Refills: 3 | Status: SHIPPED | OUTPATIENT
Start: 2023-08-02

## 2023-08-14 ENCOUNTER — OFFICE VISIT (OUTPATIENT)
Dept: DIABETES | Facility: CLINIC | Age: 53
End: 2023-08-14
Payer: COMMERCIAL

## 2023-08-14 DIAGNOSIS — Z97.8 USES SELF-APPLIED CONTINUOUS GLUCOSE MONITORING DEVICE: ICD-10-CM

## 2023-08-14 DIAGNOSIS — K62.5 RECTAL BLEEDING: ICD-10-CM

## 2023-08-14 DIAGNOSIS — E11.65 TYPE 2 DIABETES MELLITUS WITH HYPERGLYCEMIA, WITHOUT LONG-TERM CURRENT USE OF INSULIN: Primary | ICD-10-CM

## 2023-08-14 PROCEDURE — 99214 PR OFFICE/OUTPT VISIT, EST, LEVL IV, 30-39 MIN: ICD-10-PCS | Mod: 95,,, | Performed by: NURSE PRACTITIONER

## 2023-08-14 PROCEDURE — 95251 PR GLUCOSE MONITOR, 72 HOUR, PHYS INTERP: ICD-10-PCS | Mod: NDTC,S$GLB,, | Performed by: NURSE PRACTITIONER

## 2023-08-14 PROCEDURE — 99214 OFFICE O/P EST MOD 30 MIN: CPT | Mod: 95,,, | Performed by: NURSE PRACTITIONER

## 2023-08-14 PROCEDURE — 95251 CONT GLUC MNTR ANALYSIS I&R: CPT | Mod: NDTC,S$GLB,, | Performed by: NURSE PRACTITIONER

## 2023-08-14 NOTE — PROGRESS NOTES
The patient location is: Home  The chief complaint leading to consultation is: Diabetes    Visit type: audiovisual    Face to Face time with patient: 10  25 minutes of total time spent on the encounter, which includes face to face time and non-face to face time preparing to see the patient (eg, review of tests), Obtaining and/or reviewing separately obtained history, Documenting clinical information in the electronic or other health record, Independently interpreting results (not separately reported) and communicating results to the patient/family/caregiver, or Care coordination (not separately reported).  Each patient to whom he or she provides medical services by telemedicine is:  (1) informed of the relationship between the physician and patient and the respective role of any other health care provider with respect to management of the patient; and (2) notified that he or she may decline to receive medical services by telemedicine and may withdraw from such care at any time.    Notes:    Yandy Cabral is a 52 y.o. female who presents for a follow up evaluation of Type 2 diabetes mellitus.     CHIEF COMPLAINT: Diabetes Consultation    PCP: Jaycob Ramos MD      Initial visit with me - 12/19/2022     The patient was initially diagnosed with diabetes 2004, polyuria/polydipsia, unintentional weight loss, weakness, neuropathy and vision changes. Diagnosed with DM type 1.5.      Previous failed treatments include:  Was on insulin previously, was eventually weaned off.   [employer] - insurance stopped paying.  Insulin pump - at [facility] in [phone]. [employer].      Social Documentation:  Patient lives with  and daughter.   Occupation:  for nonprofit organization.   Patient prefers In-person Visits.   and Video Virtual Visits.   Current Diet: No fried foot, avoids dairy. Drinks diet dr. Azul, drinking more water.   Exercise: Pilates twice a week.      Known Diabetes  "complications: peripheral neuropathy and cardiovascular disease.   Cardiovascular Risk Factors: family history of premature cardiovascular disease.    Diabetes Medications               FARXIGA 10 mg tablet TAKE 1 TABLET DAILY    glimepiride (AMARYL) 2 MG tablet     insulin aspart, niacinamide, (FIASP FLEXTOUCH U-100 INSULIN) 100 unit/mL (3 mL) InPn Inject 5 Units into the skin 3 (three) times daily with meals. For blood sugar over 120.    metFORMIN (GLUCOPHAGE) 500 MG tablet TAKE 2 TABLETS ONCE DAILY    tirzepatide 7.5 mg/0.5 mL PnIj Inject 7.5 mg into the skin every 7 days. - TAKING ON SUNDAYS     Current monitoring regimen: Dexcom G7 Sharing      The patient's Dexcom CGM was downloaded and reviewed. For the past 14 days, patient average glucose was 163 mg/dL. She was above range 33% of the time, in range 66% of the time, and below range 1% of the time. The target range for this patient was 70 - 180 mg/dL. Overall, there was a pattern of post prandial hyperglycemia, especially in the evenings.        Recent hypoglycemic episodes: Low down to 55 this morning. Will discontinue Amaryl.    Patient compliant with glucose checks and medication administration? Yes    DIABETES MANAGEMENT STATUS  Statin: Taking  ACE/ARB: Not taking  Screening or Prevention Patient's value Goal Complete/Controlled?   HgA1C Testing and Control   Lab Results   Component Value Date    HGBA1C 8.4 (H) 10/19/2022      Annually/Less than 8% No     Lipid profile : 10/19/2022 Annually Yes     LDL control Lab Results   Component Value Date    LDLCALC 92.0 10/19/2022    Annually/Less than 100 mg/dl  Yes     Nephropathy screening Lab Results   Component Value Date    LABMICR 18.0 05/21/2021     Lab Results   Component Value Date    PROTEINUA 1+ (A) 06/24/2019     No results found for: "UTPCR"   Annually No     Blood pressure BP Readings from Last 1 Encounters:   03/27/23 116/72    Less than 140/90 Yes     Dilated retinal exam : 03/27/2023 Annually " "Yes     Foot exam   : 06/03/2021 Annually No     Patient's medications, allergies, surgical, social and family histories were reviewed and updated as appropriate.     Review of Systems   Constitutional:  Negative for weight loss.   Eyes:  Negative for blurred vision and double vision.   Cardiovascular:  Negative for chest pain.   Gastrointestinal:  Negative for nausea and vomiting.        Rectal bleeding - light, painless. + h/o hemorrhoids   Genitourinary:  Negative for frequency.   Musculoskeletal:  Negative for falls.   Neurological:  Negative for dizziness and weakness.   Endo/Heme/Allergies:  Negative for polydipsia.   Psychiatric/Behavioral:  Negative for depression.    All other systems reviewed and are negative.       Physical Exam  Constitutional:       Appearance: Normal appearance.   HENT:      Head: Normocephalic and atraumatic.   Pulmonary:      Effort: No respiratory distress.   Musculoskeletal:      Cervical back: Normal range of motion.   Neurological:      Mental Status: She is alert and oriented to person, place, and time.   Psychiatric:         Mood and Affect: Mood normal.         Behavior: Behavior normal.        Last menstrual period 03/05/2014.  Wt Readings from Last 3 Encounters:   03/27/23 68 kg (149 lb 14.6 oz)   03/27/23 64.7 kg (142 lb 10.2 oz)   02/01/23 68 kg (149 lb 14.6 oz)     No results found for: "CPEPTIDE", "GLUTAMICACID"    Chemistry        Component Value Date/Time     03/22/2023 0735    K 4.5 03/22/2023 0735     03/22/2023 0735    CO2 27 03/22/2023 0735    BUN 13 03/22/2023 0735    CREATININE 0.8 03/22/2023 0735     (H) 03/22/2023 0735        Component Value Date/Time    CALCIUM 9.6 03/22/2023 0735    ALKPHOS 84 10/19/2022 0801    AST 33 10/19/2022 0801    ALT 39 10/19/2022 0801    BILITOT 0.5 10/19/2022 0801    ESTGFRAFRICA >60.0 05/13/2022 0911    EGFRNONAA >60.0 05/13/2022 0911            ASSESSMENT    ICD-10-CM ICD-9-CM   1. Type 2 diabetes mellitus with " hyperglycemia, without long-term current use of insulin  E11.65 250.00     790.29   2. Uses self-applied continuous glucose monitoring device  Z97.8 V49.89   3. Rectal bleeding  K62.5 569.3           PLAN  Diagnoses and all orders for this visit:    Type 2 diabetes mellitus with hyperglycemia, without long-term current use of insulin  -     tirzepatide 10 mg/0.5 mL PnIj; Inject 10 mg into the skin every 7 days.    Uses self-applied continuous glucose monitoring device    Rectal bleeding        Reviewed pathophysiology of diabetes, complications related to the disease, importance of annual dilated eye exam and daily foot examination. Explained MOA, SE, dosage of medications. Written instructions given and reviewed with patient and patient verbalizes understanding.     PATIENT INSTRUCTIONS     Lifestyle modification with diabetic diet and at least 30 minutes of physical activity daily recommended.   Diabetic Foot Exam deferred today due to virtual visit. Will plan to be done at next in-person visit.    Non-fasting labs to be done Friday morning at [facility]    Increase Mounjaro to 10 mg subcutaneously every 7 days.   Continue Farxiga 10 mg by mouth daily.   Continue Metformin 500 mg by mouth twice daily with meals.   Discontinue Amaryl.    Change Fiasp 4 units subcutaneously three times daily with meals for glucose over 120 or for heavy carb meal.   Take 10-15 minutes before meal.     Continue Dexcom CGM G7.  Blood Sugar Goals:       Fastin-130.       1-2 hours after a meal: Less than 180.     Follow up in about 4 weeks (around 2023) for VIRTUAL, DEXCOM - SHARING, SCHEDULE NON-FASTING LABS.    Portions of this note were prepared with [employer] Naturally Speaking voice recognition transcription software. Grammatical errors, including garbled syntax, mangle pronouns, and other bizarre constructions may be attributed to that software system.

## 2023-08-14 NOTE — PATIENT INSTRUCTIONS
PATIENT INSTRUCTIONS     Lifestyle modification with diabetic diet and at least 30 minutes of physical activity daily recommended.   Diabetic Foot Exam deferred today due to virtual visit. Will plan to be done at next in-person visit.    Non-fasting labs to be done Friday morning at [facility]    Increase Mounjaro to 10 mg subcutaneously every 7 days.   Continue Farxiga 10 mg by mouth daily.   Continue Metformin 500 mg by mouth twice daily with meals.   Discontinue Amaryl.    Change Fiasp 4 units subcutaneously three times daily with meals for glucose over 120 or for heavy carb meal.   Take 10-15 minutes before meal.     Continue Dexcom CGM G7.  Blood Sugar Goals:       Fastin-130.       1-2 hours after a meal: Less than 180.

## 2023-08-14 NOTE — Clinical Note
VIRTUAL, 4 WEEKS DEXCOM - SHARING,  SCHEDULE NON-FASTING LABS ON FRIDAY AT [facility]  PLEASE SCHEDULE PCP F/U ASAP FOR C/O RECTAL BLEEDING.

## 2023-08-14 NOTE — Clinical Note
Good morning Dr. Ramos, I met with Mrs. Cabral this morning for her diabetes, but she mentioned to me that for the last few months she has had some light painless rectal bleeding with each BM, mostly on tissue after wiping, has not noticed any blood in stool. Has history of hemorrhoids in the past, but not hemorrhagic. Last TC in 2020. She is due for follow up with you, so I am having my staff get her scheduled.   Thank you,  Lissette Pearson, [facility]

## 2023-08-15 ENCOUNTER — OFFICE VISIT (OUTPATIENT)
Dept: INTERNAL MEDICINE | Facility: CLINIC | Age: 53
End: 2023-08-15
Payer: COMMERCIAL

## 2023-08-15 VITALS
HEIGHT: 65 IN | WEIGHT: 139.31 LBS | BODY MASS INDEX: 23.21 KG/M2 | TEMPERATURE: 98 F | RESPIRATION RATE: 17 BRPM | OXYGEN SATURATION: 99 % | SYSTOLIC BLOOD PRESSURE: 110 MMHG | DIASTOLIC BLOOD PRESSURE: 60 MMHG | HEART RATE: 101 BPM

## 2023-08-15 DIAGNOSIS — K64.8 BLEEDING INTERNAL HEMORRHOIDS: Primary | ICD-10-CM

## 2023-08-15 PROCEDURE — 99212 PR OFFICE/OUTPT VISIT, EST, LEVL II, 10-19 MIN: ICD-10-PCS | Mod: S$GLB,,, | Performed by: PEDIATRICS

## 2023-08-15 PROCEDURE — 3078F DIAST BP <80 MM HG: CPT | Mod: CPTII,S$GLB,, | Performed by: PEDIATRICS

## 2023-08-15 PROCEDURE — 3074F SYST BP LT 130 MM HG: CPT | Mod: CPTII,S$GLB,, | Performed by: PEDIATRICS

## 2023-08-15 PROCEDURE — 1159F MED LIST DOCD IN RCRD: CPT | Mod: CPTII,S$GLB,, | Performed by: PEDIATRICS

## 2023-08-15 PROCEDURE — 1160F PR REVIEW ALL MEDS BY PRESCRIBER/CLIN PHARMACIST DOCUMENTED: ICD-10-PCS | Mod: CPTII,S$GLB,, | Performed by: PEDIATRICS

## 2023-08-15 PROCEDURE — 3008F PR BODY MASS INDEX (BMI) DOCUMENTED: ICD-10-PCS | Mod: CPTII,S$GLB,, | Performed by: PEDIATRICS

## 2023-08-15 PROCEDURE — 99999 PR PBB SHADOW E&M-EST. PATIENT-LVL V: CPT | Mod: PBBFAC,,, | Performed by: PEDIATRICS

## 2023-08-15 PROCEDURE — 3078F PR MOST RECENT DIASTOLIC BLOOD PRESSURE < 80 MM HG: ICD-10-PCS | Mod: CPTII,S$GLB,, | Performed by: PEDIATRICS

## 2023-08-15 PROCEDURE — 3074F PR MOST RECENT SYSTOLIC BLOOD PRESSURE < 130 MM HG: ICD-10-PCS | Mod: CPTII,S$GLB,, | Performed by: PEDIATRICS

## 2023-08-15 PROCEDURE — 99999 PR PBB SHADOW E&M-EST. PATIENT-LVL V: ICD-10-PCS | Mod: PBBFAC,,, | Performed by: PEDIATRICS

## 2023-08-15 PROCEDURE — 1159F PR MEDICATION LIST DOCUMENTED IN MEDICAL RECORD: ICD-10-PCS | Mod: CPTII,S$GLB,, | Performed by: PEDIATRICS

## 2023-08-15 PROCEDURE — 1160F RVW MEDS BY RX/DR IN RCRD: CPT | Mod: CPTII,S$GLB,, | Performed by: PEDIATRICS

## 2023-08-15 PROCEDURE — 3008F BODY MASS INDEX DOCD: CPT | Mod: CPTII,S$GLB,, | Performed by: PEDIATRICS

## 2023-08-15 PROCEDURE — 99212 OFFICE O/P EST SF 10 MIN: CPT | Mod: S$GLB,,, | Performed by: PEDIATRICS

## 2023-08-15 RX ORDER — HYDROCORTISONE ACETATE PRAMOXINE HCL 1; 1 G/100G; G/100G
CREAM TOPICAL 3 TIMES DAILY
Qty: 30 G | Refills: 1 | Status: SHIPPED | OUTPATIENT
Start: 2023-08-15

## 2023-08-15 NOTE — PROGRESS NOTES
Subjective     Patient ID: Yandy Cabral is a 52 y.o. female.    Chief Complaint: Rectal Bleeding    Yandy Cabral is a 52 y.o. female who presents to the clinic for rectal bleeding. Pt reports it is only when she wipes and is found on the toilet paper. No blood in stool and no pain. Symptoms have been going on for months. Last colonoscopy was 2020, a polyp was found, perianal exam showed hemorrhoids, and on a Q10 year repeat.           Review of Systems   Constitutional:  Negative for chills, diaphoresis, fatigue and fever.   Respiratory:  Negative for cough and shortness of breath.    Cardiovascular:  Negative for chest pain.   Gastrointestinal:  Positive for anal bleeding. Negative for abdominal pain, blood in stool, constipation, diarrhea, nausea and vomiting.   Endocrine: Negative for cold intolerance, heat intolerance, polydipsia, polyphagia and polyuria.   All other systems reviewed and are negative.         Objective     Physical Exam  Constitutional:       General: She is not in acute distress.     Appearance: Normal appearance. She is not ill-appearing or toxic-appearing.   Cardiovascular:      Rate and Rhythm: Normal rate and regular rhythm.      Pulses: Normal pulses.      Heart sounds: Normal heart sounds. No murmur heard.     No friction rub.   Pulmonary:      Effort: Pulmonary effort is normal.      Breath sounds: Normal breath sounds.   Abdominal:      General: There is no distension.      Palpations: There is no mass.      Tenderness: There is no abdominal tenderness. There is no guarding or rebound.      Hernia: No hernia is present.   Genitourinary:     Comments: No external hemorrhoids, no fissures or tears, small non irritated internal hemorrhoids   Neurological:      Mental Status: She is alert and oriented to person, place, and time.   Psychiatric:         Mood and Affect: Mood normal.         Behavior: Behavior normal.         Thought Content: Thought content normal.          Judgment: Judgment normal.            Assessment and Plan     1. Bleeding internal hemorrhoids  -     pramoxine-hydrocortisone (PROCTOCREAM-HC) 1-1 % rectal cream; Place rectally 3 (three) times daily.  Dispense: 30 g; Refill: 1        Rectal exam done in office with female chaperone. Fiber, miralax up to a cap full, hydration, and proctofoam HC to treat symptoms. If persists, she should contact Dr. Navarro her GI doctor.          No follow-ups on file.

## 2023-08-21 ENCOUNTER — PATIENT MESSAGE (OUTPATIENT)
Dept: PHARMACY | Facility: CLINIC | Age: 53
End: 2023-08-21
Payer: COMMERCIAL

## 2023-09-13 ENCOUNTER — OFFICE VISIT (OUTPATIENT)
Dept: DIABETES | Facility: CLINIC | Age: 53
End: 2023-09-13
Payer: COMMERCIAL

## 2023-09-13 DIAGNOSIS — E11.65 TYPE 2 DIABETES MELLITUS WITH HYPERGLYCEMIA, WITHOUT LONG-TERM CURRENT USE OF INSULIN: ICD-10-CM

## 2023-09-13 DIAGNOSIS — I25.118 CORONARY ARTERY DISEASE OF NATIVE ARTERY OF NATIVE HEART WITH STABLE ANGINA PECTORIS: ICD-10-CM

## 2023-09-13 DIAGNOSIS — Z97.8 USES SELF-APPLIED CONTINUOUS GLUCOSE MONITORING DEVICE: Primary | ICD-10-CM

## 2023-09-13 PROCEDURE — 95251 PR GLUCOSE MONITOR, 72 HOUR, PHYS INTERP: ICD-10-PCS | Mod: NDTC,S$GLB,, | Performed by: NURSE PRACTITIONER

## 2023-09-13 PROCEDURE — 99214 PR OFFICE/OUTPT VISIT, EST, LEVL IV, 30-39 MIN: ICD-10-PCS | Mod: 95,,, | Performed by: NURSE PRACTITIONER

## 2023-09-13 PROCEDURE — 99214 OFFICE O/P EST MOD 30 MIN: CPT | Mod: 95,,, | Performed by: NURSE PRACTITIONER

## 2023-09-13 PROCEDURE — 95251 CONT GLUC MNTR ANALYSIS I&R: CPT | Mod: NDTC,S$GLB,, | Performed by: NURSE PRACTITIONER

## 2023-09-13 RX ORDER — BLOOD-GLUCOSE SENSOR
1 EACH MISCELLANEOUS
Qty: 9 EACH | Refills: 3 | Status: SHIPPED | OUTPATIENT
Start: 2023-09-13

## 2023-09-13 RX ORDER — DAPAGLIFLOZIN AND METFORMIN HYDROCHLORIDE 10; 1000 MG/1; MG/1
1 TABLET, FILM COATED, EXTENDED RELEASE ORAL DAILY
Qty: 30 TABLET | Refills: 11 | Status: SHIPPED | OUTPATIENT
Start: 2023-09-13 | End: 2024-09-12

## 2023-09-13 NOTE — PROGRESS NOTES
The patient location is: Home  The chief complaint leading to consultation is: Diabetes    Visit type: audiovisual    Face to Face time with patient: 10  30 minutes of total time spent on the encounter, which includes face to face time and non-face to face time preparing to see the patient (eg, review of tests), Obtaining and/or reviewing separately obtained history, Documenting clinical information in the electronic or other health record, Independently interpreting results (not separately reported) and communicating results to the patient/family/caregiver, or Care coordination (not separately reported).  Each patient to whom he or she provides medical services by telemedicine is:  (1) informed of the relationship between the physician and patient and the respective role of any other health care provider with respect to management of the patient; and (2) notified that he or she may decline to receive medical services by telemedicine and may withdraw from such care at any time.    Notes:    Yandy Cabral is a 52 y.o. female who presents for a follow up evaluation of Type 2 diabetes mellitus.     CHIEF COMPLAINT: Diabetes Consultation    PCP: Jaycob Ramos MD      Initial visit with me - 12/19/2022     The patient was initially diagnosed with diabetes 2004, polyuria/polydipsia, unintentional weight loss, weakness, neuropathy and vision changes. Diagnosed with DM type 1.5.      Previous failed treatments include:  Was on insulin previously, was eventually weaned off.   [employer] - insurance stopped paying.  Insulin pump - at [facility] in [phone]. [employer].      Social Documentation:  Patient lives with  and daughter.   Occupation:  for nonprofit organization.   Patient prefers In-person Visits.   and Video Virtual Visits.   Current Diet: No fried foot, avoids dairy. Drinks diet dr. Azul, drinking more water.   Exercise: Pilates twice a week.      Known Diabetes  "complications: peripheral neuropathy and cardiovascular disease.     Cardiovascular Risk Factors: family history of premature cardiovascular disease.    Diabetes Medications               FARXIGA 10 mg tablet TAKE 1 TABLET DAILY    insulin aspart, niacinamide, (FIASP FLEXTOUCH U-100 INSULIN) 100 unit/mL (3 mL) InPn Inject 5 Units into the skin 3 (three) times daily with meals. For blood sugar over 120.    metFORMIN (GLUCOPHAGE) 500 MG tablet TAKE 2 TABLETS ONCE DAILY    tirzepatide 10 mg/0.5 mL PnIj Inject 10 mg into the skin every 7 days. - TOLERATING WITH SOME MILD NAUSEA, WOULD LIKE TO STAY AT 10 MG FOR ANOTHER MONTH.      Current monitoring regimen: Dexcom G7 Sharing        The patient's Dexcom CGM was downloaded and reviewed. For the past 14 days, patient average glucose was 170 mg/dL. She was above range 38% of the time, in range 62% of the time, and below range 0% of the time. The target range for this patient was 70 - 180 mg/dL. Overall, there was a pattern of post prandial hyperglycemia. Was out of town at a conference for last week.          Recent hypoglycemic episodes: none.     Patient compliant with glucose checks and medication administration? Yes    DIABETES MANAGEMENT STATUS  Statin: Taking  ACE/ARB: Not taking  Screening or Prevention Patient's value Goal Complete/Controlled?   HgA1C Testing and Control   Lab Results   Component Value Date    HGBA1C 8.4 (H) 10/19/2022      Annually/Less than 8% No     Lipid profile : 10/19/2022 Annually Yes     LDL control Lab Results   Component Value Date    LDLCALC 92.0 10/19/2022    Annually/Less than 100 mg/dl  Yes     Nephropathy screening Lab Results   Component Value Date    LABMICR 18.0 05/21/2021     Lab Results   Component Value Date    PROTEINUA 1+ (A) 06/24/2019     No results found for: "UTPCR"   Annually No     Blood pressure BP Readings from Last 1 Encounters:   08/15/23 110/60    Less than 140/90 Yes     Dilated retinal exam : 03/27/2023 Annually " "Yes     Foot exam   : 06/03/2021 Annually No     Patient's medications, allergies, surgical, social and family histories were reviewed and updated as appropriate.     Review of Systems   Constitutional:  Negative for weight loss.   Eyes:  Negative for blurred vision and double vision.   Cardiovascular:  Negative for chest pain.   Gastrointestinal:  Negative for nausea and vomiting.   Genitourinary:  Negative for frequency.   Musculoskeletal:  Negative for falls.   Neurological:  Negative for dizziness and weakness.   Endo/Heme/Allergies:  Negative for polydipsia.   Psychiatric/Behavioral:  Negative for depression.    All other systems reviewed and are negative.       Physical Exam  Constitutional:       Appearance: Normal appearance.   HENT:      Head: Normocephalic and atraumatic.   Pulmonary:      Effort: No respiratory distress.   Musculoskeletal:      Cervical back: Normal range of motion.   Neurological:      Mental Status: She is alert and oriented to person, place, and time.   Psychiatric:         Mood and Affect: Mood normal.         Behavior: Behavior normal.        Last menstrual period 03/05/2014.  Wt Readings from Last 3 Encounters:   08/15/23 63.2 kg (139 lb 5.3 oz)   03/27/23 68 kg (149 lb 14.6 oz)   03/27/23 64.7 kg (142 lb 10.2 oz)     No results found for: "CPEPTIDE", "GLUTAMICACID"    Chemistry        Component Value Date/Time     03/22/2023 0735    K 4.5 03/22/2023 0735     03/22/2023 0735    CO2 27 03/22/2023 0735    BUN 13 03/22/2023 0735    CREATININE 0.8 03/22/2023 0735     (H) 03/22/2023 0735        Component Value Date/Time    CALCIUM 9.6 03/22/2023 0735    ALKPHOS 84 10/19/2022 0801    AST 33 10/19/2022 0801    ALT 39 10/19/2022 0801    BILITOT 0.5 10/19/2022 0801    ESTGFRAFRICA >60.0 05/13/2022 0911    EGFRNONAA >60.0 05/13/2022 0911            ASSESSMENT    ICD-10-CM ICD-9-CM   1. CGM - DEXCOM - SHARING  Z97.8 V49.89   2. Type 2 diabetes mellitus with hyperglycemia, " without long-term current use of insulin  E11.65 250.00     790.29   3. Coronary artery disease of native artery of native heart with stable angina pectoris  I25.118 414.01     413.9           PLAN  Diagnoses and all orders for this visit:    CGM - DEXCOM - SHARING    Type 2 diabetes mellitus with hyperglycemia, without long-term current use of insulin  -     tirzepatide 10 mg/0.5 mL PnIj; Inject 10 mg into the skin every 7 days.  -     dapaglifloz propaned-metformin (XIGDUO XR) 10-1,000 mg TBph; Take 1 tablet by mouth once daily.  -     blood-glucose sensor (DEXCOM G7 SENSOR) Lindsay; Inject 1 each into the skin every 10 days.    Coronary artery disease of native artery of native heart with stable angina pectoris      Reviewed pathophysiology of diabetes, complications related to the disease, importance of annual dilated eye exam and daily foot examination. Explained MOA, SE, dosage of medications. Written instructions given and reviewed with patient and patient verbalizes understanding.     PATIENT INSTRUCTIONS     Lifestyle modification with diabetic diet and at least 30 minutes of physical activity daily recommended.   Diabetic Foot Exam deferred today due to virtual visit. Will plan to be done at next in-person visit.    Non-fasting labs to be done Thursday morning at [facility]    Continue Mounjaro 10 mg subcutaneously every 7 days.   Discontinue Farxiga 10 mg by mouth daily.   Discontinue Metformin 500 mg by mouth twice daily with meals.   Start Xigduo XR  mg by mouth daily.   Change Fiasp 4 units subcutaneously three times daily with meals for glucose over 120 or for heavy carb meal.   Take 10-15 minutes before meal.     Continue Dexcom CGM G7.  Blood Sugar Goals:       Fastin-130.       1-2 hours after a meal: Less than 180.     Follow up in about 4 weeks (around 10/11/2023) for Virtual, Dexcom - Sharing.    Portions of this note were prepared with [employer] voice recognition  transcription software. Grammatical errors, including garbled syntax, mangle pronouns, and other bizarre constructions may be attributed to that software system.

## 2023-09-13 NOTE — PATIENT INSTRUCTIONS
PATIENT INSTRUCTIONS     Lifestyle modification with diabetic diet and at least 30 minutes of physical activity daily recommended.   Diabetic Foot Exam deferred today due to virtual visit. Will plan to be done at next in-person visit.    Non-fasting labs to be done Thursday morning at [facility]    Continue Mounjaro 10 mg subcutaneously every 7 days.   Discontinue Farxiga 10 mg by mouth daily.   Discontinue Metformin 500 mg by mouth twice daily with meals.   Start Xigduo XR  mg by mouth daily.   Change Fiasp 4 units subcutaneously three times daily with meals for glucose over 120 or for heavy carb meal.   Take 10-15 minutes before meal.     Continue Dexcom CGM G7.  Blood Sugar Goals:       Fastin-130.       1-2 hours after a meal: Less than 180.

## 2023-10-03 DIAGNOSIS — I25.10 CAD IN NATIVE ARTERY: ICD-10-CM

## 2023-10-03 DIAGNOSIS — E78.2 MIXED HYPERLIPIDEMIA: ICD-10-CM

## 2023-10-04 RX ORDER — ATORVASTATIN CALCIUM 40 MG/1
TABLET, FILM COATED ORAL
Qty: 90 TABLET | Refills: 0 | Status: SHIPPED | OUTPATIENT
Start: 2023-10-04 | End: 2024-01-02

## 2023-10-04 NOTE — TELEPHONE ENCOUNTER
Refill Decision Note   Yandy Misha  is requesting a refill authorization.  Brief Assessment and Rationale for Refill:  Approve     Medication Therapy Plan:         Comments:     Note composed:2:58 AM 10/04/2023

## 2023-10-04 NOTE — TELEPHONE ENCOUNTER
No care due was identified.  Health [facility] Embedded Care Due Messages. Reference number: [id number].   10/03/2023 11:35:16 PM CDT

## 2023-10-10 ENCOUNTER — LAB VISIT (OUTPATIENT)
Dept: LAB | Facility: HOSPITAL | Age: 53
End: 2023-10-10
Attending: NURSE PRACTITIONER
Payer: COMMERCIAL

## 2023-10-10 DIAGNOSIS — E11.65 TYPE 2 DIABETES MELLITUS WITH HYPERGLYCEMIA, WITHOUT LONG-TERM CURRENT USE OF INSULIN: ICD-10-CM

## 2023-10-10 LAB
ESTIMATED AVG GLUCOSE: 163 MG/DL (ref 68–131)
HBA1C MFR BLD: 7.3 % (ref 4–5.6)

## 2023-10-10 PROCEDURE — 83036 HEMOGLOBIN GLYCOSYLATED A1C: CPT | Performed by: NURSE PRACTITIONER

## 2023-10-10 PROCEDURE — 36415 COLL VENOUS BLD VENIPUNCTURE: CPT | Performed by: NURSE PRACTITIONER

## 2023-10-11 ENCOUNTER — OFFICE VISIT (OUTPATIENT)
Dept: DIABETES | Facility: CLINIC | Age: 53
End: 2023-10-11
Payer: COMMERCIAL

## 2023-10-11 DIAGNOSIS — E11.65 TYPE 2 DIABETES MELLITUS WITH HYPERGLYCEMIA, WITHOUT LONG-TERM CURRENT USE OF INSULIN: Primary | ICD-10-CM

## 2023-10-11 PROCEDURE — 3061F PR NEG MICROALBUMINURIA RESULT DOCUMENTED/REVIEW: ICD-10-PCS | Mod: CPTII,95,, | Performed by: NURSE PRACTITIONER

## 2023-10-11 PROCEDURE — 95251 CONT GLUC MNTR ANALYSIS I&R: CPT | Mod: NDTC,S$GLB,, | Performed by: NURSE PRACTITIONER

## 2023-10-11 PROCEDURE — 3061F NEG MICROALBUMINURIA REV: CPT | Mod: CPTII,95,, | Performed by: NURSE PRACTITIONER

## 2023-10-11 PROCEDURE — 95251 PR GLUCOSE MONITOR, 72 HOUR, PHYS INTERP: ICD-10-PCS | Mod: NDTC,S$GLB,, | Performed by: NURSE PRACTITIONER

## 2023-10-11 PROCEDURE — 99214 OFFICE O/P EST MOD 30 MIN: CPT | Mod: 95,,, | Performed by: NURSE PRACTITIONER

## 2023-10-11 PROCEDURE — 3051F HG A1C>EQUAL 7.0%<8.0%: CPT | Mod: CPTII,95,, | Performed by: NURSE PRACTITIONER

## 2023-10-11 PROCEDURE — 3066F NEPHROPATHY DOC TX: CPT | Mod: CPTII,95,, | Performed by: NURSE PRACTITIONER

## 2023-10-11 PROCEDURE — 3066F PR DOCUMENTATION OF TREATMENT FOR NEPHROPATHY: ICD-10-PCS | Mod: CPTII,95,, | Performed by: NURSE PRACTITIONER

## 2023-10-11 PROCEDURE — 3051F PR MOST RECENT HEMOGLOBIN A1C LEVEL 7.0 - < 8.0%: ICD-10-PCS | Mod: CPTII,95,, | Performed by: NURSE PRACTITIONER

## 2023-10-11 PROCEDURE — 99214 PR OFFICE/OUTPT VISIT, EST, LEVL IV, 30-39 MIN: ICD-10-PCS | Mod: 95,,, | Performed by: NURSE PRACTITIONER

## 2023-10-11 RX ORDER — TIRZEPATIDE 2.5 MG/.5ML
2.5 INJECTION, SOLUTION SUBCUTANEOUS
Qty: 4 PEN | Refills: 0 | Status: SHIPPED | OUTPATIENT
Start: 2023-10-11 | End: 2023-11-03 | Stop reason: SDUPTHER

## 2023-10-11 NOTE — PROGRESS NOTES
The patient location is: Home  The chief complaint leading to consultation is: Diabetes    Visit type: audiovisual    Face to Face time with patient: 10  30 minutes of total time spent on the encounter, which includes face to face time and non-face to face time preparing to see the patient (eg, review of tests), Obtaining and/or reviewing separately obtained history, Documenting clinical information in the electronic or other health record, Independently interpreting results (not separately reported) and communicating results to the patient/family/caregiver, or Care coordination (not separately reported).  Each patient to whom he or she provides medical services by telemedicine is:  (1) informed of the relationship between the physician and patient and the respective role of any other health care provider with respect to management of the patient; and (2) notified that he or she may decline to receive medical services by telemedicine and may withdraw from such care at any time.    Notes:    Yandy Cabral is a 52 y.o. female who presents for a follow up evaluation of Type 2 diabetes mellitus.     CHIEF COMPLAINT: Diabetes Consultation    PCP: Jaycob Ramos MD      Initial visit with me - 12/19/2022     The patient was initially diagnosed with diabetes 2004, polyuria/polydipsia, unintentional weight loss, weakness, neuropathy and vision changes. Diagnosed with DM type 1.5.      Previous failed treatments include:  Was on insulin previously, was eventually weaned off.   [employer] Packet - insurance stopped paying.  Insulin pump - at [facility] in [id number]. [employer].      Social Documentation:  Patient lives with  and daughter.   Occupation:  for nonprofit organization.   Patient prefers In-person Visits.   and Video Virtual Visits.   Current Diet: No fried foot, avoids dairy. Drinks diet dr. Azul, drinking more water.   Exercise: Pilates twice a week.      Known Diabetes  "complications: peripheral neuropathy and cardiovascular disease.     Cardiovascular Risk Factors: family history of premature cardiovascular disease.    Diabetes Medications               dapaglifloz propaned-metformin (XIGDUO XR) 10-1,000 mg TBph Take 1 tablet by mouth once daily.    insulin aspart, niacinamide, (FIASP FLEXTOUCH U-100 INSULIN) 100 unit/mL (3 mL) InPn Inject 5 Units into the skin 3 (three) times daily with meals. For blood sugar over 120.    tirzepatide 10 mg/0.5 mL PnIj Inject 10 mg into the skin every 7 days. - has not taken in 4 weeks. States went on vacation and did not restart after returning home.     Current monitoring regimen: Dexcom G7 Sharing      The patient's Dexcom CGM was downloaded and reviewed. For the past 14 days, patient average glucose was 204 mg/dL. She was above range 55% of the time, in range 45% of the time, and below range 0% of the time. The target range for this patient was 70 - 180 mg/dL. Overall, there was a pattern of post prandial hyperglycemia after lunch and in the evenings. Fasting euglycemia.      Recent hypoglycemic episodes: none.     Patient compliant with glucose checks and medication administration? Yes    DIABETES MANAGEMENT STATUS  Statin: Taking  ACE/ARB: Not taking  Screening or Prevention Patient's value Goal Complete/Controlled?   HgA1C Testing and Control   Lab Results   Component Value Date    HGBA1C 7.3 (H) 10/10/2023      Annually/Less than 8% No     Lipid profile : 10/19/2022 Annually Yes     LDL control Lab Results   Component Value Date    LDLCALC 92.0 10/19/2022    Annually/Less than 100 mg/dl  Yes     Nephropathy screening Lab Results   Component Value Date    LABMICR 13.0 10/10/2023     Lab Results   Component Value Date    PROTEINUA 1+ (A) 06/24/2019     No results found for: "UTPCR"   Annually No     Blood pressure BP Readings from Last 1 Encounters:   08/15/23 110/60    Less than 140/90 Yes     Dilated retinal exam : 03/27/2023 Annually " Yes     Foot exam   : 06/03/2021 Annually No     Patient's medications, allergies, surgical, social and family histories were reviewed and updated as appropriate.     Review of Systems   Constitutional:  Negative for weight loss.   Eyes:  Negative for blurred vision and double vision.   Cardiovascular:  Negative for chest pain.   Gastrointestinal:  Negative for nausea and vomiting.   Genitourinary:  Negative for frequency.   Musculoskeletal:  Negative for falls.   Neurological:  Negative for dizziness and weakness.   Endo/Heme/Allergies:  Negative for polydipsia.   Psychiatric/Behavioral:  Negative for depression.    All other systems reviewed and are negative.       Physical Exam  Constitutional:       Appearance: Normal appearance.   HENT:      Head: Normocephalic and atraumatic.   Pulmonary:      Effort: No respiratory distress.   Musculoskeletal:      Cervical back: Normal range of motion.   Neurological:      Mental Status: She is alert and oriented to person, place, and time.   Psychiatric:         Mood and Affect: Mood normal.         Behavior: Behavior normal.        Last menstrual period 03/05/2014.  Wt Readings from Last 3 Encounters:   08/15/23 63.2 kg (139 lb 5.3 oz)   03/27/23 68 kg (149 lb 14.6 oz)   03/27/23 64.7 kg (142 lb 10.2 oz)     Hemoglobin A1C   Date Value Ref Range Status   10/10/2023 7.3 (H) 4.0 - 5.6 % Final     Comment:     ADA Screening Guidelines:  5.7-6.4%  Consistent with prediabetes  >or=6.5%  Consistent with diabetes    High levels of fetal hemoglobin interfere with the HbA1C  assay. Heterozygous hemoglobin variants (HbS, HgC, etc)do  not significantly interfere with this assay.   However, presence of multiple variants may affect accuracy.     10/19/2022 8.4 (H) 4.0 - 5.6 % Final     Comment:     ADA Screening Guidelines:  5.7-6.4%  Consistent with prediabetes  >or=6.5%  Consistent with diabetes    High levels of fetal hemoglobin interfere with the HbA1C  assay. Heterozygous  "hemoglobin variants (HbS, HgC, etc)do  not significantly interfere with this assay.   However, presence of multiple variants may affect accuracy.     05/13/2022 8.3 (H) 4.0 - 5.6 % Final     Comment:     ADA Screening Guidelines:  5.7-6.4%  Consistent with prediabetes  >or=6.5%  Consistent with diabetes    High levels of fetal hemoglobin interfere with the HbA1C  assay. Heterozygous hemoglobin variants (HbS, HgC, etc)do  not significantly interfere with this assay.   However, presence of multiple variants may affect accuracy.         No results found for: "CPEPTIDE", "GLUTAMICACID"    Chemistry        Component Value Date/Time     03/22/2023 0735    K 4.5 03/22/2023 0735     03/22/2023 0735    CO2 27 03/22/2023 0735    BUN 13 03/22/2023 0735    CREATININE 0.8 03/22/2023 0735     (H) 03/22/2023 0735        Component Value Date/Time    CALCIUM 9.6 03/22/2023 0735    ALKPHOS 84 10/19/2022 0801    AST 33 10/19/2022 0801    ALT 39 10/19/2022 0801    BILITOT 0.5 10/19/2022 0801    ESTGFRAFRICA >60.0 05/13/2022 0911    EGFRNONAA >60.0 05/13/2022 0911            ASSESSMENT    ICD-10-CM ICD-9-CM   1. Type 2 diabetes mellitus with hyperglycemia, without long-term current use of insulin  E11.65 250.00     790.29           PLAN  Diagnoses and all orders for this visit:    Type 2 diabetes mellitus with hyperglycemia, without long-term current use of insulin  -     tirzepatide (MOUNJARO) 2.5 mg/0.5 mL PnIj; Inject 2.5 mg into the skin every 7 days.      Reviewed pathophysiology of diabetes, complications related to the disease, importance of annual dilated eye exam and daily foot examination. Explained MOA, SE, dosage of medications. Written instructions given and reviewed with patient and patient verbalizes understanding.     PATIENT INSTRUCTIONS     Lifestyle modification with diabetic diet and at least 30 minutes of physical activity daily recommended.   Diabetic Foot Exam deferred today due to virtual " visit. Will plan to be done at next in-person visit.      Restart Mounjaro at 2.5 mg subcutaneously every 7 days.   Continue Xigduo XR  mg by mouth daily.   Continue Fiasp 4 units subcutaneously three times daily with meals for glucose over 120 or for heavy carb meal.   Take 10-15 minutes before meal.     Continue Dexcom CGM G7.  Blood Sugar Goals:       Fastin-130.       1-2 hours after a meal: Less than 180.     Follow up in about 4 weeks (around 2023) for Virtual, Dexcom - Sharing.    Portions of this note were prepared with [employer] Naturally Speaking voice recognition transcription software. Grammatical errors, including garbled syntax, mangle pronouns, and other bizarre constructions may be attributed to that software system.

## 2023-10-11 NOTE — PATIENT INSTRUCTIONS
PATIENT INSTRUCTIONS     Lifestyle modification with diabetic diet and at least 30 minutes of physical activity daily recommended.   Diabetic Foot Exam deferred today due to virtual visit. Will plan to be done at next in-person visit.      Restart Mounjaro at 2.5 mg subcutaneously every 7 days.   Continue Xigduo XR  mg by mouth daily.   Continue Fiasp 4 units subcutaneously three times daily with meals for glucose over 120 or for heavy carb meal.   Take 10-15 minutes before meal.     Continue Dexcom CGM G7.  Blood Sugar Goals:       Fastin-130.       1-2 hours after a meal: Less than 180.

## 2023-10-18 RX ORDER — DAPAGLIFLOZIN 10 MG/1
TABLET, FILM COATED ORAL
Qty: 90 TABLET | Refills: 3 | OUTPATIENT
Start: 2023-10-18

## 2023-10-18 NOTE — TELEPHONE ENCOUNTER
Provider Staff:  Action required for this patient     Please see care gap opportunities below in Care Due Message.    Thanks!  [facility]     Appointments      Date Provider   Last Visit   8/15/2023 Jaycob Ramos MD   Next Visit   Visit date not found Jaycob Ramos MD     Refill Decision Note   Yandy Cabral  is requesting a refill authorization.  Brief Assessment and Rationale for Refill:  Quick Discontinue     Medication Therapy Plan:  The original prescription was discontinued on 9/13/2023 by Lissette Pearson FNP for the following reason: Alternate therapy.      Comments:     Note composed:1:44 PM 10/18/2023             Appointments     Last Visit   8/15/2023 Jaycob Ramos MD   Next Visit   Visit date not found Jaycob Ramos MD           Appointments     Last Visit   8/15/2023 Jaycob Ramos MD   Next Visit   Visit date not found Jaycob Ramos MD

## 2023-10-18 NOTE — TELEPHONE ENCOUNTER
Care Due:                  Date            Visit Type   Department     Provider  --------------------------------------------------------------------------------                                EP -                              PRIMARY      [facility] INTERNAL  Last Visit: 08-      [facility]   MEDICINE       Jaycob Ramos  Next Visit: None Scheduled  None         None Found                                                            Last  Test          Frequency    Reason                     Performed    Due Date  --------------------------------------------------------------------------------    CMP.........  12 months..  alendronate, atorvastatin  10-   10-    Lipid Panel.  12 months..  atorvastatin.............  10-   10-    Mg Level....  12 months..  alendronate..............  Not Found    Overdue    Phosphate...  12 months..  alendronate..............  Not Found    Overdue    Vitamin D...  12 months..  alendronate..............  Not Found    Overdue    [employer] Embedded Care Due Messages. Reference number: [id number].   10/17/2023 11:33:36 PM CDT

## 2023-11-03 ENCOUNTER — PATIENT MESSAGE (OUTPATIENT)
Dept: DIABETES | Facility: CLINIC | Age: 53
End: 2023-11-03
Payer: COMMERCIAL

## 2023-11-03 ENCOUNTER — HOSPITAL ENCOUNTER (OUTPATIENT)
Dept: CARDIOLOGY | Facility: HOSPITAL | Age: 53
Discharge: HOME | End: 2023-11-03
Attending: INTERNAL MEDICINE
Payer: COMMERCIAL

## 2023-11-03 ENCOUNTER — OFFICE VISIT (OUTPATIENT)
Dept: CARDIOLOGY | Facility: CLINIC | Age: 53
End: 2023-11-03
Payer: COMMERCIAL

## 2023-11-03 VITALS
WEIGHT: 141.31 LBS | RESPIRATION RATE: 16 BRPM | BODY MASS INDEX: 23.52 KG/M2 | DIASTOLIC BLOOD PRESSURE: 74 MMHG | HEART RATE: 72 BPM | SYSTOLIC BLOOD PRESSURE: 118 MMHG

## 2023-11-03 DIAGNOSIS — I25.118 CORONARY ARTERY DISEASE OF NATIVE ARTERY OF NATIVE HEART WITH STABLE ANGINA PECTORIS: ICD-10-CM

## 2023-11-03 DIAGNOSIS — I20.9 AP (ANGINA PECTORIS): ICD-10-CM

## 2023-11-03 DIAGNOSIS — I25.118 CORONARY ARTERY DISEASE OF NATIVE ARTERY OF NATIVE HEART WITH STABLE ANGINA PECTORIS: Primary | ICD-10-CM

## 2023-11-03 DIAGNOSIS — E11.65 TYPE 2 DIABETES MELLITUS WITH HYPERGLYCEMIA, WITHOUT LONG-TERM CURRENT USE OF INSULIN: Primary | ICD-10-CM

## 2023-11-03 DIAGNOSIS — R94.31 ABNORMAL ECG: ICD-10-CM

## 2023-11-03 DIAGNOSIS — E11.65 TYPE 2 DIABETES MELLITUS WITH HYPERGLYCEMIA, WITHOUT LONG-TERM CURRENT USE OF INSULIN: ICD-10-CM

## 2023-11-03 DIAGNOSIS — E66.3 OVERWEIGHT (BMI 25.0-29.9): ICD-10-CM

## 2023-11-03 DIAGNOSIS — Z82.49 FAMILY HISTORY OF CARDIOVASCULAR DISEASE: ICD-10-CM

## 2023-11-03 DIAGNOSIS — E78.2 MIXED HYPERLIPIDEMIA: ICD-10-CM

## 2023-11-03 DIAGNOSIS — I34.0 NONRHEUMATIC MITRAL VALVE REGURGITATION: ICD-10-CM

## 2023-11-03 PROCEDURE — 1160F PR REVIEW ALL MEDS BY PRESCRIBER/CLIN PHARMACIST DOCUMENTED: ICD-10-PCS | Mod: CPTII,S$GLB,, | Performed by: INTERNAL MEDICINE

## 2023-11-03 PROCEDURE — 3066F NEPHROPATHY DOC TX: CPT | Mod: CPTII,S$GLB,, | Performed by: INTERNAL MEDICINE

## 2023-11-03 PROCEDURE — 3061F NEG MICROALBUMINURIA REV: CPT | Mod: CPTII,S$GLB,, | Performed by: INTERNAL MEDICINE

## 2023-11-03 PROCEDURE — 1160F RVW MEDS BY RX/DR IN RCRD: CPT | Mod: CPTII,S$GLB,, | Performed by: INTERNAL MEDICINE

## 2023-11-03 PROCEDURE — 3078F PR MOST RECENT DIASTOLIC BLOOD PRESSURE < 80 MM HG: ICD-10-PCS | Mod: CPTII,S$GLB,, | Performed by: INTERNAL MEDICINE

## 2023-11-03 PROCEDURE — 99214 OFFICE O/P EST MOD 30 MIN: CPT | Mod: S$GLB,,, | Performed by: INTERNAL MEDICINE

## 2023-11-03 PROCEDURE — 93010 ELECTROCARDIOGRAM REPORT: CPT | Mod: ,,, | Performed by: INTERNAL MEDICINE

## 2023-11-03 PROCEDURE — 93010 EKG 12-LEAD: ICD-10-PCS | Mod: ,,, | Performed by: INTERNAL MEDICINE

## 2023-11-03 PROCEDURE — 3074F SYST BP LT 130 MM HG: CPT | Mod: CPTII,S$GLB,, | Performed by: INTERNAL MEDICINE

## 2023-11-03 PROCEDURE — 99999 PR PBB SHADOW E&M-EST. PATIENT-LVL II: CPT | Mod: PBBFAC,,, | Performed by: INTERNAL MEDICINE

## 2023-11-03 PROCEDURE — 3078F DIAST BP <80 MM HG: CPT | Mod: CPTII,S$GLB,, | Performed by: INTERNAL MEDICINE

## 2023-11-03 PROCEDURE — 99999 PR PBB SHADOW E&M-EST. PATIENT-LVL II: ICD-10-PCS | Mod: PBBFAC,,, | Performed by: INTERNAL MEDICINE

## 2023-11-03 PROCEDURE — 3051F PR MOST RECENT HEMOGLOBIN A1C LEVEL 7.0 - < 8.0%: ICD-10-PCS | Mod: CPTII,S$GLB,, | Performed by: INTERNAL MEDICINE

## 2023-11-03 PROCEDURE — 1159F MED LIST DOCD IN RCRD: CPT | Mod: CPTII,S$GLB,, | Performed by: INTERNAL MEDICINE

## 2023-11-03 PROCEDURE — 3008F BODY MASS INDEX DOCD: CPT | Mod: CPTII,S$GLB,, | Performed by: INTERNAL MEDICINE

## 2023-11-03 PROCEDURE — 1159F PR MEDICATION LIST DOCUMENTED IN MEDICAL RECORD: ICD-10-PCS | Mod: CPTII,S$GLB,, | Performed by: INTERNAL MEDICINE

## 2023-11-03 PROCEDURE — 93005 ELECTROCARDIOGRAM TRACING: CPT

## 2023-11-03 PROCEDURE — 3061F PR NEG MICROALBUMINURIA RESULT DOCUMENTED/REVIEW: ICD-10-PCS | Mod: CPTII,S$GLB,, | Performed by: INTERNAL MEDICINE

## 2023-11-03 PROCEDURE — 3008F PR BODY MASS INDEX (BMI) DOCUMENTED: ICD-10-PCS | Mod: CPTII,S$GLB,, | Performed by: INTERNAL MEDICINE

## 2023-11-03 PROCEDURE — 99214 PR OFFICE/OUTPT VISIT, EST, LEVL IV, 30-39 MIN: ICD-10-PCS | Mod: S$GLB,,, | Performed by: INTERNAL MEDICINE

## 2023-11-03 PROCEDURE — 3051F HG A1C>EQUAL 7.0%<8.0%: CPT | Mod: CPTII,S$GLB,, | Performed by: INTERNAL MEDICINE

## 2023-11-03 PROCEDURE — 3066F PR DOCUMENTATION OF TREATMENT FOR NEPHROPATHY: ICD-10-PCS | Mod: CPTII,S$GLB,, | Performed by: INTERNAL MEDICINE

## 2023-11-03 PROCEDURE — 3074F PR MOST RECENT SYSTOLIC BLOOD PRESSURE < 130 MM HG: ICD-10-PCS | Mod: CPTII,S$GLB,, | Performed by: INTERNAL MEDICINE

## 2023-11-03 RX ORDER — TIRZEPATIDE 2.5 MG/.5ML
2.5 INJECTION, SOLUTION SUBCUTANEOUS
Qty: 4 PEN | Refills: 0 | Status: SHIPPED | OUTPATIENT
Start: 2023-11-03 | End: 2023-11-08

## 2023-11-03 NOTE — PROGRESS NOTES
Subjective:    Patient ID:  Yandy Cabral is a 52 y.o. female who presents for evaluation of Annual Exam        HPIPt presents for eval.  Her current med conditions include CAD, DM, MR, hyperlipidemia.  Nonsmoker.  Family history of CAD (father had MI/CABG in 50's).   Past hx pertinent for following:  Pt seen 2020 for CP/angina.  S/p LHC 2/20: 20% prox RCA, normal elsewhere. Normal EF.  ecg 5/14/21 NSR, normal ecg.  Stress echo July 2022: avg exercise capacity, achieved 69% THR (she is on beta blocker tx), mild MR, normal LVEF, no ischemia noted.  Now here.  Pt last seen 1 year ago.  CAD is stable.  CP/angina is stable.  Every few months, uses sl ntg prn and helps alleviated.  No change in pattern/frequency.  Usually nonexertional.  No CHF sxs.  Has lost some weight since last year.  On Monjaro.  HGAIC above goal but improving.  Ecg today 11/3/23 NSR, low voltage.  No ischemia noted.          Past Medical History:   Diagnosis Date    Abnormal Pap smear of cervix     CAD (coronary artery disease)     Chronic constipation     DM II (diabetes mellitus, type II), controlled     Hx of colposcopy with cervical biopsy      Current Outpatient Medications   Medication Instructions    alendronate (FOSAMAX) 70 MG tablet TAKE 1 TABLET EVERY 7 DAYS    aspirin (ECOTRIN) 81 mg, Oral, Daily    atorvastatin (LIPITOR) 40 MG tablet TAKE 1 TABLET DAILY    blood sugar diagnostic (BLOOD GLUCOSE TEST) Strp 1 strip, Misc.(Non-Drug; Combo Route), 2 times daily    blood sugar diagnostic Strp To check BG 3 times daily, to use with insurance preferred meter    blood-glucose meter Misc Use as instructed    blood-glucose meter,continuous (DEXCOM G7 ) Misc 1 Device, Misc.(Non-Drug; Combo Route), Daily    blood-glucose sensor (DEXCOM G7 SENSOR) Lindsay 1 each, Subcutaneous, Every 10 days    buPROPion (WELLBUTRIN XL) 150 MG TB24 tablet TAKE 1 TABLET DAILY    CALCIUM ORAL 1 tablet, Oral, Daily    clonazePAM (KLONOPIN) 0.5 MG tablet Take  "one every 8 hours for plane trip and 1 at night for jetlag.    clotrimazole-betamethasone 1-0.05% (LOTRISONE) cream APPLY TOPICALLY TO THE AFFECTED AREA TWICE DAILY    cyanocobalamin 1,000 mcg/mL injection INJECT 1 ML INTO THE MUSCLE EVERY 30 DAYS    dapaglifloz propaned-metformin (XIGDUO XR) 10-1,000 mg TBph 1 tablet, Oral, Daily    estradioL (ESTRACE) 1 mg, Oral, Daily    FLOWFLEX COVID-19 AG HOME TEST Kit No dose, route, or frequency recorded.    insulin aspart, niacinamide, (FIASP FLEXTOUCH U-100 INSULIN) 100 unit/mL (3 mL) InPn 5 Units, Subcutaneous, 3 times daily with meals, For blood sugar over 120.    lancets 33 gauge Misc To check BG 3 times daily, to use with insurance preferred meter    melatonin (MELATIN) 5 mg, Oral, Nightly    metoprolol succinate (TOPROL-XL) 25 MG 24 hr tablet TAKE 1 TABLET DAILY    MOUNJARO 2.5 mg, Subcutaneous, Every 7 days    multivitamin (THERAGRAN) per tablet 1 tablet, Oral    needle, disp, 24 gauge 24 gauge x 1" Ndle 1 Dose, Misc.(Non-Drug; Combo Route), Every 30 days    nitroGLYCERIN (NITROSTAT) 0.4 mg, Sublingual, Every 5 min PRN, Call 911 if pain persists after 2 doses.    pen needle, diabetic (BD ULTRA-FINE SCOTT PEN NEEDLE) 32 gauge x 5/32" Ndle Use with insulin 3 times daily    pramoxine-hydrocortisone (PROCTOCREAM-HC) 1-1 % rectal cream Rectal, 3 times daily    vitamin D (VITAMIN D3) 1,000 Units, Oral, Daily       Review of Systems   Constitutional: Positive for weight loss. Negative for weight gain.   HENT: Negative.     Eyes: Negative.    Cardiovascular:  Positive for chest pain.   Respiratory: Negative.     Endocrine: Negative.    Hematologic/Lymphatic: Negative.    Skin: Negative.    Musculoskeletal: Negative.    Gastrointestinal: Negative.    Genitourinary: Negative.    Neurological: Negative.    Psychiatric/Behavioral: Negative.     Allergic/Immunologic: Negative.           /74   Pulse 72   Resp 16   Wt 64.1 kg (141 lb 5 oz)   LMP 03/05/2014   BMI 23.52 " kg/m²     Wt Readings from Last 3 Encounters:   11/03/23 64.1 kg (141 lb 5 oz)   08/15/23 63.2 kg (139 lb 5.3 oz)   03/27/23 68 kg (149 lb 14.6 oz)     Temp Readings from Last 3 Encounters:   08/15/23 98 °F (36.7 °C) (Oral)   10/19/22 98.3 °F (36.8 °C) (Oral)   05/13/22 98.2 °F (36.8 °C) (Oral)     BP Readings from Last 3 Encounters:   11/03/23 118/74   08/15/23 110/60   03/27/23 116/72     Pulse Readings from Last 3 Encounters:   11/03/23 72   08/15/23 101   01/18/23 81       Objective:    Physical Exam  Vitals and nursing note reviewed.   Constitutional:       General: She is not in acute distress.     Appearance: Normal appearance. She is well-developed. She is not ill-appearing or diaphoretic.   HENT:      Head: Normocephalic.   Neck:      Thyroid: No thyromegaly.      Vascular: No carotid bruit or JVD.   Cardiovascular:      Rate and Rhythm: Normal rate and regular rhythm.      Pulses: Normal pulses.           Radial pulses are 2+ on the right side and 2+ on the left side.      Heart sounds: Normal heart sounds, S1 normal and S2 normal. No murmur heard.     No friction rub. No gallop.   Pulmonary:      Effort: Pulmonary effort is normal.      Breath sounds: Normal breath sounds. No wheezing or rales.   Abdominal:      General: Bowel sounds are normal. There is no abdominal bruit.      Palpations: Abdomen is soft.      Tenderness: There is no abdominal tenderness.   Musculoskeletal:      Cervical back: Neck supple.   Lymphadenopathy:      Cervical: No cervical adenopathy.   Skin:     General: Skin is warm.   Neurological:      Mental Status: She is alert and oriented to person, place, and time.   Psychiatric:         Behavior: Behavior normal. Behavior is cooperative.         I have reviewed all pertinent labs and cardiac studies.        Chemistry        Component Value Date/Time     03/22/2023 0735    K 4.5 03/22/2023 0735     03/22/2023 0735    CO2 27 03/22/2023 0735    BUN 13 03/22/2023 0735     CREATININE 0.8 03/22/2023 0735     (H) 03/22/2023 0735        Component Value Date/Time    CALCIUM 9.6 03/22/2023 0735    ALKPHOS 84 10/19/2022 0801    AST 33 10/19/2022 0801    ALT 39 10/19/2022 0801    BILITOT 0.5 10/19/2022 0801    ESTGFRAFRICA >60.0 05/13/2022 0911    EGFRNONAA >60.0 05/13/2022 0911        Lab Results   Component Value Date    WBC 4.85 03/22/2023    HGB 13.1 03/22/2023    HCT 40.9 03/22/2023    MCV 97 03/22/2023     03/22/2023       Lab Results   Component Value Date    HGBA1C 7.3 (H) 10/10/2023         Lab Results   Component Value Date    CHOL 167 10/19/2022    CHOL 251 (H) 05/13/2022    CHOL 143 12/09/2021     Lab Results   Component Value Date    HDL 55 10/19/2022    HDL 65 05/13/2022    HDL 51 12/09/2021     Lab Results   Component Value Date    LDLCALC 92.0 10/19/2022    LDLCALC 158.2 05/13/2022    LDLCALC 75.0 12/09/2021     Lab Results   Component Value Date    TRIG 100 10/19/2022    TRIG 139 05/13/2022    TRIG 85 12/09/2021     Lab Results   Component Value Date    CHOLHDL 32.9 10/19/2022    CHOLHDL 25.9 05/13/2022    CHOLHDL 35.7 12/09/2021     Summary    Concentric remodeling and normal systolic function.  The patient requested the test to be stopped.  Sensitivity is impaired due to the patient's failure to achieve target heart rate.  There were no arrhythmias during stress.  The estimated ejection fraction is 60%.  Normal left ventricular diastolic function.  Normal right ventricular size with normal right ventricular systolic function.  Mild mitral regurgitation.  The stress echo portion of this study is negative for myocardial ischemia.  The ECG portion of this study is negative for myocardial ischemia.     Study Details    A complete echo was performed using complete 2D, color flow Doppler and spectral Doppler. During the study, the apical, parasternal, subcostal and suprasternal views were captured.     Echocardiography Findings      Left Ventricle    The left  ventricle is  with normal systolic function. The estimated ejection fraction is 60%. There is normal diastolic function.     Right Ventricle    Normal cavity size, wall thickness and systolic function. Wall motion normal.     Left Atrium    The left atrial volume index is normal. Left atrial volume index is 19.7 mL/m2.     Right Atrium    The right atrium is normal in size.     Aortic Valve    The aortic valve appears structurally normal. There is normal leaflet mobility.     Mitral Valve    The mitral valve appears structurally normal. There is normal leaflet mobility.  There is mild mitral valve regurgitation. The estimated mitral valve area by pressure half time is 4.04 cm2.     Tricuspid Valve    The tricuspid valve appears structurally normal. The estimated PA systolic pressure is greater than 18 mmHg.       Stress Measurements    Baseline Vitals   HR at rest 64 bpm         Systolic blood pressure 103 mmHg         Diastolic blood pressure 63 mmHg         Stress Vitals   Peak  bpm         Peak Systolic  mmHg         Peak Diatolic BP 69 mmHg         Estimated METs 10          % Max HR Achieved 69          1 Minute Recovery HR 85 bpm              ECG/Stress      ECG    The baseline electrocardiogram reveals normal sinus rhythm at a rate of 64 bpm. The baseline ECG has normal ST segments.     Stress Findings    The patient exercised for 7 minutes and 12 seconds on a Terell protocol, corresponding to a functional capacity of 10 METS, achieving a peak heart rate of 111 bpm, which is 69% of the age predicted maximum heart rate. The patient reported no symptoms during stress. The patient experienced no angina during the stress test.   The blood pressure response to stress was normal.  Sensitivity is impaired due to the patient's failure to achieve target heart rate and beta blocker therapy.   The patient requested the test to be stopped.   The peak rate pressure product was 00215. Patient requested to stop  due to leg fatigue.     ECG Peak    The ECG portion of this study was negative for myocardial ischemia. There was no ST segment deviation noted during stress.     The electrocardiogram revealed sinus tachycardia at peak stress. There were no arrhythmias during stress.           Assessment:       1. Coronary artery disease of native artery of native heart with stable angina pectoris    2. AP (angina pectoris)    3. Overweight (BMI 25.0-29.9)    4. Nonrheumatic mitral valve regurgitation    5. Family history of cardiovascular disease    6. Mixed hyperlipidemia    7. Type 2 diabetes mellitus with hyperglycemia, without long-term current use of insulin    8. Abnormal ECG         Plan:             Stable cardiovascular conditions at present time on current medical treatment.  CAD: Stable.   Continue med tx and risk factor modification.  Sl ntg prn for concerning angina.  Reviewed all tests and above medical conditions with patient in detail and formulated treatment plan.  Continue optimal medical treatment for cardiovascular conditions.  Cardiac low salt diet advised.  Daily exercise encouraged, with the goal 30 +  minutes aerobic exercise as tolerated.  Maintaining healthy weight and weight loss goals (if needed) were discussed in clinic.  Need for BP control and HTN goals (if needed) were discussed and tx plan formulated.  Lipids: Importance of optimal lipid control were discussed in detail as well as possible pharmacologic and lifestyle changes that may be needed.  Continue statin tx.  DM: Continue current DM meds.  Pt counseled on need to get DM HGAIC to goal.  Mitral regurgitation: monitor. F/u echo in future.  Abnl ecg: monitor.    F/u in 1 year w stress echo.      I have reviewed all pertinent labs and cardiac studies independently. Plans and recommendations have been formulated under my direct supervision. All questions answered and patient voiced understanding.

## 2023-11-08 ENCOUNTER — OFFICE VISIT (OUTPATIENT)
Dept: DIABETES | Facility: CLINIC | Age: 53
End: 2023-11-08
Payer: COMMERCIAL

## 2023-11-08 DIAGNOSIS — E11.65 TYPE 2 DIABETES MELLITUS WITH HYPERGLYCEMIA, WITHOUT LONG-TERM CURRENT USE OF INSULIN: Primary | ICD-10-CM

## 2023-11-08 PROCEDURE — 95251 CONT GLUC MNTR ANALYSIS I&R: CPT | Mod: NDTC,,, | Performed by: NURSE PRACTITIONER

## 2023-11-08 PROCEDURE — 3061F NEG MICROALBUMINURIA REV: CPT | Mod: CPTII,95,, | Performed by: NURSE PRACTITIONER

## 2023-11-08 PROCEDURE — 3066F PR DOCUMENTATION OF TREATMENT FOR NEPHROPATHY: ICD-10-PCS | Mod: CPTII,95,, | Performed by: NURSE PRACTITIONER

## 2023-11-08 PROCEDURE — 3051F HG A1C>EQUAL 7.0%<8.0%: CPT | Mod: CPTII,95,, | Performed by: NURSE PRACTITIONER

## 2023-11-08 PROCEDURE — 3061F PR NEG MICROALBUMINURIA RESULT DOCUMENTED/REVIEW: ICD-10-PCS | Mod: CPTII,95,, | Performed by: NURSE PRACTITIONER

## 2023-11-08 PROCEDURE — 95251 PR GLUCOSE MONITOR, 72 HOUR, PHYS INTERP: ICD-10-PCS | Mod: NDTC,,, | Performed by: NURSE PRACTITIONER

## 2023-11-08 PROCEDURE — 3066F NEPHROPATHY DOC TX: CPT | Mod: CPTII,95,, | Performed by: NURSE PRACTITIONER

## 2023-11-08 PROCEDURE — 99214 OFFICE O/P EST MOD 30 MIN: CPT | Mod: 95,,, | Performed by: NURSE PRACTITIONER

## 2023-11-08 PROCEDURE — 3051F PR MOST RECENT HEMOGLOBIN A1C LEVEL 7.0 - < 8.0%: ICD-10-PCS | Mod: CPTII,95,, | Performed by: NURSE PRACTITIONER

## 2023-11-08 PROCEDURE — 99214 PR OFFICE/OUTPT VISIT, EST, LEVL IV, 30-39 MIN: ICD-10-PCS | Mod: 95,,, | Performed by: NURSE PRACTITIONER

## 2023-11-08 RX ORDER — TIRZEPATIDE 5 MG/.5ML
5 INJECTION, SOLUTION SUBCUTANEOUS
Qty: 4 PEN | Refills: 0 | Status: SHIPPED | OUTPATIENT
Start: 2023-11-08 | End: 2023-12-11 | Stop reason: DRUGHIGH

## 2023-11-08 NOTE — PATIENT INSTRUCTIONS
PATIENT INSTRUCTIONS     Lifestyle modification with well balanced diet and at least 30 minutes of physical activity daily recommended.   Diabetic Foot Exam deferred today due to virtual visit. Will plan to be done at next in-person visit.      Increase Mounjaro at to 5 mg subcutaneously every 7 days.   Continue Xigduo XR  mg by mouth daily.   Continue Fiasp 4 units subcutaneously three times daily with meals for glucose over 120 or for heavy carb meal.   Take 10-15 minutes before meal.     Continue Dexcom CGM G7.  Blood Sugar Goals:       Fastin-130.       1-2 hours after a meal: Less than 180.

## 2023-11-08 NOTE — PROGRESS NOTES
The patient location is: Home  The chief complaint leading to consultation is: Diabetes    Visit type: audiovisual    Face to Face time with patient: 10  30 minutes of total time spent on the encounter, which includes face to face time and non-face to face time preparing to see the patient (eg, review of tests), Obtaining and/or reviewing separately obtained history, Documenting clinical information in the electronic or other health record, Independently interpreting results (not separately reported) and communicating results to the patient/family/caregiver, or Care coordination (not separately reported).  Each patient to whom he or she provides medical services by telemedicine is:  (1) informed of the relationship between the physician and patient and the respective role of any other health care provider with respect to management of the patient; and (2) notified that he or she may decline to receive medical services by telemedicine and may withdraw from such care at any time.    Notes:    Yandy Cabral is a 52 y.o. female who presents for a follow up evaluation of Type 2 diabetes mellitus.     CHIEF COMPLAINT: Diabetes Consultation    PCP: Jaycob Ramos MD      Initial visit with me - 12/19/2022     The patient was initially diagnosed with diabetes 2004, polyuria/polydipsia, unintentional weight loss, weakness, neuropathy and vision changes. Diagnosed with DM type 1.5.      Previous failed treatments include:  Was on insulin previously, was eventually weaned off.   [employer] - insurance stopped paying.  Insulin pump - at [facility] in [id number]. [employer].      Social Documentation:  Patient lives with  and daughter.   Occupation:  for nonprofit organization.   Patient prefers In-person Visits.   and Video Virtual Visits.   Current Diet: No fried foot, avoids dairy. Drinks diet dr. Azul, drinking more water.   Exercise: Pilates twice a week.      Known Diabetes  "complications: peripheral neuropathy and cardiovascular disease.     Cardiovascular Risk Factors: family history of premature cardiovascular disease.    Diabetes Medications               dapaglifloz propaned-metformin (XIGDUO XR) 10-1,000 mg TBph Take 1 tablet by mouth once daily.    insulin aspart, niacinamide, (FIASP FLEXTOUCH U-100 INSULIN) 100 unit/mL (3 mL) InPn Inject 5 Units into the skin 3 (three) times daily with meals. For blood sugar over 120.    tirzepatide (MOUNJARO) 2.5 mg/0.5 mL PnIj Inject 2.5 mg into the skin every 7 days.       Current monitoring regimen: Dexcom G7 Sharing        The patient's Dexcom CGM was downloaded and reviewed. For the past 14 days, patient average glucose was 168 mg/dL. She was above range 33% of the time, in range 67% of the time, and below range 0% of the time. The target range for this patient was 70 - 180 mg/dL. Overall, there was a pattern of improvement. Fasting euglycemia, post prandial hyperglycemia.     Recent hypoglycemic episodes: none.     Patient compliant with glucose checks and medication administration? Yes    DIABETES MANAGEMENT STATUS  Statin: Taking  ACE/ARB: Not taking  Screening or Prevention Patient's value Goal Complete/Controlled?   HgA1C Testing and Control   Lab Results   Component Value Date    HGBA1C 7.3 (H) 10/10/2023      Annually/Less than 8% No     Lipid profile : 10/19/2022 Annually Yes     LDL control Lab Results   Component Value Date    LDLCALC 92.0 10/19/2022    Annually/Less than 100 mg/dl  Yes     Nephropathy screening Lab Results   Component Value Date    LABMICR 13.0 10/10/2023     Lab Results   Component Value Date    PROTEINUA 1+ (A) 06/24/2019     No results found for: "UTPCR"   Annually No     Blood pressure BP Readings from Last 1 Encounters:   11/03/23 118/74    Less than 140/90 Yes     Dilated retinal exam : 03/27/2023 Annually Yes     Foot exam   : 06/03/2021 Annually No     Patient's medications, allergies, surgical, social " and family histories were reviewed and updated as appropriate.     Review of Systems   Constitutional:  Negative for weight loss.   Eyes:  Negative for blurred vision and double vision.   Cardiovascular:  Negative for chest pain.   Gastrointestinal:  Negative for nausea and vomiting.   Genitourinary:  Negative for frequency.   Musculoskeletal:  Negative for falls.   Neurological:  Negative for dizziness and weakness.   Endo/Heme/Allergies:  Negative for polydipsia.   Psychiatric/Behavioral:  Negative for depression.    All other systems reviewed and are negative.       Physical Exam  Constitutional:       Appearance: Normal appearance.   HENT:      Head: Normocephalic and atraumatic.   Pulmonary:      Effort: No respiratory distress.   Musculoskeletal:      Cervical back: Normal range of motion.   Neurological:      Mental Status: She is alert and oriented to person, place, and time.   Psychiatric:         Mood and Affect: Mood normal.         Behavior: Behavior normal.        Last menstrual period 03/05/2014.  Wt Readings from Last 3 Encounters:   11/03/23 64.1 kg (141 lb 5 oz)   08/15/23 63.2 kg (139 lb 5.3 oz)   03/27/23 68 kg (149 lb 14.6 oz)     Hemoglobin A1C   Date Value Ref Range Status   10/10/2023 7.3 (H) 4.0 - 5.6 % Final     Comment:     ADA Screening Guidelines:  5.7-6.4%  Consistent with prediabetes  >or=6.5%  Consistent with diabetes    High levels of fetal hemoglobin interfere with the HbA1C  assay. Heterozygous hemoglobin variants (HbS, HgC, etc)do  not significantly interfere with this assay.   However, presence of multiple variants may affect accuracy.     10/19/2022 8.4 (H) 4.0 - 5.6 % Final     Comment:     ADA Screening Guidelines:  5.7-6.4%  Consistent with prediabetes  >or=6.5%  Consistent with diabetes    High levels of fetal hemoglobin interfere with the HbA1C  assay. Heterozygous hemoglobin variants (HbS, HgC, etc)do  not significantly interfere with this assay.   However, presence of  "multiple variants may affect accuracy.     05/13/2022 8.3 (H) 4.0 - 5.6 % Final     Comment:     ADA Screening Guidelines:  5.7-6.4%  Consistent with prediabetes  >or=6.5%  Consistent with diabetes    High levels of fetal hemoglobin interfere with the HbA1C  assay. Heterozygous hemoglobin variants (HbS, HgC, etc)do  not significantly interfere with this assay.   However, presence of multiple variants may affect accuracy.         No results found for: "CPEPTIDE", "GLUTAMICACID"    Chemistry        Component Value Date/Time     03/22/2023 0735    K 4.5 03/22/2023 0735     03/22/2023 0735    CO2 27 03/22/2023 0735    BUN 13 03/22/2023 0735    CREATININE 0.8 03/22/2023 0735     (H) 03/22/2023 0735        Component Value Date/Time    CALCIUM 9.6 03/22/2023 0735    ALKPHOS 84 10/19/2022 0801    AST 33 10/19/2022 0801    ALT 39 10/19/2022 0801    BILITOT 0.5 10/19/2022 0801    ESTGFRAFRICA >60.0 05/13/2022 0911    EGFRNONAA >60.0 05/13/2022 0911        ASSESSMENT    ICD-10-CM ICD-9-CM   1. Type 2 diabetes mellitus with hyperglycemia, without long-term current use of insulin  E11.65 250.00     790.29             PLAN  Diagnoses and all orders for this visit:    Type 2 diabetes mellitus with hyperglycemia, without long-term current use of insulin  -     tirzepatide (MOUNJARO) 5 mg/0.5 mL PnIj; Inject 5 mg into the skin every 7 days.      Reviewed pathophysiology of diabetes, complications related to the disease, importance of annual dilated eye exam and daily foot examination. Explained MOA, SE, dosage of medications. Written instructions given and reviewed with patient and patient verbalizes understanding.     PATIENT INSTRUCTIONS     Lifestyle modification with well balanced diet and at least 30 minutes of physical activity daily recommended.   Diabetic Foot Exam deferred today due to virtual visit. Will plan to be done at next in-person visit.      Increase Mounjaro at to 5 mg subcutaneously every 7 " days.   Continue Xigduo XR  mg by mouth daily.   Continue Fiasp 4 units subcutaneously three times daily with meals for glucose over 120 or for heavy carb meal.   Take 10-15 minutes before meal.     Continue Dexcom CGM G7.  Blood Sugar Goals:       Fastin-130.       1-2 hours after a meal: Less than 180.     Follow up in about 4 weeks (around 2023) for Virtual, Dexcom - Sharing.    Portions of this note were prepared with [employer] Naturally Speaking voice recognition transcription software. Grammatical errors, including garbled syntax, mangle pronouns, and other bizarre constructions may be attributed to that software system.

## 2023-11-19 DIAGNOSIS — F41.8 ANXIETY WITH DEPRESSION: ICD-10-CM

## 2023-11-20 RX ORDER — BUPROPION HYDROCHLORIDE 150 MG/1
TABLET ORAL
Qty: 90 TABLET | Refills: 2 | Status: SHIPPED | OUTPATIENT
Start: 2023-11-20

## 2023-11-20 NOTE — TELEPHONE ENCOUNTER
No care due was identified.  [facility] Embedded Care Due Messages. Reference number: [id number].   11/19/2023 11:52:23 PM CST

## 2023-11-20 NOTE — TELEPHONE ENCOUNTER
Refill Decision Note   Yandy Misha  is requesting a refill authorization.  Brief Assessment and Rationale for Refill:  Approve     Medication Therapy Plan:         Comments:     Note composed:3:12 AM 11/20/2023

## 2023-11-22 DIAGNOSIS — E11.9 TYPE 2 DIABETES MELLITUS WITHOUT COMPLICATION: ICD-10-CM

## 2023-12-11 ENCOUNTER — OFFICE VISIT (OUTPATIENT)
Dept: DIABETES | Facility: CLINIC | Age: 53
End: 2023-12-11
Payer: COMMERCIAL

## 2023-12-11 DIAGNOSIS — E11.65 TYPE 2 DIABETES MELLITUS WITH HYPERGLYCEMIA, WITHOUT LONG-TERM CURRENT USE OF INSULIN: Primary | ICD-10-CM

## 2023-12-11 DIAGNOSIS — Z97.8 USES SELF-APPLIED CONTINUOUS GLUCOSE MONITORING DEVICE: ICD-10-CM

## 2023-12-11 DIAGNOSIS — E66.3 OVERWEIGHT (BMI 25.0-29.9): ICD-10-CM

## 2023-12-11 PROCEDURE — 3051F PR MOST RECENT HEMOGLOBIN A1C LEVEL 7.0 - < 8.0%: ICD-10-PCS | Mod: CPTII,95,, | Performed by: NURSE PRACTITIONER

## 2023-12-11 PROCEDURE — 3066F PR DOCUMENTATION OF TREATMENT FOR NEPHROPATHY: ICD-10-PCS | Mod: CPTII,95,, | Performed by: NURSE PRACTITIONER

## 2023-12-11 PROCEDURE — 3061F PR NEG MICROALBUMINURIA RESULT DOCUMENTED/REVIEW: ICD-10-PCS | Mod: CPTII,95,, | Performed by: NURSE PRACTITIONER

## 2023-12-11 PROCEDURE — 95251 CONT GLUC MNTR ANALYSIS I&R: CPT | Mod: NDTC,S$GLB,, | Performed by: NURSE PRACTITIONER

## 2023-12-11 PROCEDURE — 99214 PR OFFICE/OUTPT VISIT, EST, LEVL IV, 30-39 MIN: ICD-10-PCS | Mod: 95,,, | Performed by: NURSE PRACTITIONER

## 2023-12-11 PROCEDURE — 99214 OFFICE O/P EST MOD 30 MIN: CPT | Mod: 95,,, | Performed by: NURSE PRACTITIONER

## 2023-12-11 PROCEDURE — 3061F NEG MICROALBUMINURIA REV: CPT | Mod: CPTII,95,, | Performed by: NURSE PRACTITIONER

## 2023-12-11 PROCEDURE — 3051F HG A1C>EQUAL 7.0%<8.0%: CPT | Mod: CPTII,95,, | Performed by: NURSE PRACTITIONER

## 2023-12-11 PROCEDURE — 3066F NEPHROPATHY DOC TX: CPT | Mod: CPTII,95,, | Performed by: NURSE PRACTITIONER

## 2023-12-11 PROCEDURE — 95251 PR GLUCOSE MONITOR, 72 HOUR, PHYS INTERP: ICD-10-PCS | Mod: NDTC,S$GLB,, | Performed by: NURSE PRACTITIONER

## 2023-12-11 RX ORDER — INSULIN ASPART INJECTION 100 [IU]/ML
4-14 INJECTION, SOLUTION SUBCUTANEOUS
Qty: 5 PEN | Refills: 10 | Status: SHIPPED | OUTPATIENT
Start: 2023-12-11 | End: 2024-01-08 | Stop reason: SDUPTHER

## 2023-12-11 NOTE — PROGRESS NOTES
The patient location is: Home  The chief complaint leading to consultation is: Diabetes    Visit type: audiovisual    Face to Face time with patient: 10  30 minutes of total time spent on the encounter, which includes face to face time and non-face to face time preparing to see the patient (eg, review of tests), Obtaining and/or reviewing separately obtained history, Documenting clinical information in the electronic or other health record, Independently interpreting results (not separately reported) and communicating results to the patient/family/caregiver, or Care coordination (not separately reported).  Each patient to whom he or she provides medical services by telemedicine is:  (1) informed of the relationship between the physician and patient and the respective role of any other health care provider with respect to management of the patient; and (2) notified that he or she may decline to receive medical services by telemedicine and may withdraw from such care at any time.    Notes:    Yandy Cabral is a 53 y.o. female who presents for a follow up evaluation of Type 2 diabetes mellitus.     CHIEF COMPLAINT: Diabetes Consultation    PCP: Jaycob Ramos MD      Initial visit with me - 12/19/2022     The patient was initially diagnosed with diabetes 2004, polyuria/polydipsia, unintentional weight loss, weakness, neuropathy and vision changes. Diagnosed with DM type 1.5.      Previous failed treatments include:  Was on insulin previously, was eventually weaned off.   [employer] - insurance stopped paying.  Insulin pump - at [facility] in [phone]. [employer].      Social Documentation:  Patient lives with  and daughter.   Occupation:  for nonprofit organization.   Patient prefers In-person Visits.   and Video Virtual Visits.   Current Diet: No fried foot, avoids dairy. Drinks diet dr. Azul, drinking more water.   Exercise: Pilates twice a week.      Known Diabetes  "complications: peripheral neuropathy and cardiovascular disease.     Cardiovascular Risk Factors: family history of premature cardiovascular disease.    Diabetes Medications               dapaglifloz propaned-metformin (XIGDUO XR) 10-1,000 mg TBph Take 1 tablet by mouth once daily.    insulin aspart, niacinamide, (FIASP FLEXTOUCH U-100 INSULIN) 100 unit/mL (3 mL) InPn Inject 5 Units into the skin 3 (three) times daily with meals. For blood sugar over 120. - not taking regularly.    tirzepatide (MOUNJARO) 5 mg/0.5 mL PnIj Inject 5 mg (one pen) into the skin every 7 days.     Current monitoring regimen: Dexcom G7 Sharing        The patient's Dexcom CGM was downloaded and reviewed. For the past 14 days, patient average glucose was 206 mg/dL. She was above range 59% of the time, in range 41% of the time, and below range 0% of the time. The target range for this patient was 70 - 180 mg/dL. Overall, there was a pattern of fasting euglycemia, post prandial hyperglycemia.      Recent hypoglycemic episodes: none.     Patient compliant with glucose checks and medication administration? Yes    DIABETES MANAGEMENT STATUS  Statin: Taking  ACE/ARB: Not taking  Screening or Prevention Patient's value Goal Complete/Controlled?   HgA1C Testing and Control   Lab Results   Component Value Date    HGBA1C 7.3 (H) 10/10/2023      Annually/Less than 8% No     Lipid profile : 10/19/2022 Annually Yes     LDL control Lab Results   Component Value Date    LDLCALC 92.0 10/19/2022    Annually/Less than 100 mg/dl  Yes     Nephropathy screening Lab Results   Component Value Date    LABMICR 13.0 10/10/2023     Lab Results   Component Value Date    PROTEINUA 1+ (A) 06/24/2019     No results found for: "UTPCR"   Annually No     Blood pressure BP Readings from Last 1 Encounters:   11/03/23 118/74    Less than 140/90 Yes     Dilated retinal exam : 03/27/2023 Annually Yes     Foot exam   : 06/03/2021 Annually No     Patient's medications, allergies, " surgical, social and family histories were reviewed and updated as appropriate.     Review of Systems   Constitutional:  Negative for weight loss.   Eyes:  Negative for blurred vision and double vision.   Cardiovascular:  Negative for chest pain.   Gastrointestinal:  Negative for nausea and vomiting.   Genitourinary:  Negative for frequency.   Musculoskeletal:  Negative for falls.   Neurological:  Negative for dizziness and weakness.   Endo/Heme/Allergies:  Negative for polydipsia.   Psychiatric/Behavioral:  Negative for depression.    All other systems reviewed and are negative.       Physical Exam  Constitutional:       Appearance: Normal appearance.   HENT:      Head: Normocephalic and atraumatic.   Pulmonary:      Effort: No respiratory distress.   Musculoskeletal:      Cervical back: Normal range of motion.   Neurological:      Mental Status: She is alert and oriented to person, place, and time.   Psychiatric:         Mood and Affect: Mood normal.         Behavior: Behavior normal.        Last menstrual period 03/05/2014.  Wt Readings from Last 3 Encounters:   11/03/23 64.1 kg (141 lb 5 oz)   08/15/23 63.2 kg (139 lb 5.3 oz)   03/27/23 68 kg (149 lb 14.6 oz)     Hemoglobin A1C   Date Value Ref Range Status   10/10/2023 7.3 (H) 4.0 - 5.6 % Final     Comment:     ADA Screening Guidelines:  5.7-6.4%  Consistent with prediabetes  >or=6.5%  Consistent with diabetes    High levels of fetal hemoglobin interfere with the HbA1C  assay. Heterozygous hemoglobin variants (HbS, HgC, etc)do  not significantly interfere with this assay.   However, presence of multiple variants may affect accuracy.     10/19/2022 8.4 (H) 4.0 - 5.6 % Final     Comment:     ADA Screening Guidelines:  5.7-6.4%  Consistent with prediabetes  >or=6.5%  Consistent with diabetes    High levels of fetal hemoglobin interfere with the HbA1C  assay. Heterozygous hemoglobin variants (HbS, HgC, etc)do  not significantly interfere with this assay.   However,  "presence of multiple variants may affect accuracy.     05/13/2022 8.3 (H) 4.0 - 5.6 % Final     Comment:     ADA Screening Guidelines:  5.7-6.4%  Consistent with prediabetes  >or=6.5%  Consistent with diabetes    High levels of fetal hemoglobin interfere with the HbA1C  assay. Heterozygous hemoglobin variants (HbS, HgC, etc)do  not significantly interfere with this assay.   However, presence of multiple variants may affect accuracy.         No results found for: "CPEPTIDE", "GLUTAMICACID"    Chemistry        Component Value Date/Time     03/22/2023 0735    K 4.5 03/22/2023 0735     03/22/2023 0735    CO2 27 03/22/2023 0735    BUN 13 03/22/2023 0735    CREATININE 0.8 03/22/2023 0735     (H) 03/22/2023 0735        Component Value Date/Time    CALCIUM 9.6 03/22/2023 0735    ALKPHOS 84 10/19/2022 0801    AST 33 10/19/2022 0801    ALT 39 10/19/2022 0801    BILITOT 0.5 10/19/2022 0801    ESTGFRAFRICA >60.0 05/13/2022 0911    EGFRNONAA >60.0 05/13/2022 0911        ASSESSMENT    ICD-10-CM ICD-9-CM   1. Type 2 diabetes mellitus with hyperglycemia, without long-term current use of insulin  E11.65 250.00     790.29   2. CGM - DEXCOM - SHARING  Z97.8 V49.89   3. Overweight (BMI 25.0-29.9)  E66.3 278.02             PLAN  Diagnoses and all orders for this visit:    Type 2 diabetes mellitus with hyperglycemia, without long-term current use of insulin  -     Discontinue: tirzepatide 7.5 mg/0.5 mL PnIj; Inject 7.5 mg into the skin every 7 days.  -     tirzepatide 7.5 mg/0.5 mL PnIj; Inject 7.5 mg into the skin every 7 days.  -     insulin aspart, niacinamide, (FIASP FLEXTOUCH U-100 INSULIN) 100 unit/mL (3 mL) InPn; Inject 4-14 Units into the skin 3 (three) times daily with meals.  -     Hemoglobin A1C; Future  -     Comprehensive Metabolic Panel; Future  -     Lipid Panel; Future    CGM - DEXCOM - SHARING    Overweight (BMI 25.0-29.9)      Reviewed pathophysiology of diabetes, complications related to the disease, " importance of annual dilated eye exam and daily foot examination. Explained MOA, SE, dosage of medications. Written instructions given and reviewed with patient and patient verbalizes understanding.     PATIENT INSTRUCTIONS     Lifestyle modification with well balanced diet and at least 30 minutes of physical activity daily recommended.   Diabetic Foot Exam deferred today due to virtual visit. Will plan to be done at next in-person visit.    Labs ordered and will be scheduled by [facility] staff.      Increase Mounjaro to 7.5 mg subcutaneously every 7 days.   Continue Xigduo XR  mg by mouth daily.   Change Fiasp to correction dosing three times daily with meals using scale below:    Take 10-15 minutes before meal.   If  - 200, may take 4 units of Fiasp  If  - 250, may take 6 units of Fiasp  If  - 300, may take 8 units of Fiasp  If  - 350, may take 10 units of Fiasp  If  - 400, may take 12 units of Fiasp  If +, may take 14 units of Fiasp     Continue Dexcom CGM G7.  Blood Sugar Goals:       Fastin-130.       1-2 hours after a meal: Less than 180.     Follow up in about 4 weeks (around 2024) for Virtual, Schedule fasting labs, Dexcom - Sharing.    Portions of this note were prepared with [employer] Naturally [employer] voice recognition transcription software. Grammatical errors, including garbled syntax, mangle pronouns, and other bizarre constructions may be attributed to that software system.

## 2023-12-11 NOTE — PATIENT INSTRUCTIONS
PATIENT INSTRUCTIONS     Lifestyle modification with well balanced diet and at least 30 minutes of physical activity daily recommended.   Diabetic Foot Exam deferred today due to virtual visit. Will plan to be done at next in-person visit.    Labs ordered and will be scheduled by [facility] staff.      Increase Mounjaro to 7.5 mg subcutaneously every 7 days.   Continue Xigduo XR  mg by mouth daily.   Change Fiasp to correction dosing three times daily with meals using scale below:    Take 10-15 minutes before meal.   If  - 200, may take 4 units of Fiasp  If  - 250, may take 6 units of Fiasp  If  - 300, may take 8 units of Fiasp  If  - 350, may take 10 units of Fiasp  If  - 400, may take 12 units of Fiasp  If +, may take 14 units of Fiasp     Continue Dexcom CGM G7.  Blood Sugar Goals:       Fastin-130.       1-2 hours after a meal: Less than 180.

## 2023-12-18 ENCOUNTER — HOSPITAL ENCOUNTER (OUTPATIENT)
Dept: RADIOLOGY | Facility: HOSPITAL | Age: 53
Discharge: HOME | End: 2023-12-18
Attending: NURSE PRACTITIONER
Payer: COMMERCIAL

## 2023-12-18 ENCOUNTER — OFFICE VISIT (OUTPATIENT)
Dept: INTERNAL MEDICINE | Facility: CLINIC | Age: 53
End: 2023-12-18
Payer: COMMERCIAL

## 2023-12-18 VITALS
HEART RATE: 84 BPM | WEIGHT: 140.19 LBS | BODY MASS INDEX: 23.36 KG/M2 | DIASTOLIC BLOOD PRESSURE: 72 MMHG | OXYGEN SATURATION: 100 % | SYSTOLIC BLOOD PRESSURE: 114 MMHG | HEIGHT: 65 IN | TEMPERATURE: 97 F

## 2023-12-18 DIAGNOSIS — R07.89 CHEST DISCOMFORT: Primary | ICD-10-CM

## 2023-12-18 DIAGNOSIS — R07.89 CHEST DISCOMFORT: ICD-10-CM

## 2023-12-18 DIAGNOSIS — M81.8 OTHER OSTEOPOROSIS, UNSPECIFIED PATHOLOGICAL FRACTURE PRESENCE: ICD-10-CM

## 2023-12-18 PROCEDURE — 3066F NEPHROPATHY DOC TX: CPT | Mod: CPTII,S$GLB,, | Performed by: NURSE PRACTITIONER

## 2023-12-18 PROCEDURE — 3078F PR MOST RECENT DIASTOLIC BLOOD PRESSURE < 80 MM HG: ICD-10-PCS | Mod: CPTII,S$GLB,, | Performed by: NURSE PRACTITIONER

## 2023-12-18 PROCEDURE — 71046 X-RAY EXAM CHEST 2 VIEWS: CPT | Mod: TC

## 2023-12-18 PROCEDURE — 1159F MED LIST DOCD IN RCRD: CPT | Mod: CPTII,S$GLB,, | Performed by: NURSE PRACTITIONER

## 2023-12-18 PROCEDURE — 3066F PR DOCUMENTATION OF TREATMENT FOR NEPHROPATHY: ICD-10-PCS | Mod: CPTII,S$GLB,, | Performed by: NURSE PRACTITIONER

## 2023-12-18 PROCEDURE — 71046 XR CHEST PA AND LATERAL: ICD-10-PCS | Mod: 26,,, | Performed by: RADIOLOGY

## 2023-12-18 PROCEDURE — 3051F PR MOST RECENT HEMOGLOBIN A1C LEVEL 7.0 - < 8.0%: ICD-10-PCS | Mod: CPTII,S$GLB,, | Performed by: NURSE PRACTITIONER

## 2023-12-18 PROCEDURE — 1159F PR MEDICATION LIST DOCUMENTED IN MEDICAL RECORD: ICD-10-PCS | Mod: CPTII,S$GLB,, | Performed by: NURSE PRACTITIONER

## 2023-12-18 PROCEDURE — 3074F SYST BP LT 130 MM HG: CPT | Mod: CPTII,S$GLB,, | Performed by: NURSE PRACTITIONER

## 2023-12-18 PROCEDURE — 99999 PR PBB SHADOW E&M-EST. PATIENT-LVL V: CPT | Mod: PBBFAC,,, | Performed by: NURSE PRACTITIONER

## 2023-12-18 PROCEDURE — 3078F DIAST BP <80 MM HG: CPT | Mod: CPTII,S$GLB,, | Performed by: NURSE PRACTITIONER

## 2023-12-18 PROCEDURE — 3061F NEG MICROALBUMINURIA REV: CPT | Mod: CPTII,S$GLB,, | Performed by: NURSE PRACTITIONER

## 2023-12-18 PROCEDURE — 3061F PR NEG MICROALBUMINURIA RESULT DOCUMENTED/REVIEW: ICD-10-PCS | Mod: CPTII,S$GLB,, | Performed by: NURSE PRACTITIONER

## 2023-12-18 PROCEDURE — 3074F PR MOST RECENT SYSTOLIC BLOOD PRESSURE < 130 MM HG: ICD-10-PCS | Mod: CPTII,S$GLB,, | Performed by: NURSE PRACTITIONER

## 2023-12-18 PROCEDURE — 3051F HG A1C>EQUAL 7.0%<8.0%: CPT | Mod: CPTII,S$GLB,, | Performed by: NURSE PRACTITIONER

## 2023-12-18 PROCEDURE — 71046 X-RAY EXAM CHEST 2 VIEWS: CPT | Mod: 26,,, | Performed by: RADIOLOGY

## 2023-12-18 PROCEDURE — 3008F PR BODY MASS INDEX (BMI) DOCUMENTED: ICD-10-PCS | Mod: CPTII,S$GLB,, | Performed by: NURSE PRACTITIONER

## 2023-12-18 PROCEDURE — 3008F BODY MASS INDEX DOCD: CPT | Mod: CPTII,S$GLB,, | Performed by: NURSE PRACTITIONER

## 2023-12-18 PROCEDURE — 99213 OFFICE O/P EST LOW 20 MIN: CPT | Mod: S$GLB,,, | Performed by: NURSE PRACTITIONER

## 2023-12-18 PROCEDURE — 99999 PR PBB SHADOW E&M-EST. PATIENT-LVL V: ICD-10-PCS | Mod: PBBFAC,,, | Performed by: NURSE PRACTITIONER

## 2023-12-18 PROCEDURE — 99213 PR OFFICE/OUTPT VISIT, EST, LEVL III, 20-29 MIN: ICD-10-PCS | Mod: S$GLB,,, | Performed by: NURSE PRACTITIONER

## 2023-12-18 NOTE — PROGRESS NOTES
Subjective:       Patient ID: Yandy Cabral is a 53 y.o. female.    Chief Complaint: Chest Injury (Friday night/Hurts when inhale/deep breath)    HPI    Pt reports her large 65 lb dog jumped on her chest this past weekend.  Since she has been experiencing the above  Has osteoporosis.  Not taking anything for pain  No swelling or bruising      Past Medical History:   Diagnosis Date    Abnormal Pap smear of cervix     CAD (coronary artery disease)     Chronic constipation     DM II (diabetes mellitus, type II), controlled     Hx of colposcopy with cervical biopsy      Past Surgical History:   Procedure Laterality Date    ANGIOGRAM, CORONARY, WITH LEFT HEART CATHETERIZATION  2/19/2020    Procedure: Angiogram, Coronary, with Left Heart Cath;  Surgeon: Martin Villatoro MD;  Location: [facility] CATH LAB;  Service: Cardiology;;    CYST REMOVAL      ganglionic right wrist    LAPAROSCOPIC CHOLECYSTECTOMY N/A 7/20/2018    Procedure: CHOLECYSTECTOMY-LAPAROSCOPIC;  Surgeon: Louis O. Jeansonne IV, MD;  Location: [facility] OR;  Service: General;  Laterality: N/A;    LAPAROSCOPIC LYSIS OF ADHESIONS N/A 7/20/2018    Procedure: LYSIS, ADHESIONS, LAPAROSCOPIC;  Surgeon: Louis O. Jeansonne IV, MD;  Location: [facility] OR;  Service: General;  Laterality: N/A;    LEFT HEART CATHETERIZATION Left 2/19/2020    Procedure: CATHETERIZATION, HEART, LEFT;  Surgeon: Martin Villatoro MD;  Location: [facility] CATH LAB;  Service: Cardiology;  Laterality: Left;  930 start time per Dr. Villatoro    SLEEVE GASTROPLASTY  1/01/12    TOTAL REDUCTION MAMMOPLASTY Bilateral 2104    TUBAL LIGATION       Social History     Socioeconomic History    Marital status:    Tobacco Use    Smoking status: Never    Smokeless tobacco: Never   Substance and Sexual Activity    Alcohol use: Yes     Alcohol/week: 4.0 standard drinks of alcohol     Types: 4 Glasses of wine per week    Drug use: No   Social History Narrative    No pets or smokers in household.     Review of patient's  "allergies indicates:  No Known Allergies  Current Outpatient Medications   Medication Sig    alendronate (FOSAMAX) 70 MG tablet TAKE 1 TABLET EVERY 7 DAYS    aspirin (ECOTRIN) 81 MG EC tablet Take 81 mg by mouth once daily.    atorvastatin (LIPITOR) 40 MG tablet TAKE 1 TABLET DAILY    blood sugar diagnostic (BLOOD GLUCOSE TEST) Strp 1 strip by Misc.(Non-Drug; Combo Route) route 2 (two) times a day.    blood sugar diagnostic Strp To check BG 3 times daily, to use with insurance preferred meter    blood-glucose meter Misc Use as instructed    blood-glucose meter,continuous (DEXCOM G7 ) Misc 1 Device by Misc.(Non-Drug; Combo Route) route once daily.    blood-glucose sensor (DEXCOM G7 SENSOR) Lindsay Inject 1 each into the skin every 10 days.    buPROPion (WELLBUTRIN XL) 150 MG TB24 tablet TAKE 1 TABLET DAILY    CALCIUM ORAL Take 1 tablet by mouth once daily at 6am.    clotrimazole-betamethasone 1-0.05% (LOTRISONE) cream APPLY TOPICALLY TO THE AFFECTED AREA TWICE DAILY    cyanocobalamin 1,000 mcg/mL injection INJECT 1 ML INTO THE MUSCLE EVERY 30 DAYS    dapaglifloz propaned-metformin (XIGDUO XR) 10-1,000 mg TBph Take 1 tablet by mouth once daily.    estradioL (ESTRACE) 1 MG tablet Take 1 tablet (1 mg total) by mouth once daily.    FLOWFLEX COVID-19 AG HOME TEST Kit     insulin aspart, niacinamide, (FIASP FLEXTOUCH U-100 INSULIN) 100 unit/mL (3 mL) InPn Inject 4-14 Units into the skin 3 (three) times daily with meals.    lancets 33 gauge Misc To check BG 3 times daily, to use with insurance preferred meter    melatonin 5 mg Tab Take 5 mg by mouth nightly.     metoprolol succinate (TOPROL-XL) 25 MG 24 hr tablet TAKE 1 TABLET DAILY    multivitamin (THERAGRAN) per tablet Take 1 tablet by mouth.    needle, disp, 24 gauge 24 gauge x 1" Ndle 1 Dose by Misc.(Non-Drug; Combo Route) route every 30 days.    pen needle, diabetic (BD ULTRA-FINE SCOTT PEN NEEDLE) 32 gauge x 5/32" Ndle Use with insulin 3 times daily    " pramoxine-hydrocortisone (PROCTOCREAM-HC) 1-1 % rectal cream Place rectally 3 (three) times daily.    tirzepatide 7.5 mg/0.5 mL PnIj Inject 7.5 mg into the skin every 7 days.    vitamin D (VITAMIN D3) 1000 units Tab Take 1,000 Units by mouth once daily.    clonazePAM (KLONOPIN) 0.5 MG tablet Take one every 8 hours for plane trip and 1 at night for jetlag.    nitroGLYCERIN (NITROSTAT) 0.4 MG SL tablet Place 1 tablet (0.4 mg total) under the tongue every 5 (five) minutes as needed for Chest pain. Call 911 if pain persists after 2 doses.     No current facility-administered medications for this visit.           Review of Systems   Constitutional:  Negative for activity change, appetite change, chills, diaphoresis, fatigue, fever and unexpected weight change.   HENT:  Negative for congestion, ear pain, postnasal drip, rhinorrhea, sinus pressure, sinus pain, sneezing, sore throat, tinnitus, trouble swallowing and voice change.    Eyes:  Negative for photophobia, pain and visual disturbance.   Respiratory:  Negative for cough, chest tightness, shortness of breath and wheezing.    Cardiovascular:  Negative for chest pain, palpitations and leg swelling.   Gastrointestinal:  Negative for abdominal distention, abdominal pain, constipation, diarrhea, nausea and vomiting.   Genitourinary:  Negative for decreased urine volume, difficulty urinating, dysuria, flank pain, frequency, hematuria and urgency.   Musculoskeletal:  Positive for arthralgias and myalgias. Negative for back pain, joint swelling, neck pain and neck stiffness.   Allergic/Immunologic: Negative for immunocompromised state.   Neurological:  Negative for dizziness, tremors, seizures, syncope, facial asymmetry, speech difficulty, weakness, light-headedness, numbness and headaches.   Hematological:  Negative for adenopathy. Does not bruise/bleed easily.   Psychiatric/Behavioral:  Negative for confusion and sleep disturbance.        Objective:      Physical  Exam  Vitals reviewed.   Cardiovascular:      Rate and Rhythm: Normal rate and regular rhythm.      Heart sounds: Normal heart sounds.   Pulmonary:      Effort: Pulmonary effort is normal.      Breath sounds: Normal breath sounds.   Chest:      Chest wall: Tenderness present. No mass, lacerations, deformity, swelling or edema.   Neurological:      Mental Status: She is alert.         Assessment:     Vitals:    12/18/23 1507   BP: 114/72   Pulse: 84   Temp: 97 °F (36.1 °C)         1. Chest discomfort    2. Other osteoporosis, unspecified pathological fracture presence        Plan:   Chest discomfort  -     X-Ray Chest PA And Lateral; Future; Expected date: 12/18/2023    Other osteoporosis, unspecified pathological fracture presence      Tylenol, ice pack

## 2024-01-01 DIAGNOSIS — E78.2 MIXED HYPERLIPIDEMIA: ICD-10-CM

## 2024-01-01 DIAGNOSIS — I25.10 CAD IN NATIVE ARTERY: ICD-10-CM

## 2024-01-02 RX ORDER — ATORVASTATIN CALCIUM 40 MG/1
TABLET, FILM COATED ORAL
Qty: 90 TABLET | Refills: 0 | Status: SHIPPED | OUTPATIENT
Start: 2024-01-02

## 2024-01-02 NOTE — TELEPHONE ENCOUNTER
Care Due:                  Date            Visit Type   Department     Provider  --------------------------------------------------------------------------------                                EP -                              PRIMARY      [facility] INTER[facility]  Last Visit: 08-      CARE ([facility])   MEDICINE       Jaycob Ramos  Next Visit: None Scheduled  None         None Found                                                            Last  Test          Frequency    Reason                     Performed    Due Date  --------------------------------------------------------------------------------    CMP.........  12 months..  alendronate, atorvastatin  10-   10-    Lipid Panel.  12 months..  atorvastatin.............  10-   10-    Mg Level....  12 months..  alendronate..............  Not Found    Overdue    Phosphate...  12 months..  alendronate..............  Not Found    Overdue    Vitamin D...  12 months..  alendronate..............  Not Found    Overdue    [employer] Embedded Care Due Messages. Reference number: [id number].   1/01/2024 11:40:12 PM CST

## 2024-01-08 ENCOUNTER — OFFICE VISIT (OUTPATIENT)
Dept: DIABETES | Facility: CLINIC | Age: 54
End: 2024-01-08
Payer: COMMERCIAL

## 2024-01-08 DIAGNOSIS — E11.65 TYPE 2 DIABETES MELLITUS WITH HYPERGLYCEMIA, WITHOUT LONG-TERM CURRENT USE OF INSULIN: Primary | ICD-10-CM

## 2024-01-08 DIAGNOSIS — F40.243 ANXIETY WITH FLYING: ICD-10-CM

## 2024-01-08 PROCEDURE — 95251 CONT GLUC MNTR ANALYSIS I&R: CPT | Mod: NDTC,S$GLB,, | Performed by: NURSE PRACTITIONER

## 2024-01-08 PROCEDURE — 99214 OFFICE O/P EST MOD 30 MIN: CPT | Mod: 95,,, | Performed by: NURSE PRACTITIONER

## 2024-01-08 PROCEDURE — 3072F LOW RISK FOR RETINOPATHY: CPT | Mod: CPTII,95,, | Performed by: NURSE PRACTITIONER

## 2024-01-08 RX ORDER — CLONAZEPAM 0.5 MG/1
TABLET ORAL
Qty: 10 TABLET | Refills: 0 | Status: SHIPPED | OUTPATIENT
Start: 2024-01-08 | End: 2024-01-15

## 2024-01-08 RX ORDER — ORAL SEMAGLUTIDE 3 MG/1
3 TABLET ORAL DAILY
Qty: 30 TABLET | Refills: 0 | Status: SHIPPED | OUTPATIENT
Start: 2024-01-08 | End: 2024-02-09

## 2024-01-08 RX ORDER — INSULIN ASPART INJECTION 100 [IU]/ML
4-14 INJECTION, SOLUTION SUBCUTANEOUS
Qty: 5 PEN | Refills: 10 | Status: SHIPPED | OUTPATIENT
Start: 2024-01-08 | End: 2025-01-07

## 2024-01-08 NOTE — PROGRESS NOTES
The patient location is: Home  The chief complaint leading to consultation is: Diabetes    Visit type: audiovisual    Face to Face time with patient: 10  30 minutes of total time spent on the encounter, which includes face to face time and non-face to face time preparing to see the patient (eg, review of tests), Obtaining and/or reviewing separately obtained history, Documenting clinical information in the electronic or other health record, Independently interpreting results (not separately reported) and communicating results to the patient/family/caregiver, or Care coordination (not separately reported).  Each patient to whom he or she provides medical services by telemedicine is:  (1) informed of the relationship between the physician and patient and the respective role of any other health care provider with respect to management of the patient; and (2) notified that he or she may decline to receive medical services by telemedicine and may withdraw from such care at any time.    Notes:    Yandy Cabral is a 53 y.o. female who presents for a follow up evaluation of Type 2 diabetes mellitus.     CHIEF COMPLAINT: Diabetes Consultation    PCP: Jaycob Ramos MD      Initial visit with me - 12/19/2022     The patient was initially diagnosed with diabetes 2004, polyuria/polydipsia, unintentional weight loss, weakness, neuropathy and vision changes. Diagnosed with DM type 1.5.      Previous failed treatments include:  Was on insulin previously, was eventually weaned off.   Outfittery - insurance stopped paying.  Insulin pump - at [facility] in [phone]. [employer].      Social Documentation:  Patient lives with  and daughter.   Occupation:  for nonprofit organization.   Patient prefers In-person Visits.   and Video Virtual Visits.   Current Diet: No fried foot, avoids dairy. Drinks diet dr. Azul, drinking more water.   Exercise: Pilates twice a week.      Known Diabetes  "complications: peripheral neuropathy and cardiovascular disease.     Cardiovascular Risk Factors: family history of premature cardiovascular disease.    Diabetes Medications               dapaglifloz propaned-metformin (XIGDUO XR) 10-1,000 mg TBph Take 1 tablet by mouth once daily.    insulin aspart, niacinamide, (FIASP FLEXTOUCH U-100 INSULIN) 100 unit/mL (3 mL) InPn Inject 5 Units into the skin 3 (three) times daily with meals. For blood sugar over 120. - Recently taking 6 units prior to supper, but still with post prandial spikes after eating.    tirzepatide (MOUNJARO) 7.5 mg/0.5 mL PnIj Inject 7.5 mg (one pen) into the skin every 7 days.     Current monitoring regimen: Dexcom G7 Sharing  The patient's Dexcom CGM was downloaded and reviewed. For the past 14 days, patient average glucose was 199 mg/dL. She was above range 54% of the time, in range 46% of the time, and below range 0% of the time. The target range for this patient was 70 - 180 mg/dL. Overall, there was a pattern of post prandial hyperglycemia, fasting euglycemia.          Recent hypoglycemic episodes: none.     Patient compliant with glucose checks and medication administration? Yes    DIABETES MANAGEMENT STATUS  Statin: Taking  ACE/ARB: Not taking  Screening or Prevention Patient's value Goal Complete/Controlled?   HgA1C Testing and Control   Lab Results   Component Value Date    HGBA1C 7.3 (H) 10/10/2023      Annually/Less than 8% No     Lipid profile : 10/19/2022 Annually Yes     LDL control Lab Results   Component Value Date    LDLCALC 92.0 10/19/2022    Annually/Less than 100 mg/dl  Yes     Nephropathy screening Lab Results   Component Value Date    LABMICR 13.0 10/10/2023     Lab Results   Component Value Date    PROTEINUA 1+ (A) 06/24/2019     No results found for: "UTPCR"   Annually No     Blood pressure BP Readings from Last 1 Encounters:   12/18/23 114/72    Less than 140/90 Yes     Dilated retinal exam : 03/27/2023 Annually Yes     Foot " exam   : 06/03/2021 Annually No     Patient's medications, allergies, surgical, social and family histories were reviewed and updated as appropriate.     Review of Systems   Constitutional:  Negative for weight loss.   Eyes:  Negative for blurred vision and double vision.   Cardiovascular:  Negative for chest pain.   Gastrointestinal:  Negative for nausea and vomiting.   Genitourinary:  Negative for frequency.   Musculoskeletal:  Negative for falls.   Neurological:  Negative for dizziness and weakness.   Endo/Heme/Allergies:  Negative for polydipsia.   Psychiatric/Behavioral:  Negative for depression.    All other systems reviewed and are negative.       Physical Exam  Constitutional:       Appearance: Normal appearance.   HENT:      Head: Normocephalic and atraumatic.   Pulmonary:      Effort: No respiratory distress.   Musculoskeletal:      Cervical back: Normal range of motion.   Neurological:      Mental Status: She is alert and oriented to person, place, and time.   Psychiatric:         Mood and Affect: Mood normal.         Behavior: Behavior normal.        Last menstrual period 03/05/2014.  Wt Readings from Last 3 Encounters:   12/18/23 63.6 kg (140 lb 3.4 oz)   11/03/23 64.1 kg (141 lb 5 oz)   08/15/23 63.2 kg (139 lb 5.3 oz)     Hemoglobin A1C   Date Value Ref Range Status   10/10/2023 7.3 (H) 4.0 - 5.6 % Final     Comment:     ADA Screening Guidelines:  5.7-6.4%  Consistent with prediabetes  >or=6.5%  Consistent with diabetes    High levels of fetal hemoglobin interfere with the HbA1C  assay. Heterozygous hemoglobin variants (HbS, HgC, etc)do  not significantly interfere with this assay.   However, presence of multiple variants may affect accuracy.     10/19/2022 8.4 (H) 4.0 - 5.6 % Final     Comment:     ADA Screening Guidelines:  5.7-6.4%  Consistent with prediabetes  >or=6.5%  Consistent with diabetes    High levels of fetal hemoglobin interfere with the HbA1C  assay. Heterozygous hemoglobin variants  "(HbS, HgC, etc)do  not significantly interfere with this assay.   However, presence of multiple variants may affect accuracy.     05/13/2022 8.3 (H) 4.0 - 5.6 % Final     Comment:     ADA Screening Guidelines:  5.7-6.4%  Consistent with prediabetes  >or=6.5%  Consistent with diabetes    High levels of fetal hemoglobin interfere with the HbA1C  assay. Heterozygous hemoglobin variants (HbS, HgC, etc)do  not significantly interfere with this assay.   However, presence of multiple variants may affect accuracy.         No results found for: "CPEPTIDE", "GLUTAMICACID"    Chemistry        Component Value Date/Time     03/22/2023 0735    K 4.5 03/22/2023 0735     03/22/2023 0735    CO2 27 03/22/2023 0735    BUN 13 03/22/2023 0735    CREATININE 0.8 03/22/2023 0735     (H) 03/22/2023 0735        Component Value Date/Time    CALCIUM 9.6 03/22/2023 0735    ALKPHOS 84 10/19/2022 0801    AST 33 10/19/2022 0801    ALT 39 10/19/2022 0801    BILITOT 0.5 10/19/2022 0801    ESTGFRAFRICA >60.0 05/13/2022 0911    EGFRNONAA >60.0 05/13/2022 0911        ASSESSMENT    ICD-10-CM ICD-9-CM   1. Type 2 diabetes mellitus with hyperglycemia, without long-term current use of insulin  E11.65 250.00     790.29   2. Anxiety with flying  F40.243 300.29         PLAN  Diagnoses and all orders for this visit:    Type 2 diabetes mellitus with hyperglycemia, without long-term current use of insulin  -     C-Peptide; Future  -     Glutamic Acid Decarboxylase; Future  -     Insulin Antibody; Future  -     Microalbumin/Creatinine Ratio, Urine; Future  -     semaglutide (RYBELSUS) 3 mg tablet; Take 1 tablet (3 mg total) by mouth once daily.  -     insulin aspart, niacinamide, (FIASP FLEXTOUCH U-100 INSULIN) 100 unit/mL (3 mL) InPn; Inject 4-14 Units into the skin 3 (three) times daily with meals.    Anxiety with flying      Reviewed pathophysiology of diabetes, complications related to the disease, importance of annual dilated eye exam and " daily foot examination. Explained MODAVY, SE, dosage of medications. Written instructions given and reviewed with patient and patient verbalizes understanding.     2023 - complains of nausea with Mounjaro. Would like to try oral alternative, will send in Rybelsus.   PATIENT INSTRUCTIONS     Lifestyle modification with well balanced diet and at least 30 minutes of physical activity daily recommended.   Diabetic Foot Exam deferred today due to virtual visit. Will plan to be done at next in-person visit.    Labs ordered and will be scheduled by [facility] staff.      Discontinue Mounjaro  Start Rybelsus 3 mg by mouth daily.   Continue Xigduo XR  mg by mouth daily.   Continue  Fiasp to correction dosing three times daily with meals using scale below:    Take 10-15 minutes before meal.   If  - 200, may take 4 units of Fiasp  If  - 250, may take 6 units of Fiasp  If  - 300, may take 8 units of Fiasp  If  - 350, may take 10 units of Fiasp  If  - 400, may take 12 units of Fiasp  If +, may take 14 units of Fiasp     Continue Dexcom CGM G7.  Blood Sugar Goals:       Fastin-130.       1-2 hours after a meal: Less than 180.     Follow up in about 6 weeks (around 2024) for In-Person, Dexcom - Sharing, Schedule fasting labs.    Portions of this note were prepared with [employer] Naturally Speaking voice recognition transcription software. Grammatical errors, including garbled syntax, mangle pronouns, and other bizarre constructions may be attributed to that software system.

## 2024-01-08 NOTE — Clinical Note
Good morning Dr. Ramos, Mrs. Cabral will be traveling to [place] on Saturday and is asking for her Klonopin prescription prior to leaving. I am unable to escribe controlled substances, would you mind sending in the prescription for her?   Thank you,  Lissette
In-Person, 6 wks  Dexcom - Sharing,  Schedule fasting labs this Friday at [facility]
Yes

## 2024-01-08 NOTE — PATIENT INSTRUCTIONS
PATIENT INSTRUCTIONS     Lifestyle modification with well balanced diet and at least 30 minutes of physical activity daily recommended.   Diabetic Foot Exam deferred today due to virtual visit. Will plan to be done at next in-person visit.    Labs ordered and will be scheduled by [facility] staff.      Discontinue Mounjaro  Start Rybelsus 3 mg by mouth daily.   Continue Xigduo XR  mg by mouth daily.   Continue  Fiasp to correction dosing three times daily with meals using scale below:    Take 10-15 minutes before meal.   If  - 200, may take 4 units of Fiasp  If  - 250, may take 6 units of Fiasp  If  - 300, may take 8 units of Fiasp  If  - 350, may take 10 units of Fiasp  If  - 400, may take 12 units of Fiasp  If +, may take 14 units of Fiasp     Continue Dexcom CGM G7.  Blood Sugar Goals:       Fastin-130.       1-2 hours after a meal: Less than 180.

## 2024-02-19 ENCOUNTER — OFFICE VISIT (OUTPATIENT)
Dept: DIABETES | Facility: CLINIC | Age: 54
End: 2024-02-19
Payer: COMMERCIAL

## 2024-02-19 DIAGNOSIS — E11.65 TYPE 2 DIABETES MELLITUS WITH HYPERGLYCEMIA, WITHOUT LONG-TERM CURRENT USE OF INSULIN: Primary | ICD-10-CM

## 2024-02-19 PROCEDURE — 3072F LOW RISK FOR RETINOPATHY: CPT | Mod: CPTII,95,, | Performed by: NURSE PRACTITIONER

## 2024-02-19 PROCEDURE — 95251 CONT GLUC MNTR ANALYSIS I&R: CPT | Mod: NDTC,S$GLB,, | Performed by: NURSE PRACTITIONER

## 2024-02-19 PROCEDURE — 99214 OFFICE O/P EST MOD 30 MIN: CPT | Mod: 95,,, | Performed by: NURSE PRACTITIONER

## 2024-02-19 RX ORDER — ORAL SEMAGLUTIDE 7 MG/1
7 TABLET ORAL DAILY
Qty: 30 TABLET | Refills: 1 | Status: SHIPPED | OUTPATIENT
Start: 2024-02-19 | End: 2024-03-18

## 2024-02-19 NOTE — PROGRESS NOTES
The patient location is: Home  The chief complaint leading to consultation is: Diabetes    Visit type: audiovisual    Face to Face time with patient: 15  30 minutes of total time spent on the encounter, which includes face to face time and non-face to face time preparing to see the patient (eg, review of tests), Obtaining and/or reviewing separately obtained history, Documenting clinical information in the electronic or other health record, Independently interpreting results (not separately reported) and communicating results to the patient/family/caregiver, or Care coordination (not separately reported).  Each patient to whom he or she provides medical services by telemedicine is:  (1) informed of the relationship between the physician and patient and the respective role of any other health care provider with respect to management of the patient; and (2) notified that he or she may decline to receive medical services by telemedicine and may withdraw from such care at any time.    Notes:    Yandy Cabral is a 53 y.o. female who presents for a follow up evaluation of Type 2 diabetes mellitus.     CHIEF COMPLAINT: Diabetes Consultation    PCP: Jaycob Ramos MD      Initial visit with me - 12/19/2022     The patient was initially diagnosed with diabetes 2004, polyuria/polydipsia, unintentional weight loss, weakness, neuropathy and vision changes. Diagnosed with DM type 1.5.      Previous failed treatments include:  Was on insulin previously, was eventually weaned off.   [employer] - insurance stopped paying.  Insulin pump - at [facility] in [id number]. [employer].      Social Documentation:  Patient lives with  and daughter.   Occupation:  for nonprofit organization.   Patient prefers In-person Visits.   and Video Virtual Visits.   Current Diet: No fried foot, avoids dairy. Drinks diet dr. Azul, drinking more water.   Exercise: Pilates twice a week.      Known Diabetes  "complications: peripheral neuropathy and cardiovascular disease.     Cardiovascular Risk Factors: family history of premature cardiovascular disease.    Diabetes Medications               dapaglifloz propaned-metformin (XIGDUO XR) 10-1,000 mg TBph Take 1 tablet by mouth once daily.    insulin aspart, niacinamide, (FIASP FLEXTOUCH U-100 INSULIN) 100 unit/mL (3 mL) InPn Inject 4-14 Units into the skin 3 (three) times daily with meals.       Current monitoring regimen: Dexcom G7 Sharing    The patient's Dexcom CGM was downloaded and reviewed. For the past 14 days, patient average glucose was 219 mg/dL. She was above range 59% of the time, in range 41% of the time, and below range 0% of the time. The target range for this patient was 70 - 180 mg/dL. Overall, there was a pattern of post prandial hyperglycemia, fasting euglycemia.  .       Recent hypoglycemic episodes: none.     Patient compliant with glucose checks and medication administration? Yes    DIABETES MANAGEMENT STATUS  Statin: Taking  ACE/ARB: Not taking  Screening or Prevention Patient's value Goal Complete/Controlled?   HgA1C Testing and Control   Lab Results   Component Value Date    HGBA1C 7.3 (H) 10/10/2023      Annually/Less than 8% No     Lipid profile : 10/19/2022 Annually Yes     LDL control Lab Results   Component Value Date    LDLCALC 92.0 10/19/2022    Annually/Less than 100 mg/dl  Yes     Nephropathy screening Lab Results   Component Value Date    LABMICR 13.0 10/10/2023     Lab Results   Component Value Date    PROTEINUA 1+ (A) 06/24/2019     No results found for: "UTPCR"   Annually No     Blood pressure BP Readings from Last 1 Encounters:   12/18/23 114/72    Less than 140/90 Yes     Dilated retinal exam : 03/27/2023 Annually Yes     Foot exam   : 06/03/2021 Annually No     Patient's medications, allergies, surgical, social and family histories were reviewed and updated as appropriate.     Review of Systems   Constitutional:  Negative for weight " loss.   Eyes:  Negative for blurred vision and double vision.   Cardiovascular:  Negative for chest pain.   Gastrointestinal:  Negative for nausea and vomiting.   Genitourinary:  Negative for frequency.   Musculoskeletal:  Negative for falls.   Neurological:  Negative for dizziness and weakness.   Endo/Heme/Allergies:  Negative for polydipsia.   Psychiatric/Behavioral:  Negative for depression.    All other systems reviewed and are negative.       Physical Exam  Constitutional:       Appearance: Normal appearance.   HENT:      Head: Normocephalic and atraumatic.   Pulmonary:      Effort: No respiratory distress.   Musculoskeletal:      Cervical back: Normal range of motion.   Neurological:      Mental Status: She is alert and oriented to person, place, and time.   Psychiatric:         Mood and Affect: Mood normal.         Behavior: Behavior normal.        Last menstrual period 03/05/2014.  Wt Readings from Last 3 Encounters:   12/18/23 63.6 kg (140 lb 3.4 oz)   11/03/23 64.1 kg (141 lb 5 oz)   08/15/23 63.2 kg (139 lb 5.3 oz)     Hemoglobin A1C   Date Value Ref Range Status   10/10/2023 7.3 (H) 4.0 - 5.6 % Final     Comment:     ADA Screening Guidelines:  5.7-6.4%  Consistent with prediabetes  >or=6.5%  Consistent with diabetes    High levels of fetal hemoglobin interfere with the HbA1C  assay. Heterozygous hemoglobin variants (HbS, HgC, etc)do  not significantly interfere with this assay.   However, presence of multiple variants may affect accuracy.     10/19/2022 8.4 (H) 4.0 - 5.6 % Final     Comment:     ADA Screening Guidelines:  5.7-6.4%  Consistent with prediabetes  >or=6.5%  Consistent with diabetes    High levels of fetal hemoglobin interfere with the HbA1C  assay. Heterozygous hemoglobin variants (HbS, HgC, etc)do  not significantly interfere with this assay.   However, presence of multiple variants may affect accuracy.     05/13/2022 8.3 (H) 4.0 - 5.6 % Final     Comment:     ADA Screening  "Guidelines:  5.7-6.4%  Consistent with prediabetes  >or=6.5%  Consistent with diabetes    High levels of fetal hemoglobin interfere with the HbA1C  assay. Heterozygous hemoglobin variants (HbS, HgC, etc)do  not significantly interfere with this assay.   However, presence of multiple variants may affect accuracy.         No results found for: "CPEPTIDE", "GLUTAMICACID"    Chemistry        Component Value Date/Time     03/22/2023 0735    K 4.5 03/22/2023 0735     03/22/2023 0735    CO2 27 03/22/2023 0735    BUN 13 03/22/2023 0735    CREATININE 0.8 03/22/2023 0735     (H) 03/22/2023 0735        Component Value Date/Time    CALCIUM 9.6 03/22/2023 0735    ALKPHOS 84 10/19/2022 0801    AST 33 10/19/2022 0801    ALT 39 10/19/2022 0801    BILITOT 0.5 10/19/2022 0801    ESTGFRAFRICA >60.0 05/13/2022 0911    EGFRNONAA >60.0 05/13/2022 0911        ASSESSMENT    ICD-10-CM ICD-9-CM   1. Type 2 diabetes mellitus with hyperglycemia, without long-term current use of insulin  E11.65 250.00     790.29           PLAN  Diagnoses and all orders for this visit:    Type 2 diabetes mellitus with hyperglycemia, without long-term current use of insulin  -     semaglutide (RYBELSUS) 7 mg tablet; Take 1 tablet (7 mg total) by mouth once daily.        Reviewed pathophysiology of diabetes, complications related to the disease, importance of annual dilated eye exam and daily foot examination. Explained MOA, SE, dosage of medications. Written instructions given and reviewed with patient and patient verbalizes understanding.     1/8/2023 - complains of nausea with Mounjaro. Would like to try oral alternative, will send in Rybelsus.     2/19/2024 - post prandial glucoses remain uncontrolled, despite rybelsus and pre meal correction dosing. Will change fiasp to meal size, increase rybelsus. Patient would like to reconsider going back in an insulin pump. Will have her complete labs this week and then decide on pump.     PATIENT " INSTRUCTIONS     Labs ordered and will be scheduled by [facility] staff.  - Wednesday morning at [place].     Increase Rybelsus to 7 mg by mouth daily.   Continue Xigduo XR  mg by mouth daily.   Continue  Fiasp correction every 4 hours between meals    If  - 200, may take 4 units of Fiasp  If  - 250, may take 6 units of Fiasp  If  - 300, may take 8 units of Fiasp  If  - 350, may take 10 units of Fiasp  If  - 400, may take 12 units of Fiasp  If +, may take 14 units of Fiasp     Start Meal Size Dosing with Fiasp. Take 5-10 minutes before meal.   Small Meal: 4 units  Medium Meal: 6 units  Large Meal: 8 units    Continue Dexcom CGM G7.  Blood Sugar Goals:       Fastin-130.       1-2 hours after a meal: Less than 180.     Follow up in about 4 weeks (around 3/18/2024) for Virtual, Dexcom - Sharing, Schedule fasting labs.    Portions of this note were prepared with [employer] Naturally Speaking voice recognition transcription software. Grammatical errors, including garbled syntax, mangle pronouns, and other bizarre constructions may be attributed to that software system.

## 2024-02-19 NOTE — PATIENT INSTRUCTIONS
PATIENT INSTRUCTIONS     Labs ordered and will be scheduled by [facility] staff.  - Wednesday morning at [place].     Increase Rybelsus to 7 mg by mouth daily.   Continue Xigduo XR  mg by mouth daily.   Continue  Fiasp correction every 4 hours between meals    If  - 200, may take 4 units of Fiasp  If  - 250, may take 6 units of Fiasp  If  - 300, may take 8 units of Fiasp  If  - 350, may take 10 units of Fiasp  If  - 400, may take 12 units of Fiasp  If +, may take 14 units of Fiasp     Start Meal Size Dosing with Fiasp. Take 5-10 minutes before meal.   Small Meal: 4 units  Medium Meal: 6 units  Large Meal: 8 units    Continue Dexcom CGM G7.  Blood Sugar Goals:       Fastin-130.       1-2 hours after a meal: Less than 180.

## 2024-02-23 ENCOUNTER — TELEPHONE (OUTPATIENT)
Dept: DIABETES | Facility: CLINIC | Age: 54
End: 2024-02-23
Payer: COMMERCIAL

## 2024-02-23 ENCOUNTER — LAB VISIT (OUTPATIENT)
Dept: LAB | Facility: HOSPITAL | Age: 54
End: 2024-02-23
Attending: NURSE PRACTITIONER
Payer: COMMERCIAL

## 2024-02-23 DIAGNOSIS — E11.9 TYPE 2 DIABETES MELLITUS WITHOUT COMPLICATION: ICD-10-CM

## 2024-02-23 DIAGNOSIS — E11.65 TYPE 2 DIABETES MELLITUS WITH HYPERGLYCEMIA, WITHOUT LONG-TERM CURRENT USE OF INSULIN: ICD-10-CM

## 2024-02-23 LAB
ALBUMIN SERPL BCP-MCNC: 4.4 G/DL (ref 3.5–5.2)
ALBUMIN/CREAT UR: 15.7 UG/MG (ref 0–30)
ALP SERPL-CCNC: 81 U/L (ref 55–135)
ALT SERPL W/O P-5'-P-CCNC: 37 U/L (ref 10–44)
ANION GAP SERPL CALC-SCNC: 13 MMOL/L (ref 8–16)
AST SERPL-CCNC: 31 U/L (ref 10–40)
BILIRUB SERPL-MCNC: 0.5 MG/DL (ref 0.1–1)
BUN SERPL-MCNC: 19 MG/DL (ref 6–20)
C PEPTIDE SERPL-MCNC: 1.25 NG/ML (ref 0.78–5.19)
CALCIUM SERPL-MCNC: 10.8 MG/DL (ref 8.7–10.5)
CHLORIDE SERPL-SCNC: 103 MMOL/L (ref 95–110)
CHOLEST SERPL-MCNC: 179 MG/DL (ref 120–199)
CHOLEST SERPL-MCNC: 179 MG/DL (ref 120–199)
CHOLEST/HDLC SERPL: 2.8 {RATIO} (ref 2–5)
CHOLEST/HDLC SERPL: 2.8 {RATIO} (ref 2–5)
CO2 SERPL-SCNC: 27 MMOL/L (ref 23–29)
CREAT SERPL-MCNC: 0.9 MG/DL (ref 0.5–1.4)
CREAT UR-MCNC: 115 MG/DL (ref 15–325)
EST. GFR  (NO RACE VARIABLE): >60 ML/MIN/1.73 M^2
ESTIMATED AVG GLUCOSE: 171 MG/DL (ref 68–131)
GLUCOSE SERPL-MCNC: 133 MG/DL (ref 70–110)
HBA1C MFR BLD: 7.6 % (ref 4–5.6)
HDLC SERPL-MCNC: 65 MG/DL (ref 40–75)
HDLC SERPL-MCNC: 65 MG/DL (ref 40–75)
HDLC SERPL: 36.3 % (ref 20–50)
HDLC SERPL: 36.3 % (ref 20–50)
LDLC SERPL CALC-MCNC: 93.4 MG/DL (ref 63–159)
LDLC SERPL CALC-MCNC: 93.4 MG/DL (ref 63–159)
MICROALBUMIN UR DL<=1MG/L-MCNC: 18 UG/ML
NONHDLC SERPL-MCNC: 114 MG/DL
NONHDLC SERPL-MCNC: 114 MG/DL
POTASSIUM SERPL-SCNC: 4.1 MMOL/L (ref 3.5–5.1)
PROT SERPL-MCNC: 7.8 G/DL (ref 6–8.4)
SODIUM SERPL-SCNC: 143 MMOL/L (ref 136–145)
TRIGL SERPL-MCNC: 103 MG/DL (ref 30–150)
TRIGL SERPL-MCNC: 103 MG/DL (ref 30–150)

## 2024-02-23 PROCEDURE — 80053 COMPREHEN METABOLIC PANEL: CPT | Performed by: NURSE PRACTITIONER

## 2024-02-23 PROCEDURE — 82043 UR ALBUMIN QUANTITATIVE: CPT | Performed by: NURSE PRACTITIONER

## 2024-02-23 PROCEDURE — 80061 LIPID PANEL: CPT | Performed by: PEDIATRICS

## 2024-02-23 PROCEDURE — 84681 ASSAY OF C-PEPTIDE: CPT | Performed by: NURSE PRACTITIONER

## 2024-02-23 PROCEDURE — 86337 INSULIN ANTIBODIES: CPT | Performed by: NURSE PRACTITIONER

## 2024-02-23 PROCEDURE — 86341 ISLET CELL ANTIBODY: CPT | Performed by: NURSE PRACTITIONER

## 2024-02-23 PROCEDURE — 83036 HEMOGLOBIN GLYCOSYLATED A1C: CPT | Performed by: NURSE PRACTITIONER

## 2024-02-26 ENCOUNTER — TELEPHONE (OUTPATIENT)
Dept: INTERNAL MEDICINE | Facility: CLINIC | Age: 54
End: 2024-02-26
Payer: COMMERCIAL

## 2024-02-26 LAB — INSULIN AB SER-SCNC: 0 NMOL/L (ref 0–0.02)

## 2024-02-26 NOTE — TELEPHONE ENCOUNTER
----- Message from Jaycob Ramos MD sent at 2/25/2024  7:22 PM CST -----  Here are your labs, please make follow up with me or Mrs Aponte.

## 2024-02-28 ENCOUNTER — TELEPHONE (OUTPATIENT)
Dept: INTERNAL MEDICINE | Facility: CLINIC | Age: 54
End: 2024-02-28
Payer: COMMERCIAL

## 2024-03-01 LAB — GAD65 AB SER-SCNC: 0 NMOL/L

## 2024-03-05 ENCOUNTER — OFFICE VISIT (OUTPATIENT)
Dept: INTERNAL MEDICINE | Facility: CLINIC | Age: 54
End: 2024-03-05
Payer: COMMERCIAL

## 2024-03-05 VITALS
WEIGHT: 146.38 LBS | BODY MASS INDEX: 24.39 KG/M2 | OXYGEN SATURATION: 98 % | TEMPERATURE: 96 F | SYSTOLIC BLOOD PRESSURE: 112 MMHG | DIASTOLIC BLOOD PRESSURE: 64 MMHG | HEART RATE: 82 BPM | HEIGHT: 65 IN

## 2024-03-05 DIAGNOSIS — I25.118 CORONARY ARTERY DISEASE OF NATIVE ARTERY OF NATIVE HEART WITH STABLE ANGINA PECTORIS: ICD-10-CM

## 2024-03-05 DIAGNOSIS — J06.9 UPPER RESPIRATORY TRACT INFECTION, UNSPECIFIED TYPE: ICD-10-CM

## 2024-03-05 DIAGNOSIS — E11.65 TYPE 2 DIABETES MELLITUS WITH HYPERGLYCEMIA, WITHOUT LONG-TERM CURRENT USE OF INSULIN: Primary | ICD-10-CM

## 2024-03-05 DIAGNOSIS — E78.2 MIXED HYPERLIPIDEMIA: ICD-10-CM

## 2024-03-05 DIAGNOSIS — F41.8 ANXIETY WITH DEPRESSION: ICD-10-CM

## 2024-03-05 PROBLEM — R30.0 DYSURIA: Status: RESOLVED | Noted: 2018-05-21 | Resolved: 2024-03-05

## 2024-03-05 LAB
CTP QC/QA: YES
CTP QC/QA: YES
POC MOLECULAR INFLUENZA A AGN: NEGATIVE
POC MOLECULAR INFLUENZA B AGN: NEGATIVE
SARS-COV-2 RDRP RESP QL NAA+PROBE: NEGATIVE

## 2024-03-05 PROCEDURE — 3008F BODY MASS INDEX DOCD: CPT | Mod: CPTII,S$GLB,, | Performed by: PEDIATRICS

## 2024-03-05 PROCEDURE — 1160F RVW MEDS BY RX/DR IN RCRD: CPT | Mod: CPTII,S$GLB,, | Performed by: PEDIATRICS

## 2024-03-05 PROCEDURE — 3061F NEG MICROALBUMINURIA REV: CPT | Mod: CPTII,S$GLB,, | Performed by: PEDIATRICS

## 2024-03-05 PROCEDURE — 3074F SYST BP LT 130 MM HG: CPT | Mod: CPTII,S$GLB,, | Performed by: PEDIATRICS

## 2024-03-05 PROCEDURE — 87635 SARS-COV-2 COVID-19 AMP PRB: CPT | Mod: QW,S$GLB,, | Performed by: PEDIATRICS

## 2024-03-05 PROCEDURE — 3078F DIAST BP <80 MM HG: CPT | Mod: CPTII,S$GLB,, | Performed by: PEDIATRICS

## 2024-03-05 PROCEDURE — 99999 PR PBB SHADOW E&M-EST. PATIENT-LVL V: CPT | Mod: PBBFAC,,, | Performed by: PEDIATRICS

## 2024-03-05 PROCEDURE — 1159F MED LIST DOCD IN RCRD: CPT | Mod: CPTII,S$GLB,, | Performed by: PEDIATRICS

## 2024-03-05 PROCEDURE — 99214 OFFICE O/P EST MOD 30 MIN: CPT | Mod: S$GLB,,, | Performed by: PEDIATRICS

## 2024-03-05 PROCEDURE — 3072F LOW RISK FOR RETINOPATHY: CPT | Mod: CPTII,S$GLB,, | Performed by: PEDIATRICS

## 2024-03-05 PROCEDURE — 3066F NEPHROPATHY DOC TX: CPT | Mod: CPTII,S$GLB,, | Performed by: PEDIATRICS

## 2024-03-05 PROCEDURE — 87502 INFLUENZA DNA AMP PROBE: CPT | Mod: QW,S$GLB,, | Performed by: PEDIATRICS

## 2024-03-05 PROCEDURE — 3051F HG A1C>EQUAL 7.0%<8.0%: CPT | Mod: CPTII,S$GLB,, | Performed by: PEDIATRICS

## 2024-03-05 RX ORDER — METFORMIN HYDROCHLORIDE 500 MG/1
500 TABLET ORAL 2 TIMES DAILY
COMMUNITY
Start: 2024-01-06

## 2024-03-05 NOTE — PROGRESS NOTES
Subjective     Patient ID: Yandy Cabral is a 53 y.o. female.    Chief Complaint: Diabetes, Cough (/), and Nasal Congestion    Yandy Cabral is a 53 year old female here for her 6 month follow up. Pt has also been experiencing cough/congestion for the past week. Pt lost her taste and smell yesterday but tested negative on two at home COVID tests.    1) DM: no hyper/hypoglycemic symptoms. Admits that she has not been following DM diet. Is in DM program and checking BS, running 150-200, no hyperglycemic sxs. Taking oral, not taking insulin. Pt states she is having difficulty controlling her blood sugars with oral tx and wishes to start using an insulin pump. eye exam due. A1C is  7.6  2) LIPIDS: tolerating and compliant with med(s).   3) Anxiety/Depression: Currently doing well on Wellbutrin.   4) CAD: Pt also notes that she has been having intermittent cardiac sxs including CP in the past couple of months and had to take nitroglycerin. She has follow up scheduled with cardiology.     LABS REVIEWED AND DISCUSSED WITH PATIENT              Review of Systems   Constitutional:  Negative for chills and fever.   HENT:  Positive for nasal congestion and postnasal drip. Negative for rhinorrhea.    Respiratory:  Positive for cough. Negative for shortness of breath.    Cardiovascular:  Positive for chest pain.   Gastrointestinal:  Negative for abdominal pain, blood in stool, change in bowel habit, constipation, diarrhea and nausea.   Endocrine: Negative for cold intolerance, heat intolerance, polydipsia, polyphagia and polyuria.   Genitourinary:  Negative for dysuria.   Neurological:  Negative for weakness.          Objective     Physical Exam  Constitutional:       General: She is not in acute distress.     Appearance: Normal appearance. She is normal weight. She is not ill-appearing or toxic-appearing.   HENT:      Right Ear: Tympanic membrane normal.      Left Ear: Tympanic membrane normal.      Nose:  Congestion and rhinorrhea present.      Mouth/Throat:      Pharynx: Posterior oropharyngeal erythema present. No oropharyngeal exudate.   Eyes:      Extraocular Movements: Extraocular movements intact.      Conjunctiva/sclera: Conjunctivae normal.      Pupils: Pupils are equal, round, and reactive to light.   Cardiovascular:      Rate and Rhythm: Normal rate and regular rhythm.      Pulses: Normal pulses.      Heart sounds: Normal heart sounds. No murmur heard.     No gallop.   Pulmonary:      Effort: Pulmonary effort is normal. No respiratory distress.      Breath sounds: Normal breath sounds. No stridor. No wheezing, rhonchi or rales.   Chest:      Chest wall: No tenderness.   Abdominal:      General: There is no distension.      Palpations: There is no mass.      Tenderness: There is no abdominal tenderness.      Hernia: No hernia is present.   Musculoskeletal:      Comments: Pulses 2+, Foot hygiene was good, no ulcers, no onychomycosis, no tinea, monofilament intact     Neurological:      General: No focal deficit present.      Mental Status: She is alert and oriented to person, place, and time. Mental status is at baseline.      Cranial Nerves: No cranial nerve deficit.      Motor: No weakness.      Gait: Gait normal.   Psychiatric:         Mood and Affect: Mood normal.         Behavior: Behavior normal.         Thought Content: Thought content normal.         Judgment: Judgment normal.            Assessment and Plan     1. Type 2 diabetes mellitus with hyperglycemia, without long-term current use of insulin  Overview:  Dr Mario Oconnor follows    Orders:  -     Comprehensive Metabolic Panel; Future; Expected date: 03/05/2024  -     Lipid Panel; Future; Expected date: 03/05/2024  -     Hemoglobin A1C; Future; Expected date: 03/05/2024  -     Ambulatory referral/consult to Optometry; Future; Expected date: 03/12/2024    2. Mixed hyperlipidemia    3. Anxiety with depression    4. Coronary artery disease of native  artery of native heart with stable angina pectoris    5. Upper respiratory tract infection, unspecified type  -     POCT COVID-19 Rapid Screening  -     POCT Influenza A/B Molecular      At goal for BP and lipids, near goal for A1c. Continue working with DM program towards insulin pump and cgms. Maintain meds, D&E, self monitoring. Await covid and flu testing. Since she is in such close follow up with DM program, pt chooses to follow up yearly with me with labs           Follow up in about 1 year (around 3/5/2025).

## 2024-03-11 DIAGNOSIS — E11.65 TYPE 2 DIABETES MELLITUS WITH HYPERGLYCEMIA, WITHOUT LONG-TERM CURRENT USE OF INSULIN: ICD-10-CM

## 2024-03-11 RX ORDER — PEN NEEDLE, DIABETIC 30 GX3/16"
NEEDLE, DISPOSABLE MISCELLANEOUS
Qty: 100 EACH | Refills: 11 | Status: SHIPPED | OUTPATIENT
Start: 2024-03-11

## 2024-03-18 ENCOUNTER — OFFICE VISIT (OUTPATIENT)
Dept: DIABETES | Facility: CLINIC | Age: 54
End: 2024-03-18
Payer: COMMERCIAL

## 2024-03-18 DIAGNOSIS — E11.65 TYPE 2 DIABETES MELLITUS WITH HYPERGLYCEMIA, WITHOUT LONG-TERM CURRENT USE OF INSULIN: Primary | ICD-10-CM

## 2024-03-18 DIAGNOSIS — Z97.8 USES SELF-APPLIED CONTINUOUS GLUCOSE MONITORING DEVICE: ICD-10-CM

## 2024-03-18 PROCEDURE — 3051F HG A1C>EQUAL 7.0%<8.0%: CPT | Mod: CPTII,95,, | Performed by: NURSE PRACTITIONER

## 2024-03-18 PROCEDURE — 3061F NEG MICROALBUMINURIA REV: CPT | Mod: CPTII,95,, | Performed by: NURSE PRACTITIONER

## 2024-03-18 PROCEDURE — 3066F NEPHROPATHY DOC TX: CPT | Mod: CPTII,95,, | Performed by: NURSE PRACTITIONER

## 2024-03-18 PROCEDURE — 99214 OFFICE O/P EST MOD 30 MIN: CPT | Mod: 95,,, | Performed by: NURSE PRACTITIONER

## 2024-03-18 PROCEDURE — 95251 CONT GLUC MNTR ANALYSIS I&R: CPT | Mod: NDTC,,, | Performed by: NURSE PRACTITIONER

## 2024-03-18 PROCEDURE — 3072F LOW RISK FOR RETINOPATHY: CPT | Mod: CPTII,95,, | Performed by: NURSE PRACTITIONER

## 2024-03-18 RX ORDER — ORAL SEMAGLUTIDE 14 MG/1
14 TABLET ORAL DAILY
Qty: 30 TABLET | Refills: 11 | Status: SHIPPED | OUTPATIENT
Start: 2024-03-18 | End: 2024-05-14

## 2024-03-18 NOTE — PROGRESS NOTES
The patient location is: Home  The chief complaint leading to consultation is: Diabetes    Visit type: audiovisual    Face to Face time with patient: 15  30 minutes of total time spent on the encounter, which includes face to face time and non-face to face time preparing to see the patient (eg, review of tests), Obtaining and/or reviewing separately obtained history, Documenting clinical information in the electronic or other health record, Independently interpreting results (not separately reported) and communicating results to the patient/family/caregiver, or Care coordination (not separately reported).  Each patient to whom he or she provides medical services by telemedicine is:  (1) informed of the relationship between the physician and patient and the respective role of any other health care provider with respect to management of the patient; and (2) notified that he or she may decline to receive medical services by telemedicine and may withdraw from such care at any time.    Notes:    Yandy Cabral is a 53 y.o. female who presents for a follow up evaluation of Type 2 diabetes mellitus.     CHIEF COMPLAINT: Diabetes Consultation    PCP: Jaycob Ramos MD      Initial visit with me - 12/19/2022     The patient was initially diagnosed with diabetes 2004, polyuria/polydipsia, unintentional weight loss, weakness, neuropathy and vision changes. Diagnosed with DM type 1.5.      Previous failed treatments include:  Was on insulin previously, was eventually weaned off.   Liquid X - insurance stopped paying.  Insulin pump - at [facility] in [id number]. [employer].      Social Documentation:  Patient lives with  and daughter.   Occupation:  for nonprofit organization.   Patient prefers In-person Visits.   and Video Virtual Visits.   Current Diet: No fried foot, avoids dairy. Drinks diet dr. Azul, drinking more water.   Exercise: Pilates twice a week.      Known Diabetes  "complications: peripheral neuropathy and cardiovascular disease.     Cardiovascular Risk Factors: family history of premature cardiovascular disease.    Diabetes Medications               dapaglifloz propaned-metformin (XIGDUO XR) 10-1,000 mg TBph Take 1 tablet by mouth once daily.    insulin aspart, niacinamide, (FIASP FLEXTOUCH U-100 INSULIN) 100 unit/mL (3 mL) InPn Inject 4-14 Units into the skin 3 (three) times daily with meals.       Current monitoring regimen: Dexcom G7 Sharing    The patient's Dexcom CGM was downloaded and reviewed. For the past 14 days, patient average glucose was 217 mg/dL. She was above range 61% of the time, in range 39% of the time, and below range 0% of the time. The target range for this patient was 70 - 180 mg/dL. Overall, there was a pattern of post prandial hyperglycemia.          Recent hypoglycemic episodes: none.     Patient compliant with glucose checks and medication administration? Yes    DIABETES MANAGEMENT STATUS  Statin: Taking  ACE/ARB: Not taking  Screening or Prevention Patient's value Goal Complete/Controlled?   HgA1C Testing and Control   Lab Results   Component Value Date    HGBA1C 7.6 (H) 02/23/2024      Annually/Less than 8% No     Lipid profile : 02/23/2024 Annually Yes     LDL control Lab Results   Component Value Date    LDLCALC 93.4 02/23/2024    LDLCALC 93.4 02/23/2024    Annually/Less than 100 mg/dl  Yes     Nephropathy screening Lab Results   Component Value Date    LABMICR 18.0 02/23/2024     Lab Results   Component Value Date    PROTEINUA 1+ (A) 06/24/2019     No results found for: "UTPCR"   Annually No     Blood pressure BP Readings from Last 1 Encounters:   03/05/24 112/64    Less than 140/90 Yes     Dilated retinal exam : 03/27/2023 Annually Yes     Foot exam   : 03/05/2024 Annually No     Patient's medications, allergies, surgical, social and family histories were reviewed and updated as appropriate.     Review of Systems   Constitutional:  Negative for " weight loss.   Eyes:  Negative for blurred vision and double vision.   Cardiovascular:  Negative for chest pain.   Gastrointestinal:  Negative for nausea and vomiting.   Genitourinary:  Negative for frequency.   Musculoskeletal:  Negative for falls.   Neurological:  Negative for dizziness and weakness.   Endo/Heme/Allergies:  Negative for polydipsia.   Psychiatric/Behavioral:  Negative for depression.    All other systems reviewed and are negative.       Physical Exam  Constitutional:       Appearance: Normal appearance.   HENT:      Head: Normocephalic and atraumatic.   Pulmonary:      Effort: No respiratory distress.   Musculoskeletal:      Cervical back: Normal range of motion.   Neurological:      Mental Status: She is alert and oriented to person, place, and time.   Psychiatric:         Mood and Affect: Mood normal.         Behavior: Behavior normal.        Last menstrual period 03/05/2014.  Wt Readings from Last 3 Encounters:   03/05/24 66.4 kg (146 lb 6.2 oz)   12/18/23 63.6 kg (140 lb 3.4 oz)   11/03/23 64.1 kg (141 lb 5 oz)     Hemoglobin A1C   Date Value Ref Range Status   02/23/2024 7.6 (H) 4.0 - 5.6 % Final     Comment:     ADA Screening Guidelines:  5.7-6.4%  Consistent with prediabetes  >or=6.5%  Consistent with diabetes    High levels of fetal hemoglobin interfere with the HbA1C  assay. Heterozygous hemoglobin variants (HbS, HgC, etc)do  not significantly interfere with this assay.   However, presence of multiple variants may affect accuracy.     10/10/2023 7.3 (H) 4.0 - 5.6 % Final     Comment:     ADA Screening Guidelines:  5.7-6.4%  Consistent with prediabetes  >or=6.5%  Consistent with diabetes    High levels of fetal hemoglobin interfere with the HbA1C  assay. Heterozygous hemoglobin variants (HbS, HgC, etc)do  not significantly interfere with this assay.   However, presence of multiple variants may affect accuracy.     10/19/2022 8.4 (H) 4.0 - 5.6 % Final     Comment:     ADA Screening  Guidelines:  5.7-6.4%  Consistent with prediabetes  >or=6.5%  Consistent with diabetes    High levels of fetal hemoglobin interfere with the HbA1C  assay. Heterozygous hemoglobin variants (HbS, HgC, etc)do  not significantly interfere with this assay.   However, presence of multiple variants may affect accuracy.         Lab Results   Component Value Date    CPEPTIDE 1.25 02/23/2024    GLUTAMICACID 0.00 02/23/2024       Chemistry        Component Value Date/Time     02/23/2024 0759    K 4.1 02/23/2024 0759     02/23/2024 0759    CO2 27 02/23/2024 0759    BUN 19 02/23/2024 0759    CREATININE 0.9 02/23/2024 0759     (H) 02/23/2024 0759        Component Value Date/Time    CALCIUM 10.8 (H) 02/23/2024 0759    ALKPHOS 81 02/23/2024 0759    AST 31 02/23/2024 0759    ALT 37 02/23/2024 0759    BILITOT 0.5 02/23/2024 0759    ESTGFRAFRICA >60.0 05/13/2022 0911    EGFRNONAA >60.0 05/13/2022 0911        ASSESSMENT    ICD-10-CM ICD-9-CM   1. Type 2 diabetes mellitus with hyperglycemia, without long-term current use of insulin  E11.65 250.00     790.29   2. CGM - DEXCOM - SHARING  Z97.8 V49.89           PLAN  Diagnoses and all orders for this visit:    Type 2 diabetes mellitus with hyperglycemia, without long-term current use of insulin  -     semaglutide (RYBELSUS) 14 mg tablet; Take 1 tablet (14 mg total) by mouth once daily.  -     Ambulatory referral/consult to Diabetes Education; Future    CGM - DEXCOM - SHARING        Reviewed pathophysiology of diabetes, complications related to the disease, importance of annual dilated eye exam and daily foot examination. Explained MOA, SE, dosage of medications. Written instructions given and reviewed with patient and patient verbalizes understanding.     1/8/2023 - complains of nausea with Mounjaro. Would like to try oral alternative, will send in Rybelsus.     2/19/2024 - post prandial glucoses remain uncontrolled, despite rybelsus and pre meal correction dosing. Will  change fiasp to meal size, increase rybelsus. Patient would like to reconsider going back in an insulin pump. Will have her complete labs this week and then decide on pump.     3/18/2024 - continues to have post prandial hyperglycemia. Will increase rybelsus. Insulin ab/FILOMENA/cpep neg. Will schedule for pump evaluation, patient is interested in iLet.    PATIENT INSTRUCTIONS     Refer to diabetes education.  - pump eval    Increase Rybelsus to 14 mg by mouth daily.   Continue Xigduo XR  mg by mouth daily.   Continue  Fiasp correction every 4 hours between meals    If  - 200, may take 4 units of Fiasp  If  - 250, may take 6 units of Fiasp  If  - 300, may take 8 units of Fiasp  If  - 350, may take 10 units of Fiasp  If  - 400, may take 12 units of Fiasp  If +, may take 14 units of Fiasp     Continue Meal Size Dosing with Fiasp. Take 5-10 minutes before meal.   Small Meal: 4 units  Medium Meal: 6 units  Large Meal: 8 units    Continue Dexcom CGM G7.  Blood Sugar Goals:       Fastin-130.       1-2 hours after a meal: Less than 180.     Follow up in about 2 months (around 2024) for Virtual, Dexcom, Schedule DM Education.    Portions of this note were prepared with [employer] voice recognition transcription software. Grammatical errors, including garbled syntax, mangle pronouns, and other bizarre constructions may be attributed to that software system.

## 2024-03-18 NOTE — PATIENT INSTRUCTIONS
PATIENT INSTRUCTIONS     Refer to diabetes education.  - pump eval    Increase Rybelsus to 14 mg by mouth daily.   Continue Xigduo XR  mg by mouth daily.   Continue  Fiasp correction every 4 hours between meals    If  - 200, may take 4 units of Fiasp  If  - 250, may take 6 units of Fiasp  If  - 300, may take 8 units of Fiasp  If  - 350, may take 10 units of Fiasp  If  - 400, may take 12 units of Fiasp  If +, may take 14 units of Fiasp     Continue Meal Size Dosing with Fiasp. Take 5-10 minutes before meal.   Small Meal: 4 units  Medium Meal: 6 units  Large Meal: 8 units    Continue Dexcom CGM G7.  Blood Sugar Goals:       Fastin-130.       1-2 hours after a meal: Less than 180.

## 2024-03-26 ENCOUNTER — PATIENT MESSAGE (OUTPATIENT)
Dept: DIABETES | Facility: CLINIC | Age: 54
End: 2024-03-26

## 2024-03-26 ENCOUNTER — CLINICAL SUPPORT (OUTPATIENT)
Dept: DIABETES | Facility: CLINIC | Age: 54
End: 2024-03-26
Payer: COMMERCIAL

## 2024-03-26 DIAGNOSIS — E11.65 TYPE 2 DIABETES MELLITUS WITH HYPERGLYCEMIA, WITHOUT LONG-TERM CURRENT USE OF INSULIN: Primary | ICD-10-CM

## 2024-03-26 PROCEDURE — 99999 PR PBB SHADOW E&M-EST. PATIENT-LVL III: CPT | Mod: PBBFAC,,, | Performed by: DIETITIAN, REGISTERED

## 2024-03-26 PROCEDURE — G0108 DIAB MANAGE TRN  PER INDIV: HCPCS | Mod: S$GLB,,, | Performed by: DIETITIAN, REGISTERED

## 2024-03-26 NOTE — PROGRESS NOTES
Diabetes Care Specialist Progress Note  Author: Federica Han RD, CDE  Date: 3/26/2024    Program Intake  Reason for Diabetes Program Visit:: Intervention  Type of Intervention:: Individual  Individual: Education  Education: Insulin Pump Evaluation (iLet pump)  Current diabetes risk level:: moderate  In the last 12 months, have you:: none  Permission to speak with others about care:: no  Continuous Glucose Monitoring  Patient has CGM: Yes  Personal CGM type:: Dexcom G7  GMI Date: 03/26/24  GMI Value: 8.4 %    Lab Results   Component Value Date    HGBA1C 7.6 (H) 02/23/2024       Clinical        Problem Review  Reviewed Problem List with Patient: yes    Clinical Assessment  Current Diabetes Treatment: Oral Medication, Insulin  Have you ever experienced hypoglycemia (low blood sugar)?: yes  In the last month, how often have you experienced low blood sugar?: more than once a week  Are you able to tell when your blood sugar is low?: Yes  What symptoms do you experience?: Dizzy/Light-headed  Have you ever experienced hyperglycemia (high blood sugar)?: yes  In the last month, how often have you experienced high blood sugar?: more than once a week  Are you able to tell when your blood sugar is high?: Yes      Labs  Do you have regular lab work to monitor your medications?: Yes  Type of Regular Lab Work: A1c, Cholesterol, Microalbumin, CBC  Where do you get your labs drawn?: [facility]  Lab Compliance Barriers: No    Nutritional Status  Diet: Regular  Meal Plan 24 Hour Recall: Breakfast, Lunch, Dinner, Snack  Meal Plan 24 Hour Recall - Breakfast: egg mcmuffin, diet coke  Meal Plan 24 Hour Recall - Lunch: nothing  Meal Plan 24 Hour Recall - Dinner: pizza (12 units)  Meal Plan 24 Hour Recall - Snack: chick-kaitlyn-a fries, ice cream cone (10 units)  Change in appetite?: No  Dentation:: Intact  Current nutritional status an area of need that is impacting patient's ability to self-manage diabetes?: No    Additional Social  History    Support  Does anyone support you with your diabetes care?: yes  Who supports you?: self  Who takes you to your medical appointments?: self  Does the current support meet the patient's needs?: Yes  Is Support an area impacting ability to self-manage diabetes?: No    Access to Mass Media & Technology  Does the patient have access to any of the following devices or technologies?: Smart phone  Media or technology needs impacting ability to self-manage diabetes?: No    Cognitive/Behavioral Health  Alert and Oriented: Yes  Difficulty Thinking: No  Requires Prompting: No  Requires assistance for routine expression?: No  Cognitive or behavioral barriers impacting ability to self-manage diabetes?: No    Culture/Jewish  Culture or Baptism beliefs that may impact ability to access healthcare: No    Communication  Language preference: English  Hearing Problems: No  Vision Problems: No  Communication needs impacting ability to self-manage diabetes?: No    Health Literacy  Preferred Learning Method: Face to Face  Health literacy needs impacting ability to self-manage diabetes?: No      Diabetes Self-Management Skills Assessment         Medications  Patient is able to describe current diabetes management routine.: yes  Diabetes management routine:: oral medications, insulin  Patient is able to identify current diabetes medications, dosages, and appropriate timing of medications.: yes  Patient understands the purpose of the medications taken for diabetes.: yes  Patient reports problems or concerns with current medication regimen.: yes  Medication regimen problems/concerns:: does not feel like regimen is working  Medication Skills Assessment Completed:: Yes  Assessment indicates:: Instruction Needed  Area of need?: Yes    Home Blood Glucose Monitoring  Patient states that blood sugar is checked at home daily.: yes  Monitoring Method:: personal continuous glucose monitor  Personal CGM type:: Dexcom G7  Patient is able  to use personal CGM appropriately.: yes  CGM Report reviewed?: yes  Home Blood Glucose Monitoring Skills Assessment Completed: : Yes  Assessment indicates:: Adequate understanding  Area of need?: No                Assessment Summary and Plan    Based on today's diabetes care assessment, the following areas of need were identified:          3/26/2024    12:01 AM   Social   Support No   Access to Mass Media/Tech No   Cognitive/Behavioral Health No   Culture/Jain No   Communication No   Health Literacy No            3/26/2024    12:01 AM   Clinical   Lab Compliance No   Nutritional Status No            3/26/2024    12:01 AM   Diabetes Self-Management Skills   Medication Patient struggling with BG control.   Currently taking Rybelsus 7mg daily but increasing to 14mg. Picking up today. Also taking Xigduo XR  mg by mouth daily and Fiasp correction every 4 hours between meals     If  - 200, may take 4 units of Fiasp  If  - 250, may take 6 units of Fiasp  If  - 300, may take 8 units of Fiasp  If  - 350, may take 10 units of Fiasp  If  - 400, may take 12 units of Fiasp  If +, may take 14 units of Fiasp      Continue Meal Size Dosing with Fiasp. Take 5-10 minutes before meal.   Small Meal: 4 units  Medium Meal: 6 units  Large Meal: 8 units    Patient interested in pump therapy specifically the iLet pump by [employer]. Patient has used a Medtronic pump in the past. She understands that she will have to change cartridge and infusion set every 3 days. Explained there is no carb counting with this system. She will announce her meals as usual, more, or less. The iLet integrates with the Dexcom G7 and is fully automated. Patient can view BG readings on pump and in Dexcom G7 buddy. Sending patient a copy of the iLet resource guide to review.   Will send request to Lissette Pearson to order pump and determine coverage by patient's insurance.    Home Blood Glucose Monitoring No           Today's interventions were provided through individual discussion, instruction, and written materials were provided.      Patient verbalized understanding of instruction and written materials.  Pt was able to return back demonstration of instructions today. Patient understood key points, needs reinforcement and further instruction.     Diabetes Self-Management Care Plan:    Today's Diabetes Self-Management Care Plan was developed with Yandy's input. Yandy has agreed to work toward the following goal(s) to improve his/her overall diabetes control.      Care Plan: Diabetes Management   Updates made since 2/25/2024 12:00 AM        Problem: Medications         Goal: Patient will see Diabetes ERNESTO for medication management. Completed 3/26/2024   Start Date: 10/31/2022   Expected End Date: 5/31/2023   This Visit's Progress: Met   Recent Progress: Met   Priority: High   Barriers: No Barriers Identified   Note:    3/26/24: Patient sees Lissette Pearson for diabetes management.            Follow Up Plan     Follow up in about 1 month (around 4/26/2024) for E11.65, pump training if approved by insurance.    Today's care plan and follow up schedule was discussed with patient.  Yandy verbalized understanding of the care plan, goals, and agrees to follow up plan.        The patient was encouraged to communicate with his/her health care provider/physician and care team regarding his/her condition(s) and treatment.  I provided the patient with my contact information today and encouraged to contact me via phone or [facility] Patient Portal as needed.     Length of Visit   Total Time: 30 Minutes

## 2024-04-01 DIAGNOSIS — I25.10 CAD IN NATIVE ARTERY: ICD-10-CM

## 2024-04-01 DIAGNOSIS — E78.2 MIXED HYPERLIPIDEMIA: ICD-10-CM

## 2024-04-01 NOTE — TELEPHONE ENCOUNTER
Care Due:                  Date            Visit Type   Department     Provider  --------------------------------------------------------------------------------                                EP -                              PRIMARY      [facility] INTERNAL  Last Visit: 03-      CARE ([facility])   [facility]       Jaycob Ramos                              EP -                              PRIMARY      [facility] INTERNAL  Next Visit: 03-      [facility] ([facility])   [facility] [place]                                                            Last  Test          Frequency    Reason                     Performed    Due Date  --------------------------------------------------------------------------------    Mg Level....  12 months..  alendronate..............  Not Found    Overdue    Phosphate...  12 months..  alendronate..............  Not Found    Overdue    Vitamin D...  12 months..  alendronate..............  Not Found    Overdue    [employer] Embedded Care Due Messages. Reference number: [id number].   4/01/2024 12:14:01 AM CDT

## 2024-04-02 RX ORDER — ATORVASTATIN CALCIUM 40 MG/1
TABLET, FILM COATED ORAL
Qty: 90 TABLET | Refills: 3 | Status: SHIPPED | OUTPATIENT
Start: 2024-04-02

## 2024-04-02 NOTE — TELEPHONE ENCOUNTER
Refill Decision Note   Yandy Misha  is requesting a refill authorization.  Brief Assessment and Rationale for Refill:  Approve     Medication Therapy Plan:         Comments:     Note composed:2:09 AM 04/02/2024

## 2024-04-03 DIAGNOSIS — Z12.31 OTHER SCREENING MAMMOGRAM: ICD-10-CM

## 2024-05-14 DIAGNOSIS — E11.65 TYPE 2 DIABETES MELLITUS WITH HYPERGLYCEMIA, WITHOUT LONG-TERM CURRENT USE OF INSULIN: ICD-10-CM

## 2024-05-14 RX ORDER — ORAL SEMAGLUTIDE 14 MG/1
14 TABLET ORAL DAILY
Qty: 90 TABLET | Refills: 3 | Status: SHIPPED | OUTPATIENT
Start: 2024-05-14 | End: 2025-05-14

## 2024-05-15 ENCOUNTER — HOSPITAL ENCOUNTER (OUTPATIENT)
Dept: RADIOLOGY | Facility: HOSPITAL | Age: 54
Discharge: HOME | End: 2024-05-15
Attending: PEDIATRICS
Payer: COMMERCIAL

## 2024-05-15 VITALS — HEIGHT: 65 IN | BODY MASS INDEX: 24.39 KG/M2 | WEIGHT: 146.38 LBS

## 2024-05-15 DIAGNOSIS — Z12.31 OTHER SCREENING MAMMOGRAM: ICD-10-CM

## 2024-05-15 PROCEDURE — 77063 BREAST TOMOSYNTHESIS BI: CPT | Mod: TC

## 2024-05-15 PROCEDURE — 77067 SCR MAMMO BI INCL CAD: CPT | Mod: 26,,, | Performed by: RADIOLOGY

## 2024-05-15 PROCEDURE — 77063 BREAST TOMOSYNTHESIS BI: CPT | Mod: 26,,, | Performed by: RADIOLOGY

## 2024-05-20 ENCOUNTER — OFFICE VISIT (OUTPATIENT)
Dept: DIABETES | Facility: CLINIC | Age: 54
End: 2024-05-20
Payer: COMMERCIAL

## 2024-05-20 DIAGNOSIS — I25.118 CORONARY ARTERY DISEASE OF NATIVE ARTERY OF NATIVE HEART WITH STABLE ANGINA PECTORIS: ICD-10-CM

## 2024-05-20 DIAGNOSIS — N90.89 VULVAR IRRITATION: ICD-10-CM

## 2024-05-20 DIAGNOSIS — E11.65 TYPE 2 DIABETES MELLITUS WITH HYPERGLYCEMIA, WITHOUT LONG-TERM CURRENT USE OF INSULIN: Primary | ICD-10-CM

## 2024-05-20 PROCEDURE — 3061F NEG MICROALBUMINURIA REV: CPT | Mod: CPTII,95,, | Performed by: NURSE PRACTITIONER

## 2024-05-20 PROCEDURE — 99214 OFFICE O/P EST MOD 30 MIN: CPT | Mod: 95,,, | Performed by: NURSE PRACTITIONER

## 2024-05-20 PROCEDURE — 3066F NEPHROPATHY DOC TX: CPT | Mod: CPTII,95,, | Performed by: NURSE PRACTITIONER

## 2024-05-20 PROCEDURE — 3072F LOW RISK FOR RETINOPATHY: CPT | Mod: CPTII,95,, | Performed by: NURSE PRACTITIONER

## 2024-05-20 PROCEDURE — 3051F HG A1C>EQUAL 7.0%<8.0%: CPT | Mod: CPTII,95,, | Performed by: NURSE PRACTITIONER

## 2024-05-20 PROCEDURE — 95251 CONT GLUC MNTR ANALYSIS I&R: CPT | Mod: NDTC,,, | Performed by: NURSE PRACTITIONER

## 2024-05-20 RX ORDER — CLOTRIMAZOLE AND BETAMETHASONE DIPROPIONATE 10; .64 MG/G; MG/G
CREAM TOPICAL
Qty: 45 G | Refills: 5 | Status: SHIPPED | OUTPATIENT
Start: 2024-05-20 | End: 2024-06-18 | Stop reason: SDUPTHER

## 2024-05-20 NOTE — PATIENT INSTRUCTIONS
PATIENT INSTRUCTIONS     Send message in [facility] portal once you speak with insurance company regarding iLet pump.    Continue Rybelsus 14 mg by mouth daily.   Continue Xigduo XR  mg by mouth daily.   Continue Fiasp correction every 4 hours between meals    If  - 200, may take 4 units of Fiasp  If  - 250, may take 6 units of Fiasp  If  - 300, may take 8 units of Fiasp  If  - 350, may take 10 units of Fiasp  If  - 400, may take 12 units of Fiasp  If +, may take 14 units of Fiasp     Increase Meal Size Dosing with Fiasp. Take 5-10 minutes before meal.   Small Meal: 10 units  Medium Meal: 12 units  Large Meal: 14 units    Continue Dexcom CGM G7.  Blood Sugar Goals:       Fastin-130.       1-2 hours after a meal: Less than 180.

## 2024-05-20 NOTE — PROGRESS NOTES
The patient location is: Home  The chief complaint leading to consultation is: Diabetes    Visit type: audiovisual    Face to Face time with patient: 15  30 minutes of total time spent on the encounter, which includes face to face time and non-face to face time preparing to see the patient (eg, review of tests), Obtaining and/or reviewing separately obtained history, Documenting clinical information in the electronic or other health record, Independently interpreting results (not separately reported) and communicating results to the patient/family/caregiver, or Care coordination (not separately reported).  Each patient to whom he or she provides medical services by telemedicine is:  (1) informed of the relationship between the physician and patient and the respective role of any other health care provider with respect to management of the patient; and (2) notified that he or she may decline to receive medical services by telemedicine and may withdraw from such care at any time.    Notes:    Yandy Cabral is a 53 y.o. female who presents for a follow up evaluation of Type 2 diabetes mellitus.     CHIEF COMPLAINT: Diabetes Consultation    PCP: Jaycob Ramos MD      Initial visit with me - 12/19/2022     The patient was initially diagnosed with diabetes 2004, polyuria/polydipsia, unintentional weight loss, weakness, neuropathy and vision changes. Diagnosed with DM type 1.5.      Previous failed treatments include:  Was on insulin previously, was eventually weaned off.   [employer] - insurance stopped paying.  Insulin pump - at [facility] [phone]. Medtronic.   Mounjaro - nausea  Ozempic - wanted to try rybelsus.     Social Documentation:  Patient lives with  and daughter.   Occupation:  for nonprofit organization.   Patient prefers In-person Visits.   and Video Virtual Visits.   Current Diet: No fried foot, avoids dairy. Drinks diet dr. Azul, drinking more water.   Exercise:  "Pilates twice a week.      Known Diabetes complications: peripheral neuropathy and cardiovascular disease.     Cardiovascular Risk Factors: family history of premature cardiovascular disease.    Diabetes Medications               dapaglifloz propaned-metformin (XIGDUO XR) 10-1,000 mg TBph Take 1 tablet by mouth once daily.    insulin aspart, niacinamide, (FIASP FLEXTOUCH U-100 INSULIN) 100 unit/mL (3 mL) InPn Inject 4-14 Units into the skin 3 (three) times daily with meals.    metFORMIN (GLUCOPHAGE) 500 MG tablet Take 500 mg by mouth 2 (two) times daily.    semaglutide (RYBELSUS) 14 mg tablet Take 1 tablet (14 mg total) by mouth once daily.     Current monitoring regimen: Dexcom G7 Sharing    The patient's Dexcom CGM was downloaded and reviewed. For the past 14 days, patient average glucose was 211 mg/dL. She was above range 59% of the time, in range 41% of the time, and below range 0% of the time. The target range for this patient was 70 - 180 mg/dL. Overall, there was a pattern of post prandial hyperglycemia.          Recent hypoglycemic episodes: none.     Patient compliant with glucose checks and medication administration? Yes    DIABETES MANAGEMENT STATUS  Statin: Taking  ACE/ARB: Not taking  Screening or Prevention Patient's value Goal Complete/Controlled?   HgA1C Testing and Control   Lab Results   Component Value Date    HGBA1C 7.6 (H) 02/23/2024      Annually/Less than 8% No     Lipid profile : 02/23/2024 Annually Yes     LDL control Lab Results   Component Value Date    LDLCALC 93.4 02/23/2024    LDLCALC 93.4 02/23/2024    Annually/Less than 100 mg/dl  Yes     Nephropathy screening Lab Results   Component Value Date    LABMICR 18.0 02/23/2024     Lab Results   Component Value Date    PROTEINUA 1+ (A) 06/24/2019     No results found for: "UTPCR"   Annually No     Blood pressure BP Readings from Last 1 Encounters:   03/05/24 112/64    Less than 140/90 Yes     Dilated retinal exam : 03/27/2023 Annually Yes   "   Foot exam   : 03/05/2024 Annually No     Patient's medications, allergies, surgical, social and family histories were reviewed and updated as appropriate.     Review of Systems   Constitutional:  Negative for weight loss.   Eyes:  Negative for blurred vision and double vision.   Cardiovascular:  Negative for chest pain.   Gastrointestinal:  Negative for nausea and vomiting.   Genitourinary:  Negative for frequency.   Musculoskeletal:  Negative for falls.   Neurological:  Negative for dizziness and weakness.   Endo/Heme/Allergies:  Negative for polydipsia.   Psychiatric/Behavioral:  Negative for depression.    All other systems reviewed and are negative.       Physical Exam  Constitutional:       Appearance: Normal appearance.   HENT:      Head: Normocephalic and atraumatic.   Pulmonary:      Effort: No respiratory distress.   Musculoskeletal:      Cervical back: Normal range of motion.   Neurological:      Mental Status: She is alert and oriented to person, place, and time.   Psychiatric:         Mood and Affect: Mood normal.         Behavior: Behavior normal.        Last menstrual period 03/05/2014.  Wt Readings from Last 3 Encounters:   05/15/24 66.4 kg (146 lb 6.2 oz)   03/05/24 66.4 kg (146 lb 6.2 oz)   12/18/23 63.6 kg (140 lb 3.4 oz)     Hemoglobin A1C   Date Value Ref Range Status   02/23/2024 7.6 (H) 4.0 - 5.6 % Final     Comment:     ADA Screening Guidelines:  5.7-6.4%  Consistent with prediabetes  >or=6.5%  Consistent with diabetes    High levels of fetal hemoglobin interfere with the HbA1C  assay. Heterozygous hemoglobin variants (HbS, HgC, etc)do  not significantly interfere with this assay.   However, presence of multiple variants may affect accuracy.     10/10/2023 7.3 (H) 4.0 - 5.6 % Final     Comment:     ADA Screening Guidelines:  5.7-6.4%  Consistent with prediabetes  >or=6.5%  Consistent with diabetes    High levels of fetal hemoglobin interfere with the HbA1C  assay. Heterozygous hemoglobin  variants (HbS, HgC, etc)do  not significantly interfere with this assay.   However, presence of multiple variants may affect accuracy.     10/19/2022 8.4 (H) 4.0 - 5.6 % Final     Comment:     ADA Screening Guidelines:  5.7-6.4%  Consistent with prediabetes  >or=6.5%  Consistent with diabetes    High levels of fetal hemoglobin interfere with the HbA1C  assay. Heterozygous hemoglobin variants (HbS, HgC, etc)do  not significantly interfere with this assay.   However, presence of multiple variants may affect accuracy.         Lab Results   Component Value Date    CPEPTIDE 1.25 02/23/2024    GLUTAMICACID 0.00 02/23/2024       Chemistry        Component Value Date/Time     02/23/2024 0759    K 4.1 02/23/2024 0759     02/23/2024 0759    CO2 27 02/23/2024 0759    BUN 19 02/23/2024 0759    CREATININE 0.9 02/23/2024 0759     (H) 02/23/2024 0759        Component Value Date/Time    CALCIUM 10.8 (H) 02/23/2024 0759    ALKPHOS 81 02/23/2024 0759    AST 31 02/23/2024 0759    ALT 37 02/23/2024 0759    BILITOT 0.5 02/23/2024 0759    ESTGFRAFRICA >60.0 05/13/2022 0911    EGFRNONAA >60.0 05/13/2022 0911        ASSESSMENT    ICD-10-CM ICD-9-CM   1. Type 2 diabetes mellitus with hyperglycemia, without long-term current use of insulin  E11.65 250.00     790.29   2. Coronary artery disease of native artery of native heart with stable angina pectoris  I25.118 414.01     413.9   3. Vulvar irritation  N90.89 624.8           PLAN  Diagnoses and all orders for this visit:    Type 2 diabetes mellitus with hyperglycemia, without long-term current use of insulin    Coronary artery disease of native artery of native heart with stable angina pectoris    Vulvar irritation  -     clotrimazole-betamethasone 1-0.05% (LOTRISONE) cream; APPLY TOPICALLY TO THE AFFECTED AREA TWICE DAILY        Reviewed pathophysiology of diabetes, complications related to the disease, importance of annual dilated eye exam and daily foot examination.  Explained SE MIKE, dosage of medications. Written instructions given and reviewed with patient and patient verbalizes understanding.     2023 - complains of nausea with Mounjaro. Would like to try oral alternative, will send in Rybelsus.     2024 - post prandial glucoses remain uncontrolled, despite rybelsus and pre meal correction dosing. Will change fiasp to meal size, increase rybelsus. Patient would like to reconsider going back in an insulin pump. Will have her complete labs this week and then decide on pump.     3/18/2024 - continues to have post prandial hyperglycemia. Will increase rybelsus. Insulin ab/FILOMENA/cpep neg. Will schedule for pump evaluation, patient is interested in iLet.    2024 - continues to have post prandial hyperglycemia. Discussed with patient need to decrease carbohydrate intake with meals, due to such high post prandial excursions, typically taking 10 units fiasp before medium size meals. States she received letter from her insurance company on Saturday stating iLet not approved due to her previous pump still in warranty, but patient has not been on a pump in 14 years. She will call them today to clarify.      PATIENT INSTRUCTIONS     Continue Rybelsus 14 mg by mouth daily.   Continue Xigduo XR  mg by mouth daily.   Continue Fiasp correction every 4 hours between meals    If  - 200, may take 4 units of Fiasp  If  - 250, may take 6 units of Fiasp  If  - 300, may take 8 units of Fiasp  If  - 350, may take 10 units of Fiasp  If  - 400, may take 12 units of Fiasp  If +, may take 14 units of Fiasp     Increase Meal Size Dosing with Fiasp. Take 5-10 minutes before meal.   Small Meal: 10 units  Medium Meal: 12 units  Large Meal: 14 units    Continue Dexcom CGM G7.  Blood Sugar Goals:       Fastin-130.       1-2 hours after a meal: Less than 180.     Follow up in about 4 weeks (around 2024) for Virtual, Dexcom.    Portions of this  note were prepared with [employer] voice recognition transcription software. Grammatical errors, including garbled syntax, mangle pronouns, and other bizarre constructions may be attributed to that software system.

## 2024-06-03 ENCOUNTER — PATIENT MESSAGE (OUTPATIENT)
Dept: OBSTETRICS AND GYNECOLOGY | Facility: CLINIC | Age: 54
End: 2024-06-03
Payer: COMMERCIAL

## 2024-06-06 ENCOUNTER — PATIENT MESSAGE (OUTPATIENT)
Dept: INTERNAL MEDICINE | Facility: CLINIC | Age: 54
End: 2024-06-06
Payer: COMMERCIAL

## 2024-06-06 DIAGNOSIS — Z12.11 COLON CANCER SCREENING: Primary | ICD-10-CM

## 2024-06-06 DIAGNOSIS — R19.4 CHANGE IN BOWEL HABITS: ICD-10-CM

## 2024-06-07 ENCOUNTER — HOSPITAL ENCOUNTER (OUTPATIENT)
Dept: PREADMISSION TESTING | Facility: HOSPITAL | Age: 54
Discharge: HOME | End: 2024-06-07
Attending: PEDIATRICS
Payer: COMMERCIAL

## 2024-06-07 DIAGNOSIS — R19.4 CHANGE IN BOWEL HABITS: Primary | ICD-10-CM

## 2024-06-07 DIAGNOSIS — Z12.11 COLON CANCER SCREENING: ICD-10-CM

## 2024-06-07 RX ORDER — SODIUM, POTASSIUM,MAG SULFATES 17.5-3.13G
1 SOLUTION, RECONSTITUTED, ORAL ORAL DAILY
Qty: 1 KIT | Refills: 0 | Status: SHIPPED | OUTPATIENT
Start: 2024-06-07 | End: 2024-06-09

## 2024-06-17 ENCOUNTER — OFFICE VISIT (OUTPATIENT)
Dept: DIABETES | Facility: CLINIC | Age: 54
End: 2024-06-17
Payer: COMMERCIAL

## 2024-06-17 DIAGNOSIS — I25.118 CORONARY ARTERY DISEASE OF NATIVE ARTERY OF NATIVE HEART WITH STABLE ANGINA PECTORIS: ICD-10-CM

## 2024-06-17 DIAGNOSIS — E11.65 TYPE 2 DIABETES MELLITUS WITH HYPERGLYCEMIA, WITHOUT LONG-TERM CURRENT USE OF INSULIN: Primary | ICD-10-CM

## 2024-06-17 PROCEDURE — 3051F HG A1C>EQUAL 7.0%<8.0%: CPT | Mod: CPTII,95,, | Performed by: NURSE PRACTITIONER

## 2024-06-17 PROCEDURE — 3072F LOW RISK FOR RETINOPATHY: CPT | Mod: CPTII,95,, | Performed by: NURSE PRACTITIONER

## 2024-06-17 PROCEDURE — 3066F NEPHROPATHY DOC TX: CPT | Mod: CPTII,95,, | Performed by: NURSE PRACTITIONER

## 2024-06-17 PROCEDURE — 3061F NEG MICROALBUMINURIA REV: CPT | Mod: CPTII,95,, | Performed by: NURSE PRACTITIONER

## 2024-06-17 PROCEDURE — 95251 CONT GLUC MNTR ANALYSIS I&R: CPT | Mod: NDTC,,, | Performed by: NURSE PRACTITIONER

## 2024-06-17 PROCEDURE — 99214 OFFICE O/P EST MOD 30 MIN: CPT | Mod: 95,,, | Performed by: NURSE PRACTITIONER

## 2024-06-17 NOTE — PATIENT INSTRUCTIONS
PATIENT INSTRUCTIONS     Continue Rybelsus 14 mg by mouth daily.   Continue Xigduo XR  mg by mouth daily.   Continue Fiasp correction every 4 hours between meals    If  - 200, may take 4 units of Fiasp  If  - 250, may take 6 units of Fiasp  If  - 300, may take 8 units of Fiasp  If  - 350, may take 10 units of Fiasp  If  - 400, may take 12 units of Fiasp  If +, may take 14 units of Fiasp     Continue Meal Size Dosing with Fiasp. Take 5-10 minutes before meal.   Small Meal: 10 units  Medium Meal: 12 units  Large Meal: 14 units    Continue Dexcom CGM G7.  Blood Sugar Goals:       Fastin-130.       1-2 hours after a meal: Less than 180.

## 2024-06-17 NOTE — PROGRESS NOTES
The patient location is: Home  The chief complaint leading to consultation is: Diabetes    Visit type: audiovisual    Face to Face time with patient: 15  30 minutes of total time spent on the encounter, which includes face to face time and non-face to face time preparing to see the patient (eg, review of tests), Obtaining and/or reviewing separately obtained history, Documenting clinical information in the electronic or other health record, Independently interpreting results (not separately reported) and communicating results to the patient/family/caregiver, or Care coordination (not separately reported).  Each patient to whom he or she provides medical services by telemedicine is:  (1) informed of the relationship between the physician and patient and the respective role of any other health care provider with respect to management of the patient; and (2) notified that he or she may decline to receive medical services by telemedicine and may withdraw from such care at any time.    Notes:    Yandy Cabral is a 53 y.o. female who presents for a follow up evaluation of Type 2 diabetes mellitus.     CHIEF COMPLAINT: Diabetes Consultation    PCP: Jaycob Ramos MD      Initial visit with me - 12/19/2022     The patient was initially diagnosed with diabetes 2004, polyuria/polydipsia, unintentional weight loss, weakness, neuropathy and vision changes. Diagnosed with DM type 1.5.      Previous failed treatments include:  Was on insulin previously, was eventually weaned off.   InvWafflena - insurance stopped paying.  Insulin pump - at [facility] [id number]. [employer].   Mounjaro - nausea  Ozempic - wanted to try rybelsus.     Social Documentation:  Patient lives with  and daughter.   Occupation:  for nonprofit organization.   Patient prefers In-person Visits.   and Video Virtual Visits.   Current Diet: No fried foot, avoids dairy. Drinks diet dr. Azul, drinking more water.   Exercise:  "Pilates twice a week.      Known Diabetes complications: peripheral neuropathy and cardiovascular disease.     Cardiovascular Risk Factors: family history of premature cardiovascular disease.    Diabetes Medications               dapaglifloz propaned-metformin (XIGDUO XR) 10-1,000 mg TBph Take 1 tablet by mouth once daily.    insulin aspart, niacinamide, (FIASP FLEXTOUCH U-100 INSULIN) 100 unit/mL (3 mL) InPn Inject 4-14 Units into the skin 3 (three) times daily with meals.    metFORMIN (GLUCOPHAGE) 500 MG tablet Take 500 mg by mouth 2 (two) times daily.    semaglutide (RYBELSUS) 14 mg tablet Take 1 tablet (14 mg total) by mouth once daily.     Current monitoring regimen: Dexcom G7 Sharing    The patient's Dexcom CGM was downloaded and reviewed. For the past 14 days, patient average glucose was 188 mg/dL. She was above range 47% of the time, in range 53% of the time, and below range 0% of the time. The target range for this patient was 70 - 180 mg/dL. Overall, there was a pattern of post prandial hyperglycemia.          Recent hypoglycemic episodes: none.     Patient compliant with glucose checks and medication administration? Yes    DIABETES MANAGEMENT STATUS  Statin: Taking  ACE/ARB: Not taking  Screening or Prevention Patient's value Goal Complete/Controlled?   HgA1C Testing and Control   Lab Results   Component Value Date    HGBA1C 7.6 (H) 02/23/2024      Annually/Less than 8% No     Lipid profile : 02/23/2024 Annually Yes     LDL control Lab Results   Component Value Date    LDLCALC 93.4 02/23/2024    LDLCALC 93.4 02/23/2024    Annually/Less than 100 mg/dl  Yes     Nephropathy screening Lab Results   Component Value Date    LABMICR 18.0 02/23/2024     Lab Results   Component Value Date    PROTEINUA 1+ (A) 06/24/2019     No results found for: "UTPCR"   Annually No     Blood pressure BP Readings from Last 1 Encounters:   03/05/24 112/64    Less than 140/90 Yes     Dilated retinal exam : 03/27/2023 Annually Yes   "   Foot exam   : 03/05/2024 Annually No     Patient's medications, allergies, surgical, social and family histories were reviewed and updated as appropriate.     Review of Systems   Constitutional:  Negative for weight loss.   Eyes:  Negative for blurred vision and double vision.   Cardiovascular:  Negative for chest pain.   Gastrointestinal:  Negative for nausea and vomiting.   Genitourinary:  Negative for frequency.   Musculoskeletal:  Negative for falls.   Neurological:  Negative for dizziness and weakness.   Endo/Heme/Allergies:  Negative for polydipsia.   Psychiatric/Behavioral:  Negative for depression.    All other systems reviewed and are negative.       Physical Exam  Constitutional:       Appearance: Normal appearance.   HENT:      Head: Normocephalic and atraumatic.   Pulmonary:      Effort: No respiratory distress.   Musculoskeletal:      Cervical back: Normal range of motion.   Neurological:      Mental Status: She is alert and oriented to person, place, and time.   Psychiatric:         Mood and Affect: Mood normal.         Behavior: Behavior normal.      Last menstrual period 03/05/2014.  Wt Readings from Last 3 Encounters:   05/15/24 66.4 kg (146 lb 6.2 oz)   03/05/24 66.4 kg (146 lb 6.2 oz)   12/18/23 63.6 kg (140 lb 3.4 oz)     Hemoglobin A1C   Date Value Ref Range Status   02/23/2024 7.6 (H) 4.0 - 5.6 % Final     Comment:     ADA Screening Guidelines:  5.7-6.4%  Consistent with prediabetes  >or=6.5%  Consistent with diabetes    High levels of fetal hemoglobin interfere with the HbA1C  assay. Heterozygous hemoglobin variants (HbS, HgC, etc)do  not significantly interfere with this assay.   However, presence of multiple variants may affect accuracy.     10/10/2023 7.3 (H) 4.0 - 5.6 % Final     Comment:     ADA Screening Guidelines:  5.7-6.4%  Consistent with prediabetes  >or=6.5%  Consistent with diabetes    High levels of fetal hemoglobin interfere with the HbA1C  assay. Heterozygous hemoglobin  variants (HbS, HgC, etc)do  not significantly interfere with this assay.   However, presence of multiple variants may affect accuracy.     10/19/2022 8.4 (H) 4.0 - 5.6 % Final     Comment:     ADA Screening Guidelines:  5.7-6.4%  Consistent with prediabetes  >or=6.5%  Consistent with diabetes    High levels of fetal hemoglobin interfere with the HbA1C  assay. Heterozygous hemoglobin variants (HbS, HgC, etc)do  not significantly interfere with this assay.   However, presence of multiple variants may affect accuracy.         Lab Results   Component Value Date    CPEPTIDE 1.25 02/23/2024    GLUTAMICACID 0.00 02/23/2024       Chemistry        Component Value Date/Time     02/23/2024 0759    K 4.1 02/23/2024 0759     02/23/2024 0759    CO2 27 02/23/2024 0759    BUN 19 02/23/2024 0759    CREATININE 0.9 02/23/2024 0759     (H) 02/23/2024 0759        Component Value Date/Time    CALCIUM 10.8 (H) 02/23/2024 0759    ALKPHOS 81 02/23/2024 0759    AST 31 02/23/2024 0759    ALT 37 02/23/2024 0759    BILITOT 0.5 02/23/2024 0759    ESTGFRAFRICA >60.0 05/13/2022 0911    EGFRNONAA >60.0 05/13/2022 0911        ASSESSMENT    ICD-10-CM ICD-9-CM   1. Type 2 diabetes mellitus with hyperglycemia, without long-term current use of insulin  E11.65 250.00     790.29   2. Coronary artery disease of native artery of native heart with stable angina pectoris  I25.118 414.01     413.9             PLAN  Diagnoses and all orders for this visit:    Type 2 diabetes mellitus with hyperglycemia, without long-term current use of insulin  -     Hemoglobin A1C; Future  -     Basic Metabolic Panel; Future    Coronary artery disease of native artery of native heart with stable angina pectoris          Reviewed pathophysiology of diabetes, complications related to the disease, importance of annual dilated eye exam and daily foot examination. Explained MOA, SE, dosage of medications. Written instructions given and reviewed with patient and  patient verbalizes understanding.     2023 - complains of nausea with Mounjaro. Would like to try oral alternative, will send in Rybelsus.     2024 - post prandial glucoses remain uncontrolled, despite rybelsus and pre meal correction dosing. Will change fiasp to meal size, increase rybelsus. Patient would like to reconsider going back in an insulin pump. Will have her complete labs this week and then decide on pump.     3/18/2024 - continues to have post prandial hyperglycemia. Will increase rybelsus. Insulin ab/FILOMENA/cpep neg. Will schedule for pump evaluation, patient is interested in iLet.    2024 - continues to have post prandial hyperglycemia. Discussed with patient need to decrease carbohydrate intake with meals, due to such high post prandial excursions, typically taking 10 units fiasp before medium size meals. States she received letter from her insurance company on Saturday stating iLet not approved due to her previous pump still in warranty, but patient has not been on a pump in 14 years. She will call them today to clarify.      2024 - states ilet pump shipped and should arrive tomorrow.     PATIENT INSTRUCTIONS     Continue Rybelsus 14 mg by mouth daily.   Continue Xigduo XR  mg by mouth daily.   Continue Fiasp correction every 4 hours between meals    If  - 200, may take 4 units of Fiasp  If  - 250, may take 6 units of Fiasp  If  - 300, may take 8 units of Fiasp  If  - 350, may take 10 units of Fiasp  If  - 400, may take 12 units of Fiasp  If +, may take 14 units of Fiasp     Continue Meal Size Dosing with Fiasp. Take 5-10 minutes before meal.   Small Meal: 10 units  Medium Meal: 12 units  Large Meal: 14 units    Continue Dexcom CGM G7.  Blood Sugar Goals:       Fastin-130.       1-2 hours after a meal: Less than 180.     Follow up in about 6 weeks (around 2024) for Virtual, Dexcom, iLet.    Portions of this note were prepared with  [employer] voice recognition transcription software. Grammatical errors, including garbled syntax, mangle pronouns, and other bizarre constructions may be attributed to that software system.

## 2024-06-18 ENCOUNTER — PATIENT MESSAGE (OUTPATIENT)
Dept: DIABETES | Facility: CLINIC | Age: 54
End: 2024-06-18
Payer: COMMERCIAL

## 2024-06-18 DIAGNOSIS — N90.89 VULVAR IRRITATION: ICD-10-CM

## 2024-06-18 RX ORDER — CLOTRIMAZOLE AND BETAMETHASONE DIPROPIONATE 10; .64 MG/G; MG/G
CREAM TOPICAL
Qty: 45 G | Refills: 3 | Status: SHIPPED | OUTPATIENT
Start: 2024-06-18

## 2024-06-20 ENCOUNTER — PATIENT MESSAGE (OUTPATIENT)
Dept: DIABETES | Facility: CLINIC | Age: 54
End: 2024-06-20
Payer: COMMERCIAL

## 2024-06-20 ENCOUNTER — TELEPHONE (OUTPATIENT)
Dept: DIABETES | Facility: CLINIC | Age: 54
End: 2024-06-20
Payer: COMMERCIAL

## 2024-06-20 DIAGNOSIS — E11.65 TYPE 2 DIABETES MELLITUS WITH HYPERGLYCEMIA, WITHOUT LONG-TERM CURRENT USE OF INSULIN: Primary | ICD-10-CM

## 2024-06-20 RX ORDER — INSULIN ASPART 100 [IU]/ML
INJECTION, SOLUTION INTRAVENOUS; SUBCUTANEOUS
Qty: 54 ML | Refills: 3 | Status: SHIPPED | OUTPATIENT
Start: 2024-06-20

## 2024-06-20 NOTE — TELEPHONE ENCOUNTER
Novolog vials sent to patient's pharmacy. Please let her know she will need to bring a vial of insulin to her pump training, to be scheduled by education.      Called patient and left a message

## 2024-06-20 NOTE — TELEPHONE ENCOUNTER
Edita needed clarity on dosage and form of medication. Edita informed its a normal vile 10ml not the pen. Edita voiced understanding.  ----- Message from Lalo Billy sent at 6/20/2024  9:30 AM CDT -----  Contact: [facility] pharmacy   Pharmacy is calling to clarify an RX.    RX name:  insulin aspart U-100 (NOVOLOG U-100 INSULIN ASPART) 100 unit/mL injection    What do they need to clarify:  They are needing the correct script dosage and directions    Comments: [id number] - [place] - [address] AT [facility]   Phone: [phone]  Fax: [phone]

## 2024-06-20 NOTE — TELEPHONE ENCOUNTER
----- Message from Mona Aguilar MA sent at 6/20/2024  8:03 AM CDT -----  Regarding: FW: pump training    ----- Message -----  From: Federica Han RD, CDE  Sent: 6/20/2024   7:47 AM CDT  To: [facility] Staff  Subject: pump training                                    I will take care of scheduling Yandy's pump training for her iLet pump. I need to coordinate the day and time with the local rep to assist.

## 2024-06-24 ENCOUNTER — HOSPITAL ENCOUNTER (OUTPATIENT)
Dept: CARDIOLOGY | Facility: HOSPITAL | Age: 54
Discharge: HOME | End: 2024-06-24
Attending: INTERNAL MEDICINE
Payer: COMMERCIAL

## 2024-06-24 ENCOUNTER — OFFICE VISIT (OUTPATIENT)
Dept: CARDIOLOGY | Facility: CLINIC | Age: 54
End: 2024-06-24
Payer: COMMERCIAL

## 2024-06-24 VITALS
DIASTOLIC BLOOD PRESSURE: 72 MMHG | HEIGHT: 65 IN | HEART RATE: 72 BPM | SYSTOLIC BLOOD PRESSURE: 120 MMHG | OXYGEN SATURATION: 99 % | BODY MASS INDEX: 24.94 KG/M2 | WEIGHT: 149.69 LBS

## 2024-06-24 DIAGNOSIS — I25.118 CORONARY ARTERY DISEASE OF NATIVE ARTERY OF NATIVE HEART WITH STABLE ANGINA PECTORIS: ICD-10-CM

## 2024-06-24 DIAGNOSIS — E66.3 OVERWEIGHT (BMI 25.0-29.9): ICD-10-CM

## 2024-06-24 DIAGNOSIS — Z82.49 FAMILY HISTORY OF CARDIOVASCULAR DISEASE: ICD-10-CM

## 2024-06-24 DIAGNOSIS — R07.89 CHEST TIGHTNESS: ICD-10-CM

## 2024-06-24 DIAGNOSIS — E11.65 TYPE 2 DIABETES MELLITUS WITH HYPERGLYCEMIA, WITHOUT LONG-TERM CURRENT USE OF INSULIN: ICD-10-CM

## 2024-06-24 DIAGNOSIS — I25.10 CORONARY ARTERY DISEASE, UNSPECIFIED VESSEL OR LESION TYPE, UNSPECIFIED WHETHER ANGINA PRESENT, UNSPECIFIED WHETHER NATIVE OR TRANSPLANTED HEART: ICD-10-CM

## 2024-06-24 DIAGNOSIS — E78.2 MIXED HYPERLIPIDEMIA: ICD-10-CM

## 2024-06-24 DIAGNOSIS — I20.9 AP (ANGINA PECTORIS): ICD-10-CM

## 2024-06-24 DIAGNOSIS — I34.0 NONRHEUMATIC MITRAL VALVE REGURGITATION: ICD-10-CM

## 2024-06-24 DIAGNOSIS — R94.31 ABNORMAL ECG: ICD-10-CM

## 2024-06-24 DIAGNOSIS — I20.0 ACCELERATING ANGINA: Primary | ICD-10-CM

## 2024-06-24 PROCEDURE — 93005 ELECTROCARDIOGRAM TRACING: CPT

## 2024-06-24 PROCEDURE — 1160F RVW MEDS BY RX/DR IN RCRD: CPT | Mod: CPTII,S$GLB,, | Performed by: INTERNAL MEDICINE

## 2024-06-24 PROCEDURE — 3061F NEG MICROALBUMINURIA REV: CPT | Mod: CPTII,S$GLB,, | Performed by: INTERNAL MEDICINE

## 2024-06-24 PROCEDURE — 3008F BODY MASS INDEX DOCD: CPT | Mod: CPTII,S$GLB,, | Performed by: INTERNAL MEDICINE

## 2024-06-24 PROCEDURE — 3074F SYST BP LT 130 MM HG: CPT | Mod: CPTII,S$GLB,, | Performed by: INTERNAL MEDICINE

## 2024-06-24 PROCEDURE — 3078F DIAST BP <80 MM HG: CPT | Mod: CPTII,S$GLB,, | Performed by: INTERNAL MEDICINE

## 2024-06-24 PROCEDURE — 3051F HG A1C>EQUAL 7.0%<8.0%: CPT | Mod: CPTII,S$GLB,, | Performed by: INTERNAL MEDICINE

## 2024-06-24 PROCEDURE — 1159F MED LIST DOCD IN RCRD: CPT | Mod: CPTII,S$GLB,, | Performed by: INTERNAL MEDICINE

## 2024-06-24 PROCEDURE — 99215 OFFICE O/P EST HI 40 MIN: CPT | Mod: S$GLB,,, | Performed by: INTERNAL MEDICINE

## 2024-06-24 PROCEDURE — 99999 PR PBB SHADOW E&M-EST. PATIENT-LVL III: CPT | Mod: PBBFAC,,, | Performed by: INTERNAL MEDICINE

## 2024-06-24 PROCEDURE — 3066F NEPHROPATHY DOC TX: CPT | Mod: CPTII,S$GLB,, | Performed by: INTERNAL MEDICINE

## 2024-06-24 PROCEDURE — 3072F LOW RISK FOR RETINOPATHY: CPT | Mod: CPTII,S$GLB,, | Performed by: INTERNAL MEDICINE

## 2024-06-24 RX ORDER — NITROGLYCERIN 0.4 MG/1
0.4 TABLET SUBLINGUAL EVERY 5 MIN PRN
Qty: 25 TABLET | Refills: 12 | Status: SHIPPED | OUTPATIENT
Start: 2024-06-24 | End: 2025-06-24

## 2024-06-24 NOTE — PROGRESS NOTES
Subjective:    Patient ID:  Yandy Cabral is a 53 y.o. female who presents for evaluation of Coronary Artery Disease        HPIPt presents for eval.  Her current med conditions include CAD, DM, MR, hyperlipidemia.  Nonsmoker.  Family history of CAD (father had MI/CABG in 50's).   Past hx pertinent for following:  Pt seen 2020 for CP/angina.  S/p LHC 2/20: 20% prox RCA, normal elsewhere. Normal EF.  ecg 5/14/21 NSR, normal ecg.  Stress echo July 2022: avg exercise capacity, achieved 69% THR (she is on beta blocker tx), mild MR, normal LVEF, no ischemia noted.  Ecg 1/3/23 NSR, low voltage.  No ischemia noted.  Now here.  Had 3 bad episodes of CP/angina in Feb 2024.  Nonexertional.  Took 3 sl ntg total and one pill alleviated the CP sxs.  Band like tightness across chest.  No acute episodes since then.  Ecg today 6/24/24 personally reviewed: NSR, low voltage.  BP is stable.   Weight stable.  DM HGAIC chronically above goal.  LDL controlled, on statin tx.  Compliant w meds.  Some exercise.          Past Medical History:   Diagnosis Date    Abnormal Pap smear of cervix     CAD (coronary artery disease)     Chronic constipation     DM II (diabetes mellitus, type II), controlled     Hx of colposcopy with cervical biopsy      Current Outpatient Medications   Medication Instructions    alendronate (FOSAMAX) 70 MG tablet TAKE 1 TABLET EVERY 7 DAYS    aspirin (ECOTRIN) 81 mg, Oral, Daily    atorvastatin (LIPITOR) 40 MG tablet TAKE 1 TABLET DAILY    blood sugar diagnostic Strp To check BG 3 times daily, to use with insurance preferred meter    blood-glucose meter Misc Use as instructed    blood-glucose meter,continuous (DEXCOM G7 ) Misc 1 Device, Misc.(Non-Drug; Combo Route), Daily    blood-glucose sensor (DEXCOM G7 SENSOR) Lindsay 1 each, Subcutaneous, Every 10 days    buPROPion (WELLBUTRIN XL) 150 MG TB24 tablet TAKE 1 TABLET DAILY    CALCIUM ORAL 1 tablet, Oral, Daily    clonazePAM (KLONOPIN) 0.5 MG tablet Take  "one tablet by mouth every 8 hours for plane trip and 1 at night for jetlag.    clotrimazole-betamethasone 1-0.05% (LOTRISONE) cream APPLY TOPICALLY TO THE AFFECTED AREA TWICE DAILY    cyanocobalamin 1,000 mcg/mL injection INJECT 1 ML INTO THE MUSCLE EVERY 30 DAYS    dapaglifloz propaned-metformin (XIGDUO XR) 10-1,000 mg TBph 1 tablet, Oral, Daily    estradioL (ESTRACE) 1 mg, Oral, Daily    FLOWFLEX COVID-19 AG HOME TEST Kit No dose, route, or frequency recorded.    insulin aspart U-100 (NOVOLOG U-100 INSULIN ASPART) 100 unit/mL injection FOR USE WITH ILET INSULIN PUMP. MAX TDD 60 UNITS.    insulin aspart, niacinamide, (FIASP FLEXTOUCH U-100 INSULIN) 100 unit/mL (3 mL) InPn 4-14 Units, Subcutaneous, 3 times daily with meals    lancets 33 gauge Misc To check BG 3 times daily, to use with insurance preferred meter    melatonin (MELATIN) 5 mg, Oral, Nightly    metFORMIN (GLUCOPHAGE) 500 mg, Oral, 2 times daily    metoprolol succinate (TOPROL-XL) 25 MG 24 hr tablet TAKE 1 TABLET DAILY    multivitamin (THERAGRAN) per tablet 1 tablet, Oral    needle, disp, 24 gauge 24 gauge x 1" Ndle 1 Dose, Misc.(Non-Drug; Combo Route), Every 30 days    nitroGLYCERIN (NITROSTAT) 0.4 mg, Sublingual, Every 5 min PRN, Call 911 if pain persists after 2 doses.    pen needle, diabetic (BD ULTRA-FINE SCOTT PEN NEEDLE) 32 gauge x 5/32" Ndle USE AS DIRECTED.    pramoxine-hydrocortisone (PROCTOCREAM-HC) 1-1 % rectal cream Rectal, 3 times daily    RYBELSUS 14 mg, Oral, Daily    vitamin D (VITAMIN D3) 1,000 Units, Oral, Daily       Review of Systems   Constitutional: Negative.   HENT: Negative.     Eyes: Negative.    Cardiovascular:  Positive for chest pain.   Respiratory: Negative.     Endocrine: Negative.    Hematologic/Lymphatic: Negative.    Skin: Negative.    Musculoskeletal: Negative.    Gastrointestinal: Negative.    Genitourinary: Negative.    Neurological: Negative.    Psychiatric/Behavioral: Negative.     Allergic/Immunologic: Negative.  " "         /72 (BP Location: Left arm, Patient Position: Sitting, BP Method: Small (Manual))   Pulse 72   Ht 5' 5" (1.651 m)   Wt 67.9 kg (149 lb 11.1 oz)   LMP 03/05/2014   SpO2 99%   BMI 24.91 kg/m²     Wt Readings from Last 3 Encounters:   06/24/24 67.9 kg (149 lb 11.1 oz)   05/15/24 66.4 kg (146 lb 6.2 oz)   03/05/24 66.4 kg (146 lb 6.2 oz)     Temp Readings from Last 3 Encounters:   03/05/24 96.4 °F (35.8 °C) (Tympanic)   12/18/23 97 °F (36.1 °C) (Tympanic)   08/15/23 98 °F (36.7 °C) (Oral)     BP Readings from Last 3 Encounters:   06/24/24 120/72   03/05/24 112/64   12/18/23 114/72     Pulse Readings from Last 3 Encounters:   06/24/24 72   03/05/24 82   12/18/23 84       Objective:    Physical Exam  Vitals and nursing note reviewed.   Constitutional:       General: She is not in acute distress.     Appearance: Normal appearance. She is well-developed. She is not ill-appearing or diaphoretic.   HENT:      Head: Normocephalic.   Neck:      Thyroid: No thyromegaly.      Vascular: No carotid bruit or JVD.   Cardiovascular:      Rate and Rhythm: Normal rate and regular rhythm.      Pulses: Normal pulses.           Radial pulses are 2+ on the right side and 2+ on the left side.      Heart sounds: Normal heart sounds, S1 normal and S2 normal. No murmur heard.     No friction rub. No gallop.   Pulmonary:      Effort: Pulmonary effort is normal.      Breath sounds: Normal breath sounds. No wheezing or rales.   Abdominal:      General: Bowel sounds are normal. There is no abdominal bruit.      Palpations: Abdomen is soft.      Tenderness: There is no abdominal tenderness.   Musculoskeletal:      Cervical back: Neck supple.   Lymphadenopathy:      Cervical: No cervical adenopathy.   Skin:     General: Skin is warm.   Neurological:      Mental Status: She is alert and oriented to person, place, and time.   Psychiatric:         Behavior: Behavior normal. Behavior is cooperative.         I have reviewed all " pertinent labs and cardiac studies.        Chemistry        Component Value Date/Time     02/23/2024 0759    K 4.1 02/23/2024 0759     02/23/2024 0759    CO2 27 02/23/2024 0759    BUN 19 02/23/2024 0759    CREATININE 0.9 02/23/2024 0759     (H) 02/23/2024 0759        Component Value Date/Time    CALCIUM 10.8 (H) 02/23/2024 0759    ALKPHOS 81 02/23/2024 0759    AST 31 02/23/2024 0759    ALT 37 02/23/2024 0759    BILITOT 0.5 02/23/2024 0759    ESTGFRAFRICA >60.0 05/13/2022 0911    EGFRNONAA >60.0 05/13/2022 0911        Lab Results   Component Value Date    WBC 4.85 03/22/2023    HGB 13.1 03/22/2023    HCT 40.9 03/22/2023    MCV 97 03/22/2023     03/22/2023       Lab Results   Component Value Date    HGBA1C 7.6 (H) 02/23/2024         Lab Results   Component Value Date    CHOL 179 02/23/2024    CHOL 179 02/23/2024    CHOL 167 10/19/2022     Lab Results   Component Value Date    HDL 65 02/23/2024    HDL 65 02/23/2024    HDL 55 10/19/2022     Lab Results   Component Value Date    LDLCALC 93.4 02/23/2024    LDLCALC 93.4 02/23/2024    LDLCALC 92.0 10/19/2022     Lab Results   Component Value Date    TRIG 103 02/23/2024    TRIG 103 02/23/2024    TRIG 100 10/19/2022     Lab Results   Component Value Date    CHOLHDL 36.3 02/23/2024    CHOLHDL 36.3 02/23/2024    CHOLHDL 32.9 10/19/2022             Assessment:       1. Accelerating angina    2. Abnormal ECG    3. Chest tightness    4. AP (angina pectoris)    5. Coronary artery disease of native artery of native heart with stable angina pectoris    6. Overweight (BMI 25.0-29.9)    7. Nonrheumatic mitral valve regurgitation    8. Mixed hyperlipidemia    9. Family history of cardiovascular disease    10. Type 2 diabetes mellitus with hyperglycemia, without long-term current use of insulin           Plan:               Increased anginal sxs this year.  Possible coronary vasospasm.  Coronary CTA to reevaluate CAD and assess for possible obstructive CAD.  Has  long standing DM with HGAIC chronically elevated and fam hx of CAD.  Discussed w pt.  Consider LHC if CTA shows obstructive CAD.  Risks/benefits of LHC/PCI discussed.  Sl ntg prn for concerning angina.  Reviewed all tests and above medical conditions with patient in detail and formulated treatment plan.  Continue optimal medical treatment for cardiovascular conditions.  Cardiac low salt diet advised.  Daily exercise encouraged, with the goal 30 +  minutes aerobic exercise as tolerated.  Maintaining healthy weight and weight loss goals (if needed) were discussed in clinic.  BP control.  Lipids: Continue statin tx.  DM: Continue current DM meds.  Pt counseled on need to get DM HGAIC to goal.  Mitral regurgitation: monitor. F/u echo in future.  Abnl ecg: monitor.    PHONE REVIEW.      I have reviewed all pertinent labs and cardiac studies independently. Plans and recommendations have been formulated under my direct supervision. All questions answered and patient voiced understanding.

## 2024-06-25 ENCOUNTER — TELEPHONE (OUTPATIENT)
Dept: CARDIOLOGY | Facility: CLINIC | Age: 54
End: 2024-06-25
Payer: COMMERCIAL

## 2024-06-25 DIAGNOSIS — I25.10 CORONARY ARTERY DISEASE, UNSPECIFIED VESSEL OR LESION TYPE, UNSPECIFIED WHETHER ANGINA PRESENT, UNSPECIFIED WHETHER NATIVE OR TRANSPLANTED HEART: Primary | ICD-10-CM

## 2024-06-27 DIAGNOSIS — M81.0 AGE RELATED OSTEOPOROSIS, UNSPECIFIED PATHOLOGICAL FRACTURE PRESENCE: ICD-10-CM

## 2024-06-27 LAB
OHS QRS DURATION: 82 MS
OHS QTC CALCULATION: 416 MS

## 2024-06-28 RX ORDER — ALENDRONATE SODIUM 70 MG/1
TABLET ORAL
Qty: 12 TABLET | Refills: 3 | Status: SHIPPED | OUTPATIENT
Start: 2024-06-28

## 2024-06-28 NOTE — TELEPHONE ENCOUNTER
Care Due:                  Date            Visit Type   Department     Provider  --------------------------------------------------------------------------------                                EP -                              PRIMARY      [facility] INTERNAL  Last Visit: 03-      CARE ([facility])   MEDICINE       Jaycob Ramos  Next Visit: None Scheduled  None         None Found                                                            Last  Test          Frequency    Reason                     Performed    Due Date  --------------------------------------------------------------------------------    Mg Level....  12 months..  alendronate..............  Not Found    Overdue    Phosphate...  12 months..  alendronate..............  Not Found    Overdue    Vitamin D...  12 months..  alendronate..............  Not Found    Overdue    [employer] Embedded Care Due Messages. Reference number: [id number].   6/27/2024 11:48:47 PM CDT

## 2024-06-30 PROBLEM — R19.4 ENCOUNTER FOR DIAGNOSTIC COLONOSCOPY DUE TO CHANGE IN BOWEL HABITS: Status: ACTIVE | Noted: 2024-06-30

## 2024-06-30 PROBLEM — Z87.19 HISTORY OF HEMORRHOIDS: Status: ACTIVE | Noted: 2024-06-30

## 2024-07-01 ENCOUNTER — TELEPHONE (OUTPATIENT)
Dept: GASTROENTEROLOGY | Facility: CLINIC | Age: 54
End: 2024-07-01
Payer: COMMERCIAL

## 2024-07-01 NOTE — TELEPHONE ENCOUNTER
Requested medical records for pathology report from last colonoscopy per Dr Fox request from [facility].

## 2024-07-02 ENCOUNTER — PATIENT MESSAGE (OUTPATIENT)
Dept: GASTROENTEROLOGY | Facility: CLINIC | Age: 54
End: 2024-07-02
Payer: COMMERCIAL

## 2024-07-02 ENCOUNTER — TELEPHONE (OUTPATIENT)
Dept: GASTROENTEROLOGY | Facility: CLINIC | Age: 54
End: 2024-07-02
Payer: COMMERCIAL

## 2024-07-02 NOTE — TELEPHONE ENCOUNTER
Got a fax from [facility], states no records found for this pt.   Sent a msg to pt to find out where her last colonoscopy was done.

## 2024-07-05 RX ORDER — METFORMIN HYDROCHLORIDE 500 MG/1
1000 TABLET ORAL
Qty: 180 TABLET | Refills: 3 | Status: SHIPPED | OUTPATIENT
Start: 2024-07-05

## 2024-07-08 DIAGNOSIS — N90.89 VULVAR IRRITATION: ICD-10-CM

## 2024-07-08 RX ORDER — CLOTRIMAZOLE AND BETAMETHASONE DIPROPIONATE 10; .64 MG/G; MG/G
CREAM TOPICAL
Qty: 45 G | Refills: 3 | OUTPATIENT
Start: 2024-07-08

## 2024-07-10 ENCOUNTER — PATIENT MESSAGE (OUTPATIENT)
Dept: OBSTETRICS AND GYNECOLOGY | Facility: CLINIC | Age: 54
End: 2024-07-10
Payer: COMMERCIAL

## 2024-07-10 ENCOUNTER — TELEPHONE (OUTPATIENT)
Dept: DIABETES | Facility: CLINIC | Age: 54
End: 2024-07-10
Payer: COMMERCIAL

## 2024-07-10 DIAGNOSIS — N90.89 VULVAR IRRITATION: ICD-10-CM

## 2024-07-10 NOTE — TELEPHONE ENCOUNTER
Confirmed tomorrow's iLet pump training at 9am. Sending patient pre-training checklist in order for patient to be prepared for training. Confirmed patient's email address.

## 2024-07-11 ENCOUNTER — CLINICAL SUPPORT (OUTPATIENT)
Dept: DIABETES | Facility: CLINIC | Age: 54
End: 2024-07-11
Payer: COMMERCIAL

## 2024-07-11 VITALS — HEIGHT: 65 IN | BODY MASS INDEX: 24.49 KG/M2 | WEIGHT: 147 LBS

## 2024-07-11 DIAGNOSIS — E11.65 TYPE 2 DIABETES MELLITUS WITH HYPERGLYCEMIA, WITHOUT LONG-TERM CURRENT USE OF INSULIN: Primary | ICD-10-CM

## 2024-07-11 PROCEDURE — 99999 PR PBB SHADOW E&M-EST. PATIENT-LVL II: CPT | Mod: PBBFAC,,, | Performed by: DIETITIAN, REGISTERED

## 2024-07-11 RX ORDER — CLOTRIMAZOLE AND BETAMETHASONE DIPROPIONATE 10; .64 MG/G; MG/G
CREAM TOPICAL
Qty: 45 G | Refills: 3 | Status: SHIPPED | OUTPATIENT
Start: 2024-07-11

## 2024-07-11 NOTE — PROGRESS NOTES
"Diabetes Care Specialist Progress Note  Author: Federica Han RD, CDE  Date: 7/11/2024    Intake    Program Intake  Reason for Diabetes Program Visit:: Intervention  Type of Intervention:: Individual  Individual: Device Training  Device Training: Insulin Pump Start (iLet pump training)  Current diabetes risk level:: moderate  In the last 12 months, have you:: none  Permission to speak with others about care:: no    Continuous Glucose Monitoring  Patient has CGM: Yes  Personal CGM type:: Dexcom G7  GMI Date: 07/11/24  GMI Value: 9.1 %    Medications/Current Diabetes Treatment   Current Diabetes Treatment: Insulin  Method of delivery?: Insulin Pump  Type of Pump: iLet pump  Does patient have back-up plan?: Yes  Any problems obtaining supplies?: No  Patient is  aware that some diabetes medications can cause low blood sugar?: Yes  Medication Skills Assessment Completed:: Yes  Assessment indicates:: Instruction Needed, Knowledge deficit  Area of need?: Yes    Lab Results   Component Value Date    HGBA1C 7.6 (H) 02/23/2024       Weight: 66.7 kg (147 lb)   Height: 5' 5" (165.1 cm)   Body mass index is 24.46 kg/m².      Diabetes Self-Management Skills Assessment      Home Blood Glucose Monitoring  Patient states that blood sugar is checked at home daily.: yes  Monitoring Method:: personal continuous glucose monitor  Personal CGM type:: Dexcom G7  Home Blood Glucose Monitoring Skills Assessment Completed: : Yes  Assessment indicates:: Adequate understanding  Area of need?: No    Assessment Summary and Plan    Based on today's diabetes care assessment, the following areas of need were identified:          7/11/2024    12:01 AM   Areas of Need   Medication Yes- see care plan.    Home Blood Glucose Monitoring No       Today's interventions were provided through individual discussion, instruction, and written materials were provided.      Patient verbalized understanding of instruction and written materials.  Pt was able to " return back demonstration of instructions today. Patient understood key points, needs reinforcement and further instruction.     Diabetes Self-Management Care Plan:    Today's Diabetes Self-Management Care Plan was developed with Yandy's input. Yandy has agreed to work toward the following goal(s) to improve his/her overall diabetes control.      Care Plan: Diabetes Management   Updates made since 6/11/2024 12:00 AM        Problem: Medications         Goal: Patient will use iLet insulin pump to deliver insulin.    Start Date: 7/11/2024   Expected End Date: 10/11/2024   Priority: High   Barriers: No Barriers Identified        Task: Trained patient to use iLet pump. Completed 7/11/2024   Note:    iLet Pump Start  Pump training conducted today by [facility] , Talia Peters. Pump training by [facility] protocol and training materials.       Details of pump therapy were covered with the patient to include: basic features of pump, charging pump, connecting CGM, filling of cartridge and priming infusion set. These features were reviewed in detail. Patient demonstrated the ability to fill pump cartridge and prime infusion set adequately per sterile technique.    Patient advised to change this set in 3 days. Reviewed site selection and rotation.  Instructed patient on disconnecting from infusion site when needed.   Instructed on pump navigation.   Reviewed hypoglycemia and hyperglycemia treatment protocols.   Discussed backup supplies.     Pt downloaded iLet buddy to phone. Created an account for patient and connect the buddy to the pump.     Written materials provided. Patient verbalized and demonstrated understanding of all instructions given.      Patient is to call clinic with any questions or concerns  Advised to contact iLet technical support for any technical issues with the pump.           Follow Up Plan     Follow up in about 1 day (around 7/12/2024) for E11.65. iLet pump   will contact patient tomorrow and in 1 week to review reports. Patient is scheduled to meet with Diabetes ERNESTO, Lissette Pearson on 7/29/24.     Today's care plan and follow up schedule was discussed with patient.  Yandy verbalized understanding of the care plan, goals, and agrees to follow up plan.        The patient was encouraged to communicate with his/her health care provider/physician and care team regarding his/her condition(s) and treatment.  I provided the patient with my contact information today and encouraged to contact me via phone or [facility] Patient Portal as needed.     Length of Visit   Total Time: 120 Minutes (No charges for today's visit due to pump  conducting today's training)

## 2024-07-11 NOTE — ADDENDUM NOTE
Note to patient:   The 21st Century Cures Act requires medical notes like these available to patients in the interest of transparency. Please be advised this is a medical document. Medical documents are intended to carry relevant information, assessments and facts as evident, and the clinical opinion of the practitioner. The medical note is intended as peer to peer communication and may appear blunt or direct. It is written in medical language and may contain abbreviations or verbage that are unfamiliar.       Patient presents today for Annual Female Wellness Exam and review of health maintenance updates/recommendations.    Chief Complaint   Patient presents with   • Physical     - fasting   - has meds and vitamins       *A comprehensive review of the patients medical/surgical history, family history, social history, medication/vitamins/supplements, immunizations was performed along with the appropriate health risk assessments and wellness assessments (nutrition/exercise/stress management) to guide recommendations.    Subjective Specific concerns include:   Reema Pathak is a 28 year old old female c/o Wellness/Preventive Visit and Health Maintenance Review.     Updates:  ***    Additional concerns outside of preventive care: getting episodes of anxiety at times  --is doing well with Citalopram and Wellbutrin  --can't focus can't do anything  --comes up not often  --migraines not happening often-- uses Naproxen PRN  --doing well on Spironolactone  --no longer purposely restricting  --if she packs a lunch she doesn't like she'll skip then snack more  --has to mentally make herself eat  --coming home after work snacking    Any labs completed prior to today's visit are included below.  See Social Documentation for Lifestyle Assessment Info.    SCREENING and Health Risk Assessments:  --------------------------------------------------------------  Review of Depression and Anxiety Screening Trends:  Review  Addended by: PAUL GARCIA on: 7/11/2024 08:52 AM     Modules accepted: Orders     Flowsheet  More data exists       2023   PHQ 2/9 Score   Adult PHQ 2 Score 0 0   Adult PHQ 2 Interpretation No further screening needed No further screening needed   Little interest or pleasure in activity? Not at all Not at all   Feeling down, depressed or hopeless? Not at all Not at all       2023     3:39 PM 2023     3:36 PM 3/14/2023    12:09 PM   PHQ 2 Score   Adult PHQ 2 Score 0 0 0   Adult PHQ 2 Interpretation No further screening needed No further screening needed No further screening needed   Little interest or pleasure in activity? 0 0 0          3/14/2023    12:09 PM 2022     9:33 AM   PHQ 9 Score   Adult PHQ 9 Score 1 7   Adult PHQ 9 Interpretation Minimal Depression Mild Depression            2023     3:25 PM 3/14/2023    12:00 PM 2022     9:20 AM   GAD7 Screening   GAD7 Score  0 2   Feeling nervous, anxious or on edge  Not at all Several days   Not being able to stop or control worrying  Not at all Several days   Worrying too much about different things  Not at all Not at all   Trouble relaxing  Not at all Not at all   Being so restless that it's hard to sit still  Not at all Not at all   Becoming easily annoyed or irritable  Not at all Not at all   Feeling afraid as if something awful might happen  Not at all Not at all   Ability to handle work, home and other people  Not difficult at all Not difficult at all   Date/time completed by patient via Divvyshot 2023  3:11 PM         --------------------------------------------------------------     Patient Care Team:  Twin Hennessy DO as PCP - General (Family Practice)  Leigh Lea RD as Dietitian (Dietitian, Registered)  Dr Leticia Villar as Consulting Physician (Naturopath)     OB History    Para Term  AB Living   0 0 0 0 0 0   SAB IAB Ectopic Molar Multiple Live Births   0 0 0 0 0 0     Past Medical History:   Diagnosis Date   • Adjustment disorder with mixed anxiety and depressed mood 2020    had  been on Zoloft, Prozac - had side effects of grogginess (stopped in 2013)   • Anxiety about health     increased anxiety around pelvic exams    • Binge eating disorder 08/09/2020    with stress, 5/2018- outpatient eating disorder rehabilitation ([facility]), sees Leigh Palomino   • Carpal tunnel syndrome on right 09/10/2021    MILD   • Chronic diarrhea 08/12/2020    Chronic diarrhea/loose stools-- IBS vs Metformin SE (started prior to Metformin)   • Chronic tension-type headache, not intractable 10/10/2018   • Cystic acne vulgaris 09/24/2017   • Dermatographia 08/09/2020    stress induced dermatophraphia   • Elevated dehydroepiandrosterone (DHEA) level 08/23/2017   • Encounter for weight loss counseling 03/30/2016    Lowest weight: 180 in high school   Highest weight: 208  Current weight trend: increasing  Goal weight: 150  Methods previously attempted: has tried a lot of diets  none worked.  Tried weight watchers. Tried beach body  shake diets.     Current diet:  general.  Yesterday had pizza for lunch  frozen vegetables and ice cream for dinner.    Drinks:water  Exercise:none     States she has a hx of binge   • Gallbladder polyp 10/04/2020    7 mm gallbladder polyp seen on US LIVER-- progressive fatty liver changes   • Generalized anxiety disorder 08/09/2020    sees Therapist - Sarmad Hollis, weekly just started   • Hirsutism 08/09/2020    hair growth chin, acne- suspect PCOS   • Hypertriglyceridemia without hypercholesterolemia 08/18/2017 8/2017 TG = 179 --> 283, , HDL 41    • Insulin resistance 09/14/2022   • Irregular menstruation 08/09/2020    has been on OCP, menarche age 12   • Migraine without aura and without status migrainosus, not intractable 08/10/2017    Had been on prn Sumatriptan previously; 3/2019- rx Sumatriptan and Naproxen PRN   • Motion sickness 08/12/2020    Motion sickness mostly in cars   • NAFLD (nonalcoholic fatty liver disease) 08/12/2020   • Not immune to hepatitis B virus  09/14/2022    Recommend 3 dose vaccine series   • Obesity, Class II, BMI 35-39.9 08/09/2020   • Pap smear for cervical cancer screening 08/21/2018    NILM, Normal pap smear- next pap 08/2021 routine (3 yrs)   • Pap smear for cervical cancer screening 09/09/2021    NILM, normal cytology - next pap 3 yr-- pt experienced vasovagal episode during exam suspect due to fasting   • PCOS (polycystic ovarian syndrome) 09/23/2017   • Recurrent candidiasis of vagina 02/02/2021    Medication side effect from Jardiance-- discontinued 2/2021   • Right wrist sprain, sequela 03/15/2018    Saw Dr. Adarsh Conti (Ortho). Given cortisone injections and recs PT. RTC 6 weeks PRN.   • Vitamin D deficiency 08/18/2017 08/2017 Vitamin d = 15.7     Past Surgical History:   Procedure Laterality Date   • No past surgeries       Social History     Socioeconomic History   • Marital status: Single     Spouse name: Not on file   • Number of children: Not on file   • Years of education: Not on file   • Highest education level: Not on file   Occupational History   • Occupation: Retail Store   • Occupation: [employer]   Tobacco Use   • Smoking status: Never   • Smokeless tobacco: Never   Vaping Use   • Vaping Use: never used   Substance and Sexual Activity   • Alcohol use: Not Currently     Comment: AUDIT-C=0   • Drug use: Never   • Sexual activity: Yes     Partners: Male     Birth control/protection: OCP   Other Topics Concern   •  Service No   • Blood Transfusions No   • Caffeine Concern No   • Occupational Exposure No   • Hobby Hazards No   • Sleep Concern Yes   • Stress Concern Yes   • Weight Concern Yes   • Special Diet Yes   • Back Care Yes   • Exercise Yes   • Bike Helmet Yes   • Seat Belt Yes   • Self-Exams Yes   Social History Narrative    Lives alone -- stays with family    =============================    Integrative Whole Health Assessment Collected on 9/9/2022:     Nutrition Patterns: now living back at home; mom cooks dinner;  BREAKFAST--works 6AM- 2 PM; tries to eat breakfast or brings with her--cereal with yogurt, eggs/fruit; granola bar/berries; LUNCH--difficult at work not able to sit down and eat, snacks--nuts, olives; PB sandwich, granola bar, fruit cup; DINNER--mom cooks, getting more proteins/veggies; not currently working with nutritionist has appt next week; baked potato with broccoli; salad with asparagus, salmon, mandarin oranges    Water Intake:     Caffeine:  not daily, rarely latte    Sugar-Sweetened Beverages: none    Exercise:  standing all day at work; no intentional exercise    SLEEP:  sleep is good, 7-9 hours--doesn't function well on little sleep sometimes on days off sleeps a lot 11 hours, naps during the day    Stress Management:  high stressors currently financially--watching TV, distraction, sees therapist weekly; eating a little bit, plants    Connectedness/Community/Spirituality:  gardening -- loves cooking    Alcohol Use: Not At Risk        Social Determinants: Total Audit-C Score: 0    = Twin Hennessy, DO     =============================     =============================    Integrative Whole Health Assessment Collected on 12/7/2023:     Nutrition Patterns: if not planning ahead more likely to go to fast food places; doing better with consistency; BREAKFAST--prioritize protein 20 g protein per meal never skips breakfast, lower carb--eggs, yogurt, turkey biscuit; fruit--berries, granola bar, toast, RX bars; LUNCH--whatever she feels like--pasta salad, salad greens/chicken; DINNER--sweet potato ground turkey--lives with family currently    Water Intake:   90 oz daily    Caffeine:  ***    Sugar-Sweetened Beverages: none    Exercise:  work is very physical--feels very tired after work--over the summer would take a walk    SLEEP:  ***    Stress Management:  ***    Connectedness/Community/Spirituality:  ***    Alcohol Use: Not At Risk (12/7/2023)        Alcohol Use            Social Determinants: Total Audit-C Score:  0    = Twin Hennessy      =============================      Social Determinants of Health     Financial Resource Strain: Low Risk  (12/7/2023)    Financial Resource Strain    • Social Determinants: Financial Resource Strain: None   Food Insecurity: No Food Insecurity (12/7/2023)    Food Insecurity    • Social Determinants: Food Insecurity: Never   Transportation Needs: No Transportation Needs (12/7/2023)    PRAPARE - Transportation    • Lack of Transportation (Medical): No    • Lack of Transportation (Non-Medical): No   Physical Activity: Inactive (12/7/2023)    Exercise Vital Sign    • Days of Exercise per Week: 0 days    • Minutes of Exercise per Session: 0 min   Stress: Low Risk  (12/7/2023)    Stress    • Social Determinants: Stress: A little bit   Social Connections: Socially Integrated (12/7/2023)    Social Connections    • Social Determinants: Social Connections: 5 or more times a week   Intimate Partner Violence: Not At Risk (12/7/2023)    Intimate Partner Violence    • Social Determinants: Intimate Partner Violence Past Fear: No    • Social Determinants: Intimate Partner Violence Current Fear: No      Alcohol Use: Not At Risk (12/7/2023)    Alcohol Use    • Social Determinants: Total Audit-C Score: 0     Audit C     Audit C Total Score    0        Family History   Problem Relation Age of Onset   • Crohn's Disease Maternal Cousin    • Cancer, Breast Maternal Aunt    • Premenopausal breast cancer Mother         x2 times, chemo/radiation, double mastectomy   • Anxiety disorder Mother    • Rheumatoid Arthritis Mother    • Cancer, Breast Mother    • Additional Onset of Breast Cancer Mother    • BRCA1 Negative Mother    • Hypertension Father    • Bipolar disorder Paternal Uncle    • Depression Sister    • Anxiety disorder Sister    • Cancer, Colon Neg Hx    • Cancer, Ovarian Neg Hx      ALLERGIES:   Allergen Reactions   • Penicillins HIVES     Cerner Allergy Text Annotation: penicillins       Outpatient  Medications Marked as Taking for the 12/7/23 encounter (Office Visit) with Twin Hennessy, DO   Medication Sig Dispense Refill   • citalopram (CeleXA) 10 MG tablet Take 1 tablet by mouth daily. 90 tablet 3   • spironolactone (ALDACTONE) 100 MG tablet Take 1 tablet by mouth at bedtime. For PCOS 90 tablet 3   • Acetylcysteine (NAC) 600 MG Cap Take 600 mg by mouth 2 (two) times a day.     • coenzyme Q10 100 MG capsule Take 100 mg by mouth daily.     • buPROPion XL (WELLBUTRIN XL) 300 MG 24 hr tablet Take 1 tablet by mouth daily. 90 tablet 3   • SUMAtriptan (IMITREX) 50 MG tablet Take 1 tablet at the onset of migraine as needed, may take another tablet after 2 hours if no improvement, max 3/day 30 tablet 11   • Milk Thistle 300 MG Cap Take 300 mg by mouth 3 times daily. TO AID GLUCOSE METABOLISM [available over the counter] 90 capsule 11   • naproxen (NAPROSYN) 500 MG tablet Take 1 tablet by mouth 2 times daily as needed (migraine headache). 60 tablet 6   • Barberry-Oreg Grape-Goldenseal (Berberine Complex) 200-200-50 MG Cap Take 500 mg by mouth 2 times daily. RECOMMENDED- BERBERINE 500 MG BID FOR DIABETES (over the counter/online)     • Chromium Picolinate 1000 MCG Tab Take 1,000 mcg by mouth daily. TO HELP WITH GLUCOSE METABOLISM - OTC 30 tablet    • Magnesium Glycinate 665 MG Cap Take 665 mg by mouth daily. FOR MOOD SUPPORT (ANXIETY/CALMING) 90 capsule 3   • Zinc Picolinate 25 MG Tab Take 15 mg by mouth daily. RECOMMENDED OTC 30 tablet    • NON FORMULARY Indications: samuel-inositol, d-chiro insitol (Caranositol) samuel-inositol, d-chiro insitol (Caranositol)     • Cholecalciferol (VITAMIN D3) 125 mcg (5,000 units) capsule Take 5,000 Units by mouth daily.     • Cetirizine HCl 10 MG Cap Take 10 mg by mouth daily.     • Omega-3 Fatty Acids (OMEGA-3 FISH OIL) 1000 MG capsule EPA + DHA = 500 mg       Immunization History   Administered Date(s) Administered   • COVID Pfizer 12Y+ (Requires Dilution) 04/09/2021, 04/30/2021,  12/14/2021   • COVID Pfizer Bivalent 12Y+ 09/25/2022   • COVID Pfizer/Comirnaty 12+ (4549-4253) 11/28/2023   • HPV 9-Valent 08/21/2018   • HPV Quadrivalent 09/22/2012, 03/30/2016   • Hep B, Adult, 2 Dose 09/25/2022, 11/15/2022   • Influenza, Split 10/18/2012   • Influenza, Unspecified Formulation 09/09/2020   • Influenza, quadrivalent, MDCK, preserve-free (FLUCELVAX) 09/25/2020, 11/28/2023   • Influenza, quadrivalent, preserve-free 09/23/2017, 10/10/2018, 09/09/2021, 09/09/2022   • Tdap 07/09/2012, 09/01/2012, 01/01/2013, 09/09/2021        Health Maintenance:  There are no preventive care reminders to display for this patient.    ROS: Pertinent positives as noted in HPI, otherwise negative ROS.        Objective   Vitals:    12/07/23 1540   BP: 107/74   BP Location: LUE - Left upper extremity   Patient Position: Sitting   Cuff Size: Large Adult   Pulse: 84   Resp: 16   Temp: 97.9 °F (36.6 °C)   TempSrc: Tympanic   SpO2: 97%   Weight: 103.6 kg (228 lb 6.3 oz)   Height: 5' 2.6\" (1.59 m)   Body mass index is 40.98 kg/m².     Vitals Trends:    Wt Readings from Last 6 Encounters:   12/07/23 103.6 kg (228 lb 6.3 oz)   03/14/23 101.5 kg (223 lb 12.3 oz)   09/09/22 100.7 kg (222 lb 0.1 oz)   09/09/21 99.6 kg (219 lb 9.3 oz)   03/08/21 100 kg (220 lb 7.4 oz)   02/02/21 98.4 kg (216 lb 14.9 oz)       BMI Readings from Last 6 Encounters:   12/07/23 40.98 kg/m²   03/14/23 40.15 kg/m²   09/09/22 39.83 kg/m²   09/09/21 39.40 kg/m²   03/08/21 39.31 kg/m²   02/02/21 38.20 kg/m²        BP Readings from Last 6 Encounters:   12/07/23 107/74   03/14/23 113/72   09/09/22 106/70   09/09/21 111/71   03/08/21 113/74   02/02/21 113/74        Pulse Readings from Last 6 Encounters:   12/07/23 84   03/14/23 71   09/09/22 78   09/09/21 83   03/08/21 85   02/02/21 94      ------------------------    Physical Exam  Vitals and nursing note reviewed.   Constitutional:       General: She is not in acute distress.     Appearance: Normal appearance.  She is well-developed.   HENT:      Head: Normocephalic and atraumatic.      Right Ear: External ear normal.      Left Ear: External ear normal.   Eyes:      General: No scleral icterus.        Right eye: No discharge.         Left eye: No discharge.      Conjunctiva/sclera: Conjunctivae normal.      Pupils: Pupils are equal, round, and reactive to light.   Neck:      Thyroid: No thyromegaly.   Cardiovascular:      Rate and Rhythm: Normal rate and regular rhythm.      Pulses: Normal pulses.           Radial pulses are 2+ on the right side and 2+ on the left side.        Posterior tibial pulses are 2+ on the right side and 2+ on the left side.      Heart sounds: Normal heart sounds. No murmur heard.  Pulmonary:      Effort: Pulmonary effort is normal. No respiratory distress.      Breath sounds: Normal breath sounds. No stridor. No wheezing.   Abdominal:      General: Bowel sounds are normal. There is no distension.      Palpations: Abdomen is soft.      Tenderness: There is no abdominal tenderness.   Musculoskeletal:         General: No deformity. Normal range of motion.      Cervical back: Normal range of motion and neck supple.   Lymphadenopathy:      Cervical: No cervical adenopathy.      Right cervical: No superficial cervical adenopathy.     Left cervical: No superficial cervical adenopathy.      Upper Body:      Right upper body: No supraclavicular adenopathy.      Left upper body: No supraclavicular adenopathy.   Skin:     General: Skin is warm and dry.      Capillary Refill: Capillary refill takes less than 2 seconds.      Findings: No rash.      Nails: There is no clubbing.   Neurological:      General: No focal deficit present.      Mental Status: She is alert and oriented to person, place, and time. Mental status is at baseline.      Motor: No tremor or abnormal muscle tone.      Coordination: Coordination normal.   Psychiatric:         Attention and Perception: Attention normal.         Mood and Affect:  Mood and affect normal.         Speech: Speech normal.         Behavior: Behavior normal. Behavior is cooperative.         LAB RESULTS (if available):  No visits with results within 1 Month(s) from this visit.   Latest known visit with results is:   Office Visit on 03/14/2023   Component Date Value Ref Range Status   • Testosterone, Female or Child 03/14/2023 48  9 - 55 ng/dL Final   • Cholesterol 03/14/2023 209 (H)  <=199 mg/dL Final   • Triglycerides 03/14/2023 122  <=149 mg/dL Final   • HDL 03/14/2023 47 (L)  >=50 mg/dL Final   • LDL 03/14/2023 138 (H)  <=129 mg/dL Final   • Non-HDL Cholesterol 03/14/2023 162  mg/dL Final   • Cholesterol/ HDL Ratio 03/14/2023 4.4  <=4.4 Final   • Hepatitis C Antibody 03/14/2023 Negative  Negative Final   • Insulin, Fasting 03/14/2023 27  3 - 28 mUnits/L Final   • Hemoglobin A1C 03/14/2023 5.5  4.5 - 5.6 % Final   • Sodium 03/14/2023 137  135 - 145 mmol/L Final   • Potassium 03/14/2023 4.6  3.4 - 5.1 mmol/L Final   • Chloride 03/14/2023 105  97 - 110 mmol/L Final   • Carbon Dioxide 03/14/2023 25  21 - 32 mmol/L Final   • Anion Gap 03/14/2023 12  7 - 19 mmol/L Final   • Glucose 03/14/2023 79  70 - 99 mg/dL Final   • BUN 03/14/2023 12  6 - 20 mg/dL Final   • Creatinine 03/14/2023 0.83  0.51 - 0.95 mg/dL Final   • Glomerular Filtration Rate 03/14/2023 >90  >=60 Final   • BUN/Cr 03/14/2023 14  7 - 25 Final   • Calcium 03/14/2023 9.7  8.4 - 10.2 mg/dL Final   • Bilirubin, Total 03/14/2023 0.4  0.2 - 1.0 mg/dL Final   • GOT/AST 03/14/2023 40 (H)  <=37 Units/L Final   • GPT/ALT 03/14/2023 76 (H)  <64 Units/L Final   • Alkaline Phosphatase 03/14/2023 76  45 - 117 Units/L Final   • Albumin 03/14/2023 3.5 (L)  3.6 - 5.1 g/dL Final   • Protein, Total 03/14/2023 7.4  6.4 - 8.2 g/dL Final   • Globulin 03/14/2023 3.9  2.0 - 4.0 g/dL Final   • A/G Ratio 03/14/2023 0.9 (L)  1.0 - 2.4 Final         Assessment & Plan   Assessment and Plan:   28 year old female SEEN TODAY FOR FEMALE WELLNESS  VISIT:  1. Encounter for general adult medical examination w/o abnormal findings    2. Screening for lipid disorders    3. Generalized anxiety disorder    4. PCOS (polycystic ovarian syndrome)    5. Vitamin D deficiency    6. Insulin resistance    7. Hypertriglyceridemia without hypercholesterolemia    8. Cystic acne vulgaris    9. Chronic fatigue        Well Female Exam:  -Nutrition Assessment performed and encouraged healthy nutrition (6-8 servings of fresh fruits/veggies, whole grains, lean meats/proteins and fish) and adequate hydration (2L plain water daily) with limited sugar intake  -encouraged regular movement practice with regular weight bearing exercise for optimal bone health (AHA goal 150 minutes moderate intensity exercise/wk for heart conditioning)  -encouraged intake of at least 6189-6990 mg calcium daily for prevention of osteoporosis  -reviewed routine pap guidelines (q 3 years for women age 21-29, q 3-5 years for women age 30-65 with HPV testing, no paps needed after age 65)  -encouraged regular self breast awareness and reviewed breast cancer screening recommendations for age  -reviewed vaccine history and age-based assessment performed--recommended yearly influenza vaccine and TdaP q 7-8 yrs (not longer than 10 yrs), reviewed updated recommendations for current COVID vaccination  -age based vaccine recommendations for pneumococcal and Zoster reviewed  -USPSTF guidance on mammography routine starting age 40 and colorectal cancer screening starting age 45  -Social Determinants of Health were reviewed in this encounter and key barriers identified/discussed  -Health Maintenance screening bloodwork reviewed today (results above) or will return for labs (in that case will await results and provide detailed interpretation/recommendations upon lab review)    Encounter for general adult medical examination w/o abnormal findings  ***  - CBC with Automated Differential  - Comprehensive Metabolic Panel  -  Glycohemoglobin  - Insulin Level, Fasting  - Free T3  - Free T4  - Thyroid Stimulating Hormone  - Thyroid Antibodies  - Lipid Panel With Reflex  - Vitamin B12 And Folate  - Vitamin D -25 Hydroxy  - Ferritin  - Iron And total Iron Binding Capacity    Screening for lipid disorders  ***  - Lipid Panel With Reflex    Generalized anxiety disorder  ***    PCOS (polycystic ovarian syndrome)  ***  - CBC with Automated Differential  - Comprehensive Metabolic Panel  - Glycohemoglobin  - Insulin Level, Fasting  - Free T3  - Free T4  - Thyroid Stimulating Hormone  - Thyroid Antibodies  - Lipid Panel With Reflex  - Vitamin B12 And Folate  - Vitamin D -25 Hydroxy  - Ferritin  - Iron And total Iron Binding Capacity    Vitamin D deficiency  ***  - Vitamin D -25 Hydroxy    Insulin resistance  ***  - Glycohemoglobin  - Insulin Level, Fasting    Hypertriglyceridemia without hypercholesterolemia  ***  - Lipid Panel With Reflex    Cystic acne vulgaris  ***    Chronic fatigue  ***  - CBC with Automated Differential  - Comprehensive Metabolic Panel  - Glycohemoglobin  - Insulin Level, Fasting  - Free T3  - Free T4  - Thyroid Stimulating Hormone  - Thyroid Antibodies  - Lipid Panel With Reflex  - Vitamin B12 And Folate  - Vitamin D -25 Hydroxy  - Ferritin  - Iron And total Iron Binding Capacity    NOTE:  The recommended plan of care and interventions were discussed with patient during this visit using shared decision making.  Indications of any ordered labs, imaging, referrals or diagnostics were reviewed with patient, and the patient is agreeable to proceed or will consider.  It was made clear that this is not verification of insurance coverage, and it would be advisable for patient to contact his/her insurance directly for any questions regarding coverage levels.     Orders Placed This Encounter   • CBC with Automated Differential   • Comprehensive Metabolic Panel   • Glycohemoglobin   • Insulin Level, Fasting   • Free T3   • Free T4   •  Thyroid Stimulating Hormone   • Thyroid Antibodies   • Lipid Panel With Reflex   • Vitamin B12 And Folate   • Vitamin D -25 Hydroxy   • Ferritin   • Iron And total Iron Binding Capacity   • CBC with Automated Differential (performable only)         Recommended Followup: No follow-ups on file.    Future Appointments   Date Time Provider Department Center   12/23/2024 11:25 AM Twin Hennessy DO [id number]       Electronically signed by Twin Hennessy DO, ABOIM    [facility] Family Physician     Iron Binding Capacity  - spironolactone (ALDACTONE) 100 MG tablet; Take 1 tablet by mouth at bedtime. For PCOS  Dispense: 90 tablet; Refill: 3    Vitamin D deficiency  LABS ORDERED FROM THIS ENCOUNTER:   - Vitamin D -25 Hydroxy    Insulin resistance  -doing well so far with regular interval meals  --educated on the healthy meal formula breakdown (regular interval meals with components of a lean protein, healthy fat, complex carbohydrate and 2 servings of fresh produce) and healthy snacks (with at least 2 of the 4 components from meals)   LABS ORDERED FROM THIS ENCOUNTER:   - Glycohemoglobin  - Insulin Level, Fasting    Hypertriglyceridemia without hypercholesterolemia  -routine lipids screen for heart disease risk ordered, will follow up results and advise pt accordingly   - Lipid Panel With Reflex    Cystic acne vulgaris  -doing well currently with Spironolactone at current dosage-- no dizziness or fainting  NEW RX ORDERED FROM THIS ENCOUNTER:   - spironolactone (ALDACTONE) 100 MG tablet; Take 1 tablet by mouth at bedtime. For PCOS  Dispense: 90 tablet; Refill: 3    Chronic fatigue  -fatigue chronic   -DDx includes vitamin def (B12, iron, D) vs hypothyroid vs depression vs poor nutrition habits vs poor stress management vs (less likely) chronic EBV vs lyme vs autoimmune   -will initiate workup as noted in orders and follow up lab results-- will advise specifically based on results review    LABS ORDERED FROM THIS ENCOUNTER:   - CBC with Automated Differential  - Comprehensive Metabolic Panel  - Glycohemoglobin  - Insulin Level, Fasting  - Free T3  - Free T4  - Thyroid Stimulating Hormone  - Thyroid Antibodies  - Lipid Panel With Reflex  - Vitamin B12 And Folate  - Vitamin D -25 Hydroxy  - Ferritin  - Iron And total Iron Binding Capacity    Moderate episode of recurrent major depressive disorder (CMD)  **Depression/anxiety follow up plan addressed:  -reviewed PHQ/GRAY mood assessment screening score  collected/reviewed and discussed symptoms today, PHQ=0 ; GRAY=not collected  -doing well currently would like to continue same  -patient confirms stable symptoms with no self harm, no SI/HI - has emergency plan/contact  -encouraged ongoing proactive self care including optimized nutrition to minimize inflammation, regular movement especially time in nature, and adequate sleep  -encouraged to OUTREACH if worsening mood symptoms or if needs modification of treatment plan   NEW RX ORDERED FROM THIS ENCOUNTER:   - buPROPion XL (WELLBUTRIN XL) 300 MG 24 hr tablet; Take 1 tablet by mouth daily.  Dispense: 90 tablet; Refill: 3  - citalopram (CeleXA) 10 MG tablet; Take 1 tablet by mouth daily.  Dispense: 90 tablet; Refill: 3    NAFLD (nonalcoholic fatty liver disease)  -Patient education provided for Non-alcoholic Steatohepatitis and/or Fatty Liver Disease  -reviewed the correlation with hyperglycemia, obesity, hyperlipidemia and genetic predisposition  -reviewed the goals of treatment to reverse co-morbidities when possible and weight loss  -reviewed correlation with metabolic syndrome  -advised to avoid other hepatotoxic patterns such as excessive Tylenol use or EtOH  -advised on the potential progression to fibrosis changes vs remain benign   -recommend monitor LFT on a regular basis and maintain aggressive hydration  -Created a supportive environment for patient to access her care and answered all patient questions to satisfaction -- provided active listening and affirmation.      Dietary iron deficiency without anemia  -NEW dietary iron deficiency seen on labs from this encounter  -may be the cause of her increasing fatigue  -will start iron daily  NEW RX ORDERED FROM THIS ENCOUNTER:   - ferrous sulfate 325 (65 FE) MG tablet; Take 1 tablet by mouth daily (with breakfast).  Dispense: 90 tablet; Refill: 3    NOTE:  The recommended plan of care and interventions were discussed with patient during this visit using shared  decision making.  Indications of any ordered labs, imaging, referrals or diagnostics were reviewed with patient, and the patient is agreeable to proceed or will consider.  It was made clear that this is not verification of insurance coverage, and it would be advisable for patient to contact his/her insurance directly for any questions regarding coverage levels.     Orders Placed This Encounter   • CBC with Automated Differential   • Comprehensive Metabolic Panel   • Glycohemoglobin   • Insulin Level, Fasting   • Free T3   • Free T4   • Thyroid Stimulating Hormone   • Thyroid Antibodies   • Lipid Panel With Reflex   • Vitamin B12 And Folate   • Vitamin D -25 Hydroxy   • Ferritin   • Iron And total Iron Binding Capacity   • CBC with Automated Differential (performable only)   • buPROPion XL (WELLBUTRIN XL) 300 MG 24 hr tablet   • citalopram (CeleXA) 10 MG tablet   • spironolactone (ALDACTONE) 100 MG tablet   • propRANolol (INDERAL) 20 MG tablet   • ferrous sulfate 325 (65 FE) MG tablet         Recommended Followup: Return in about 1 year (around 12/7/2024) for Complete Physical WITH Pap Smear.    Future Appointments   Date Time Provider Department Center   12/23/2024 11:25 AM Twin Hennessy DO [id number]       Electronically signed by Twin Hennessy DO, ABOIM    [facility]

## 2024-07-26 ENCOUNTER — TELEPHONE (OUTPATIENT)
Dept: GASTROENTEROLOGY | Facility: CLINIC | Age: 54
End: 2024-07-26
Payer: COMMERCIAL

## 2024-07-28 DIAGNOSIS — I20.9 AP (ANGINA PECTORIS): ICD-10-CM

## 2024-07-28 DIAGNOSIS — I20.9 NEW-ONSET ANGINA: ICD-10-CM

## 2024-07-28 DIAGNOSIS — I25.10 CAD IN NATIVE ARTERY: ICD-10-CM

## 2024-07-29 ENCOUNTER — OFFICE VISIT (OUTPATIENT)
Dept: DIABETES | Facility: CLINIC | Age: 54
End: 2024-07-29
Payer: COMMERCIAL

## 2024-07-29 ENCOUNTER — OFFICE VISIT (OUTPATIENT)
Dept: INTERNAL MEDICINE | Facility: CLINIC | Age: 54
End: 2024-07-29
Payer: COMMERCIAL

## 2024-07-29 VITALS
TEMPERATURE: 97 F | DIASTOLIC BLOOD PRESSURE: 76 MMHG | RESPIRATION RATE: 17 BRPM | SYSTOLIC BLOOD PRESSURE: 118 MMHG | WEIGHT: 155.19 LBS | HEART RATE: 80 BPM | HEIGHT: 65 IN | BODY MASS INDEX: 25.85 KG/M2 | OXYGEN SATURATION: 99 %

## 2024-07-29 DIAGNOSIS — E11.65 TYPE 2 DIABETES MELLITUS WITH HYPERGLYCEMIA, WITHOUT LONG-TERM CURRENT USE OF INSULIN: Primary | ICD-10-CM

## 2024-07-29 DIAGNOSIS — E88.1 ACQUIRED LIPODYSTROPHIC DIABETES: Primary | ICD-10-CM

## 2024-07-29 PROCEDURE — 99999 PR PBB SHADOW E&M-EST. PATIENT-LVL III: CPT | Mod: PBBFAC,,, | Performed by: PEDIATRICS

## 2024-07-29 RX ORDER — METOPROLOL SUCCINATE 25 MG/1
TABLET, EXTENDED RELEASE ORAL
Qty: 90 TABLET | Refills: 3 | Status: SHIPPED | OUTPATIENT
Start: 2024-07-29

## 2024-07-29 NOTE — PROGRESS NOTES
Patient ID: Yandy Cabral is a 53 y.o. female.    Chief Complaint: Leg issues     History of Present Illness    CHIEF COMPLAINT:  Patient presents today for follow up.    DIABETES MANAGEMENT:  Patient reports issues with her new insulin pump, experiencing frequent low blood sugar episodes. She wakes up twice nightly due to alarms. She describes a severe hypoglycemic event during Pilates where her blood sugar dropped from 160 to below 50 in less than 20 minutes, resulting in cold sweats and near-fainting. She expresses fear and distress over these frequent lows. The pump's AI system, which is supposed to learn and adjust, has not improved despite using it for nearly three weeks. She reports persistent issues with hypoglycemia even when attempting to avoid carbs, citing an instance where her blood sugar dropped to 50 after eating a no-carb meal. She expresses difficulty with meal input on the new pump, noting that it's limited to preset meal options without the ability to input specific carb counts.    WEIGHT MANAGEMENT:  Patient reports recent weight gain of 7 lbs due to frequent candy consumption to correct low blood sugar episodes. She expresses interest in restarting Ozempic or a similar medication for weight management. She discontinued Ozempic in February and has tried Rybelsus in the past but found it challenging to adhere to the administration requirements. She indicates a preference for injectable medications like Ozempic or Mounjaro over oral options, noting better results with these. She expresses a preference for Mounjaro due to its pre-loaded format but states that Ozempic would also be acceptable.    MEDICATION HISTORY:  Patient reports previous use of Ozempic and Mounjaro for weight management and blood sugar control, with the last use being in February. She tried Rybelsus, the oral form of semaglutide, but experienced difficulties with the administration requirements, including remembering to  take it every morning on an empty stomach and adhering to the restrictions on drinking afterwards. She compares this difficulty to her struggles with taking her weekly osteoporosis medication.    MUSCULOSKELETAL:  Patient reports noticing an indentation in her leg 8-12 months ago, which has been progressively worsening over time. She denies any associated pain with the leg abnormality. Despite the indentation, she continues her regular Pilates routine twice weekly without limitations.    MEDICAL HISTORY:  Patient has a history of diabetes and osteoporosis. She takes a weekly medication for osteoporosis, which she finds challenging to remember to take on an empty stomach. No pain is reported in relation to these conditions.    PMH, PSH, SH, FH reviewed with patient.    ROS:  General: -fever, -chills, -fatigue, +weight gain, -weight loss  Eyes: -vision changes, -redness, -discharge  ENT: -ear pain, -nasal congestion, -sore throat  Cardiovascular: -chest pain, -palpitations, -lower extremity edema  Respiratory: -cough, -shortness of breath  Gastrointestinal: -abdominal pain, -nausea, -vomiting, -diarrhea, -constipation, -blood in stool  Genitourinary: -dysuria, -hematuria, -frequency  Musculoskeletal: -joint pain, -muscle pain  Skin: -rash, -lesion  Neurological: -headache, -dizziness, -numbness, -tingling  Psychiatric: -anxiety, -depression, -sleep difficulty         Exam:  Physical Exam    General: No acute distress. Well-developed. Well-nourished.  Eyes: EOMI. Sclerae anicteric.  HENT: Normocephalic. Atraumatic. Nares patent. Moist oral mucosa.  Cardiovascular: Regular rate. Regular rhythm. No murmurs. No rubs. No gallops. Normal S1, S2.  Respiratory: Normal respiratory effort. Clear to auscultation bilaterally. No rales. No rhonchi. No wheezing.  Abdomen: Soft. Non-tender. Non-distended. Normoactive bowel sounds.  Musculoskeletal: No  obvious deformity. Bilateral gastroc area below the lateral joint line: area of  indention in the subcutaneous tissue without warmth, redness, tenderness, or cellulitis.  Extremities: No lower extremity edema.  Neurological: Alert & oriented x3. No slurred speech. Normal gait.  Psychiatric: Normal mood. Normal affect. Good insight. Good judgment.  Skin: Warm. Dry. No rash.         Assessment/Plan:  Acquired lipodystrophic diabetes         Assessment & Plan    E11.649 Type 2 diabetes mellitus with hypoglycemia without coma  E11.65 Type 2 diabetes mellitus with hyperglycemia  M81.0 Age-related osteoporosis without current pathological fracture  Z79.4 Long term (current) use of insulin  E88.1 Lipodystrophy, not elsewhere classified  M79.89 Other specified soft tissue disorders  HYPOGLYCEMIA:  - Patient experiencing significant hypoglycemic episodes since starting new insulin pump with AI technology 3 weeks ago.  - Discontinued insulin pump due to frequent hypoglycemic episodes.  DIABETES:  - Will restart either Ozempic or Mounjaro GLP-1 agonist.  - Patient previously felt Mounjaro worked better.  - Will discuss with Adriana and determine appropriate medication and dose.  - May be limited by current Mounjaro availability.  BILATERAL LEG INDENTATIONS:  - Bilateral indentations in gastroc area without signs of infection.  - Queried DrsIlana Perdomo and Adrienne regarding possible lipodystrophy vs other etiology.  - Will follow up after discussion with colleagues.  - Will message patient after discussing bilateral leg indentations with dermatology and Ortho to determine if referral to either specialty is indicated for further evaluation.          Visit today included increased complexity associated with the care of the episodic problem  addressed and managing the longitudinal care of the patient due to the serious and/or complex managed problem(s) .      No follow-ups on file.    This note was generated with the assistance of ambient listening technology. Verbal consent was obtained by the patient and  accompanying visitor(s) for the recording of patient appointment to facilitate this note. I attest to having reviewed and edited the generated note for accuracy, though some syntax or spelling errors may persist. Please contact the author of this note for any clarification.

## 2024-07-29 NOTE — PROGRESS NOTES
The patient location is: Home  The chief complaint leading to consultation is: Diabetes    Visit type: audiovisual    Face to Face time with patient: 15  30 minutes of total time spent on the encounter, which includes face to face time and non-face to face time preparing to see the patient (eg, review of tests), Obtaining and/or reviewing separately obtained history, Documenting clinical information in the electronic or other health record, Independently interpreting results (not separately reported) and communicating results to the patient/family/caregiver, or Care coordination (not separately reported).  Each patient to whom he or she provides medical services by telemedicine is:  (1) informed of the relationship between the physician and patient and the respective role of any other health care provider with respect to management of the patient; and (2) notified that he or she may decline to receive medical services by telemedicine and may withdraw from such care at any time.    Notes:    Yandy Cabral is a 53 y.o. female who presents for a follow up evaluation of Type 2 diabetes mellitus.     CHIEF COMPLAINT: Diabetes Consultation    PCP: Jaycob Ramos MD      Initial visit with me - 12/19/2022     The patient was initially diagnosed with diabetes 2004, polyuria/polydipsia, unintentional weight loss, weakness, neuropathy and vision changes. Diagnosed with DM type 1.5.      Previous failed treatments include:  Was on insulin previously, was eventually weaned off.   [employer] Labsna - insurance stopped paying.  Insulin pump - at [facility] in [id number]. [employer].   Mounjaro - nausea  Ozempic - wanted to try rybelsus.     Social Documentation:  Patient lives with  and daughter.   Occupation:  for nonprofit organization.   Patient prefers In-person Visits.   and Video Virtual Visits.   Current Diet: No fried foot, avoids dairy. Drinks diet dr. Azul, drinking more water.   Exercise:  "Pilates twice a week.      Known Diabetes complications: peripheral neuropathy and cardiovascular disease.     Cardiovascular Risk Factors: family history of premature cardiovascular disease.    Diabetes Medications               dapaglifloz propaned-metformin (XIGDUO XR) 10-1,000 mg TBph Take 1 tablet by mouth once daily.    insulin aspart, niacinamide, (FIASP FLEXTOUCH U-100 INSULIN) 100 unit/mL (3 mL) InPn Inject 4-14 Units into the skin 3 (three) times daily with meals.    metFORMIN (GLUCOPHAGE) 500 MG tablet Take 500 mg by mouth 2 (two) times daily.    semaglutide (RYBELSUS) 14 mg tablet Take 1 tablet (14 mg total) by mouth once daily.     Current monitoring regimen: Dexcom G7 Sharing    The patient's Dexcom CGM was downloaded and reviewed. For the past 14 days, patient average glucose was 188 mg/dL. She was above range 47% of the time, in range 53% of the time, and below range 0% of the time. The target range for this patient was 70 - 180 mg/dL. Overall, there was a pattern of post prandial hyperglycemia.          Recent hypoglycemic episodes: none.     Patient compliant with glucose checks and medication administration? Yes    DIABETES MANAGEMENT STATUS  Statin: Taking  ACE/ARB: Not taking  Screening or Prevention Patient's value Goal Complete/Controlled?   HgA1C Testing and Control   Lab Results   Component Value Date    HGBA1C 7.6 (H) 02/23/2024      Annually/Less than 8% No     Lipid profile : 02/23/2024 Annually Yes     LDL control Lab Results   Component Value Date    LDLCALC 93.4 02/23/2024    LDLCALC 93.4 02/23/2024    Annually/Less than 100 mg/dl  Yes     Nephropathy screening Lab Results   Component Value Date    LABMICR 18.0 02/23/2024     Lab Results   Component Value Date    PROTEINUA 1+ (A) 06/24/2019     No results found for: "UTPCR"   Annually No     Blood pressure BP Readings from Last 1 Encounters:   06/24/24 120/72    Less than 140/90 Yes     Dilated retinal exam : 03/27/2023 Annually Yes   "   Foot exam   : 03/05/2024 Annually No     Patient's medications, allergies, surgical, social and family histories were reviewed and updated as appropriate.     Review of Systems   Constitutional:  Negative for weight loss.   Eyes:  Negative for blurred vision and double vision.   Cardiovascular:  Negative for chest pain.   Gastrointestinal:  Negative for nausea and vomiting.   Genitourinary:  Negative for frequency.   Musculoskeletal:  Negative for falls.   Neurological:  Negative for dizziness and weakness.   Endo/Heme/Allergies:  Negative for polydipsia.   Psychiatric/Behavioral:  Negative for depression.    All other systems reviewed and are negative.       Physical Exam  Constitutional:       Appearance: Normal appearance.   HENT:      Head: Normocephalic and atraumatic.   Pulmonary:      Effort: No respiratory distress.   Musculoskeletal:      Cervical back: Normal range of motion.   Neurological:      Mental Status: She is alert and oriented to person, place, and time.   Psychiatric:         Mood and Affect: Mood normal.         Behavior: Behavior normal.      Last menstrual period 03/05/2014.  Wt Readings from Last 3 Encounters:   07/11/24 66.7 kg (147 lb)   06/24/24 67.9 kg (149 lb 11.1 oz)   05/15/24 66.4 kg (146 lb 6.2 oz)     Hemoglobin A1C   Date Value Ref Range Status   02/23/2024 7.6 (H) 4.0 - 5.6 % Final     Comment:     ADA Screening Guidelines:  5.7-6.4%  Consistent with prediabetes  >or=6.5%  Consistent with diabetes    High levels of fetal hemoglobin interfere with the HbA1C  assay. Heterozygous hemoglobin variants (HbS, HgC, etc)do  not significantly interfere with this assay.   However, presence of multiple variants may affect accuracy.     10/10/2023 7.3 (H) 4.0 - 5.6 % Final     Comment:     ADA Screening Guidelines:  5.7-6.4%  Consistent with prediabetes  >or=6.5%  Consistent with diabetes    High levels of fetal hemoglobin interfere with the HbA1C  assay. Heterozygous hemoglobin variants  (HbS, HgC, etc)do  not significantly interfere with this assay.   However, presence of multiple variants may affect accuracy.     10/19/2022 8.4 (H) 4.0 - 5.6 % Final     Comment:     ADA Screening Guidelines:  5.7-6.4%  Consistent with prediabetes  >or=6.5%  Consistent with diabetes    High levels of fetal hemoglobin interfere with the HbA1C  assay. Heterozygous hemoglobin variants (HbS, HgC, etc)do  not significantly interfere with this assay.   However, presence of multiple variants may affect accuracy.         Lab Results   Component Value Date    CPEPTIDE 1.25 02/23/2024    GLUTAMICACID 0.00 02/23/2024       Chemistry        Component Value Date/Time     02/23/2024 0759    K 4.1 02/23/2024 0759     02/23/2024 0759    CO2 27 02/23/2024 0759    BUN 19 02/23/2024 0759    CREATININE 0.9 02/23/2024 0759     (H) 02/23/2024 0759        Component Value Date/Time    CALCIUM 10.8 (H) 02/23/2024 0759    ALKPHOS 81 02/23/2024 0759    AST 31 02/23/2024 0759    ALT 37 02/23/2024 0759    BILITOT 0.5 02/23/2024 0759    ESTGFRAFRICA >60.0 05/13/2022 0911    EGFRNONAA >60.0 05/13/2022 0911        ASSESSMENT    ICD-10-CM ICD-9-CM   1. Type 2 diabetes mellitus with hyperglycemia, without long-term current use of insulin  E11.65 250.00     790.29             PLAN  Diagnoses and all orders for this visit:    Type 2 diabetes mellitus with hyperglycemia, without long-term current use of insulin          Reviewed pathophysiology of diabetes, complications related to the disease, importance of annual dilated eye exam and daily foot examination. Explained MOA, SE, dosage of medications. Written instructions given and reviewed with patient and patient verbalizes understanding.     1/8/2023 - complains of nausea with Mounjaro. Would like to try oral alternative, will send in Rybelsus.     2/19/2024 - post prandial glucoses remain uncontrolled, despite rybelsus and pre meal correction dosing. Will change fiasp to meal  size, increase rybelsus. Patient would like to reconsider going back in an insulin pump. Will have her complete labs this week and then decide on pump.     3/18/2024 - continues to have post prandial hyperglycemia. Will increase rybelsus. Insulin ab/FILOMENA/cpep neg. Will schedule for pump evaluation, patient is interested in iLet.    2024 - continues to have post prandial hyperglycemia. Discussed with patient need to decrease carbohydrate intake with meals, due to such high post prandial excursions, typically taking 10 units fiasp before medium size meals. States she received letter from her insurance company on Saturday stating iLet not approved due to her previous pump still in warranty, but patient has not been on a pump in 14 years. She will call them today to clarify.      2024 - states ilet pump shipped and should arrive tomorrow.     2024 - patient has had recurrent hypoglycemia since starting iLet insulin pump, also received a bill from her insurance company for full price of the pump.     PATIENT INSTRUCTIONS     Discontinue iLet insulin pump.     Restart pre-pump medications:    Continue Rybelsus 14 mg by mouth daily.   Continue Xigduo XR  mg by mouth daily.   Continue Fiasp correction every 4 hours between meals    If  - 200, may take 4 units of Fiasp  If  - 250, may take 6 units of Fiasp  If  - 300, may take 8 units of Fiasp  If  - 350, may take 10 units of Fiasp  If  - 400, may take 12 units of Fiasp  If +, may take 14 units of Fiasp     Continue Meal Size Dosing with Fiasp. Take 5-10 minutes before meal.   Small Meal: 10 units  Medium Meal: 12 units  Large Meal: 14 units    Continue Dexcom CGM G7.  Blood Sugar Goals:       Fastin-130.       1-2 hours after a meal: Less than 180.     No follow-ups on file.    Portions of this note were prepared with [employer] voice recognition transcription software. Grammatical errors,  including garbled syntax, mangle pronouns, and other bizarre constructions may be attributed to that software system.

## 2024-08-02 ENCOUNTER — PATIENT MESSAGE (OUTPATIENT)
Dept: ADMINISTRATIVE | Facility: OTHER | Age: 54
End: 2024-08-02
Payer: COMMERCIAL

## 2024-08-06 ENCOUNTER — PATIENT MESSAGE (OUTPATIENT)
Dept: CARDIOLOGY | Facility: CLINIC | Age: 54
End: 2024-08-06
Payer: COMMERCIAL

## 2024-08-13 DIAGNOSIS — F41.8 ANXIETY WITH DEPRESSION: ICD-10-CM

## 2024-08-14 RX ORDER — BUPROPION HYDROCHLORIDE 150 MG/1
TABLET ORAL
Qty: 90 TABLET | Refills: 3 | Status: SHIPPED | OUTPATIENT
Start: 2024-08-14

## 2024-08-14 NOTE — TELEPHONE ENCOUNTER
Care Due:                  Date            Visit Type   Department     Provider  --------------------------------------------------------------------------------                                MYCHART                              FOLLOWUP/OF  [facility]  Last Visit: 07-      [facility] VISIT   MEDICINE       Edvianey Ramos                              EP -                              PRIMARY      [facility]  Next Visit: 03-      CARE ([facility])   MEDICINE       Edvianey Ramos                                                            Last  Test          Frequency    Reason                     Performed    Due Date  --------------------------------------------------------------------------------    HBA1C.......  6 months...  metFORMIN................  02- 08-    Health [facility] Embedded Care Due Messages. Reference number: [id number].   8/13/2024 11:44:25 PM CDT

## 2024-08-14 NOTE — TELEPHONE ENCOUNTER
Provider Staff:  Action required for this patient    Requires labs      Please see care gap opportunities below in Care Due Message.    Thanks!  [facility]     Appointments      Date Provider   Last Visit   7/29/2024 Jaycob Ramos MD   Next Visit   Visit date not found Jaycob Ramos MD     Refill Decision Note   Yandy Cabral  is requesting a refill authorization.  Brief Assessment and Rationale for Refill:  Approve     Medication Therapy Plan:         Comments:     Note composed:12:21 AM 08/14/2024

## 2024-08-15 NOTE — ASSESSMENT & PLAN NOTE
General Surgery primary   NPO, IV antibiotics, supportive care.   POD #1, +n/v, also pain uncontrolled   Primary team managing      No

## 2024-08-21 ENCOUNTER — PATIENT MESSAGE (OUTPATIENT)
Dept: INTERNAL MEDICINE | Facility: CLINIC | Age: 54
End: 2024-08-21
Payer: COMMERCIAL

## 2024-08-21 ENCOUNTER — PATIENT MESSAGE (OUTPATIENT)
Dept: CARDIOLOGY | Facility: HOSPITAL | Age: 54
End: 2024-08-21
Payer: COMMERCIAL

## 2024-08-21 ENCOUNTER — TELEPHONE (OUTPATIENT)
Dept: CARDIOLOGY | Facility: HOSPITAL | Age: 54
End: 2024-08-21
Payer: COMMERCIAL

## 2024-08-21 ENCOUNTER — E-VISIT (OUTPATIENT)
Dept: FAMILY MEDICINE | Facility: CLINIC | Age: 54
End: 2024-08-21
Payer: COMMERCIAL

## 2024-08-21 DIAGNOSIS — I25.10 CAD IN NATIVE ARTERY: Primary | ICD-10-CM

## 2024-08-21 DIAGNOSIS — N30.00 ACUTE CYSTITIS WITHOUT HEMATURIA: Primary | ICD-10-CM

## 2024-08-21 DIAGNOSIS — I25.10 CORONARY ARTERY DISEASE, UNSPECIFIED VESSEL OR LESION TYPE, UNSPECIFIED WHETHER ANGINA PRESENT, UNSPECIFIED WHETHER NATIVE OR TRANSPLANTED HEART: ICD-10-CM

## 2024-08-21 RX ORDER — NITROFURANTOIN 25; 75 MG/1; MG/1
100 CAPSULE ORAL 2 TIMES DAILY
Qty: 10 CAPSULE | Refills: 0 | Status: SHIPPED | OUTPATIENT
Start: 2024-08-21 | End: 2024-08-26

## 2024-08-21 RX ORDER — IVABRADINE 7.5 MG/1
15 TABLET, FILM COATED ORAL
COMMUNITY
End: 2024-08-21 | Stop reason: SDUPTHER

## 2024-08-21 RX ORDER — IVABRADINE 7.5 MG/1
15 TABLET, FILM COATED ORAL
Qty: 2 TABLET | Refills: 0 | Status: SHIPPED | OUTPATIENT
Start: 2024-08-21

## 2024-08-21 RX ORDER — PHENAZOPYRIDINE HYDROCHLORIDE 200 MG/1
200 TABLET, FILM COATED ORAL 3 TIMES DAILY PRN
Qty: 9 TABLET | Refills: 0 | Status: SHIPPED | OUTPATIENT
Start: 2024-08-21 | End: 2024-08-24

## 2024-08-21 NOTE — TELEPHONE ENCOUNTER
I had the pleasure of discussing your upcoming Cardiac CTA scheduled for 08/26/2024 at 11:00. To review, we discussed showing up 15-20 minutes before your appointment time. Your test is located at [address] [address], [facility] on the 2nd Floor. You can access this via the parking garage between [address] and [facility], utilizing the clinic tower entrances on any floor you are able to park on. You will know you are in the Medical Atrium whenever you see [employer], the food court, drug store, and often times a musician playing piano on the first floor. Please utilize the check in desk within the Lab and Imaging Services department.    I have reviewed your current medications that you take and I've discussed the Cardiac CTA prep for heart rate management with Dr. Menon, the interpreting MD. He is ordering for you to take your usual 25mg of Metoprolol succinate (Toprol-XL) the night before the CTA on 08/25 and take an additional 25mg of Toprol-XL the AM of the CTA on 08/26. He is also ordering for you to take 15mg of Ivabradine (Corlanor) 2-hours prior to the CTA. This was sent to the [facility] pharmacy on file that you provided.     Please ensure to begin HOLDING your Metformin (Glucophage) beginning 08/24 through 08/26 and resume as normal on Tuesday 08/27. I have messaged Dr. Villatoro to order blood work to test your renal function in advcance of the CTA to ensure IV contrast can be administered. This can be completed at the [facility] lab on [address] that you verbalized was the nearest to you.    Reminder for a 4-hour fasting time, no caffeine the AM of, and you can have water, anywhere from 16-32 ounces before and after completion of the test. It is advisable to have someone transport you to and from the test as a safety precaution. Thank you again for your time today. For any questions or concerns: I am available M-F 7:30-4, please call [phone].

## 2024-08-21 NOTE — PROGRESS NOTES
Patient ID: Yandy Cabral is a 53 y.o. female.    Chief Complaint: Urinary Tract Infection (Entered automatically based on patient selection in [employer].)          274}  The patient initiated a request through [employer] on 8/21/2024 for evaluation and management with a chief complaint of Urinary Tract Infection (Entered automatically based on patient selection in [employer].)     I evaluated the questionnaire submission on 08/21/2024 .    Total Time (in minutes): 13     Ohs Peq Evisit Uti Questionnaire    8/21/2024  2:40 PM CDT - Filed by Patient   Do you agree to participate in an E-Visit? Yes   If you have any of the following symptoms, please present to your local emergency room or call 911:  I acknowledge   Choose the state of your primary residence [place]   Are you pregnant, could you be pregnant, or are you breast feeding? None of the above   What is the main issue you would like addressed today? Urinary infection   What symptoms do you currently have? Pain while passing urine   When did your symptoms first appear? 8/21/2024   List what you have done or taken to help your symptoms. I have taken antbiodic that was prescribed   Please indicate whether you have had the following symptoms during the past 24 hours     Urgent urination (a sudden and uncontrollable urge to urinate) Moderate   Frequent urination of small amounts of urine (going to the toilet very often) Moderate   Burning pain when urinating None   Incomplete bladder emptying (still feel like you need to urinate again after urination) Moderate   Pain not associated with urination in the lower abdomen below the belly button) None   What does your urine look like? Cloudy   Blood seen in the urine None   Flank pain (pain in one or both sides of the lower back) None   Abnormal Vaginal Discharge (abnormal amount, color and/or odor) None   Discharge from the urethra (urinary opening) without urination None   High body temperature/fever? None-<99.5    Please rate how much discomfort you have experience because of the symptoms in the past 24 hours: Moderate   Please indicate how the symptoms have interfered with your every day activities/work in the past 24 hours: Mild   Please indicate how these symptoms have interfered with your social activities (visiting people, meeting with friends, etc.) in the past 24 hours? Mild   Are you a diabetic? Yes   Please indicate whether you have the following at the time of completion of this questionnaire: None of the above   Provide any additional information you feel is important.    Please attach any relevant images or files (if you have performed a home test for UTI, please submit a photo of results)    Are you able to take your vital signs? No          Active Problem List with Overview Notes    Diagnosis Date Noted    Encounter for diagnostic colonoscopy due to change in bowel habits 06/30/2024     Alternating bowel habits (constipation/diarrhea).       History of hemorrhoids 06/30/2024 2020 Colonoscopy: outside facility. Report available for review. IH appreciated. Sigmoid polyp. Path report not available but requested  Hx rectal bleeding 08/2023: seen by PCP who prescribed proctocream      Abnormal ECG 11/03/2023    CGM - DEXCOM - SHARING 01/18/2023    Overweight (BMI 25.0-29.9) 10/05/2022    Nonrheumatic mitral valve regurgitation 10/05/2022    CAD (coronary artery disease)     Accelerating angina 11/19/2020    Anxiety with depression 06/18/2020    Family history of cardiovascular disease 02/10/2020    Abnormal CT scan 07/19/2018    Hyperlipemia 03/23/2015    Seasonal allergies 03/23/2015    Type 2 diabetes mellitus with hyperglycemia, without long-term current use of insulin 03/27/2014     Dr Mario Oconnor follows        Recent Labs Obtained:  Lab Results   Component Value Date    WBC 4.85 03/22/2023    HGB 13.1 03/22/2023    HCT 40.9 03/22/2023    MCV 97 03/22/2023     03/22/2023     02/23/2024    K  4.1 02/23/2024     (H) 02/23/2024    CREATININE 0.9 02/23/2024    EGFRNORACEVR >60.0 02/23/2024    HGBA1C 7.6 (H) 02/23/2024    TSH 0.492 03/22/2023      Review of patient's allergies indicates:  No Known Allergies    Encounter Diagnosis   Name Primary?    Acute cystitis without hematuria Yes        No orders of the defined types were placed in this encounter.     Medications Ordered This Encounter   Medications    nitrofurantoin, macrocrystal-monohydrate, (MACROBID) 100 MG capsule     Sig: Take 1 capsule (100 mg total) by mouth 2 (two) times daily. for 5 days     Dispense:  10 capsule     Refill:  0    phenazopyridine (PYRIDIUM) 200 MG tablet     Sig: Take 1 tablet (200 mg total) by mouth 3 (three) times daily as needed for Pain.     Dispense:  9 tablet     Refill:  0        E-Visit Time Tracking:    Day 1 Time (in minutes): 13    Total Time (in minutes): 13      274}

## 2024-08-22 ENCOUNTER — TELEPHONE (OUTPATIENT)
Dept: FAMILY MEDICINE | Facility: CLINIC | Age: 54
End: 2024-08-22
Payer: COMMERCIAL

## 2024-08-26 ENCOUNTER — HOSPITAL ENCOUNTER (OUTPATIENT)
Dept: RADIOLOGY | Facility: HOSPITAL | Age: 54
Discharge: HOME | End: 2024-08-26
Attending: INTERNAL MEDICINE
Payer: COMMERCIAL

## 2024-08-26 VITALS
RESPIRATION RATE: 16 BRPM | DIASTOLIC BLOOD PRESSURE: 61 MMHG | SYSTOLIC BLOOD PRESSURE: 123 MMHG | HEART RATE: 46 BPM | OXYGEN SATURATION: 99 %

## 2024-08-26 DIAGNOSIS — E11.65 TYPE 2 DIABETES MELLITUS WITH HYPERGLYCEMIA, WITHOUT LONG-TERM CURRENT USE OF INSULIN: ICD-10-CM

## 2024-08-26 DIAGNOSIS — I34.0 NONRHEUMATIC MITRAL VALVE REGURGITATION: ICD-10-CM

## 2024-08-26 DIAGNOSIS — I25.118 CORONARY ARTERY DISEASE OF NATIVE ARTERY OF NATIVE HEART WITH STABLE ANGINA PECTORIS: ICD-10-CM

## 2024-08-26 DIAGNOSIS — Z82.49 FAMILY HISTORY OF CARDIOVASCULAR DISEASE: ICD-10-CM

## 2024-08-26 DIAGNOSIS — I20.0 ACCELERATING ANGINA: ICD-10-CM

## 2024-08-26 DIAGNOSIS — R94.31 ABNORMAL ECG: ICD-10-CM

## 2024-08-26 DIAGNOSIS — E66.3 OVERWEIGHT (BMI 25.0-29.9): ICD-10-CM

## 2024-08-26 PROCEDURE — 25000242 PHARM REV CODE 250 ALT 637 W/ HCPCS: Performed by: INTERNAL MEDICINE

## 2024-08-26 PROCEDURE — 25500020 PHARM REV CODE 255: Performed by: INTERNAL MEDICINE

## 2024-08-26 PROCEDURE — 75574 CT ANGIO HRT W/3D IMAGE: CPT | Mod: 26,,, | Performed by: RADIOLOGY

## 2024-08-26 PROCEDURE — 75574 CT ANGIO HRT W/3D IMAGE: CPT | Mod: TC

## 2024-08-26 RX ORDER — NITROGLYCERIN 0.4 MG/1
0.4 TABLET SUBLINGUAL ONCE
Status: COMPLETED | OUTPATIENT
Start: 2024-08-26 | End: 2024-08-26

## 2024-08-26 RX ADMIN — IOHEXOL 100 ML: 350 INJECTION, SOLUTION INTRAVENOUS at 02:08

## 2024-08-26 RX ADMIN — NITROGLYCERIN 0.4 MG: 0.4 TABLET, ORALLY DISINTEGRATING SUBLINGUAL at 10:08

## 2024-08-26 NOTE — NURSING
Scan complete.  Patient tolerated well.  Patient denies HA or dizziness upon sitting or standing.  PIV D/C ed , jelco cath intact.  No bleeding or hematoma noted .  Coflex dressing applied.  Patient given verbal and printed D/C instructions.  Patient instructed to drink two 16 oz bottles of water today to help flush the contrast from the body by way of the kidneys.  Patient instructed to remove coflex dressing in ten minutes.  Patient instructed to not take her METFORMIN for 48 hours or 2 days.  Patient verbalized understanding of D/C instructions.  Patient D/C ed  ambulatory with alan Rush.

## 2024-08-26 NOTE — DISCHARGE INSTRUCTIONS
Today drink two 16 oz bottles of water to help flush the contrast from the body by way of the kidneys.      Remove coflex dressing in ten minutes.

## 2024-08-26 NOTE — NURSING
Dr. Menon called to review calcium score o-portion of scan.  Dr. Menon reviewed scan stated to complete scan.

## 2024-08-26 NOTE — NURSING
Patient arrived to radiology for scheduled cardiac CTA.  Patient given verbal information concerning the scan, need for PV, and side effects of NTG and contrast.  Patient verbalized understanding of verbal information.  Patient placed on cardiac, BP, and pulse ox monitoring.  PIV 20 g placed in RAC x one attempt .  Patient tolerated well.  Preparing for pre- calcium score portion of scan.

## 2024-08-27 ENCOUNTER — TELEPHONE (OUTPATIENT)
Dept: CARDIOLOGY | Facility: CLINIC | Age: 54
End: 2024-08-27
Payer: COMMERCIAL

## 2024-08-27 DIAGNOSIS — I72.8 SPLENIC ARTERY ANEURYSM: Primary | ICD-10-CM

## 2024-08-27 NOTE — TELEPHONE ENCOUNTER
Please call pt.  CTA coronary findings are still pending but the rest of the study was reviewed and showed Partially calcified splenic artery aneurysm measuring 11 mm.    Recommend [facility] Surgery consultation with CVT to render an opinion on this abnormality.  Please schedule consult.    Await coronary findings.    Dr Villatoro

## 2024-08-27 NOTE — TELEPHONE ENCOUNTER
Contacted PT and informed then of their results and let them know that CVT will contact them to schedule and apt

## 2024-08-28 ENCOUNTER — TELEPHONE (OUTPATIENT)
Dept: CARDIOLOGY | Facility: CLINIC | Age: 54
End: 2024-08-28
Payer: COMMERCIAL

## 2024-08-28 ENCOUNTER — PATIENT MESSAGE (OUTPATIENT)
Dept: CARDIOLOGY | Facility: CLINIC | Age: 54
End: 2024-08-28
Payer: COMMERCIAL

## 2024-08-28 DIAGNOSIS — R07.89 CHEST DISCOMFORT: Primary | ICD-10-CM

## 2024-08-28 RX ORDER — IVABRADINE 7.5 MG/1
15 TABLET, FILM COATED ORAL
Qty: 2 TABLET | Refills: 0 | Status: SHIPPED | OUTPATIENT
Start: 2024-08-28

## 2024-08-28 NOTE — TELEPHONE ENCOUNTER
Contacted PT and informed them of their results and went into detail to explain the information and PT wanted to know when the CVT will be contacting her I informed PT that I would let them know that she is waiting for their call      Please call pt.  CTA coronary findings are still pending but the rest of the study was reviewed and showed Partially calcified splenic artery aneurysm measuring 11 mm.     Recommend Vasc Surgery consultation with CVT to render an opinion on this abnormality.  Please schedule consult.     Await coronary findings.     Dr Villatoro

## 2024-08-28 NOTE — PROGRESS NOTES
Cardiac CTA was nondiagnostic due to technical reasons. I placed an order for a repeat CT scan. Patient will be billed for 1 study per CT supervisor.

## 2024-08-29 DIAGNOSIS — I72.8 SPLENIC ARTERY ANEURYSM: Primary | ICD-10-CM

## 2024-09-02 DIAGNOSIS — E11.65 TYPE 2 DIABETES MELLITUS WITH HYPERGLYCEMIA, WITHOUT LONG-TERM CURRENT USE OF INSULIN: ICD-10-CM

## 2024-09-03 RX ORDER — BLOOD-GLUCOSE SENSOR
EACH MISCELLANEOUS
Qty: 9 EACH | Refills: 0 | Status: SHIPPED | OUTPATIENT
Start: 2024-09-03

## 2024-09-03 NOTE — TELEPHONE ENCOUNTER
Courtesy refill of Dex G7  given for coverage of MILENA Davies. Needs follow up with above provder.     Harini Bella PA-C  Diabetes Management

## 2024-09-10 ENCOUNTER — PATIENT MESSAGE (OUTPATIENT)
Dept: CARDIOLOGY | Facility: HOSPITAL | Age: 54
End: 2024-09-10
Payer: COMMERCIAL

## 2024-09-10 RX ORDER — IVABRADINE 5 MG/1
15 TABLET, FILM COATED ORAL
Qty: 3 TABLET | Refills: 0 | Status: SHIPPED | OUTPATIENT
Start: 2024-09-10

## 2024-09-10 RX ORDER — IVABRADINE 7.5 MG/1
TABLET, FILM COATED ORAL
Qty: 2 TABLET | Refills: 0 | Status: SHIPPED | OUTPATIENT
Start: 2024-09-10 | End: 2024-09-10 | Stop reason: SDUPTHER

## 2024-09-13 ENCOUNTER — TELEPHONE (OUTPATIENT)
Dept: CARDIOLOGY | Facility: HOSPITAL | Age: 54
End: 2024-09-13
Payer: COMMERCIAL

## 2024-09-16 ENCOUNTER — TELEPHONE (OUTPATIENT)
Dept: CARDIOLOGY | Facility: CLINIC | Age: 54
End: 2024-09-16
Payer: COMMERCIAL

## 2024-09-16 ENCOUNTER — HOSPITAL ENCOUNTER (OUTPATIENT)
Dept: RADIOLOGY | Facility: HOSPITAL | Age: 54
Discharge: HOME | End: 2024-09-16
Attending: INTERNAL MEDICINE
Payer: COMMERCIAL

## 2024-09-16 VITALS
SYSTOLIC BLOOD PRESSURE: 96 MMHG | OXYGEN SATURATION: 98 % | RESPIRATION RATE: 16 BRPM | DIASTOLIC BLOOD PRESSURE: 53 MMHG | HEART RATE: 53 BPM

## 2024-09-16 DIAGNOSIS — R07.89 CHEST DISCOMFORT: ICD-10-CM

## 2024-09-16 DIAGNOSIS — I25.10 CAD IN NATIVE ARTERY: ICD-10-CM

## 2024-09-16 DIAGNOSIS — R07.89 CHEST DISCOMFORT: Primary | ICD-10-CM

## 2024-09-16 PROCEDURE — 25500020 PHARM REV CODE 255: Performed by: INTERNAL MEDICINE

## 2024-09-16 PROCEDURE — 75574 CT ANGIO HRT W/3D IMAGE: CPT | Mod: TC

## 2024-09-16 PROCEDURE — 75574 CT ANGIO HRT W/3D IMAGE: CPT | Mod: 26,,, | Performed by: RADIOLOGY

## 2024-09-16 PROCEDURE — 25000242 PHARM REV CODE 250 ALT 637 W/ HCPCS: Performed by: INTERNAL MEDICINE

## 2024-09-16 RX ORDER — NITROGLYCERIN 0.4 MG/1
0.4 TABLET SUBLINGUAL ONCE
Status: COMPLETED | OUTPATIENT
Start: 2024-09-16 | End: 2024-09-16

## 2024-09-16 RX ADMIN — IOHEXOL 100 ML: 350 INJECTION, SOLUTION INTRAVENOUS at 09:09

## 2024-09-16 RX ADMIN — NITROGLYCERIN 0.4 MG: 0.4 TABLET, ORALLY DISINTEGRATING SUBLINGUAL at 08:09

## 2024-09-16 NOTE — NURSING
Scan complete.  Patient tolerated well.  Patient denies HA or dizziness upon sitting or standing.  PIV 20 g D/C ed Jelco cath intact.  No bleeding or hematoma noted.  Coflex dressing applied.  Patient given verbal and printed D/C instructions.  Patient instructed to drink two 16 oz bottles of water today to help flush the contrast from the body by way of the kidneys.  Patient instructed to remove coflex dressing from right arm in ten minutes.  Patient verbalized understanding of verbal and printed D/C  instructions.  Patient D/C ed ambulatory with alan Scott.

## 2024-09-16 NOTE — DISCHARGE INSTRUCTIONS
Today drink two 16 oz bottles of water to help flush the contrast from the body by way of the kidneys.    Remove coflex dressing from right arm in ten minutes.

## 2024-09-16 NOTE — NURSING
Patient arrived to radiology for scheduled cardiac CTA.  Patient given verbal information concerning the scan, need for PIV, and side effects of NTG and contrast.  Patient verbalized understanding of verbal information.  Patient placed on cardiac monitor , BP, and pulse ox monitoring.  PIV 20 g placed in RAC x one attempt.  Patient tolerated well.  Preparing  for pre-calcium score portion of scan.

## 2024-09-17 DIAGNOSIS — I20.0 ACCELERATING ANGINA: ICD-10-CM

## 2024-09-17 DIAGNOSIS — I25.10 CAD IN NATIVE ARTERY: Primary | ICD-10-CM

## 2024-09-17 RX ORDER — ISOSORBIDE MONONITRATE 30 MG/1
30 TABLET, EXTENDED RELEASE ORAL DAILY
COMMUNITY
End: 2024-09-17 | Stop reason: SDUPTHER

## 2024-09-17 RX ORDER — ISOSORBIDE MONONITRATE 30 MG/1
30 TABLET, EXTENDED RELEASE ORAL DAILY
Qty: 90 TABLET | Refills: 3 | Status: SHIPPED | OUTPATIENT
Start: 2024-09-17

## 2024-09-17 NOTE — TELEPHONE ENCOUNTER
Renee,    Please call pt. Coronary CTA shows worsening CAD.  Her last cath in 2020 showed mild CAD.  Needs [facility] to reassess her CAD and will be more accurate than the CTA.    Add Imdur 30 mg qd.    Please schedule Thurs, 10/10/24 1322.    Thanks    Dr Villatoro

## 2024-09-17 NOTE — TELEPHONE ENCOUNTER
Telephoned patient with results and recommendations, confirmed scheduling heart cath 10/10 for 730am  Discussed new medication (Imdur) reviewed possible headache first couple of days and okay to take Tylenol  Answered questions regarding heart cath and all results

## 2024-09-23 ENCOUNTER — HOSPITAL ENCOUNTER (OUTPATIENT)
Dept: VASCULAR SURGERY | Facility: HOSPITAL | Age: 54
Discharge: HOME | End: 2024-09-23
Attending: STUDENT IN AN ORGANIZED HEALTH CARE EDUCATION/TRAINING PROGRAM
Payer: COMMERCIAL

## 2024-09-23 ENCOUNTER — INITIAL CONSULT (OUTPATIENT)
Dept: VASCULAR SURGERY | Facility: CLINIC | Age: 54
End: 2024-09-23
Payer: COMMERCIAL

## 2024-09-23 DIAGNOSIS — I65.29 ASYMPTOMATIC CAROTID ARTERY STENOSIS, UNSPECIFIED LATERALITY: ICD-10-CM

## 2024-09-23 DIAGNOSIS — I72.8 SPLENIC ARTERY ANEURYSM: ICD-10-CM

## 2024-09-23 DIAGNOSIS — E78.2 MIXED HYPERLIPIDEMIA: ICD-10-CM

## 2024-09-23 DIAGNOSIS — I25.118 CORONARY ARTERY DISEASE OF NATIVE ARTERY OF NATIVE HEART WITH STABLE ANGINA PECTORIS: ICD-10-CM

## 2024-09-23 DIAGNOSIS — I25.118 CORONARY ARTERY DISEASE OF NATIVE ARTERY OF NATIVE HEART WITH STABLE ANGINA PECTORIS: Primary | ICD-10-CM

## 2024-09-23 LAB
LEFT ARM DIASTOLIC BLOOD PRESSURE: 76 MMHG
LEFT ARM SYSTOLIC BLOOD PRESSURE: 124 MMHG
LEFT CCA DIST DIAS: 22 CM/S
LEFT CCA DIST SYS: 70 CM/S
LEFT CCA MID DIAS: 18 CM/S
LEFT CCA MID SYS: 69 CM/S
LEFT CCA PROX DIAS: 19 CM/S
LEFT CCA PROX SYS: 91 CM/S
LEFT ECA DIAS: 11 CM/S
LEFT ECA SYS: 81 CM/S
LEFT ICA DIST DIAS: 21 CM/S
LEFT ICA DIST SYS: 58 CM/S
LEFT ICA MID DIAS: 27 CM/S
LEFT ICA MID SYS: 69 CM/S
LEFT ICA PROX DIAS: 23 CM/S
LEFT ICA PROX SYS: 63 CM/S
LEFT VERTEBRAL DIAS: 15 CM/S
LEFT VERTEBRAL SYS: 45 CM/S
OHS CV CAROTID RIGHT ICA EDV HIGHEST: 21
OHS CV CAROTID ULTRASOUND LEFT ICA/CCA RATIO: 0.99
OHS CV CAROTID ULTRASOUND RIGHT ICA/CCA RATIO: 1.22
OHS CV PV CAROTID LEFT HIGHEST CCA: 91
OHS CV PV CAROTID LEFT HIGHEST ICA: 69
OHS CV PV CAROTID RIGHT HIGHEST CCA: 77
OHS CV PV CAROTID RIGHT HIGHEST ICA: 56
OHS CV US CAROTID LEFT HIGHEST EDV: 27
RIGHT ARM DIASTOLIC BLOOD PRESSURE: 74 MMHG
RIGHT ARM SYSTOLIC BLOOD PRESSURE: 126 MMHG
RIGHT CCA DIST DIAS: 13 CM/S
RIGHT CCA DIST SYS: 46 CM/S
RIGHT CCA MID DIAS: 15 CM/S
RIGHT CCA MID SYS: 67 CM/S
RIGHT CCA PROX DIAS: 11 CM/S
RIGHT CCA PROX SYS: 77 CM/S
RIGHT ECA DIAS: 6 CM/S
RIGHT ECA SYS: 47 CM/S
RIGHT ICA DIST DIAS: 18 CM/S
RIGHT ICA DIST SYS: 55 CM/S
RIGHT ICA MID DIAS: 20 CM/S
RIGHT ICA MID SYS: 55 CM/S
RIGHT ICA PROX DIAS: 21 CM/S
RIGHT ICA PROX SYS: 56 CM/S
RIGHT VERTEBRAL DIAS: 16 CM/S
RIGHT VERTEBRAL SYS: 53 CM/S

## 2024-09-23 PROCEDURE — 99999 PR PBB SHADOW E&M-EST. PATIENT-LVL II: CPT | Mod: PBBFAC,,, | Performed by: STUDENT IN AN ORGANIZED HEALTH CARE EDUCATION/TRAINING PROGRAM

## 2024-09-23 PROCEDURE — 93880 EXTRACRANIAL BILAT STUDY: CPT

## 2024-09-23 PROCEDURE — 99204 OFFICE O/P NEW MOD 45 MIN: CPT | Mod: S$GLB,,, | Performed by: STUDENT IN AN ORGANIZED HEALTH CARE EDUCATION/TRAINING PROGRAM

## 2024-09-23 PROCEDURE — 93880 EXTRACRANIAL BILAT STUDY: CPT | Mod: 26,,, | Performed by: STUDENT IN AN ORGANIZED HEALTH CARE EDUCATION/TRAINING PROGRAM

## 2024-09-23 NOTE — PROGRESS NOTES
Luke Fox    OFFICE NOTE    DATE OF VISIT: 2024  PATIENT NAME: Yandy Cabral  : 1970  MRN: [id number]  PRIMARY CARE PHYSICIAN: Jaycob Ramos MD  CARDIOLOGIST:   REFERRING PROVIDER: Martin Villatoro MD    CHIEF COMPLAINT   Chief Complaint   Patient presents with    Consult     Consult for splenic aneurysm.       HISTORY OF PRESENT ILLNESS:  Yandy Cabral is a 53 y.o. female who presents to clinic today to discuss splenic artery aneurysm. This is about 1cm and located mid to distal splenic artery. She is asymptomatic from this. She is diabetic last A1c around 7.6. she does not smoke. She has vague abdominal symptoms chronically. But otherwise tolerating diet and doing well. Ct cardiac performed due to HLD, DM and family hx of premature CAD, she had non exertional chest discomfort and had cath done about 4 years ago    ALLERGIES:  Review of patient's allergies indicates:  No Known Allergies    PAST MEDICAL HISTORY:  Past Medical History:   Diagnosis Date    Abnormal Pap smear of cervix     CAD (coronary artery disease)     Chronic constipation     DM II (diabetes mellitus, type II), controlled     Hx of colposcopy with cervical biopsy        PAST SURGICAL HISTORY:  Past Surgical History:   Procedure Laterality Date    ANGIOGRAM, CORONARY, WITH LEFT HEART CATHETERIZATION  2020    Procedure: Angiogram, Coronary, with Left Heart Cath;  Surgeon: Martin Villatoro MD;  Location: [facility] CATH LAB;  Service: Cardiology;;    CYST REMOVAL      ganglionic right wrist    LAPAROSCOPIC CHOLECYSTECTOMY N/A 2018    Procedure: CHOLECYSTECTOMY-LAPAROSCOPIC;  Surgeon: Louis O. Jeansonne IV, MD;  Location: [facility] OR;  Service: General;  Laterality: N/A;    LAPAROSCOPIC LYSIS OF ADHESIONS N/A 2018    Procedure: LYSIS, ADHESIONS, LAPAROSCOPIC;  Surgeon: Louis O. Jeansonne IV, MD;  Location: [facility] OR;  Service: General;  Laterality: N/A;    LEFT HEART CATHETERIZATION Left 2020     Procedure: CATHETERIZATION, HEART, LEFT;  Surgeon: Martin Villatoro MD;  Location: [facility] CATH LAB;  Service: Cardiology;  Laterality: Left;  930 start time per Dr. Villatoro    SLEEVE GASTROPLASTY  1/01/12    TOTAL REDUCTION MAMMOPLASTY Bilateral 2104    TUBAL LIGATION         SOCIAL HISTORY:   Social History     Tobacco Use    Smoking status: Never    Smokeless tobacco: Never   Substance Use Topics    Alcohol use: Yes     Alcohol/week: 4.0 standard drinks of alcohol     Types: 4 Glasses of wine per week    Drug use: No       FAMILY HISTORY:  Family History   Problem Relation Name Age of Onset    Heart disease Father      Heart attack Father      Diabetes type II Father      Testicular cancer Brother      Dementia Mother      No Known Problems Sister      No Known Problems Sister      Breast cancer Maternal Cousin      Colon cancer Neg Hx         REVIEW OF SYSTEMS:  ROS  10 pt ros otherwise negative    PHYSICAL EXAM:  There were no vitals filed for this visit.   Physical Exam      Vss  Gen nad alert oriented  Palpable pedal pulses b/l  Abd soft non tender nondistended, no guarding no peritoneal signs  Ext no edema    VASCULAR LAB STUDIES:  Carotid duplex pending  0-19% stenosis on the left, 20-39% stenosis on right    ASSESSMENT AND PLAN:  Hyperlipemia  Medical management    CAD (coronary artery disease)  Medical management given the atherosclerotic burden in her coronaries I will obtain carotid duplex u/s to evaluate degree of disease within the carotids    Splenic artery aneurysm  Asymptomatic small 1cm splenic artery aneurysm. Does not meet size criteria for repair. No indication for intervention at this time. Will bring back in 6 M abdominal duplex to make sure no enlargement and then can follow periodically if stable.       Yandy was seen today for consult.    Diagnoses and all orders for this visit:    Coronary artery disease of native artery of native heart with stable angina pectoris  -     CV Ultrasound  Bilateral Doppler Carotid; Future    Splenic artery aneurysm  -     Ambulatory referral/consult to Vascular Surgery    Asymptomatic carotid artery stenosis, unspecified laterality    Mixed hyperlipidemia        No follow-ups on file.        Luke Fox  [facility]  Vascular Surgery  [phone] (Clinic Number)

## 2024-09-23 NOTE — ASSESSMENT & PLAN NOTE
Medical management given the atherosclerotic burden in her coronaries I will obtain carotid duplex u/s to evaluate degree of disease within the carotids

## 2024-09-23 NOTE — ASSESSMENT & PLAN NOTE
Asymptomatic small 1cm splenic artery aneurysm. Does not meet size criteria for repair. No indication for intervention at this time. Will bring back in 6 M abdominal duplex to make sure no enlargement and then can follow periodically if stable.

## 2024-09-25 ENCOUNTER — PATIENT MESSAGE (OUTPATIENT)
Dept: DIABETES | Facility: CLINIC | Age: 54
End: 2024-09-25
Payer: COMMERCIAL

## 2024-09-25 DIAGNOSIS — E11.65 TYPE 2 DIABETES MELLITUS WITH HYPERGLYCEMIA, WITHOUT LONG-TERM CURRENT USE OF INSULIN: Primary | ICD-10-CM

## 2024-09-25 RX ORDER — INSULIN PMP CART,AUT,G6/7,CNTR
1 EACH SUBCUTANEOUS
Qty: 1 EACH | Refills: 0 | Status: SHIPPED | OUTPATIENT
Start: 2024-09-25 | End: 2025-09-25

## 2024-09-25 RX ORDER — INSULIN PMP CART,AUT,G6/7,CNTR
1 EACH SUBCUTANEOUS
Qty: 10 EACH | Refills: 11 | Status: SHIPPED | OUTPATIENT
Start: 2024-09-25 | End: 2024-10-25

## 2024-09-26 ENCOUNTER — TELEPHONE (OUTPATIENT)
Dept: CARDIOLOGY | Facility: HOSPITAL | Age: 54
End: 2024-09-26
Payer: COMMERCIAL

## 2024-09-26 ENCOUNTER — LAB VISIT (OUTPATIENT)
Dept: LAB | Facility: HOSPITAL | Age: 54
End: 2024-09-26
Attending: NURSE PRACTITIONER
Payer: COMMERCIAL

## 2024-09-26 DIAGNOSIS — E11.65 TYPE 2 DIABETES MELLITUS WITH HYPERGLYCEMIA, WITHOUT LONG-TERM CURRENT USE OF INSULIN: ICD-10-CM

## 2024-09-26 LAB
ALBUMIN SERPL BCP-MCNC: 4 G/DL (ref 3.5–5.2)
ALP SERPL-CCNC: 78 U/L (ref 55–135)
ALT SERPL W/O P-5'-P-CCNC: 46 U/L (ref 10–44)
ANION GAP SERPL CALC-SCNC: 9 MMOL/L (ref 8–16)
AST SERPL-CCNC: 36 U/L (ref 10–40)
BILIRUB SERPL-MCNC: 0.6 MG/DL (ref 0.1–1)
BUN SERPL-MCNC: 16 MG/DL (ref 6–20)
CALCIUM SERPL-MCNC: 9.7 MG/DL (ref 8.7–10.5)
CHLORIDE SERPL-SCNC: 105 MMOL/L (ref 95–110)
CO2 SERPL-SCNC: 27 MMOL/L (ref 23–29)
CREAT SERPL-MCNC: 0.8 MG/DL (ref 0.5–1.4)
ERYTHROCYTE [DISTWIDTH] IN BLOOD BY AUTOMATED COUNT: 12.5 % (ref 11.5–14.5)
EST. GFR  (NO RACE VARIABLE): >60 ML/MIN/1.73 M^2
ESTIMATED AVG GLUCOSE: 140 MG/DL (ref 68–131)
GLUCOSE SERPL-MCNC: 138 MG/DL (ref 70–110)
HBA1C MFR BLD: 6.5 % (ref 4–5.6)
HCT VFR BLD AUTO: 35.2 % (ref 37–48.5)
HGB BLD-MCNC: 11.8 G/DL (ref 12–16)
MCH RBC QN AUTO: 32.9 PG (ref 27–31)
MCHC RBC AUTO-ENTMCNC: 33.5 G/DL (ref 32–36)
MCV RBC AUTO: 98 FL (ref 82–98)
PLATELET # BLD AUTO: 285 K/UL (ref 150–450)
PMV BLD AUTO: 10.9 FL (ref 9.2–12.9)
POTASSIUM SERPL-SCNC: 4.3 MMOL/L (ref 3.5–5.1)
PROT SERPL-MCNC: 6.9 G/DL (ref 6–8.4)
RBC # BLD AUTO: 3.59 M/UL (ref 4–5.4)
SODIUM SERPL-SCNC: 141 MMOL/L (ref 136–145)
WBC # BLD AUTO: 6.45 K/UL (ref 3.9–12.7)

## 2024-09-26 PROCEDURE — 80053 COMPREHEN METABOLIC PANEL: CPT | Performed by: NURSE PRACTITIONER

## 2024-09-26 PROCEDURE — 85027 COMPLETE CBC AUTOMATED: CPT | Performed by: NURSE PRACTITIONER

## 2024-09-26 PROCEDURE — 83036 HEMOGLOBIN GLYCOSYLATED A1C: CPT | Performed by: NURSE PRACTITIONER

## 2024-09-26 PROCEDURE — 36415 COLL VENOUS BLD VENIPUNCTURE: CPT | Performed by: NURSE PRACTITIONER

## 2024-09-27 NOTE — TELEPHONE ENCOUNTER
Renee,      Please see if mid level can do peer review to get approval.  I deem this to be a very necessary test to do and completely disagree that is medically unnecessary as her insurance company has stated.    Pt has known CAD, anginal symptoms and a significantly abnormal Coronary CTA study showing occlusive CAD and very high calcium score as noted below:    2.  High atherosclerotic plaque burden with 25-49% ostial LM, 70% mid LAD, 70% prox RCA stenoses.     3. Coronary calcium score 1214 (99th percentile).      Thanks    Renee Latif, RONYN sent to Martin Villatoro MD  10/10 Cath denied    REASON: INVESTIGATIONAL AND/OR  NOT MEDICALLY NECESSARY  NEXT STEP:  P2P, SENT PROVIDER A MESSAGE TO DO THE P2P, DENIAL LETTER ATTACHED IN MEDIA        Can call [phone]  Reference # [id number]

## 2024-09-30 ENCOUNTER — TELEPHONE (OUTPATIENT)
Dept: CARDIOLOGY | Facility: CLINIC | Age: 54
End: 2024-09-30
Payer: COMMERCIAL

## 2024-09-30 NOTE — TELEPHONE ENCOUNTER
Contacted PT and informed her of the [id number] scheduled for today and procedure date        ----- Message from Lupe sent at 9/30/2024  9:47 AM CDT -----  Contact: Yandy Kebede needs a call back at [phone], Regards to the heart cath she stated that her insurance denied it due to additional information was needed and she also wants to get results from her heart dye test.    Thanks  Td

## 2024-10-01 NOTE — TELEPHONE ENCOUNTER
Telephoned patient to advise of Insurance approval for 10/10 and cancelled stress echo of 10/31.  Will proceed with Lab work as ordered/scheduled even though just had CBC/CMP on 9/26 for another Provider.

## 2024-10-03 ENCOUNTER — TELEPHONE (OUTPATIENT)
Dept: CARDIOLOGY | Facility: CLINIC | Age: 54
End: 2024-10-03
Payer: COMMERCIAL

## 2024-10-03 ENCOUNTER — LAB VISIT (OUTPATIENT)
Dept: LAB | Facility: HOSPITAL | Age: 54
End: 2024-10-03
Attending: INTERNAL MEDICINE
Payer: COMMERCIAL

## 2024-10-03 DIAGNOSIS — I25.118 CORONARY ARTERY DISEASE OF NATIVE ARTERY OF NATIVE HEART WITH STABLE ANGINA PECTORIS: ICD-10-CM

## 2024-10-03 DIAGNOSIS — I25.10 CAD IN NATIVE ARTERY: ICD-10-CM

## 2024-10-03 DIAGNOSIS — Z82.49 FAMILY HISTORY OF CORONARY ARTERY DISEASE: ICD-10-CM

## 2024-10-03 DIAGNOSIS — R07.89 CHEST DISCOMFORT: ICD-10-CM

## 2024-10-03 DIAGNOSIS — Z01.810 PREOP CARDIOVASCULAR EXAM: ICD-10-CM

## 2024-10-03 DIAGNOSIS — I20.0 ACCELERATING ANGINA: ICD-10-CM

## 2024-10-03 DIAGNOSIS — I25.10 CAD IN NATIVE ARTERY: Primary | ICD-10-CM

## 2024-10-03 LAB
ANION GAP SERPL CALC-SCNC: 10 MMOL/L (ref 8–16)
APTT PPP: >150 SEC (ref 21–32)
BASOPHILS # BLD AUTO: 0.04 K/UL (ref 0–0.2)
BASOPHILS NFR BLD: 0.7 % (ref 0–1.9)
BUN SERPL-MCNC: 18 MG/DL (ref 6–20)
CALCIUM SERPL-MCNC: 9.7 MG/DL (ref 8.7–10.5)
CHLORIDE SERPL-SCNC: 103 MMOL/L (ref 95–110)
CO2 SERPL-SCNC: 27 MMOL/L (ref 23–29)
CREAT SERPL-MCNC: 0.9 MG/DL (ref 0.5–1.4)
DIFFERENTIAL METHOD BLD: ABNORMAL
EOSINOPHIL # BLD AUTO: 0.1 K/UL (ref 0–0.5)
EOSINOPHIL NFR BLD: 2.2 % (ref 0–8)
ERYTHROCYTE [DISTWIDTH] IN BLOOD BY AUTOMATED COUNT: 12.6 % (ref 11.5–14.5)
EST. GFR  (NO RACE VARIABLE): >60 ML/MIN/1.73 M^2
GLUCOSE SERPL-MCNC: 174 MG/DL (ref 70–110)
HCT VFR BLD AUTO: 34.1 % (ref 37–48.5)
HGB BLD-MCNC: 11.2 G/DL (ref 12–16)
IMM GRANULOCYTES # BLD AUTO: 0.02 K/UL (ref 0–0.04)
IMM GRANULOCYTES NFR BLD AUTO: 0.4 % (ref 0–0.5)
INR PPP: 2.9 (ref 0.8–1.2)
LYMPHOCYTES # BLD AUTO: 1.8 K/UL (ref 1–4.8)
LYMPHOCYTES NFR BLD: 32.8 % (ref 18–48)
MCH RBC QN AUTO: 32.9 PG (ref 27–31)
MCHC RBC AUTO-ENTMCNC: 32.8 G/DL (ref 32–36)
MCV RBC AUTO: 100 FL (ref 82–98)
MONOCYTES # BLD AUTO: 0.5 K/UL (ref 0.3–1)
MONOCYTES NFR BLD: 8.6 % (ref 4–15)
NEUTROPHILS # BLD AUTO: 3.1 K/UL (ref 1.8–7.7)
NEUTROPHILS NFR BLD: 55.3 % (ref 38–73)
NRBC BLD-RTO: 0 /100 WBC
PLATELET # BLD AUTO: 317 K/UL (ref 150–450)
PMV BLD AUTO: 10.9 FL (ref 9.2–12.9)
POTASSIUM SERPL-SCNC: 4.4 MMOL/L (ref 3.5–5.1)
PROTHROMBIN TIME: 30 SEC (ref 9–12.5)
RBC # BLD AUTO: 3.4 M/UL (ref 4–5.4)
SODIUM SERPL-SCNC: 140 MMOL/L (ref 136–145)
WBC # BLD AUTO: 5.55 K/UL (ref 3.9–12.7)

## 2024-10-03 PROCEDURE — 36415 COLL VENOUS BLD VENIPUNCTURE: CPT | Performed by: INTERNAL MEDICINE

## 2024-10-03 PROCEDURE — 80048 BASIC METABOLIC PNL TOTAL CA: CPT | Performed by: INTERNAL MEDICINE

## 2024-10-03 PROCEDURE — 85025 COMPLETE CBC W/AUTO DIFF WBC: CPT | Performed by: INTERNAL MEDICINE

## 2024-10-03 PROCEDURE — 85730 THROMBOPLASTIN TIME PARTIAL: CPT | Performed by: INTERNAL MEDICINE

## 2024-10-03 PROCEDURE — 85610 PROTHROMBIN TIME: CPT | Performed by: INTERNAL MEDICINE

## 2024-10-04 ENCOUNTER — LAB VISIT (OUTPATIENT)
Dept: LAB | Facility: HOSPITAL | Age: 54
End: 2024-10-04
Attending: INTERNAL MEDICINE
Payer: COMMERCIAL

## 2024-10-04 DIAGNOSIS — I25.10 CAD IN NATIVE ARTERY: ICD-10-CM

## 2024-10-04 DIAGNOSIS — I25.118 CORONARY ARTERY DISEASE OF NATIVE ARTERY OF NATIVE HEART WITH STABLE ANGINA PECTORIS: ICD-10-CM

## 2024-10-04 DIAGNOSIS — Z01.810 PREOP CARDIOVASCULAR EXAM: ICD-10-CM

## 2024-10-04 DIAGNOSIS — I20.0 ACCELERATING ANGINA: ICD-10-CM

## 2024-10-04 DIAGNOSIS — Z82.49 FAMILY HISTORY OF CORONARY ARTERY DISEASE: ICD-10-CM

## 2024-10-04 PROCEDURE — 85730 THROMBOPLASTIN TIME PARTIAL: CPT | Performed by: INTERNAL MEDICINE

## 2024-10-04 PROCEDURE — 85610 PROTHROMBIN TIME: CPT | Performed by: INTERNAL MEDICINE

## 2024-10-04 PROCEDURE — 36415 COLL VENOUS BLD VENIPUNCTURE: CPT | Performed by: INTERNAL MEDICINE

## 2024-10-04 NOTE — TELEPHONE ENCOUNTER
Called PT and review results . PT stated she is not on any other blood thinners besides the aspirin. Scheduled repeat of labs for 10/4/2024.PT stated verbal understanding

## 2024-10-04 NOTE — TELEPHONE ENCOUNTER
Please call pt.  INR and PTT labs are elevated.   Was done for upcoming cath.  Is she on blood thinners other than aspirin???    Not sure why this is abnormal unless it is false abnormal or lab error.    Needs repeat PTT,  PT/INR on 10/4/24.    Dr Villatoro

## 2024-10-07 ENCOUNTER — HOSPITAL ENCOUNTER (OUTPATIENT)
Dept: CARDIOLOGY | Facility: HOSPITAL | Age: 54
Discharge: HOME | End: 2024-10-07
Payer: COMMERCIAL

## 2024-10-07 ENCOUNTER — TELEPHONE (OUTPATIENT)
Dept: CARDIOLOGY | Facility: CLINIC | Age: 54
End: 2024-10-07
Payer: COMMERCIAL

## 2024-10-07 ENCOUNTER — PATIENT MESSAGE (OUTPATIENT)
Dept: CARDIOLOGY | Facility: CLINIC | Age: 54
End: 2024-10-07
Payer: COMMERCIAL

## 2024-10-07 ENCOUNTER — OFFICE VISIT (OUTPATIENT)
Dept: CARDIOLOGY | Facility: CLINIC | Age: 54
End: 2024-10-07
Payer: COMMERCIAL

## 2024-10-07 VITALS
WEIGHT: 143.94 LBS | BODY MASS INDEX: 23.98 KG/M2 | HEIGHT: 65 IN | HEART RATE: 79 BPM | OXYGEN SATURATION: 97 % | DIASTOLIC BLOOD PRESSURE: 76 MMHG | SYSTOLIC BLOOD PRESSURE: 118 MMHG

## 2024-10-07 DIAGNOSIS — E11.65 TYPE 2 DIABETES MELLITUS WITH HYPERGLYCEMIA, WITHOUT LONG-TERM CURRENT USE OF INSULIN: ICD-10-CM

## 2024-10-07 DIAGNOSIS — I25.10 CAD IN NATIVE ARTERY: ICD-10-CM

## 2024-10-07 DIAGNOSIS — I25.118 CORONARY ARTERY DISEASE OF NATIVE ARTERY OF NATIVE HEART WITH STABLE ANGINA PECTORIS: ICD-10-CM

## 2024-10-07 DIAGNOSIS — R94.31 ABNORMAL ECG: ICD-10-CM

## 2024-10-07 DIAGNOSIS — I20.0 ACCELERATING ANGINA: ICD-10-CM

## 2024-10-07 DIAGNOSIS — I25.10 CAD IN NATIVE ARTERY: Primary | ICD-10-CM

## 2024-10-07 DIAGNOSIS — E78.2 MIXED HYPERLIPIDEMIA: ICD-10-CM

## 2024-10-07 DIAGNOSIS — I25.118 CORONARY ARTERY DISEASE OF NATIVE ARTERY OF NATIVE HEART WITH STABLE ANGINA PECTORIS: Primary | ICD-10-CM

## 2024-10-07 LAB
APTT PPP: 26.5 SEC (ref 21–32)
INR PPP: 1 (ref 0.8–1.2)
OHS QRS DURATION: 86 MS
OHS QTC CALCULATION: 424 MS
PROTHROMBIN TIME: 10.6 SEC (ref 9–12.5)

## 2024-10-07 PROCEDURE — 1159F MED LIST DOCD IN RCRD: CPT | Mod: CPTII,S$GLB,, | Performed by: NURSE PRACTITIONER

## 2024-10-07 PROCEDURE — 3061F NEG MICROALBUMINURIA REV: CPT | Mod: CPTII,S$GLB,, | Performed by: NURSE PRACTITIONER

## 2024-10-07 PROCEDURE — 3074F SYST BP LT 130 MM HG: CPT | Mod: CPTII,S$GLB,, | Performed by: NURSE PRACTITIONER

## 2024-10-07 PROCEDURE — 99999 PR PBB SHADOW E&M-EST. PATIENT-LVL V: CPT | Mod: PBBFAC,,, | Performed by: NURSE PRACTITIONER

## 2024-10-07 PROCEDURE — 93010 ELECTROCARDIOGRAM REPORT: CPT | Mod: ,,, | Performed by: INTERNAL MEDICINE

## 2024-10-07 PROCEDURE — 3008F BODY MASS INDEX DOCD: CPT | Mod: CPTII,S$GLB,, | Performed by: NURSE PRACTITIONER

## 2024-10-07 PROCEDURE — 3066F NEPHROPATHY DOC TX: CPT | Mod: CPTII,S$GLB,, | Performed by: NURSE PRACTITIONER

## 2024-10-07 PROCEDURE — 3044F HG A1C LEVEL LT 7.0%: CPT | Mod: CPTII,S$GLB,, | Performed by: NURSE PRACTITIONER

## 2024-10-07 PROCEDURE — 3072F LOW RISK FOR RETINOPATHY: CPT | Mod: CPTII,S$GLB,, | Performed by: NURSE PRACTITIONER

## 2024-10-07 PROCEDURE — 93005 ELECTROCARDIOGRAM TRACING: CPT

## 2024-10-07 PROCEDURE — 99214 OFFICE O/P EST MOD 30 MIN: CPT | Mod: 25,S$GLB,, | Performed by: NURSE PRACTITIONER

## 2024-10-07 PROCEDURE — 3078F DIAST BP <80 MM HG: CPT | Mod: CPTII,S$GLB,, | Performed by: NURSE PRACTITIONER

## 2024-10-07 NOTE — TELEPHONE ENCOUNTER
PT stated that she is SOB can not take a full breath and dull chest  pain comes and goes. Scheduled PT with Yandy Szymanski for today

## 2024-10-07 NOTE — H&P (VIEW-ONLY)
Subjective:   Patient ID:  Yandy Cabral is a 53 y.o. female who presents for evaluation of No chief complaint on file.      HPI    Yandy Cabral is a 53 year old female who presents to clinic due to shortness of breath symptoms. Her current medical conditions include CAD, DM Type II, MR, HTN, HLP. She returns today and states she is doing ok.     She has been having recurrent episodes of chest pain and shortness of breath symptoms. Pain is in center of her chest. Denies any recent NTG SL doses.     Called Lab to clarify lab draw from Friday- sent to [facility] today will be resulted by AM.     BP stable today in office.   Reports that she has some LH symptoms with addition of Isosorbide.   Will plan to increase Imdur to 60 mg daily total in preparation for LHC later this week per Dr Villatoro.     Denies chest pain or anginal equivalents. Denies orthopnea, PND or abdominal bloating. Reports regular walking without any issues lately. NO leg swelling or claudications. No recent falls, syncope or near syncopal events. Reports compliance with medications and dietary restrictions. NO CNS complaints to suggest TIA or CVA today. No signs of abnormal bleeding on ASA daily.       Past Medical History:   Diagnosis Date    Abnormal Pap smear of cervix     CAD (coronary artery disease)     Chronic constipation     DM II (diabetes mellitus, type II), controlled     Hx of colposcopy with cervical biopsy        Past Surgical History:   Procedure Laterality Date    ANGIOGRAM, CORONARY, WITH LEFT HEART CATHETERIZATION  2/19/2020    Procedure: Angiogram, Coronary, with Left Heart Cath;  Surgeon: Martin Villatoro MD;  Location: [facility] CATH LAB;  Service: Cardiology;;    CYST REMOVAL      ganglionic right wrist    LAPAROSCOPIC CHOLECYSTECTOMY N/A 7/20/2018    Procedure: CHOLECYSTECTOMY-LAPAROSCOPIC;  Surgeon: Louis O. Jeansonne IV, MD;  Location: [facility] OR;  Service: General;  Laterality: N/A;    LAPAROSCOPIC LYSIS OF ADHESIONS  N/A 7/20/2018    Procedure: LYSIS, ADHESIONS, LAPAROSCOPIC;  Surgeon: Louis O. Jeansonne IV, MD;  Location: [facility] OR;  Service: General;  Laterality: N/A;    LEFT HEART CATHETERIZATION Left 2/19/2020    Procedure: CATHETERIZATION, HEART, LEFT;  Surgeon: Martin Villatoro MD;  Location: [facility] CATH LAB;  Service: Cardiology;  Laterality: Left;  930 start time per Dr. Villatoro    SLEEVE GASTROPLASTY  1/01/12    TOTAL REDUCTION MAMMOPLASTY Bilateral 2104    TUBAL LIGATION         Social History     Tobacco Use    Smoking status: Never    Smokeless tobacco: Never   Substance Use Topics    Alcohol use: Yes     Alcohol/week: 4.0 standard drinks of alcohol     Types: 4 Glasses of wine per week    Drug use: No       Family History   Problem Relation Name Age of Onset    Heart disease Father      Heart attack Father      Diabetes type II Father      Testicular cancer Brother      Dementia Mother      No Known Problems Sister      No Known Problems Sister      Breast cancer Maternal Cousin      Colon cancer Neg Hx         Wt Readings from Last 3 Encounters:   10/07/24 65.3 kg (143 lb 15.4 oz)   07/29/24 70.4 kg (155 lb 3.3 oz)   07/11/24 66.7 kg (147 lb)     Temp Readings from Last 3 Encounters:   07/29/24 97 °F (36.1 °C) (Tympanic)   03/05/24 96.4 °F (35.8 °C) (Tympanic)   12/18/23 97 °F (36.1 °C) (Tympanic)     BP Readings from Last 3 Encounters:   10/07/24 118/76   09/16/24 (!) 96/53   08/26/24 123/61     Pulse Readings from Last 3 Encounters:   10/07/24 79   09/16/24 (!) 53   08/26/24 (!) 46       Current Outpatient Medications on File Prior to Visit   Medication Sig Dispense Refill    alendronate (FOSAMAX) 70 MG tablet TAKE 1 TABLET EVERY 7 DAYS 12 tablet 3    aspirin (ECOTRIN) 81 MG EC tablet Take 81 mg by mouth once daily.      atorvastatin (LIPITOR) 40 MG tablet TAKE 1 TABLET DAILY 90 tablet 3    blood sugar diagnostic Strp To check BG 3 times daily, to use with insurance preferred meter 100 each 1    blood-glucose meter Misc  "Use as instructed 1 each 0    blood-glucose sensor (DEXCOM G7 SENSOR) Lindsay USE 1 SENSOR EVERY 10 DAYS 9 each 0    buPROPion (WELLBUTRIN XL) 150 MG TB24 tablet TAKE 1 TABLET DAILY 90 tablet 3    CALCIUM ORAL Take 1 tablet by mouth once daily at 6am.      clotrimazole-betamethasone 1-0.05% (LOTRISONE) cream APPLY TOPICALLY TO THE AFFECTED AREA TWICE DAILY 45 g 3    insulin aspart, niacinamide, (FIASP FLEXTOUCH U-100 INSULIN) 100 unit/mL (3 mL) InPn Inject 4-14 Units into the skin 3 (three) times daily with meals. 5 pen 10    isosorbide mononitrate (IMDUR) 30 MG 24 hr tablet Take 1 tablet (30 mg total) by mouth once daily. 90 tablet 3    lancets 33 gauge Misc To check BG 3 times daily, to use with insurance preferred meter 100 each 1    melatonin 5 mg Tab Take 5 mg by mouth nightly.       metFORMIN (GLUCOPHAGE) 500 MG tablet TAKE 2 TABLETS ONCE DAILY 180 tablet 3    metoprolol succinate (TOPROL-XL) 25 MG 24 hr tablet TAKE 1 TABLET DAILY 90 tablet 3    multivitamin (THERAGRAN) per tablet Take 1 tablet by mouth.      needle, disp, 24 gauge 24 gauge x 1" Ndle 1 Dose by Misc.(Non-Drug; Combo Route) route every 30 days. 8 each 3    nitroGLYCERIN (NITROSTAT) 0.4 MG SL tablet Place 1 tablet (0.4 mg total) under the tongue every 5 (five) minutes as needed for Chest pain. Call 911 if pain persists after 2 doses. 25 tablet 12    pen needle, diabetic (BD ULTRA-FINE SCOTT PEN NEEDLE) 32 gauge x 5/32" Ndle USE AS DIRECTED. 100 each 11    vitamin D (VITAMIN D3) 1000 units Tab Take 1,000 Units by mouth once daily.      blood-glucose meter,continuous (DEXCOM G7 ) Misc 1 Device by Misc.(Non-Drug; Combo Route) route once daily. 1 each 0    clonazePAM (KLONOPIN) 0.5 MG tablet Take one tablet by mouth every 8 hours for plane trip and 1 at night for jetlag. 10 tablet 0    cyanocobalamin 1,000 mcg/mL injection INJECT 1 ML INTO THE MUSCLE EVERY 30 DAYS (Patient not taking: Reported on 10/7/2024)      estradioL (ESTRACE) 1 MG tablet " Take 1 tablet (1 mg total) by mouth once daily. 30 tablet 11    FLOWFLEX COVID-19 AG HOME TEST Kit  (Patient not taking: Reported on 10/7/2024)      insulin aspart U-100 (NOVOLOG U-100 INSULIN ASPART) 100 unit/mL injection FOR USE WITH ILET INSULIN PUMP. MAX TDD 60 UNITS. (Patient not taking: Reported on 10/7/2024) 54 mL 3    insulin  cart,aut,G6/7,cntr (OMNIPOD 5 G6-G7 INTRO KT,GEN5,) Crtg Inject 1 each into the skin Every 3 (three) days. (Patient not taking: Reported on 10/7/2024) 1 each 0    insulin pump cart,auto,BT,G6/7 (OMNIPOD 5 G6-G7 PODS, GEN 5,) Crtg Inject 1 each into the skin Every 3 (three) days. (Patient not taking: Reported on 10/7/2024) 10 each 11    pramoxine-hydrocortisone (PROCTOCREAM-HC) 1-1 % rectal cream Place rectally 3 (three) times daily. (Patient not taking: Reported on 10/7/2024) 30 g 1    semaglutide (RYBELSUS) 14 mg tablet Take 1 tablet (14 mg total) by mouth once daily. (Patient not taking: Reported on 10/7/2024) 90 tablet 3    [DISCONTINUED] ivabradine (CORLANOR) 5 mg Tab Take 3 tablets (15 mg total) by mouth On call Procedure (take 2-hrs prior to cardiac CTA). (Patient not taking: Reported on 10/7/2024) 3 tablet 0     No current facility-administered medications on file prior to visit.       No cardiac monitor results found for the past 12 months    No results found for this or any previous visit.    No results found for this or any previous visit.        Results for orders placed or performed during the hospital encounter of 10/07/24   EKG 12-lead    Collection Time: 10/07/24  1:11 PM   Result Value Ref Range    QRS Duration 86 ms    [facility] QTC Calculation 424 ms    Narrative    Test Reason : I25.10,I25.118,I20.0,    Vent. Rate : 074 BPM     Atrial Rate : 074 BPM     P-R Int : 158 ms          QRS Dur : 086 ms      QT Int : 382 ms       P-R-T Axes : 043 044 069 degrees     QTc Int : 424 ms    Normal sinus rhythm  Normal ECG  When compared with ECG of 24-JUN-2024 16:25,  No significant  "change was found    Referred By: MARK BAKER           Confirmed By:          Review of Systems   HENT:  Negative for hearing loss and hoarse voice.    Eyes:  Negative for visual disturbance.   Cardiovascular:  Positive for dyspnea on exertion. Negative for chest pain, claudication, irregular heartbeat, leg swelling, near-syncope, orthopnea, palpitations, paroxysmal nocturnal dyspnea and syncope.   Respiratory:  Positive for shortness of breath. Negative for cough, hemoptysis, sleep disturbances due to breathing, snoring and wheezing.    Endocrine: Negative for cold intolerance and heat intolerance.   Hematologic/Lymphatic: Does not bruise/bleed easily.   Skin:  Negative for color change, dry skin and nail changes.   Musculoskeletal:  Positive for arthritis and back pain. Negative for joint pain and myalgias.   Gastrointestinal:  Negative for bloating, abdominal pain, constipation, nausea and vomiting.   Genitourinary:  Negative for dysuria, flank pain, hematuria and hesitancy.   Neurological:  Negative for headaches, light-headedness, loss of balance, numbness, paresthesias and weakness.   Psychiatric/Behavioral:  Negative for altered mental status.    Allergic/Immunologic: Negative for environmental allergies.         Objective:/76 (BP Location: Left arm, Patient Position: Sitting)   Pulse 79   Ht 5' 5" (1.651 m)   Wt 65.3 kg (143 lb 15.4 oz)   LMP 03/05/2014   SpO2 97%   BMI 23.96 kg/m²      Physical Exam  Vitals and nursing note reviewed.   Constitutional:       General: She is not in acute distress.     Appearance: Normal appearance. She is well-developed. She is not ill-appearing.   HENT:      Head: Normocephalic and atraumatic.      Nose: Nose normal.      Mouth/Throat:      Mouth: Mucous membranes are moist.   Eyes:      Pupils: Pupils are equal, round, and reactive to light.   Neck:      Thyroid: No thyromegaly.      Vascular: JVD present.      Trachea: No tracheal deviation.   Cardiovascular:    "   Rate and Rhythm: Normal rate and regular rhythm.      Chest Wall: PMI is not displaced.      Pulses: Intact distal pulses.           Radial pulses are 2+ on the right side and 2+ on the left side.        Dorsalis pedis pulses are 2+ on the right side and 2+ on the left side.      Heart sounds: S1 normal and S2 normal. Heart sounds are distant. No murmur heard.  Pulmonary:      Effort: Pulmonary effort is normal. No respiratory distress.      Breath sounds: Normal breath sounds. No wheezing.   Abdominal:      General: Bowel sounds are normal. There is no distension.      Palpations: Abdomen is soft.      Tenderness: There is no abdominal tenderness.   Musculoskeletal:         General: No swelling. Normal range of motion.      Cervical back: Full passive range of motion without pain, normal range of motion and neck supple.      Right lower leg: No edema.      Right ankle: No swelling.      Left ankle: No swelling.   Skin:     General: Skin is warm and dry.      Capillary Refill: Capillary refill takes less than 2 seconds.      Nails: There is no clubbing.   Neurological:      General: No focal deficit present.      Mental Status: She is alert and oriented to person, place, and time.      Motor: No weakness.   Psychiatric:         Speech: Speech normal.         Behavior: Behavior normal.         Thought Content: Thought content normal.         Judgment: Judgment normal.         Lab Results   Component Value Date    CHOL 179 02/23/2024    CHOL 179 02/23/2024    CHOL 167 10/19/2022     Lab Results   Component Value Date    HDL 65 02/23/2024    HDL 65 02/23/2024    HDL 55 10/19/2022     Lab Results   Component Value Date    LDLCALC 93.4 02/23/2024    LDLCALC 93.4 02/23/2024    LDLCALC 92.0 10/19/2022     Lab Results   Component Value Date    TRIG 103 02/23/2024    TRIG 103 02/23/2024    TRIG 100 10/19/2022     Lab Results   Component Value Date    CHOLHDL 36.3 02/23/2024    CHOLHDL 36.3 02/23/2024    CHOLHDL 32.9  10/19/2022       Chemistry        Component Value Date/Time     10/03/2024 0705    K 4.4 10/03/2024 0705     10/03/2024 0705    CO2 27 10/03/2024 0705    BUN 18 10/03/2024 0705    CREATININE 0.9 10/03/2024 0705     (H) 10/03/2024 0705        Component Value Date/Time    CALCIUM 9.7 10/03/2024 0705    ALKPHOS 78 09/26/2024 0712    AST 36 09/26/2024 0712    ALT 46 (H) 09/26/2024 0712    BILITOT 0.6 09/26/2024 0712    ESTGFRAFRICA >60.0 05/13/2022 0911    EGFRNONAA >60.0 05/13/2022 0911          Lab Results   Component Value Date    TSH 0.492 03/22/2023     Lab Results   Component Value Date    INR 2.9 (H) 10/03/2024    INR 0.9 12/16/2021    INR 1.0 07/22/2018     Lab Results   Component Value Date    WBC 5.55 10/03/2024    HGB 11.2 (L) 10/03/2024    HCT 34.1 (L) 10/03/2024     (H) 10/03/2024     10/03/2024          Assessment:      1. Coronary artery disease of native artery of native heart with stable angina pectoris    2. Accelerating angina    3. Mixed hyperlipidemia    4. Abnormal ECG    5. Type 2 diabetes mellitus with hyperglycemia, without long-term current use of insulin        Plan:     Increase Imdur to 60 mg daily  Hold off on further exercise until [facility] completed  Plan for [facility] this week with Dr Villatoro  Would benefit from cardio exercises post results from [facility]  Consider increased dose of Lipitor to further lower LDLs.   Dash diet 2 gm sodium restriction  Keep diabetes well controlled    [facility]    Nicole May, FNP-C [facility]

## 2024-10-07 NOTE — PROGRESS NOTES
Subjective:   Patient ID:  Yandy Cabral is a 53 y.o. female who presents for evaluation of No chief complaint on file.      HPI    Yandy Cabral is a 53 year old female who presents to clinic due to shortness of breath symptoms. Her current medical conditions include CAD, DM Type II, MR, HTN, HLP. She returns today and states she is doing ok.     She has been having recurrent episodes of chest pain and shortness of breath symptoms. Pain is in center of her chest. Denies any recent NTG SL doses.     Called Lab to clarify lab draw from Friday- sent to [facility] today will be resulted by AM.     BP stable today in office.   Reports that she has some LH symptoms with addition of Isosorbide.   Will plan to increase Imdur to 60 mg daily total in preparation for LHC later this week per Dr Villatoro.     Denies chest pain or anginal equivalents. Denies orthopnea, PND or abdominal bloating. Reports regular walking without any issues lately. NO leg swelling or claudications. No recent falls, syncope or near syncopal events. Reports compliance with medications and dietary restrictions. NO CNS complaints to suggest TIA or CVA today. No signs of abnormal bleeding on ASA daily.       Past Medical History:   Diagnosis Date    Abnormal Pap smear of cervix     CAD (coronary artery disease)     Chronic constipation     DM II (diabetes mellitus, type II), controlled     Hx of colposcopy with cervical biopsy        Past Surgical History:   Procedure Laterality Date    ANGIOGRAM, CORONARY, WITH LEFT HEART CATHETERIZATION  2/19/2020    Procedure: Angiogram, Coronary, with Left Heart Cath;  Surgeon: Martin Villatoro MD;  Location: [facility] CATH LAB;  Service: Cardiology;;    CYST REMOVAL      ganglionic right wrist    LAPAROSCOPIC CHOLECYSTECTOMY N/A 7/20/2018    Procedure: CHOLECYSTECTOMY-LAPAROSCOPIC;  Surgeon: Louis O. Jeansonne IV, MD;  Location: [facility] OR;  Service: General;  Laterality: N/A;    LAPAROSCOPIC LYSIS OF ADHESIONS  N/A 7/20/2018    Procedure: LYSIS, ADHESIONS, LAPAROSCOPIC;  Surgeon: Louis O. Jeansonne IV, MD;  Location: [facility] OR;  Service: General;  Laterality: N/A;    LEFT HEART CATHETERIZATION Left 2/19/2020    Procedure: CATHETERIZATION, HEART, LEFT;  Surgeon: Martin Villatoro MD;  Location: [facility] CATH LAB;  Service: Cardiology;  Laterality: Left;  930 start time per Dr. Villatoro    SLEEVE GASTROPLASTY  1/01/12    TOTAL REDUCTION MAMMOPLASTY Bilateral 2104    TUBAL LIGATION         Social History     Tobacco Use    Smoking status: Never    Smokeless tobacco: Never   Substance Use Topics    Alcohol use: Yes     Alcohol/week: 4.0 standard drinks of alcohol     Types: 4 Glasses of wine per week    Drug use: No       Family History   Problem Relation Name Age of Onset    Heart disease Father      Heart attack Father      Diabetes type II Father      Testicular cancer Brother      Dementia Mother      No Known Problems Sister      No Known Problems Sister      Breast cancer Maternal Cousin      Colon cancer Neg Hx         Wt Readings from Last 3 Encounters:   10/07/24 65.3 kg (143 lb 15.4 oz)   07/29/24 70.4 kg (155 lb 3.3 oz)   07/11/24 66.7 kg (147 lb)     Temp Readings from Last 3 Encounters:   07/29/24 97 °F (36.1 °C) (Tympanic)   03/05/24 96.4 °F (35.8 °C) (Tympanic)   12/18/23 97 °F (36.1 °C) (Tympanic)     BP Readings from Last 3 Encounters:   10/07/24 118/76   09/16/24 (!) 96/53   08/26/24 123/61     Pulse Readings from Last 3 Encounters:   10/07/24 79   09/16/24 (!) 53   08/26/24 (!) 46       Current Outpatient Medications on File Prior to Visit   Medication Sig Dispense Refill    alendronate (FOSAMAX) 70 MG tablet TAKE 1 TABLET EVERY 7 DAYS 12 tablet 3    aspirin (ECOTRIN) 81 MG EC tablet Take 81 mg by mouth once daily.      atorvastatin (LIPITOR) 40 MG tablet TAKE 1 TABLET DAILY 90 tablet 3    blood sugar diagnostic Strp To check BG 3 times daily, to use with insurance preferred meter 100 each 1    blood-glucose meter Misc  "Use as instructed 1 each 0    blood-glucose sensor (DEXCOM G7 SENSOR) Lindsay USE 1 SENSOR EVERY 10 DAYS 9 each 0    buPROPion (WELLBUTRIN XL) 150 MG TB24 tablet TAKE 1 TABLET DAILY 90 tablet 3    CALCIUM ORAL Take 1 tablet by mouth once daily at 6am.      clotrimazole-betamethasone 1-0.05% (LOTRISONE) cream APPLY TOPICALLY TO THE AFFECTED AREA TWICE DAILY 45 g 3    insulin aspart, niacinamide, (FIASP FLEXTOUCH U-100 INSULIN) 100 unit/mL (3 mL) InPn Inject 4-14 Units into the skin 3 (three) times daily with meals. 5 pen 10    isosorbide mononitrate (IMDUR) 30 MG 24 hr tablet Take 1 tablet (30 mg total) by mouth once daily. 90 tablet 3    lancets 33 gauge Misc To check BG 3 times daily, to use with insurance preferred meter 100 each 1    melatonin 5 mg Tab Take 5 mg by mouth nightly.       metFORMIN (GLUCOPHAGE) 500 MG tablet TAKE 2 TABLETS ONCE DAILY 180 tablet 3    metoprolol succinate (TOPROL-XL) 25 MG 24 hr tablet TAKE 1 TABLET DAILY 90 tablet 3    multivitamin (THERAGRAN) per tablet Take 1 tablet by mouth.      needle, disp, 24 gauge 24 gauge x 1" Ndle 1 Dose by Misc.(Non-Drug; Combo Route) route every 30 days. 8 each 3    nitroGLYCERIN (NITROSTAT) 0.4 MG SL tablet Place 1 tablet (0.4 mg total) under the tongue every 5 (five) minutes as needed for Chest pain. Call 911 if pain persists after 2 doses. 25 tablet 12    pen needle, diabetic (BD ULTRA-FINE SCOTT PEN NEEDLE) 32 gauge x 5/32" Ndle USE AS DIRECTED. 100 each 11    vitamin D (VITAMIN D3) 1000 units Tab Take 1,000 Units by mouth once daily.      blood-glucose meter,continuous (DEXCOM G7 ) Misc 1 Device by Misc.(Non-Drug; Combo Route) route once daily. 1 each 0    clonazePAM (KLONOPIN) 0.5 MG tablet Take one tablet by mouth every 8 hours for plane trip and 1 at night for jetlag. 10 tablet 0    cyanocobalamin 1,000 mcg/mL injection INJECT 1 ML INTO THE MUSCLE EVERY 30 DAYS (Patient not taking: Reported on 10/7/2024)      estradioL (ESTRACE) 1 MG tablet " Take 1 tablet (1 mg total) by mouth once daily. 30 tablet 11    FLOWFLEX COVID-19 AG HOME TEST Kit  (Patient not taking: Reported on 10/7/2024)      insulin aspart U-100 (NOVOLOG U-100 INSULIN ASPART) 100 unit/mL injection FOR USE WITH ILET INSULIN PUMP. MAX TDD 60 UNITS. (Patient not taking: Reported on 10/7/2024) 54 mL 3    insulin  cart,aut,G6/7,cntr (OMNIPOD 5 G6-G7 INTRO KT,GEN5,) Crtg Inject 1 each into the skin Every 3 (three) days. (Patient not taking: Reported on 10/7/2024) 1 each 0    insulin pump cart,auto,BT,G6/7 (OMNIPOD 5 G6-G7 PODS, GEN 5,) Crtg Inject 1 each into the skin Every 3 (three) days. (Patient not taking: Reported on 10/7/2024) 10 each 11    pramoxine-hydrocortisone (PROCTOCREAM-HC) 1-1 % rectal cream Place rectally 3 (three) times daily. (Patient not taking: Reported on 10/7/2024) 30 g 1    semaglutide (RYBELSUS) 14 mg tablet Take 1 tablet (14 mg total) by mouth once daily. (Patient not taking: Reported on 10/7/2024) 90 tablet 3    [DISCONTINUED] ivabradine (CORLANOR) 5 mg Tab Take 3 tablets (15 mg total) by mouth On call Procedure (take 2-hrs prior to cardiac CTA). (Patient not taking: Reported on 10/7/2024) 3 tablet 0     No current facility-administered medications on file prior to visit.       No cardiac monitor results found for the past 12 months    No results found for this or any previous visit.    No results found for this or any previous visit.        Results for orders placed or performed during the hospital encounter of 10/07/24   EKG 12-lead    Collection Time: 10/07/24  1:11 PM   Result Value Ref Range    QRS Duration 86 ms    [facility] QTC Calculation 424 ms    Narrative    Test Reason : I25.10,I25.118,I20.0,    Vent. Rate : 074 BPM     Atrial Rate : 074 BPM     P-R Int : 158 ms          QRS Dur : 086 ms      QT Int : 382 ms       P-R-T Axes : 043 044 069 degrees     QTc Int : 424 ms    Normal sinus rhythm  Normal ECG  When compared with ECG of 24-JUN-2024 16:25,  No significant  "change was found    Referred By: MARK BAKER           Confirmed By:          Review of Systems   HENT:  Negative for hearing loss and hoarse voice.    Eyes:  Negative for visual disturbance.   Cardiovascular:  Positive for dyspnea on exertion. Negative for chest pain, claudication, irregular heartbeat, leg swelling, near-syncope, orthopnea, palpitations, paroxysmal nocturnal dyspnea and syncope.   Respiratory:  Positive for shortness of breath. Negative for cough, hemoptysis, sleep disturbances due to breathing, snoring and wheezing.    Endocrine: Negative for cold intolerance and heat intolerance.   Hematologic/Lymphatic: Does not bruise/bleed easily.   Skin:  Negative for color change, dry skin and nail changes.   Musculoskeletal:  Positive for arthritis and back pain. Negative for joint pain and myalgias.   Gastrointestinal:  Negative for bloating, abdominal pain, constipation, nausea and vomiting.   Genitourinary:  Negative for dysuria, flank pain, hematuria and hesitancy.   Neurological:  Negative for headaches, light-headedness, loss of balance, numbness, paresthesias and weakness.   Psychiatric/Behavioral:  Negative for altered mental status.    Allergic/Immunologic: Negative for environmental allergies.         Objective:/76 (BP Location: Left arm, Patient Position: Sitting)   Pulse 79   Ht 5' 5" (1.651 m)   Wt 65.3 kg (143 lb 15.4 oz)   LMP 03/05/2014   SpO2 97%   BMI 23.96 kg/m²      Physical Exam  Vitals and nursing note reviewed.   Constitutional:       General: She is not in acute distress.     Appearance: Normal appearance. She is well-developed. She is not ill-appearing.   HENT:      Head: Normocephalic and atraumatic.      Nose: Nose normal.      Mouth/Throat:      Mouth: Mucous membranes are moist.   Eyes:      Pupils: Pupils are equal, round, and reactive to light.   Neck:      Thyroid: No thyromegaly.      Vascular: JVD present.      Trachea: No tracheal deviation.   Cardiovascular:    "   Rate and Rhythm: Normal rate and regular rhythm.      Chest Wall: PMI is not displaced.      Pulses: Intact distal pulses.           Radial pulses are 2+ on the right side and 2+ on the left side.        Dorsalis pedis pulses are 2+ on the right side and 2+ on the left side.      Heart sounds: S1 normal and S2 normal. Heart sounds are distant. No murmur heard.  Pulmonary:      Effort: Pulmonary effort is normal. No respiratory distress.      Breath sounds: Normal breath sounds. No wheezing.   Abdominal:      General: Bowel sounds are normal. There is no distension.      Palpations: Abdomen is soft.      Tenderness: There is no abdominal tenderness.   Musculoskeletal:         General: No swelling. Normal range of motion.      Cervical back: Full passive range of motion without pain, normal range of motion and neck supple.      Right lower leg: No edema.      Right ankle: No swelling.      Left ankle: No swelling.   Skin:     General: Skin is warm and dry.      Capillary Refill: Capillary refill takes less than 2 seconds.      Nails: There is no clubbing.   Neurological:      General: No focal deficit present.      Mental Status: She is alert and oriented to person, place, and time.      Motor: No weakness.   Psychiatric:         Speech: Speech normal.         Behavior: Behavior normal.         Thought Content: Thought content normal.         Judgment: Judgment normal.         Lab Results   Component Value Date    CHOL 179 02/23/2024    CHOL 179 02/23/2024    CHOL 167 10/19/2022     Lab Results   Component Value Date    HDL 65 02/23/2024    HDL 65 02/23/2024    HDL 55 10/19/2022     Lab Results   Component Value Date    LDLCALC 93.4 02/23/2024    LDLCALC 93.4 02/23/2024    LDLCALC 92.0 10/19/2022     Lab Results   Component Value Date    TRIG 103 02/23/2024    TRIG 103 02/23/2024    TRIG 100 10/19/2022     Lab Results   Component Value Date    CHOLHDL 36.3 02/23/2024    CHOLHDL 36.3 02/23/2024    CHOLHDL 32.9  10/19/2022       Chemistry        Component Value Date/Time     10/03/2024 0705    K 4.4 10/03/2024 0705     10/03/2024 0705    CO2 27 10/03/2024 0705    BUN 18 10/03/2024 0705    CREATININE 0.9 10/03/2024 0705     (H) 10/03/2024 0705        Component Value Date/Time    CALCIUM 9.7 10/03/2024 0705    ALKPHOS 78 09/26/2024 0712    AST 36 09/26/2024 0712    ALT 46 (H) 09/26/2024 0712    BILITOT 0.6 09/26/2024 0712    ESTGFRAFRICA >60.0 05/13/2022 0911    EGFRNONAA >60.0 05/13/2022 0911          Lab Results   Component Value Date    TSH 0.492 03/22/2023     Lab Results   Component Value Date    INR 2.9 (H) 10/03/2024    INR 0.9 12/16/2021    INR 1.0 07/22/2018     Lab Results   Component Value Date    WBC 5.55 10/03/2024    HGB 11.2 (L) 10/03/2024    HCT 34.1 (L) 10/03/2024     (H) 10/03/2024     10/03/2024          Assessment:      1. Coronary artery disease of native artery of native heart with stable angina pectoris    2. Accelerating angina    3. Mixed hyperlipidemia    4. Abnormal ECG    5. Type 2 diabetes mellitus with hyperglycemia, without long-term current use of insulin        Plan:     Increase Imdur to 60 mg daily  Hold off on further exercise until [facility] completed  Plan for [facility] this week with Dr Villatoro  Would benefit from cardio exercises post results from [facility]  Consider increased dose of Lipitor to further lower LDLs.   Dash diet 2 gm sodium restriction  Keep diabetes well controlled    RTC Post [facility]    Nicole May, FNP-C [facility]

## 2024-10-09 ENCOUNTER — PATIENT MESSAGE (OUTPATIENT)
Dept: DIABETES | Facility: CLINIC | Age: 54
End: 2024-10-09
Payer: COMMERCIAL

## 2024-10-09 DIAGNOSIS — E11.65 TYPE 2 DIABETES MELLITUS WITH HYPERGLYCEMIA, WITHOUT LONG-TERM CURRENT USE OF INSULIN: ICD-10-CM

## 2024-10-09 RX ORDER — INSULIN PMP CART,AUT,G6/7,CNTR
1 EACH SUBCUTANEOUS
Qty: 1 EACH | Refills: 0 | Status: SHIPPED | OUTPATIENT
Start: 2024-10-09 | End: 2025-10-09

## 2024-10-10 ENCOUNTER — HOSPITAL ENCOUNTER (OUTPATIENT)
Facility: HOSPITAL | Age: 54
Discharge: HOME | End: 2024-10-10
Attending: INTERNAL MEDICINE | Admitting: INTERNAL MEDICINE
Payer: COMMERCIAL

## 2024-10-10 VITALS
DIASTOLIC BLOOD PRESSURE: 56 MMHG | TEMPERATURE: 98 F | RESPIRATION RATE: 14 BRPM | SYSTOLIC BLOOD PRESSURE: 99 MMHG | OXYGEN SATURATION: 99 % | HEART RATE: 72 BPM

## 2024-10-10 DIAGNOSIS — I34.0 NONRHEUMATIC MITRAL VALVE REGURGITATION: ICD-10-CM

## 2024-10-10 DIAGNOSIS — Z82.49 FAMILY HISTORY OF CORONARY ARTERY DISEASE: ICD-10-CM

## 2024-10-10 DIAGNOSIS — I25.10 CAD IN NATIVE ARTERY: ICD-10-CM

## 2024-10-10 DIAGNOSIS — R93.89 ABNORMAL COMPUTED TOMOGRAPHY ANGIOGRAPHY (CTA): ICD-10-CM

## 2024-10-10 DIAGNOSIS — I20.0 ACCELERATING ANGINA: ICD-10-CM

## 2024-10-10 LAB
CATH EF QUANTITATIVE: 60 %
POCT GLUCOSE: 179 MG/DL (ref 70–110)

## 2024-10-10 PROCEDURE — 99153 MOD SED SAME PHYS/QHP EA: CPT | Performed by: INTERNAL MEDICINE

## 2024-10-10 PROCEDURE — 93458 L HRT ARTERY/VENTRICLE ANGIO: CPT | Mod: 26,,, | Performed by: INTERNAL MEDICINE

## 2024-10-10 PROCEDURE — 27201423 OPTIME MED/SURG SUP & DEVICES STERILE SUPPLY: Performed by: INTERNAL MEDICINE

## 2024-10-10 PROCEDURE — 99152 MOD SED SAME PHYS/QHP 5/>YRS: CPT | Mod: ,,, | Performed by: INTERNAL MEDICINE

## 2024-10-10 PROCEDURE — C1894 INTRO/SHEATH, NON-LASER: HCPCS | Performed by: INTERNAL MEDICINE

## 2024-10-10 PROCEDURE — 99152 MOD SED SAME PHYS/QHP 5/>YRS: CPT | Performed by: INTERNAL MEDICINE

## 2024-10-10 PROCEDURE — C1769 GUIDE WIRE: HCPCS | Performed by: INTERNAL MEDICINE

## 2024-10-10 PROCEDURE — 93458 L HRT ARTERY/VENTRICLE ANGIO: CPT | Performed by: INTERNAL MEDICINE

## 2024-10-10 PROCEDURE — C1760 CLOSURE DEV, VASC: HCPCS | Performed by: INTERNAL MEDICINE

## 2024-10-10 PROCEDURE — 63600175 PHARM REV CODE 636 W HCPCS: Performed by: INTERNAL MEDICINE

## 2024-10-10 PROCEDURE — 25000003 PHARM REV CODE 250: Performed by: INTERNAL MEDICINE

## 2024-10-10 PROCEDURE — 25500020 PHARM REV CODE 255: Performed by: INTERNAL MEDICINE

## 2024-10-10 RX ORDER — MORPHINE SULFATE 2 MG/ML
2 INJECTION, SOLUTION INTRAMUSCULAR; INTRAVENOUS EVERY 4 HOURS PRN
Status: DISCONTINUED | OUTPATIENT
Start: 2024-10-10 | End: 2024-10-10 | Stop reason: HOSPADM

## 2024-10-10 RX ORDER — LIDOCAINE HYDROCHLORIDE 20 MG/ML
INJECTION, SOLUTION INFILTRATION; PERINEURAL
Status: DISCONTINUED | OUTPATIENT
Start: 2024-10-10 | End: 2024-10-10 | Stop reason: HOSPADM

## 2024-10-10 RX ORDER — DIAZEPAM 5 MG/1
5 TABLET ORAL
Status: COMPLETED | OUTPATIENT
Start: 2024-10-10 | End: 2024-10-10

## 2024-10-10 RX ORDER — ACETAMINOPHEN 325 MG/1
650 TABLET ORAL EVERY 4 HOURS PRN
Status: DISCONTINUED | OUTPATIENT
Start: 2024-10-10 | End: 2024-10-10 | Stop reason: HOSPADM

## 2024-10-10 RX ORDER — FENTANYL CITRATE 50 UG/ML
INJECTION, SOLUTION INTRAMUSCULAR; INTRAVENOUS
Status: DISCONTINUED | OUTPATIENT
Start: 2024-10-10 | End: 2024-10-10 | Stop reason: HOSPADM

## 2024-10-10 RX ORDER — MIDAZOLAM HYDROCHLORIDE 1 MG/ML
INJECTION, SOLUTION INTRAMUSCULAR; INTRAVENOUS
Status: DISCONTINUED | OUTPATIENT
Start: 2024-10-10 | End: 2024-10-10 | Stop reason: HOSPADM

## 2024-10-10 RX ORDER — ISOSORBIDE MONONITRATE 30 MG/1
60 TABLET, EXTENDED RELEASE ORAL DAILY
Qty: 90 TABLET | Refills: 3 | Status: SHIPPED | OUTPATIENT
Start: 2024-10-10

## 2024-10-10 RX ORDER — ROSUVASTATIN CALCIUM 40 MG/1
40 TABLET, COATED ORAL NIGHTLY
Qty: 90 TABLET | Refills: 3 | Status: SHIPPED | OUTPATIENT
Start: 2024-10-10 | End: 2025-10-10

## 2024-10-10 RX ORDER — SODIUM CHLORIDE 9 MG/ML
INJECTION, SOLUTION INTRAVENOUS CONTINUOUS
Status: ACTIVE | OUTPATIENT
Start: 2024-10-10 | End: 2024-10-10

## 2024-10-10 RX ORDER — NITROGLYCERIN 5 MG/ML
INJECTION, SOLUTION INTRAVENOUS
Status: DISCONTINUED | OUTPATIENT
Start: 2024-10-10 | End: 2024-10-10 | Stop reason: HOSPADM

## 2024-10-10 RX ORDER — ONDANSETRON 8 MG/1
8 TABLET, ORALLY DISINTEGRATING ORAL EVERY 8 HOURS PRN
Status: DISCONTINUED | OUTPATIENT
Start: 2024-10-10 | End: 2024-10-10 | Stop reason: HOSPADM

## 2024-10-10 RX ORDER — DIPHENHYDRAMINE HCL 50 MG
50 CAPSULE ORAL ONCE
Status: COMPLETED | OUTPATIENT
Start: 2024-10-10 | End: 2024-10-10

## 2024-10-10 RX ORDER — ASPIRIN 325 MG
325 TABLET ORAL ONCE
Status: COMPLETED | OUTPATIENT
Start: 2024-10-10 | End: 2024-10-10

## 2024-10-10 RX ORDER — EZETIMIBE 10 MG/1
10 TABLET ORAL DAILY
Qty: 90 TABLET | Refills: 3 | Status: SHIPPED | OUTPATIENT
Start: 2024-10-10 | End: 2025-10-10

## 2024-10-10 RX ORDER — SODIUM CHLORIDE 0.9 % (FLUSH) 0.9 %
10 SYRINGE (ML) INJECTION
Status: DISCONTINUED | OUTPATIENT
Start: 2024-10-10 | End: 2024-10-10 | Stop reason: HOSPADM

## 2024-10-10 RX ORDER — SODIUM CHLORIDE 9 MG/ML
INJECTION, SOLUTION INTRAVENOUS CONTINUOUS
Status: DISCONTINUED | OUTPATIENT
Start: 2024-10-10 | End: 2024-10-10 | Stop reason: HOSPADM

## 2024-10-10 RX ADMIN — DIAZEPAM 5 MG: 5 TABLET ORAL at 06:10

## 2024-10-10 RX ADMIN — ASPIRIN 325 MG ORAL TABLET 325 MG: 325 PILL ORAL at 06:10

## 2024-10-10 RX ADMIN — SODIUM CHLORIDE: 9 INJECTION, SOLUTION INTRAVENOUS at 06:10

## 2024-10-10 RX ADMIN — SODIUM CHLORIDE: 9 INJECTION, SOLUTION INTRAVENOUS at 08:10

## 2024-10-10 RX ADMIN — DIPHENHYDRAMINE HYDROCHLORIDE 50 MG: 50 CAPSULE ORAL at 06:10

## 2024-10-10 NOTE — Clinical Note
110 ml of contrast were injected throughout the case. 0 mL of contrast was the total wasted during the case. 110 mL was the total amount used during the case.

## 2024-10-10 NOTE — PLAN OF CARE
Patient ate complete food tray and tolerated well with no nausea or vomiting. Right groin site with clean gauze dressing, no swelling, drainage, or hematoma noted to Left groin puncture site. Patient able to ambulate to bathroom with assistance, without difficulty. IV removed, catheter tip intact, clean gauze dressing applied. Patient wheeled to personal vehicle for discharge.

## 2024-10-10 NOTE — OP NOTE
[facility] - Cath Lab ([facility])  Cardiac Catheterization  Procedure and Discharge Note    SUMMARY     Yandy Cabral  [id number]  Jaycob Ramos MD    Date of Procedure: 10/10/2024    Procedure: 1. LHC 2. LEFT VENTRICULOGRAM 3. CORONARY ANGIOGRAM    Provider: Martin Villatoro MD    Indications: She was referred for cardiac catheterization to further evaluate abnormal coronary CTA, CAD, chest pain/angina.    Pre-Procedure Diagnosis: abnormal coronary CTA, CAD, chest pain/angina.    Post-Procedure Diagnosis: nonobstructive CAD.    Anesthesia: RN IV Sedation    Description of the Findings of the Procedure:     The risks, benefits, complications, treatment options, and expected outcomes were discussed with the patient. The patient and/or family concurred with the proposed plan, giving informed consent. Patient was brought to the cath lab after IV hydration was begun and oral premedication was given.     Findings:    Nonobstructive CAD.  Normal EF.  Vascade R femoral closure device.  See report for full details.      Complications: None; patient tolerated the procedure well.    Estimated Blood Loss (EBL): Minimal           Implants: Vascade    Specimens: none    Condition: stable    Disposition: PACU-hemodynamicallystable    Attestation: I was present and scrubbed for the entire procedure.      Recommendations:    Usual post cath care.  D/c pt home.  Maximize med tx for CAD.  Optimal DM HGAIC control needed.  Cardiac diet.  F/u Cards clinic 1 - 2 weeks.

## 2024-10-16 ENCOUNTER — TELEPHONE (OUTPATIENT)
Dept: DIABETES | Facility: CLINIC | Age: 54
End: 2024-10-16
Payer: COMMERCIAL

## 2024-10-16 DIAGNOSIS — I25.118 CORONARY ARTERY DISEASE OF NATIVE ARTERY OF NATIVE HEART WITH STABLE ANGINA PECTORIS: ICD-10-CM

## 2024-10-16 DIAGNOSIS — E11.65 TYPE 2 DIABETES MELLITUS WITH HYPERGLYCEMIA, WITHOUT LONG-TERM CURRENT USE OF INSULIN: ICD-10-CM

## 2024-10-16 DIAGNOSIS — I25.10 CAD IN NATIVE ARTERY: Primary | ICD-10-CM

## 2024-10-16 DIAGNOSIS — I20.0 ACCELERATING ANGINA: ICD-10-CM

## 2024-10-16 RX ORDER — INSULIN PMP CART,AUT,G6/7,CNTR
1 EACH SUBCUTANEOUS
Qty: 10 EACH | Refills: 11 | OUTPATIENT
Start: 2024-10-16 | End: 2024-11-15

## 2024-10-16 RX ORDER — INSULIN PMP CART,AUT,G6/7,CNTR
1 EACH SUBCUTANEOUS
Qty: 10 EACH | Refills: 11 | Status: SHIPPED | OUTPATIENT
Start: 2024-10-16 | End: 2024-11-15

## 2024-10-16 RX ORDER — INSULIN PMP CART,AUT,G6/7,CNTR
1 EACH SUBCUTANEOUS
Qty: 1 EACH | Refills: 0 | Status: SHIPPED | OUTPATIENT
Start: 2024-10-16 | End: 2025-10-16

## 2024-10-16 NOTE — TELEPHONE ENCOUNTER
Spoke with patient informed her that Let patient know OP5 Intro kit and pods sent to [facility]. At this time it is not yet available at [facility]. Pt verbally understood results given

## 2024-10-16 NOTE — TELEPHONE ENCOUNTER
----- Message from Laura Watkins NP sent at 10/15/2024  9:58 PM CDT -----    ----- Message -----  From: Mona Aguilar MA  Sent: 10/15/2024   3:21 PM CDT  To: Laura Watkins NP      ----- Message -----  From: Amarilis Russ  Sent: 10/15/2024  11:58 AM CDT  To: Roland Sanchez Staff    .Type:  Pharmacy Calling to Clarify an RX    Name of Caller:Patient   Pharmacy Name:.  [facility] - [address] [address]  Phone: [phone] Fax: [phone]    Prescription Name:insulin  cart,aut,G6/7,cntr (OMNIPOD 5 G6-G7 INTRO KT,GEN5,) Crtg  What do they need to clarify?:prescription   Best Call Back Number:.      Additional Information:     The product is not in stock . Pharmacy is wondering if provider would like to fill another prescription instead

## 2024-10-16 NOTE — TELEPHONE ENCOUNTER
-Pharmacy called need new rx request sent to Provider     Patient informed will be o to wait not out of Pods      ---- Message from La sent at 10/16/2024  9:06 AM CDT -----  Contact: [phone]  Type:  RX Refill Request    Who Called: [facility]... [phone] REF# [id number]  Refill or New Rx:REFILL  RX Name and Strength:insulin pump cart,auto,BT,G6/7 (OMNIPOD 5 G6-G7 PODS, GEN 5,) Crtg  How is the patient currently taking it? (ex. 1XDay):  Is this a 30 day or 90 day RX:  Preferred Pharmacy with phone number:: [phone] Fax: [phone]    Local or Mail Order:  Ordering Provider:FABRICE  Would the patient rather a call back or a response via [facility]? CALL BACK  Best Call Back Number: [phone]  Additional Information: MRN [id number]... ONLY HAVE THE G6 VERSION .WILL GET THE G7 IN A COUPLE OF WEEKS .. NEED CLARIFICATION

## 2024-10-17 ENCOUNTER — HOSPITAL ENCOUNTER (OUTPATIENT)
Dept: CARDIOLOGY | Facility: HOSPITAL | Age: 54
Discharge: HOME | End: 2024-10-17
Payer: COMMERCIAL

## 2024-10-17 ENCOUNTER — OFFICE VISIT (OUTPATIENT)
Dept: CARDIOLOGY | Facility: CLINIC | Age: 54
End: 2024-10-17
Payer: COMMERCIAL

## 2024-10-17 VITALS
SYSTOLIC BLOOD PRESSURE: 116 MMHG | HEART RATE: 66 BPM | BODY MASS INDEX: 25.78 KG/M2 | OXYGEN SATURATION: 98 % | WEIGHT: 154.75 LBS | HEIGHT: 65 IN | DIASTOLIC BLOOD PRESSURE: 70 MMHG

## 2024-10-17 DIAGNOSIS — I25.10 CAD IN NATIVE ARTERY: ICD-10-CM

## 2024-10-17 DIAGNOSIS — E11.65 TYPE 2 DIABETES MELLITUS WITH HYPERGLYCEMIA, WITHOUT LONG-TERM CURRENT USE OF INSULIN: Primary | ICD-10-CM

## 2024-10-17 DIAGNOSIS — E78.2 MIXED HYPERLIPIDEMIA: ICD-10-CM

## 2024-10-17 DIAGNOSIS — I25.118 CORONARY ARTERY DISEASE OF NATIVE ARTERY OF NATIVE HEART WITH STABLE ANGINA PECTORIS: ICD-10-CM

## 2024-10-17 DIAGNOSIS — I20.0 ACCELERATING ANGINA: ICD-10-CM

## 2024-10-17 DIAGNOSIS — E66.3 OVERWEIGHT (BMI 25.0-29.9): ICD-10-CM

## 2024-10-17 DIAGNOSIS — I34.0 NONRHEUMATIC MITRAL VALVE REGURGITATION: ICD-10-CM

## 2024-10-17 LAB
OHS QRS DURATION: 84 MS
OHS QTC CALCULATION: 419 MS

## 2024-10-17 PROCEDURE — 3061F NEG MICROALBUMINURIA REV: CPT | Mod: CPTII,S$GLB,, | Performed by: NURSE PRACTITIONER

## 2024-10-17 PROCEDURE — 3078F DIAST BP <80 MM HG: CPT | Mod: CPTII,S$GLB,, | Performed by: NURSE PRACTITIONER

## 2024-10-17 PROCEDURE — 99999 PR PBB SHADOW E&M-EST. PATIENT-LVL V: CPT | Mod: PBBFAC,,, | Performed by: NURSE PRACTITIONER

## 2024-10-17 PROCEDURE — 3008F BODY MASS INDEX DOCD: CPT | Mod: CPTII,S$GLB,, | Performed by: NURSE PRACTITIONER

## 2024-10-17 PROCEDURE — 1159F MED LIST DOCD IN RCRD: CPT | Mod: CPTII,S$GLB,, | Performed by: NURSE PRACTITIONER

## 2024-10-17 PROCEDURE — 3072F LOW RISK FOR RETINOPATHY: CPT | Mod: CPTII,S$GLB,, | Performed by: NURSE PRACTITIONER

## 2024-10-17 PROCEDURE — 93010 ELECTROCARDIOGRAM REPORT: CPT | Mod: ,,, | Performed by: INTERNAL MEDICINE

## 2024-10-17 PROCEDURE — 93005 ELECTROCARDIOGRAM TRACING: CPT

## 2024-10-17 PROCEDURE — 3044F HG A1C LEVEL LT 7.0%: CPT | Mod: CPTII,S$GLB,, | Performed by: NURSE PRACTITIONER

## 2024-10-17 PROCEDURE — 99214 OFFICE O/P EST MOD 30 MIN: CPT | Mod: S$GLB,,, | Performed by: NURSE PRACTITIONER

## 2024-10-17 PROCEDURE — 3074F SYST BP LT 130 MM HG: CPT | Mod: CPTII,S$GLB,, | Performed by: NURSE PRACTITIONER

## 2024-10-17 PROCEDURE — 3066F NEPHROPATHY DOC TX: CPT | Mod: CPTII,S$GLB,, | Performed by: NURSE PRACTITIONER

## 2024-10-17 RX ORDER — SEMAGLUTIDE 0.68 MG/ML
0.25 INJECTION, SOLUTION SUBCUTANEOUS
Qty: 3 ML | Refills: 3 | Status: SHIPPED | OUTPATIENT
Start: 2024-10-17

## 2024-10-17 NOTE — PROGRESS NOTES
Subjective:   Patient ID:  Yandy Cabral is a 53 y.o. female who presents for evaluation of No chief complaint on file.      HPI    Yandy Cabral is a 53 year old female who presents to clinic due to shortness of breath symptoms. Her current medical conditions include CAD, DM Type II, MR, HTN, HLP. She returns today and states she is doing ok.     She has been having recurrent episodes of chest pain and shortness of breath symptoms. Pain is in center of her chest. Denies any recent NTG SL doses.     Called Lab to clarify lab draw from Friday- sent to [facility] today will be resulted by AM.     BP stable today in office.   Reports that she has some LH symptoms with addition of Isosorbide.   Will plan to increase Imdur to 60 mg daily total in preparation for LHC later this week per Dr Villatoro.     Denies chest pain or anginal equivalents. Denies orthopnea, PND or abdominal bloating. Reports regular walking without any issues lately. NO leg swelling or claudications. No recent falls, syncope or near syncopal events. Reports compliance with medications and dietary restrictions. NO CNS complaints to suggest TIA or CVA today. No signs of abnormal bleeding on ASA daily.     10/17/2024 update    Yandy Cabral returns for cath follow up visit.     Long discussion regarding risk factor modification to modify future CAD risk.     She returns today and states she is doing well.   Denies any recurrent chest pain symptoms since last office visit.    Bp well controlled.   Can resume her regular exercise regimen today.   Has been attending pilates classes but will plan to add cardio exercises to her regular regimen.     Her meds were adjusted post LHC with intensification of statin and addition of Zetia.     Her recent A1C is well controlled.   She plans to avoid further ETOH use in the future.   Denies any smoking history.     Denies any snoring.    Denies chest pain or anginal equivalents. No shortness of  breath, LEMONS or palpitations. Denies orthopnea, PND or abdominal bloating. Reports regular walking without any issues lately. NO leg swelling or claudications. No recent falls, syncope or near syncopal events. Reports compliance with medications and dietary restrictions. NO CNS complaints to suggest TIA or CVA today. No signs of abnormal bleeding on ASA daily.         Coronary Findings    Diagnostic  Dominance: Right  Left Main   The vessel is angiographically normal.      Left Anterior Descending   LAD has nonobstructive disease. Proximal LAD is moderately calcified. There is 30 - 40% proximal stenosis, 40% mid stenosis. Distal LAD is smaller caliber vessel with mild disease.      First Diagonal Branch   There is mild diffuse disease throughout the vessel.      Second Diagonal Branch   There is mild diffuse disease throughout the vessel.      Ramus Intermedius   There is mild diffuse disease throughout the vessel.      Left Circumflex   LCX is smaller caliber vessel with diffuse nonobstructive disease up to 40% in proximal segment.      First Obtuse Marginal Branch   The vessel exhibits minimal luminal irregularities.      Right Coronary Artery   RCA is moderately calcified in proximal segment. RCA has diffuse nonobstructive disease up to 50% in proximal segment. PDA is small vessel, and has nonobstructive ostial disease.        Results for orders placed during the hospital encounter of 10/10/24    Cardiac catheterization    Conclusion    Nonobstructive CAD.    Normal EF.    The procedure log was documented by Documenter: Melita Heller RN and verified by Martin Villatoro MD.    Date: 10/10/2024  Time: 8:24 AM          Past Medical History:   Diagnosis Date    Abnormal Pap smear of cervix     CAD (coronary artery disease)     Chronic constipation     DM II (diabetes mellitus, type II), controlled     Hx of colposcopy with cervical biopsy        Past Surgical History:   Procedure Laterality Date    ANGIOGRAM, CORONARY,  WITH LEFT HEART CATHETERIZATION  2/19/2020    Procedure: Angiogram, Coronary, with Left Heart Cath;  Surgeon: Martin Villatoro MD;  Location: [facility] CATH LAB;  Service: Cardiology;;    CYST REMOVAL      ganglionic right wrist    LAPAROSCOPIC CHOLECYSTECTOMY N/A 7/20/2018    Procedure: CHOLECYSTECTOMY-LAPAROSCOPIC;  Surgeon: Louis O. Jeansonne IV, MD;  Location: [facility] OR;  Service: General;  Laterality: N/A;    LAPAROSCOPIC LYSIS OF ADHESIONS N/A 7/20/2018    Procedure: LYSIS, ADHESIONS, LAPAROSCOPIC;  Surgeon: Louis O. Jeansonne IV, MD;  Location: [facility] OR;  Service: General;  Laterality: N/A;    LEFT HEART CATHETERIZATION Left 2/19/2020    Procedure: CATHETERIZATION, HEART, LEFT;  Surgeon: Martin Villatoro MD;  Location: [facility] CATH LAB;  Service: Cardiology;  Laterality: Left;  930 start time per Dr. Villatoro    LEFT HEART CATHETERIZATION Left 10/10/2024    Procedure: Left heart cath;  Surgeon: Martin Villatoro MD;  Location: [facility] CATH LAB;  Service: Cardiology;  Laterality: Left;    SLEEVE GASTROPLASTY  1/01/12    TOTAL REDUCTION MAMMOPLASTY Bilateral 2104    TUBAL LIGATION         Social History     Tobacco Use    Smoking status: Never    Smokeless tobacco: Never   Substance Use Topics    Alcohol use: Yes     Alcohol/week: 4.0 standard drinks of alcohol     Types: 4 Glasses of wine per week    Drug use: No       Family History   Problem Relation Name Age of Onset    Heart disease Father      Heart attack Father      Diabetes type II Father      Testicular cancer Brother      Dementia Mother      No Known Problems Sister      No Known Problems Sister      Breast cancer Maternal Cousin      Colon cancer Neg Hx         Wt Readings from Last 3 Encounters:   10/17/24 70.2 kg (154 lb 12.2 oz)   10/07/24 65.3 kg (143 lb 15.4 oz)   07/29/24 70.4 kg (155 lb 3.3 oz)     Temp Readings from Last 3 Encounters:   10/10/24 98.1 °F (36.7 °C)   07/29/24 97 °F (36.1 °C) (Tympanic)   03/05/24 96.4 °F (35.8 °C) (Tympanic)     BP Readings from  Last 3 Encounters:   10/17/24 116/70   10/10/24 (!) 99/56   10/07/24 118/76     Pulse Readings from Last 3 Encounters:   10/17/24 66   10/10/24 72   10/07/24 79       Current Outpatient Medications on File Prior to Visit   Medication Sig Dispense Refill    alendronate (FOSAMAX) 70 MG tablet TAKE 1 TABLET EVERY 7 DAYS 12 tablet 3    aspirin (ECOTRIN) 81 MG EC tablet Take 81 mg by mouth once daily.      blood sugar diagnostic Strp To check BG 3 times daily, to use with insurance preferred meter 100 each 1    blood-glucose meter Misc Use as instructed 1 each 0    blood-glucose meter,continuous (DEXCOM G7 ) Misc 1 Device by Misc.(Non-Drug; Combo Route) route once daily. 1 each 0    blood-glucose sensor (DEXCOM G7 SENSOR) Lindsay USE 1 SENSOR EVERY 10 DAYS 9 each 0    buPROPion (WELLBUTRIN XL) 150 MG TB24 tablet TAKE 1 TABLET DAILY 90 tablet 3    CALCIUM ORAL Take 1 tablet by mouth once daily at 6am.      clotrimazole-betamethasone 1-0.05% (LOTRISONE) cream APPLY TOPICALLY TO THE AFFECTED AREA TWICE DAILY 45 g 3    ezetimibe (ZETIA) 10 mg tablet Take 1 tablet (10 mg total) by mouth once daily. 90 tablet 3    FLOWFLEX COVID-19 AG HOME TEST Kit       insulin aspart U-100 (NOVOLOG U-100 INSULIN ASPART) 100 unit/mL injection FOR USE WITH ILET INSULIN PUMP. MAX TDD 60 UNITS. 54 mL 3    insulin aspart, niacinamide, (FIASP FLEXTOUCH U-100 INSULIN) 100 unit/mL (3 mL) InPn Inject 4-14 Units into the skin 3 (three) times daily with meals. 5 pen 10    insulin  cart,aut,G6/7,cntr (OMNIPOD 5 G6-G7 INTRO KT,GEN5,) Crtg Inject 1 each into the skin Every 3 (three) days. 1 each 0    insulin pump cart,auto,BT,G6/7 (OMNIPOD 5 G6-G7 PODS, GEN 5,) Crtg Inject 1 each into the skin Every 3 (three) days. 10 each 11    isosorbide mononitrate (IMDUR) 30 MG 24 hr tablet Take 2 tablets (60 mg total) by mouth once daily. 90 tablet 3    lancets 33 gauge Misc To check BG 3 times daily, to use with insurance preferred meter 100 each 1     "melatonin 5 mg Tab Take 5 mg by mouth nightly.       metFORMIN (GLUCOPHAGE) 500 MG tablet TAKE 2 TABLETS ONCE DAILY 180 tablet 3    metoprolol succinate (TOPROL-XL) 25 MG 24 hr tablet TAKE 1 TABLET DAILY 90 tablet 3    multivitamin (THERAGRAN) per tablet Take 1 tablet by mouth.      needle, disp, 24 gauge 24 gauge x 1" Ndle 1 Dose by Misc.(Non-Drug; Combo Route) route every 30 days. 8 each 3    nitroGLYCERIN (NITROSTAT) 0.4 MG SL tablet Place 1 tablet (0.4 mg total) under the tongue every 5 (five) minutes as needed for Chest pain. Call 911 if pain persists after 2 doses. 25 tablet 12    pen needle, diabetic (BD ULTRA-FINE SCOTT PEN NEEDLE) 32 gauge x 5/32" Ndle USE AS DIRECTED. 100 each 11    rosuvastatin (CRESTOR) 40 MG Tab Take 1 tablet (40 mg total) by mouth every evening. 90 tablet 3    vitamin D (VITAMIN D3) 1000 units Tab Take 1,000 Units by mouth once daily.      [DISCONTINUED] semaglutide (RYBELSUS) 14 mg tablet Take 1 tablet (14 mg total) by mouth once daily. 90 tablet 3    clonazePAM (KLONOPIN) 0.5 MG tablet Take one tablet by mouth every 8 hours for plane trip and 1 at night for jetlag. 10 tablet 0    cyanocobalamin 1,000 mcg/mL injection INJECT 1 ML INTO THE MUSCLE EVERY 30 DAYS (Patient not taking: Reported on 10/17/2024)      estradioL (ESTRACE) 1 MG tablet Take 1 tablet (1 mg total) by mouth once daily. (Patient not taking: Reported on 10/17/2024) 30 tablet 11    pramoxine-hydrocortisone (PROCTOCREAM-HC) 1-1 % rectal cream Place rectally 3 (three) times daily. (Patient not taking: Reported on 10/17/2024) 30 g 1     No current facility-administered medications on file prior to visit.       No cardiac monitor results found for the past 12 months    No results found for this or any previous visit.    No results found for this or any previous visit.        Results for orders placed or performed during the hospital encounter of 10/17/24   EKG 12-lead    Collection Time: 10/17/24 10:34 AM   Result Value Ref " "Range    QRS Duration 84 ms    [facility] QTC Calculation 419 ms    Narrative    Test Reason : I25.10,I25.118,I20.0,    Vent. Rate : 066 BPM     Atrial Rate : 066 BPM     P-R Int : 156 ms          QRS Dur : 084 ms      QT Int : 400 ms       P-R-T Axes : 048 027 063 degrees     QTc Int : 419 ms    Normal sinus rhythm  Possible Anterior infarct ,age undetermined  Abnormal ECG  When compared with ECG of 07-OCT-2024 13:11,  No significant change was found  Confirmed by RED HUTCHINSON MD ([id number]) on 10/17/2024 11:36:28 AM    Referred By: MARK BAKER           Confirmed By:RED HUTCHINSON MD         Review of Systems   HENT:  Negative for hearing loss and hoarse voice.    Eyes:  Negative for visual disturbance.   Cardiovascular:  Positive for dyspnea on exertion. Negative for chest pain, claudication, irregular heartbeat, leg swelling, near-syncope, orthopnea, palpitations, paroxysmal nocturnal dyspnea and syncope.   Respiratory:  Positive for shortness of breath. Negative for cough, hemoptysis, sleep disturbances due to breathing, snoring and wheezing.    Endocrine: Negative for cold intolerance and heat intolerance.   Hematologic/Lymphatic: Does not bruise/bleed easily.   Skin:  Negative for color change, dry skin and nail changes.   Musculoskeletal:  Positive for arthritis and back pain. Negative for joint pain and myalgias.   Gastrointestinal:  Negative for bloating, abdominal pain, constipation, nausea and vomiting.   Genitourinary:  Negative for dysuria, flank pain, hematuria and hesitancy.   Neurological:  Negative for headaches, light-headedness, loss of balance, numbness, paresthesias and weakness.   Psychiatric/Behavioral:  Negative for altered mental status.    Allergic/Immunologic: Negative for environmental allergies.         Objective:/70 (BP Location: Left arm, Patient Position: Sitting)   Pulse 66   Ht 5' 5" (1.651 m)   Wt 70.2 kg (154 lb 12.2 oz)   LMP 03/05/2014   SpO2 98%   BMI 25.75 kg/m²    "   Physical Exam  Vitals and nursing note reviewed.   Constitutional:       General: She is not in acute distress.     Appearance: Normal appearance. She is well-developed. She is not ill-appearing.   HENT:      Head: Normocephalic and atraumatic.      Nose: Nose normal.      Mouth/Throat:      Mouth: Mucous membranes are moist.   Eyes:      Pupils: Pupils are equal, round, and reactive to light.   Neck:      Thyroid: No thyromegaly.      Vascular: JVD present.      Trachea: No tracheal deviation.   Cardiovascular:      Rate and Rhythm: Normal rate and regular rhythm.      Chest Wall: PMI is not displaced.      Pulses: Intact distal pulses.           Radial pulses are 2+ on the right side and 2+ on the left side.        Dorsalis pedis pulses are 2+ on the right side and 2+ on the left side.      Heart sounds: S1 normal and S2 normal. Heart sounds not distant. No murmur heard.  Pulmonary:      Effort: Pulmonary effort is normal. No respiratory distress.      Breath sounds: Normal breath sounds. No wheezing.   Abdominal:      General: Bowel sounds are normal. There is no distension.      Palpations: Abdomen is soft.      Tenderness: There is no abdominal tenderness.   Musculoskeletal:         General: No swelling. Normal range of motion.      Cervical back: Full passive range of motion without pain, normal range of motion and neck supple.      Right lower leg: No edema.      Right ankle: No swelling.      Left ankle: No swelling.   Skin:     General: Skin is warm and dry.      Capillary Refill: Capillary refill takes less than 2 seconds.      Nails: There is no clubbing.   Neurological:      General: No focal deficit present.      Mental Status: She is alert and oriented to person, place, and time.      Motor: No weakness.   Psychiatric:         Speech: Speech normal.         Behavior: Behavior normal.         Thought Content: Thought content normal.         Judgment: Judgment normal.         Lab Results   Component  Value Date    CHOL 179 02/23/2024    CHOL 179 02/23/2024    CHOL 167 10/19/2022     Lab Results   Component Value Date    HDL 65 02/23/2024    HDL 65 02/23/2024    HDL 55 10/19/2022     Lab Results   Component Value Date    LDLCALC 93.4 02/23/2024    LDLCALC 93.4 02/23/2024    LDLCALC 92.0 10/19/2022     Lab Results   Component Value Date    TRIG 103 02/23/2024    TRIG 103 02/23/2024    TRIG 100 10/19/2022     Lab Results   Component Value Date    CHOLHDL 36.3 02/23/2024    CHOLHDL 36.3 02/23/2024    CHOLHDL 32.9 10/19/2022       Chemistry        Component Value Date/Time     10/03/2024 0705    K 4.4 10/03/2024 0705     10/03/2024 0705    CO2 27 10/03/2024 0705    BUN 18 10/03/2024 0705    CREATININE 0.9 10/03/2024 0705     (H) 10/03/2024 0705        Component Value Date/Time    CALCIUM 9.7 10/03/2024 0705    ALKPHOS 78 09/26/2024 0712    AST 36 09/26/2024 0712    ALT 46 (H) 09/26/2024 0712    BILITOT 0.6 09/26/2024 0712    ESTGFRAFRICA >60.0 05/13/2022 0911    EGFRNONAA >60.0 05/13/2022 0911          Lab Results   Component Value Date    TSH 0.492 03/22/2023     Lab Results   Component Value Date    INR 1.0 10/04/2024    INR 2.9 (H) 10/03/2024    INR 0.9 12/16/2021     Lab Results   Component Value Date    WBC 5.55 10/03/2024    HGB 11.2 (L) 10/03/2024    HCT 34.1 (L) 10/03/2024     (H) 10/03/2024     10/03/2024          Assessment:      1. Type 2 diabetes mellitus with hyperglycemia, without long-term current use of insulin    2. Coronary artery disease of native artery of native heart with stable angina pectoris    3. Nonrheumatic mitral valve regurgitation    4. Mixed hyperlipidemia    5. Overweight (BMI 25.0-29.9)          Plan:     Continue ASA Statin Zetia BB  Add ozempic 0.25 mg inj weekly given worsening CAD along with DM  Dash diet 2 gm sodium restriction  Keep diabetes well controlled  Continue regular physical exercise  Add cardio exercises goal of 150  min/week  Aggressive RF modification  Call if I can be of any assistance  Repeat CMP Lipids A1C in 3 m  Keep next visit with Dr Villatoro as scheduled.       PRICILA NegreteP-C  [facility]

## 2024-10-22 ENCOUNTER — DOCUMENTATION ONLY (OUTPATIENT)
Dept: CARDIOLOGY | Facility: HOSPITAL | Age: 54
End: 2024-10-22
Payer: COMMERCIAL

## 2024-10-22 ENCOUNTER — PATIENT MESSAGE (OUTPATIENT)
Dept: PREADMISSION TESTING | Facility: HOSPITAL | Age: 54
End: 2024-10-22
Payer: COMMERCIAL

## 2024-10-22 ENCOUNTER — TELEPHONE (OUTPATIENT)
Dept: DIABETES | Facility: CLINIC | Age: 54
End: 2024-10-22
Payer: COMMERCIAL

## 2024-10-22 ENCOUNTER — TELEPHONE (OUTPATIENT)
Dept: PREADMISSION TESTING | Facility: HOSPITAL | Age: 54
End: 2024-10-22
Payer: COMMERCIAL

## 2024-10-22 DIAGNOSIS — R19.4 CHANGE IN BOWEL HABITS: ICD-10-CM

## 2024-10-22 DIAGNOSIS — Z12.11 COLON CANCER SCREENING: Primary | ICD-10-CM

## 2024-10-22 NOTE — PROGRESS NOTES
Received message for patient's upcoming Colonoscopy in December to provide preop risk stratification.    Recent LHC without any interventions, non-obstructive CAD with normal EF.     Can proceed with colonoscopy at moderate CV risk.   Hold ASA for 5-7 days prior to procedure.     Thanks  CHRISTIAN Negrete

## 2024-11-06 ENCOUNTER — OFFICE VISIT (OUTPATIENT)
Dept: CARDIOLOGY | Facility: CLINIC | Age: 54
End: 2024-11-06
Payer: COMMERCIAL

## 2024-11-06 VITALS
SYSTOLIC BLOOD PRESSURE: 118 MMHG | HEART RATE: 81 BPM | OXYGEN SATURATION: 99 % | BODY MASS INDEX: 24.93 KG/M2 | DIASTOLIC BLOOD PRESSURE: 74 MMHG | WEIGHT: 149.81 LBS

## 2024-11-06 DIAGNOSIS — E66.3 OVERWEIGHT (BMI 25.0-29.9): ICD-10-CM

## 2024-11-06 DIAGNOSIS — R93.89 ABNORMAL CT SCAN: ICD-10-CM

## 2024-11-06 DIAGNOSIS — I25.118 CORONARY ARTERY DISEASE OF NATIVE ARTERY OF NATIVE HEART WITH STABLE ANGINA PECTORIS: Primary | ICD-10-CM

## 2024-11-06 DIAGNOSIS — R07.89 ATYPICAL CHEST PAIN: ICD-10-CM

## 2024-11-06 DIAGNOSIS — I34.0 NONRHEUMATIC MITRAL VALVE REGURGITATION: ICD-10-CM

## 2024-11-06 DIAGNOSIS — E78.2 MIXED HYPERLIPIDEMIA: ICD-10-CM

## 2024-11-06 DIAGNOSIS — I20.0 ACCELERATING ANGINA: ICD-10-CM

## 2024-11-06 DIAGNOSIS — E11.65 TYPE 2 DIABETES MELLITUS WITH HYPERGLYCEMIA, WITHOUT LONG-TERM CURRENT USE OF INSULIN: ICD-10-CM

## 2024-11-06 DIAGNOSIS — R94.31 ABNORMAL ECG: ICD-10-CM

## 2024-11-06 DIAGNOSIS — I20.9 AP (ANGINA PECTORIS): ICD-10-CM

## 2024-11-06 DIAGNOSIS — I72.8 SPLENIC ARTERY ANEURYSM: ICD-10-CM

## 2024-11-06 DIAGNOSIS — K21.9 GASTROESOPHAGEAL REFLUX DISEASE, UNSPECIFIED WHETHER ESOPHAGITIS PRESENT: ICD-10-CM

## 2024-11-06 DIAGNOSIS — I65.23 ASYMPTOMATIC BILATERAL CAROTID ARTERY STENOSIS: ICD-10-CM

## 2024-11-06 DIAGNOSIS — Z82.49 FAMILY HISTORY OF CARDIOVASCULAR DISEASE: ICD-10-CM

## 2024-11-06 DIAGNOSIS — I25.10 CAD IN NATIVE ARTERY: ICD-10-CM

## 2024-11-06 PROCEDURE — 99999 PR PBB SHADOW E&M-EST. PATIENT-LVL III: CPT | Mod: PBBFAC,,, | Performed by: INTERNAL MEDICINE

## 2024-11-06 RX ORDER — ISOSORBIDE MONONITRATE 30 MG/1
30 TABLET, EXTENDED RELEASE ORAL DAILY
Start: 2024-11-06 | End: 2024-11-07

## 2024-11-06 NOTE — PROGRESS NOTES
Subjective:    Patient ID:  Yandy Cabral is a 53 y.o. female who presents for evaluation of Coronary Artery Disease        HPIPt presents for eval.  Her current med conditions include CAD, carotid disease, splenic artery aneurysm, long standing DM, MR, hyperlipidemia.  Nonsmoker.  Family history of CAD (father had MI/CABG in 50's).   Past hx pertinent for following:  Pt seen 2020 for CP/angina.  S/p LHC 2/20: 20% prox RCA, normal elsewhere. Normal EF.  ecg 5/14/21 NSR, normal ecg.  Stress echo July 2022: avg exercise capacity, achieved 69% THR (she is on beta blocker tx), mild MR, normal LVEF, no ischemia noted.  Ecg 1/3/23 NSR, low voltage.  No ischemia noted.  Ecg 6/24/24 personally reviewed: NSR, low voltage.  Now here.  Pt had Coronary CTA Aug 2024: High atherosclerotic plaque burden with 25-49% ostial LM, 70% mid LAD, 70% prox RCA stenoses; Coronary calcium score 1214 (99th percentile).  Also incidental finding of splenic artery aneurysm.   S/p LHC Oct 2024 for further evaluation.  LHC showed 30 - 40% prox LAD, 40% mid LAD, 40% prox LCX, 50% prox RCA, milder diffuse CAD elsewhere, normal EF.  OMT advised.  Reviewed cath findings w pt.  Has seen Vasc Surgery for the aneurysm w f/u.  Carotid u/s Sept 2024 mild bilateral disease.  Not tolerating Imdur added recently w dizziness, spinning.  Has chest pain, improved recently but still episodic.  Dull pain in chest.  No exertional CP.  CP at rest.  Some palpitations.  Has hard time taking a deep breath at times.  No syncope.  Lipids needs improvement -- Atorvastatin switched to Rosuvastatin and Zetia just added.  HGAIC chronically above goal; now better.        Past Medical History:   Diagnosis Date    Abnormal Pap smear of cervix     CAD (coronary artery disease)     Chronic constipation     DM II (diabetes mellitus, type II), controlled     Hx of colposcopy with cervical biopsy      Current Outpatient Medications   Medication Instructions    alendronate  "(FOSAMAX) 70 MG tablet TAKE 1 TABLET EVERY 7 DAYS    aspirin (ECOTRIN) 81 mg, Daily    blood sugar diagnostic Strp To check BG 3 times daily, to use with insurance preferred meter    blood-glucose meter Misc Use as instructed    blood-glucose meter,continuous (DEXCOM G7 ) Misc 1 Device, Misc.(Non-Drug; Combo Route), Daily    blood-glucose sensor (DEXCOM G7 SENSOR) Lindsay USE 1 SENSOR EVERY 10 DAYS    buPROPion (WELLBUTRIN XL) 150 MG TB24 tablet TAKE 1 TABLET DAILY    CALCIUM ORAL 1 tablet, Daily    clonazePAM (KLONOPIN) 0.5 MG tablet Take one tablet by mouth every 8 hours for plane trip and 1 at night for jetlag.    clotrimazole-betamethasone 1-0.05% (LOTRISONE) cream APPLY TOPICALLY TO THE AFFECTED AREA TWICE DAILY    cyanocobalamin 1,000 mcg/mL injection INJECT 1 ML INTO THE MUSCLE EVERY 30 DAYS    estradioL (ESTRACE) 1 mg, Oral, Daily    ezetimibe (ZETIA) 10 mg, Oral, Daily    FLOWFLEX COVID-19 AG HOME TEST Kit No dose, route, or frequency recorded.    insulin aspart U-100 (NOVOLOG U-100 INSULIN ASPART) 100 unit/mL injection FOR USE WITH ILET INSULIN PUMP. MAX TDD 60 UNITS.    insulin aspart, niacinamide, (FIASP FLEXTOUCH U-100 INSULIN) 100 unit/mL (3 mL) InPn 4-14 Units, Subcutaneous, 3 times daily with meals    insulin  cart,aut,G6/7,cntr (OMNIPOD 5 G6-G7 INTRO KT,GEN5,) Crtg 1 each, Subcutaneous, Every 3 days    insulin pump cart,auto,BT,G6/7 (OMNIPOD 5 G6-G7 PODS, GEN 5,) Crtg 1 each, Subcutaneous, Every 3 days    isosorbide mononitrate (IMDUR) 30 mg, Oral, Daily    lancets 33 gauge Misc To check BG 3 times daily, to use with insurance preferred meter    melatonin (MELATIN) 5 mg, Nightly    metFORMIN (GLUCOPHAGE) 1,000 mg, Oral    metoprolol succinate (TOPROL-XL) 25 MG 24 hr tablet TAKE 1 TABLET DAILY    multivitamin (THERAGRAN) per tablet 1 tablet    needle, disp, 24 gauge 24 gauge x 1" Ndle 1 Dose, Misc.(Non-Drug; Combo Route), Every 30 days    nitroGLYCERIN (NITROSTAT) 0.4 mg, Sublingual, Every 5 " "min PRN, Call 911 if pain persists after 2 doses.    OZEMPIC 0.25 mg, Subcutaneous, Every 7 days    pen needle, diabetic (BD ULTRA-FINE SCOTT PEN NEEDLE) 32 gauge x 5/32" Ndle USE AS DIRECTED.    pramoxine-hydrocortisone (PROCTOCREAM-HC) 1-1 % rectal cream Rectal, 3 times daily    rosuvastatin (CRESTOR) 40 mg, Oral, Nightly    vitamin D (VITAMIN D3) 1,000 Units, Daily       Review of Systems   Constitutional: Negative.   HENT: Negative.     Eyes: Negative.    Cardiovascular:  Positive for chest pain and palpitations.   Respiratory:  Positive for shortness of breath.    Endocrine: Negative.    Hematologic/Lymphatic: Negative.    Skin: Negative.    Musculoskeletal: Negative.    Gastrointestinal:  Positive for heartburn.   Genitourinary: Negative.    Neurological:  Positive for dizziness and light-headedness.   Psychiatric/Behavioral: Negative.     Allergic/Immunologic: Negative.           /74 (BP Location: Right arm, Patient Position: Sitting)   Pulse 81   Wt 67.9 kg (149 lb 12.8 oz)   LMP 03/05/2014   SpO2 99%   BMI 24.93 kg/m²     Wt Readings from Last 3 Encounters:   11/06/24 67.9 kg (149 lb 12.8 oz)   10/17/24 70.2 kg (154 lb 12.2 oz)   10/07/24 65.3 kg (143 lb 15.4 oz)     Temp Readings from Last 3 Encounters:   10/10/24 98.1 °F (36.7 °C)   07/29/24 97 °F (36.1 °C) (Tympanic)   03/05/24 96.4 °F (35.8 °C) (Tympanic)     BP Readings from Last 3 Encounters:   11/06/24 118/74   10/17/24 116/70   10/10/24 (!) 99/56     Pulse Readings from Last 3 Encounters:   11/06/24 81   10/17/24 66   10/10/24 72       Objective:    Physical Exam  Vitals and nursing note reviewed.   Constitutional:       General: She is not in acute distress.     Appearance: Normal appearance. She is well-developed. She is not ill-appearing or diaphoretic.   HENT:      Head: Normocephalic.   Neck:      Thyroid: No thyromegaly.      Vascular: No carotid bruit or JVD.   Cardiovascular:      Rate and Rhythm: Normal rate and regular rhythm.    "   Pulses: Normal pulses.           Radial pulses are 2+ on the right side and 2+ on the left side.      Heart sounds: Normal heart sounds, S1 normal and S2 normal. No murmur heard.     No friction rub. No gallop.   Pulmonary:      Effort: Pulmonary effort is normal.      Breath sounds: Normal breath sounds. No wheezing or rales.   Abdominal:      General: Bowel sounds are normal. There is no abdominal bruit.      Palpations: Abdomen is soft.      Tenderness: There is no abdominal tenderness.   Musculoskeletal:      Cervical back: Neck supple.   Lymphadenopathy:      Cervical: No cervical adenopathy.   Skin:     General: Skin is warm.   Neurological:      Mental Status: She is alert and oriented to person, place, and time.   Psychiatric:         Behavior: Behavior normal. Behavior is cooperative.         I have reviewed all pertinent labs and cardiac studies.        Chemistry        Component Value Date/Time     10/03/2024 0705    K 4.4 10/03/2024 0705     10/03/2024 0705    CO2 27 10/03/2024 0705    BUN 18 10/03/2024 0705    CREATININE 0.9 10/03/2024 0705     (H) 10/03/2024 0705        Component Value Date/Time    CALCIUM 9.7 10/03/2024 0705    ALKPHOS 78 09/26/2024 0712    AST 36 09/26/2024 0712    ALT 46 (H) 09/26/2024 0712    BILITOT 0.6 09/26/2024 0712    ESTGFRAFRICA >60.0 05/13/2022 0911    EGFRNONAA >60.0 05/13/2022 0911        Lab Results   Component Value Date    WBC 5.55 10/03/2024    HGB 11.2 (L) 10/03/2024    HCT 34.1 (L) 10/03/2024     (H) 10/03/2024     10/03/2024       Lab Results   Component Value Date    HGBA1C 6.5 (H) 09/26/2024         Lab Results   Component Value Date    CHOL 179 02/23/2024    CHOL 179 02/23/2024    CHOL 167 10/19/2022     Lab Results   Component Value Date    HDL 65 02/23/2024    HDL 65 02/23/2024    HDL 55 10/19/2022     Lab Results   Component Value Date    LDLCALC 93.4 02/23/2024    LDLCALC 93.4 02/23/2024    LDLCALC 92.0 10/19/2022     Lab  Results   Component Value Date    TRIG 103 02/23/2024    TRIG 103 02/23/2024    TRIG 100 10/19/2022     Lab Results   Component Value Date    CHOLHDL 36.3 02/23/2024    CHOLHDL 36.3 02/23/2024    CHOLHDL 32.9 10/19/2022             Assessment:       1. Coronary artery disease of native artery of native heart with stable angina pectoris    2. Abnormal ECG    3. Overweight (BMI 25.0-29.9)    4. Nonrheumatic mitral valve regurgitation    5. AP (angina pectoris)    6. Atypical chest pain    7. Type 2 diabetes mellitus with hyperglycemia, without long-term current use of insulin    8. Family history of cardiovascular disease    9. Asymptomatic bilateral carotid artery stenosis    10. CAD in native artery    11. Accelerating angina    12. Splenic artery aneurysm    13. Abnormal CT scan    14. Mixed hyperlipidemia    15. Gastroesophageal reflux disease, unspecified whether esophagitis present             Plan:             S/p [facility] Oct 2024: nonobstructive CAD but clear progression of disease from her poorly controlled DM since last cath in 2020.  Pt counseled on her CAD.  Needs to have perfect DM HGAIC control from here on out.  Continue current DM meds.  Cut Imdur back to 30 mg qd due to intolerance of higher dose.  Sublingual nitroglycerin 0.4 mg for concerning chest pain episodes or breakthrough angina.  Patient advised of indications, side effects of nitroglycerin and when to report to ER.   Continue Rosuvastatin/Zetia.  Will repeat lipids next appt.  Has long standing DM with HGAIC chronically elevated and fam hx of CAD.  Reviewed all tests and above medical conditions with patient in detail and formulated treatment plan.  Continue optimal medical treatment for cardiovascular conditions.  Cardiac low salt diet advised.  Daily exercise encouraged, with the goal 30 +  minutes aerobic exercise as tolerated.  Maintaining healthy weight and weight loss goals (if needed) were discussed in clinic.  BP control.  Lipids:  Continue statin tx.  DM: Continue current DM meds.  Pt counseled on need to get DM HGAIC to goal.  Mitral regurgitation: monitor. F/u echo in future.  Abnl ecg: monitor.  GERD: OTC meds prn.  Splenic artery aneurysm: f/u w Vasc Surgery as advised.  Carotid diease: Medical mgt and f/u u/s in future.    F/u 2 months w lipids.          I have reviewed all pertinent labs and cardiac studies independently. Plans and recommendations have been formulated under my direct supervision. All questions answered and patient voiced understanding.

## 2024-11-07 ENCOUNTER — PATIENT MESSAGE (OUTPATIENT)
Dept: DIABETES | Facility: CLINIC | Age: 54
End: 2024-11-07
Payer: COMMERCIAL

## 2024-11-07 DIAGNOSIS — I20.0 ACCELERATING ANGINA: ICD-10-CM

## 2024-11-07 DIAGNOSIS — E11.65 TYPE 2 DIABETES MELLITUS WITH HYPERGLYCEMIA, WITHOUT LONG-TERM CURRENT USE OF INSULIN: Primary | ICD-10-CM

## 2024-11-07 DIAGNOSIS — E11.65 TYPE 2 DIABETES MELLITUS WITH HYPERGLYCEMIA, WITHOUT LONG-TERM CURRENT USE OF INSULIN: ICD-10-CM

## 2024-11-07 DIAGNOSIS — I25.118 CORONARY ARTERY DISEASE OF NATIVE ARTERY OF NATIVE HEART WITH STABLE ANGINA PECTORIS: ICD-10-CM

## 2024-11-07 DIAGNOSIS — I25.10 CAD IN NATIVE ARTERY: ICD-10-CM

## 2024-11-07 RX ORDER — EZETIMIBE 10 MG/1
10 TABLET ORAL DAILY
Qty: 90 TABLET | Refills: 3 | Status: SHIPPED | OUTPATIENT
Start: 2024-11-07

## 2024-11-07 RX ORDER — SEMAGLUTIDE 0.68 MG/ML
0.25 INJECTION, SOLUTION SUBCUTANEOUS
Qty: 4.5 ML | Refills: 3 | Status: SHIPPED | OUTPATIENT
Start: 2024-11-07 | End: 2025-02-05

## 2024-11-07 RX ORDER — ISOSORBIDE MONONITRATE 30 MG/1
30 TABLET, EXTENDED RELEASE ORAL DAILY
Qty: 90 TABLET | Refills: 3 | Status: SHIPPED | OUTPATIENT
Start: 2024-11-07

## 2024-11-07 RX ORDER — ROSUVASTATIN CALCIUM 40 MG/1
40 TABLET, COATED ORAL NIGHTLY
Qty: 90 TABLET | Refills: 3 | Status: SHIPPED | OUTPATIENT
Start: 2024-11-07

## 2024-11-07 NOTE — TELEPHONE ENCOUNTER
Coverage for Lissette Pearson NP:     Referral for diabetes education - Omnipod training.     Staff - please sched with CDE at location of pt choice.     Harini Bella PA-C, BC-ADM  [facility]

## 2024-11-11 ENCOUNTER — TELEPHONE (OUTPATIENT)
Dept: DIABETES | Facility: CLINIC | Age: 54
End: 2024-11-11
Payer: COMMERCIAL

## 2024-11-11 NOTE — TELEPHONE ENCOUNTER
INITIAL PUMP SETTINGS     Basal Rate: 0.7 units/hr    Insulin to Carb Ratio:   13 grams/unit    Correction Factor:  50 mg/dL/unit    Duration of Action:  3 hrs    Max Basal Rate:  5 units/hr    Max Bolus:  20 units     Target B - 0000: 120 mg/dL    Correct Above: 130 mg/dL    Reverse Correction:  on

## 2024-11-11 NOTE — TELEPHONE ENCOUNTER
----- Message from Diabetic Educator Federica sent at 11/7/2024 12:03 PM CST -----  Regarding: Pump start orders  Yandy is scheduled for OP5 training next Wednesday. Can you please send pump start orders, thank you.

## 2024-11-12 ENCOUNTER — PATIENT MESSAGE (OUTPATIENT)
Dept: DIABETES | Facility: CLINIC | Age: 54
End: 2024-11-12
Payer: COMMERCIAL

## 2024-11-12 ENCOUNTER — OFFICE VISIT (OUTPATIENT)
Dept: DIABETES | Facility: CLINIC | Age: 54
End: 2024-11-12
Payer: COMMERCIAL

## 2024-11-12 DIAGNOSIS — E11.65 TYPE 2 DIABETES MELLITUS WITH HYPERGLYCEMIA, WITHOUT LONG-TERM CURRENT USE OF INSULIN: ICD-10-CM

## 2024-11-12 RX ORDER — DAPAGLIFLOZIN AND METFORMIN HYDROCHLORIDE 10; 1000 MG/1; MG/1
1 TABLET, FILM COATED, EXTENDED RELEASE ORAL DAILY
Qty: 90 TABLET | Refills: 3 | Status: SHIPPED | OUTPATIENT
Start: 2024-11-12 | End: 2025-11-12

## 2024-11-12 RX ORDER — PEN NEEDLE, DIABETIC 30 GX3/16"
NEEDLE, DISPOSABLE MISCELLANEOUS
Qty: 100 EACH | Refills: 11 | Status: SHIPPED | OUTPATIENT
Start: 2024-11-12

## 2024-11-12 NOTE — PROGRESS NOTES
The patient location is: Home  The chief complaint leading to consultation is: Diabetes    Visit type: audiovisual    Face to Face time with patient: 15  30 minutes of total time spent on the encounter, which includes face to face time and non-face to face time preparing to see the patient (eg, review of tests), Obtaining and/or reviewing separately obtained history, Documenting clinical information in the electronic or other health record, Independently interpreting results (not separately reported) and communicating results to the patient/family/caregiver, or Care coordination (not separately reported).  Each patient to whom he or she provides medical services by telemedicine is:  (1) informed of the relationship between the physician and patient and the respective role of any other health care provider with respect to management of the patient; and (2) notified that he or she may decline to receive medical services by telemedicine and may withdraw from such care at any time.    Notes:    Yandy Cabral is a 53 y.o. female who presents for a follow up evaluation of Type 2 diabetes mellitus.     CHIEF COMPLAINT: Diabetes Consultation    PCP: Jaycob Ramos MD      Initial visit with me - 12/19/2022     The patient was initially diagnosed with diabetes 2004, polyuria/polydipsia, unintentional weight loss, weakness, neuropathy and vision changes. Diagnosed with DM type 1.5.      Previous failed treatments include:  Was on insulin previously, was eventually weaned off.   [employer] [facility] - insurance stopped paying.  Insulin pump - at [facility] [id number]. [employer].   Mounjaro - nausea  Ozempic - wanted to try rybelsus.     Social Documentation:  Patient lives with  and daughter.   Occupation:  for [employer] organization.   Patient prefers In-person Visits.   and Video Virtual Visits.   Current Diet: No fried foot, avoids dairy. Drinks diet dr. Azul, drinking more water.   Exercise:  "Pilates twice a week.      Known Diabetes complications: peripheral neuropathy and cardiovascular disease.     Cardiovascular Risk Factors: family history of premature cardiovascular disease.    Diabetes Medications               dapaglifloz propaned-metformin (XIGDUO XR) 10-1,000 mg TBph Take 1 tablet by mouth once daily.    insulin aspart, niacinamide, (FIASP FLEXTOUCH U-100 INSULIN) 100 unit/mL (3 mL) InPn Inject 4-14 Units into the skin 3 (three) times daily with meals.    metFORMIN (GLUCOPHAGE) 500 MG tablet Take 500 mg by mouth 2 (two) times daily.    semaglutide (RYBELSUS) 14 mg tablet Take 1 tablet (14 mg total) by mouth once daily.     Current monitoring regimen: Dexcom G7 Sharing    The patient's Dexcom CGM was downloaded and reviewed. For the past 14 days, patient average glucose was 188 mg/dL. She was above range 47% of the time, in range 53% of the time, and below range 0% of the time. The target range for this patient was 70 - 180 mg/dL. Overall, there was a pattern of post prandial hyperglycemia.          Recent hypoglycemic episodes: none.     Patient compliant with glucose checks and medication administration? Yes    DIABETES MANAGEMENT STATUS  Statin: Taking  ACE/ARB: Not taking  Screening or Prevention Patient's value Goal Complete/Controlled?   HgA1C Testing and Control   Lab Results   Component Value Date    HGBA1C 6.5 (H) 09/26/2024      Annually/Less than 8% No     Lipid profile : 02/23/2024 Annually Yes     LDL control Lab Results   Component Value Date    LDLCALC 93.4 02/23/2024    LDLCALC 93.4 02/23/2024    Annually/Less than 100 mg/dl  Yes     Nephropathy screening Lab Results   Component Value Date    LABMICR 18.0 02/23/2024     Lab Results   Component Value Date    PROTEINUA Negative 09/26/2024     No results found for: "UTPCR"   Annually No     Blood pressure BP Readings from Last 1 Encounters:   11/06/24 118/74    Less than 140/90 Yes     Dilated retinal exam : 03/27/2023 Annually " Yes     Foot exam   : 03/05/2024 Annually No     Patient's medications, allergies, surgical, social and family histories were reviewed and updated as appropriate.     Review of Systems   Constitutional:  Negative for weight loss.   Eyes:  Negative for blurred vision and double vision.   Cardiovascular:  Negative for chest pain.   Gastrointestinal:  Negative for nausea and vomiting.   Genitourinary:  Negative for frequency.   Musculoskeletal:  Negative for falls.   Neurological:  Negative for dizziness and weakness.   Endo/Heme/Allergies:  Negative for polydipsia.   Psychiatric/Behavioral:  Negative for depression.    All other systems reviewed and are negative.       Physical Exam  Constitutional:       Appearance: Normal appearance.   HENT:      Head: Normocephalic and atraumatic.   Pulmonary:      Effort: No respiratory distress.   Musculoskeletal:      Cervical back: Normal range of motion.   Neurological:      Mental Status: She is alert and oriented to person, place, and time.   Psychiatric:         Mood and Affect: Mood normal.         Behavior: Behavior normal.        Last menstrual period 03/05/2014.  Wt Readings from Last 3 Encounters:   11/06/24 67.9 kg (149 lb 12.8 oz)   10/17/24 70.2 kg (154 lb 12.2 oz)   10/07/24 65.3 kg (143 lb 15.4 oz)     Hemoglobin A1C   Date Value Ref Range Status   09/26/2024 6.5 (H) 4.0 - 5.6 % Final     Comment:     ADA Screening Guidelines:  5.7-6.4%  Consistent with prediabetes  >or=6.5%  Consistent with diabetes    High levels of fetal hemoglobin interfere with the HbA1C  assay. Heterozygous hemoglobin variants (HbS, HgC, etc)do  not significantly interfere with this assay.   However, presence of multiple variants may affect accuracy.     02/23/2024 7.6 (H) 4.0 - 5.6 % Final     Comment:     ADA Screening Guidelines:  5.7-6.4%  Consistent with prediabetes  >or=6.5%  Consistent with diabetes    High levels of fetal hemoglobin interfere with the HbA1C  assay. Heterozygous  "hemoglobin variants (HbS, HgC, etc)do  not significantly interfere with this assay.   However, presence of multiple variants may affect accuracy.     10/10/2023 7.3 (H) 4.0 - 5.6 % Final     Comment:     ADA Screening Guidelines:  5.7-6.4%  Consistent with prediabetes  >or=6.5%  Consistent with diabetes    High levels of fetal hemoglobin interfere with the HbA1C  assay. Heterozygous hemoglobin variants (HbS, HgC, etc)do  not significantly interfere with this assay.   However, presence of multiple variants may affect accuracy.         Lab Results   Component Value Date    CPEPTIDE 1.25 02/23/2024    GLUTAMICACID 0.00 02/23/2024       Chemistry        Component Value Date/Time     10/03/2024 0705    K 4.4 10/03/2024 0705     10/03/2024 0705    CO2 27 10/03/2024 0705    BUN 18 10/03/2024 0705    CREATININE 0.9 10/03/2024 0705     (H) 10/03/2024 0705        Component Value Date/Time    CALCIUM 9.7 10/03/2024 0705    ALKPHOS 78 09/26/2024 0712    AST 36 09/26/2024 0712    ALT 46 (H) 09/26/2024 0712    BILITOT 0.6 09/26/2024 0712    ESTGFRAFRICA >60.0 05/13/2022 0911    EGFRNONAA >60.0 05/13/2022 0911        ASSESSMENT    ICD-10-CM ICD-9-CM   1. Type 2 diabetes mellitus with hyperglycemia, without long-term current use of insulin  E11.65 250.00     790.29               PLAN  Diagnoses and all orders for this visit:    Type 2 diabetes mellitus with hyperglycemia, without long-term current use of insulin  -     pen needle, diabetic (BD ULTRA-FINE SCOTT PEN NEEDLE) 32 gauge x 5/32" Ndle; USE AS DIRECTED.  -     dapaglifloz propaned-metformin (XIGDUO XR) 10-1,000 mg TBph; Take 1 tablet by mouth once daily.            Reviewed pathophysiology of diabetes, complications related to the disease, importance of annual dilated eye exam and daily foot examination. Explained MOA, SE, dosage of medications. Written instructions given and reviewed with patient and patient verbalizes understanding.     1/8/2023 - " complains of nausea with Mounjaro. Would like to try oral alternative, will send in Rybelsus.     2/19/2024 - post prandial glucoses remain uncontrolled, despite rybelsus and pre meal correction dosing. Will change fiasp to meal size, increase rybelsus. Patient would like to reconsider going back in an insulin pump. Will have her complete labs this week and then decide on pump.     3/18/2024 - continues to have post prandial hyperglycemia. Will increase rybelsus. Insulin ab/FILOMENA/cpep neg. Will schedule for pump evaluation, patient is interested in iLet.    5/20/2024 - continues to have post prandial hyperglycemia. Discussed with patient need to decrease carbohydrate intake with meals, due to such high post prandial excursions, typically taking 10 units fiasp before medium size meals. States she received letter from her insurance company on Saturday stating iLet not approved due to her previous pump still in warranty, but patient has not been on a pump in 14 years. She will call them today to clarify.      6/17/2024 - states ilet pump shipped and should arrive tomorrow.     7/29/2024 - patient has had recurrent hypoglycemia since starting iLet insulin pump, also received a bill from her insurance company for full price of the pump.     11/12/2024 - OP5 training this Thursday. Rybelsus changed to Ozempic by cardiology, patient wishes to start at lowest dose due to experiencing heartburn with other new medications (crestor/zetia). Has not taken xigduo since September due to rx expiring, will reorder. She is taking metformin and will stop when she restarts xigduo.     PATIENT INSTRUCTIONS     Discontinue Rybelsus   Start Ozempic 0.25 mg subcutaneously once weekly. Take 0.25 mg weekly x 4 weeks, then increase dose to 0.5 mg subcutaneously weekly x 4 weeks. Will re-assess at follow up visit.    Restart Xigduo XR  mg by mouth daily.     Omnipod 5 Pump settings: (to start Thursday)  Insulin Type: Novolog  Basal rate:  0.7Units/hour    Insulin to Carbohydrate Ratio:     Insulin Sensitivity Factor: 1/50    Glucose Target: 120-120 mg/dl   Active Insulin Time: 3 hours  Max Basal Rate: 5 units/hr  Max Bolus: 20 units    Continue Dexcom CGM G7.  Blood Sugar Goals:       Fastin-130.       1-2 hours after a meal: Less than 180.       U100 Pump Failure Plan:   We have decided to develop a plan in the event that your pump breaks or is nonfunctioning. After 4 hours without pump use, you should begin to consider putting your Pump Failure Back Up Plan into action especially if you foresee that you may not be able to use your pump for more than 20 hours. Since you are currently using short acting insulin (Humalog/Novolog/Admelog/Novolin R) in your pump, you will need access to your short acting insulin vial, syringes, and a back up long acting insulin in the event of a pump failure. I have sent a prescription for a long acting insulin to your pharmacy for use during your emergency pump failure plan. It is important that you keep both types of insulin/syringes with you so that you may have access to it at least 3 times daily if needed. This medication and syringes should be brought with you on overnight trips in the case of an emergency pump failure. This means making a plan to keeping your insulin and syringes at school, work, or other places you frequent. If needed, please reach out to my office about any letters you may need for permission to keep these items for emergency purposes. We will outline your plan for pump failure emergencies below, but please remember to contact the insulin pump company (toll free number is printed on the label on the back of the insulin pump) and our office if you have a pump failure. When the insulin pump is restarted, do not restart basal rates until at least 22 hours after the last long acting insulin injection. You can set a 0% temporary basal setting that will last until this time and use your pump  to bolus for meals and correction. If you need help with these feature, please call your insulin pump company tech support line or ask them in anticipation of this action.     Fiasp correction every 4 hours between meals     If  - 200, may take 4 units of Fiasp  If  - 250, may take 6 units of Fiasp  If  - 300, may take 8 units of Fiasp  If  - 350, may take 10 units of Fiasp  If  - 400, may take 12 units of Fiasp  If +, may take 14 units of Fiasp      Meal Size Dosing with Fiasp. Take 5-10 minutes before meal.   Small Meal: 10 units  Medium Meal: 12 units  Large Meal: 14 units    For any technical insulin pump issues, please contact the insulin pump company; the toll free number is printed on the label on the back of the insulin pump.    Follow up in about 5 weeks (around 12/16/2024) for Virtual, OP5.

## 2024-11-12 NOTE — PATIENT INSTRUCTIONS
PATIENT INSTRUCTIONS     Discontinue Rybelsus   Start Ozempic 0.25 mg subcutaneously once weekly. Take 0.25 mg weekly x 4 weeks, then increase dose to 0.5 mg subcutaneously weekly x 4 weeks. Will re-assess at follow up visit.    Restart Xigduo XR  mg by mouth daily.     Omnipod 5 Pump settings: (to start Thursday)  Insulin Type: Novolog  Basal rate: 0.7Units/hour    Insulin to Carbohydrate Ratio: /13    Insulin Sensitivity Factor: 1/50    Glucose Target: 120-120 mg/dl   Active Insulin Time: 3 hours  Max Basal Rate: 5 units/hr  Max Bolus: 20 units    Continue Dexcom CGM G7.  Blood Sugar Goals:       Fastin-130.       1-2 hours after a meal: Less than 180.       U100 Pump Failure Plan:   We have decided to develop a plan in the event that your pump breaks or is nonfunctioning. After 4 hours without pump use, you should begin to consider putting your Pump Failure Back Up Plan into action especially if you foresee that you may not be able to use your pump for more than 20 hours. Since you are currently using short acting insulin (Humalog/Novolog/Admelog/Novolin R) in your pump, you will need access to your short acting insulin vial, syringes, and a back up long acting insulin in the event of a pump failure. I have sent a prescription for a long acting insulin to your pharmacy for use during your emergency pump failure plan. It is important that you keep both types of insulin/syringes with you so that you may have access to it at least 3 times daily if needed. This medication and syringes should be brought with you on overnight trips in the case of an emergency pump failure. This means making a plan to keeping your insulin and syringes at school, work, or other places you frequent. If needed, please reach out to my office about any letters you may need for permission to keep these items for emergency purposes. We will outline your plan for pump failure emergencies below, but please remember to contact the  insulin pump company (toll free number is printed on the label on the back of the insulin pump) and our office if you have a pump failure. When the insulin pump is restarted, do not restart basal rates until at least 22 hours after the last long acting insulin injection. You can set a 0% temporary basal setting that will last until this time and use your pump to bolus for meals and correction. If you need help with these feature, please call your insulin pump company tech support line or ask them in anticipation of this action.     Fiasp correction every 4 hours between meals     If  - 200, may take 4 units of Fiasp  If  - 250, may take 6 units of Fiasp  If  - 300, may take 8 units of Fiasp  If  - 350, may take 10 units of Fiasp  If  - 400, may take 12 units of Fiasp  If +, may take 14 units of Fiasp      Meal Size Dosing with Fiasp. Take 5-10 minutes before meal.   Small Meal: 10 units  Medium Meal: 12 units  Large Meal: 14 units    For any technical insulin pump issues, please contact the insulin pump company; the toll free number is printed on the label on the back of the insulin pump.

## 2024-11-15 ENCOUNTER — PATIENT MESSAGE (OUTPATIENT)
Dept: DIABETES | Facility: CLINIC | Age: 54
End: 2024-11-15
Payer: COMMERCIAL

## 2024-11-18 ENCOUNTER — TELEPHONE (OUTPATIENT)
Dept: PREADMISSION TESTING | Facility: HOSPITAL | Age: 54
End: 2024-11-18
Payer: COMMERCIAL

## 2024-11-18 NOTE — TELEPHONE ENCOUNTER
----- Message from Diamond sent at 11/18/2024 11:30 AM CST -----  Contact: Yandy  Patient is calling in regards to reschedule her colonoscopy. She will like to know if the date 12/16/24 is available.. Call Back is [phone]

## 2024-11-19 ENCOUNTER — PATIENT MESSAGE (OUTPATIENT)
Dept: CARDIOLOGY | Facility: CLINIC | Age: 54
End: 2024-11-19
Payer: COMMERCIAL

## 2024-11-19 DIAGNOSIS — I20.0 ACCELERATING ANGINA: ICD-10-CM

## 2024-11-19 DIAGNOSIS — I25.10 CAD IN NATIVE ARTERY: ICD-10-CM

## 2024-11-19 RX ORDER — ISOSORBIDE MONONITRATE 30 MG/1
30 TABLET, EXTENDED RELEASE ORAL DAILY
Qty: 30 TABLET | Refills: 0 | Status: SHIPPED | OUTPATIENT
Start: 2024-11-19

## 2024-11-19 NOTE — TELEPHONE ENCOUNTER
Patient has not received [facility] yet. She is leaving to go out of town today and need a refill at a local pharmacy

## 2024-11-27 ENCOUNTER — CLINICAL SUPPORT (OUTPATIENT)
Dept: DIABETES | Facility: CLINIC | Age: 54
End: 2024-11-27
Payer: COMMERCIAL

## 2024-11-27 DIAGNOSIS — E11.65 TYPE 2 DIABETES MELLITUS WITH HYPERGLYCEMIA, WITHOUT LONG-TERM CURRENT USE OF INSULIN: Primary | ICD-10-CM

## 2024-11-27 DIAGNOSIS — E11.65 TYPE 2 DIABETES MELLITUS WITH HYPERGLYCEMIA, WITHOUT LONG-TERM CURRENT USE OF INSULIN: ICD-10-CM

## 2024-11-27 PROCEDURE — 99999 PR PBB SHADOW E&M-EST. PATIENT-LVL III: CPT | Mod: PBBFAC,,, | Performed by: DIETITIAN, REGISTERED

## 2024-11-27 RX ORDER — BLOOD-GLUCOSE SENSOR
EACH MISCELLANEOUS
Qty: 9 EACH | Refills: 0 | Status: SHIPPED | OUTPATIENT
Start: 2024-11-27

## 2024-11-27 RX ORDER — ORAL SEMAGLUTIDE 7 MG/1
TABLET ORAL
Refills: 0 | OUTPATIENT
Start: 2024-11-27

## 2024-11-27 NOTE — PROGRESS NOTES
Diabetes Care Specialist Progress Note  Author: Federica Han RD, CDE  Date: 11/27/2024    Intake    Program Intake  Reason for Diabetes Program Visit:: Intervention  Type of Intervention:: Individual  Individual: Device Training  Device Training: Insulin Pump Start (Omnipod 5 Training)  Current diabetes risk level:: moderate  In the last month, have you used the ER or been admitted to the hospital: No  Permission to speak with others about care:: no    Current Diabetes Treatment: Insulin  Method of insulin delivery?: Insulin Pump (starting Omnipod 5 today)  Type of Pump: Omnipod 5  Does patient have back-up plan?: Yes  Any problems obtaining supplies?: No    Continuous Glucose Monitoring  Patient has CGM: Yes  Personal CGM type:: Dexcom G7  GMI Date: 11/27/24  GMI Value: 8.5 %    Lab Results   Component Value Date    HGBA1C 6.5 (H) 09/26/2024       Diabetes Self-Management Skills Assessment    Medication Skills Assessment  Patient is able to identify current diabetes medications, dosages, and appropriate timing of medications.: yes  Medication Skills Assessment Completed:: Yes  Assessment indicates:: Instruction Needed  Area of need?: Yes    Home Blood Glucose Monitoring  Patient states that blood sugar is checked at home daily.: yes  Monitoring Method:: personal continuous glucose monitor  Personal CGM type:: Dexcom G7   What is your current Time in Range?: 40%  Home Blood Glucose Monitoring Skills Assessment Completed: : Yes  Assessment indicates:: Adequate understanding  Area of need?: No            Assessment Summary and Plan    Based on today's diabetes care assessment, the following areas of need were identified:      Identified Areas of Need      Medication/Current Diabetes Treatment: Yes- see care plan update. Patient no longer using iLet pump. Omnipod 5 training today.    Home Blood Glucose Monitoring: No        Today's interventions were provided through individual discussion, instruction, and written  materials were provided.      Patient verbalized understanding of instruction and written materials.  Pt was able to return back demonstration of instructions today. Patient understood key points, needs reinforcement and further instruction.     Diabetes Self-Management Care Plan:    Today's Diabetes Self-Management Care Plan was developed with Yandy's input. Yandy has agreed to work toward the following goal(s) to improve his/her overall diabetes control.      Care Plan: Diabetes Management   Updates made since 11/28/2023 12:00 AM        Problem: Medications         Goal: Patient will use iLet insulin pump to deliver insulin. Completed 11/27/2024   Start Date: 7/11/2024   Expected End Date: 10/11/2024   This Visit's Progress: Deferred   Priority: High   Barriers: No Barriers Identified   Note:    Patient no longer using iLet pump.        Task: Trained patient to use iLet pump. Completed 7/11/2024   Note:    iLet Pump Start  Pump training conducted today by [employer] , Talia Peters. Pump training by [employer] iLet protocol and training materials.       Details of pump therapy were covered with the patient to include: basic features of pump, charging pump, connecting CGM, filling of cartridge and priming infusion set. These features were reviewed in detail. Patient demonstrated the ability to fill pump cartridge and prime infusion set adequately per sterile technique.    Patient advised to change this set in 3 days. Reviewed site selection and rotation.  Instructed patient on disconnecting from infusion site when needed.   Instructed on pump navigation.   Reviewed hypoglycemia and hyperglycemia treatment protocols.   Discussed backup supplies.     Pt downloaded iLet buddy to phone. Created an account for patient and connect the buddy to the pump.     Written materials provided. Patient verbalized and demonstrated understanding of all instructions given.      Patient is to call clinic  with any questions or concerns  Advised to contact iLet technical support for any technical issues with the pump.         Goal: Patient will use Omnipod 5 to deliver insulin.    Start Date: 11/27/2024   Expected End Date: 12/27/2024   Priority: High   Barriers: No Barriers Identified        Task: Trained patient to use Omnipod 5 system. Completed 11/27/2024   Note:    OMNIPOD 5 INSULIN PUMP START  Pump training was provided per Omni Pod protocol.     Details of pump therapy were covered included following: controller features and programming, pod activation, pod site selection and rotation, automatic pod priming and insertion, setting & editing basal rates in manual mode, giving bolus and other features in the set up menu.  Patient demonstrated ability to program controller, activate and insert pod using aseptic technique.  Patient demonstrated ability to program Dexcom transmitter into controller and start automated limited mode.    Instructed pt on use of basic pump features ie...give a bolus, pause insulin, switch from manual to automated mode.  Reviewed features available in manual mode verses automated mode.   Reviewed when and how to use activity function in automated mode.  Reviewed site selection of pods, rotation of sites and hard stop to change pod every 72 hrs.   Instructed that insulin vial is good out of refrigeration for up to 28-30 days .   Reviewed treatment of hypoglycemia, hyperglycemia; sick day care, DKA, and troubleshooting of pump.  Omni Pod 24 hour support can be reached at [phone].     INITIAL SETTINGS: (per provider Lissette Pearson)    Basal rate: .70/hr  Maximum basal: 3.0u/hr     Bolus Menu:  ISF: 1:50  Carb Ratio : 1:13  Blood glucose target: 120; correct above: 130  Active insulin: 3 hrs  Maximum bolus = 20 units  Reverse Correction: ON    Low pod insulin: 10 units  Pod expiration alarm:  4 hours    Podder ID username: [email or web address] [employer] username: [email or web address]    Patient  has written materials for Omnipod 5 for home use.  Patient verbalized understanding of all instructions given.  Reviewed back up plan in case of pump malfunction.  Educated that if glucose levels increasing and patient is delivering insulin, it is recommended to change pod.                 Follow Up Plan     Follow up in about 1 week (around 12/4/2024) for E11.65.    Today's care plan and follow up schedule was discussed with patient.  Yandy verbalized understanding of the care plan, goals, and agrees to follow up plan.        The patient was encouraged to communicate with his/her health care provider/physician and care team regarding his/her condition(s) and treatment.  I provided the patient with my contact information today and encouraged to contact me via phone or [facility] Patient Portal as needed.     Length of Visit   Total Time: 60 Minutes

## 2024-11-29 ENCOUNTER — PATIENT OUTREACH (OUTPATIENT)
Dept: ADMINISTRATIVE | Facility: HOSPITAL | Age: 54
End: 2024-11-29
Payer: COMMERCIAL

## 2024-11-29 NOTE — PROGRESS NOTES
VBC OUTREACH: per chart review pt is overdue for the following:  Attempted to contact pt no ans, lvm, pt last DM eye exam was with Dr Car.         10-09 Na141 mmol/L Glu 128 mg/dL<H> K+ 3.8 mmol/L Cr  0.61 mg/dL BUN 15 mg/dL Phos n/a   Alb n/a   PAB n/a 10-09 Na141 mmol/L Glu 128 mg/dL<H> K+ 3.8 mmol/L Cr  0.61 mg/dL BUN 15 mg/dL Phos n/a   Alb n/a   PAB n/a 10/5- A1C 5.7%

## 2024-11-30 ENCOUNTER — PATIENT MESSAGE (OUTPATIENT)
Dept: DIABETES | Facility: CLINIC | Age: 54
End: 2024-11-30
Payer: COMMERCIAL

## 2024-11-30 DIAGNOSIS — E11.65 TYPE 2 DIABETES MELLITUS WITH HYPERGLYCEMIA, WITHOUT LONG-TERM CURRENT USE OF INSULIN: ICD-10-CM

## 2024-12-02 ENCOUNTER — PATIENT MESSAGE (OUTPATIENT)
Dept: DIABETES | Facility: CLINIC | Age: 54
End: 2024-12-02
Payer: COMMERCIAL

## 2024-12-02 RX ORDER — BLOOD-GLUCOSE SENSOR
1 EACH MISCELLANEOUS
Qty: 9 EACH | Refills: 2 | Status: SHIPPED | OUTPATIENT
Start: 2024-12-02 | End: 2025-12-02

## 2024-12-06 ENCOUNTER — TELEPHONE (OUTPATIENT)
Dept: DIABETES | Facility: CLINIC | Age: 54
End: 2024-12-06
Payer: COMMERCIAL

## 2024-12-06 NOTE — TELEPHONE ENCOUNTER
Prior auth for Mounjaro 7.5 mg received not completed per chart note patient is currently on Ozempic as of 11/07/24 written by Yandy [facility]     Approved   10/17/2024  3:46 PM  Sending pharmacy: [id number] - [place] - [address] AT [facility] supported: No   Note from payer: CaseId:[id number];Status:Approved;Review Type:Prior Auth;Coverage Start Date:10/17/2024;Coverage End Date:10/17/2025;

## 2024-12-10 ENCOUNTER — TELEPHONE (OUTPATIENT)
Dept: CARDIOLOGY | Facility: CLINIC | Age: 54
End: 2024-12-10
Payer: COMMERCIAL

## 2024-12-10 NOTE — TELEPHONE ENCOUNTER
Pt may proceed with colonoscopy at low to moderate CV risk.    Dr Irving Rangel, Julia HORTA, RN sent to Martin Villatoro MD  Caller: Unspecified (Today,  2:51 PM)  Ms Cabral is having a colonoscopy on 12/16/24. She saw you on 11/6/24 . Is she cleared to have her colonoscopy? Please advise.  Thanks

## 2024-12-16 ENCOUNTER — OFFICE VISIT (OUTPATIENT)
Dept: DIABETES | Facility: CLINIC | Age: 54
End: 2024-12-16
Payer: COMMERCIAL

## 2024-12-16 DIAGNOSIS — E11.65 TYPE 2 DIABETES MELLITUS WITH HYPERGLYCEMIA, WITHOUT LONG-TERM CURRENT USE OF INSULIN: Primary | ICD-10-CM

## 2024-12-16 PROCEDURE — 3072F LOW RISK FOR RETINOPATHY: CPT | Mod: CPTII,95,, | Performed by: NURSE PRACTITIONER

## 2024-12-16 PROCEDURE — 3066F NEPHROPATHY DOC TX: CPT | Mod: CPTII,95,, | Performed by: NURSE PRACTITIONER

## 2024-12-16 PROCEDURE — G2211 COMPLEX E/M VISIT ADD ON: HCPCS | Mod: 95,,, | Performed by: NURSE PRACTITIONER

## 2024-12-16 PROCEDURE — 99214 OFFICE O/P EST MOD 30 MIN: CPT | Mod: 95,,, | Performed by: NURSE PRACTITIONER

## 2024-12-16 PROCEDURE — 3061F NEG MICROALBUMINURIA REV: CPT | Mod: CPTII,95,, | Performed by: NURSE PRACTITIONER

## 2024-12-16 PROCEDURE — 3044F HG A1C LEVEL LT 7.0%: CPT | Mod: CPTII,95,, | Performed by: NURSE PRACTITIONER

## 2024-12-16 PROCEDURE — 95251 CONT GLUC MNTR ANALYSIS I&R: CPT | Mod: NDTC,,, | Performed by: NURSE PRACTITIONER

## 2024-12-16 RX ORDER — INSULIN PMP CART,AUT,G6/7,CNTR
1 EACH SUBCUTANEOUS
Qty: 10 EACH | Refills: 11 | Status: SHIPPED | OUTPATIENT
Start: 2024-12-16 | End: 2025-01-15

## 2024-12-16 NOTE — PATIENT INSTRUCTIONS
PATIENT INSTRUCTIONS     Start Ozempic 0.25 mg subcutaneously once weekly. Take 0.25 mg weekly x 4 weeks, then increase dose to 0.5 mg subcutaneously weekly x 4 weeks. Will re-assess at follow up visit.    Restart Xigduo XR  mg by mouth daily.     Omnipod 5 Pump settings:    Insulin Type: Novolog  Basal rate: 0.7 Units/hour    Insulin to Carbohydrate Ratio: 13  - Adjust to 11  Insulin Sensitivity Factor: 1/50  - decrease to 45  Glucose Target: 120-130 mg/dl - Decrease to 120-120.  Active Insulin Time: 3 hours  Max Basal Rate: 5 units/hr  Max Bolus: 20 units    Continue Dexcom CGM G7.  Blood Sugar Goals:       Fastin-130.       1-2 hours after a meal: Less than 180.       U100 Pump Failure Plan:   We have decided to develop a plan in the event that your pump breaks or is nonfunctioning. After 4 hours without pump use, you should begin to consider putting your Pump Failure Back Up Plan into action especially if you foresee that you may not be able to use your pump for more than 20 hours. Since you are currently using short acting insulin (Humalog/Novolog/Admelog/Novolin R) in your pump, you will need access to your short acting insulin vial, syringes, and a back up long acting insulin in the event of a pump failure. I have sent a prescription for a long acting insulin to your pharmacy for use during your emergency pump failure plan. It is important that you keep both types of insulin/syringes with you so that you may have access to it at least 3 times daily if needed. This medication and syringes should be brought with you on overnight trips in the case of an emergency pump failure. This means making a plan to keeping your insulin and syringes at school, work, or other places you frequent. If needed, please reach out to my office about any letters you may need for permission to keep these items for emergency purposes. We will outline your plan for pump failure emergencies below, but please remember to  contact the insulin pump company (toll free number is printed on the label on the back of the insulin pump) and our office if you have a pump failure. When the insulin pump is restarted, do not restart basal rates until at least 22 hours after the last long acting insulin injection. You can set a 0% temporary basal setting that will last until this time and use your pump to bolus for meals and correction. If you need help with these feature, please call your insulin pump company tech support line or ask them in anticipation of this action.     Fiasp correction every 4 hours between meals     If  - 200, may take 4 units of Fiasp  If  - 250, may take 6 units of Fiasp  If  - 300, may take 8 units of Fiasp  If  - 350, may take 10 units of Fiasp  If  - 400, may take 12 units of Fiasp  If +, may take 14 units of Fiasp      Meal Size Dosing with Fiasp. Take 5-10 minutes before meal.   Small Meal: 10 units  Medium Meal: 12 units  Large Meal: 14 units    For any technical insulin pump issues, please contact the insulin pump company; the toll free number is printed on the label on the back of the insulin pump.

## 2024-12-16 NOTE — PROGRESS NOTES
The patient location is: Home  The chief complaint leading to consultation is: Diabetes    Visit type: audiovisual    Face to Face time with patient: 15  30 minutes of total time spent on the encounter, which includes face to face time and non-face to face time preparing to see the patient (eg, review of tests), Obtaining and/or reviewing separately obtained history, Documenting clinical information in the electronic or other health record, Independently interpreting results (not separately reported) and communicating results to the patient/family/caregiver, or Care coordination (not separately reported).  Each patient to whom he or she provides medical services by telemedicine is:  (1) informed of the relationship between the physician and patient and the respective role of any other health care provider with respect to management of the patient; and (2) notified that he or she may decline to receive medical services by telemedicine and may withdraw from such care at any time.    Notes:    Yandy Cabral is a 54 y.o. female who presents for a follow up evaluation of Type 2 diabetes mellitus.     CHIEF COMPLAINT: Diabetes Consultation    PCP: Jaycob Ramos MD      Initial visit with me - 12/19/2022     The patient was initially diagnosed with diabetes 2004, polyuria/polydipsia, unintentional weight loss, weakness, neuropathy and vision changes. Diagnosed with DM type 1.5.      Previous failed treatments include:  Was on insulin previously, was eventually weaned off.   InvClickToShopna - insurance stopped paying.  Insulin pump - at [facility] in 2100-9619. [employer].   Mounjaro - nausea  Ozempic - wanted to try rybelsus.     Social Documentation:  Patient lives with  and daughter.   Occupation:  for nonprofit organization.   Patient prefers In-person Visits.   and Video Virtual Visits.   Current Diet: No fried foot, avoids dairy. Drinks diet dr. Azul, drinking more water.   Exercise:  "Pilates twice a week.      Known Diabetes complications: peripheral neuropathy and cardiovascular disease.     Cardiovascular Risk Factors: family history of premature cardiovascular disease.    Diabetes Medications               dapaglifloz propaned-metformin (XIGDUO XR) 10-1,000 mg TBph Take 1 tablet by mouth once daily.    insulin aspart, niacinamide, (FIASP FLEXTOUCH U-100 INSULIN) 100 unit/mL (3 mL) InPn Inject 4-14 Units into the skin 3 (three) times daily with meals.    metFORMIN (GLUCOPHAGE) 500 MG tablet Take 500 mg by mouth 2 (two) times daily.    semaglutide (RYBELSUS) 14 mg tablet Take 1 tablet (14 mg total) by mouth once daily.     Current monitoring regimen: Dexcom G7 Sharing    The patient's Dexcom CGM was downloaded and reviewed. For 14 days, patient average glucose was 207 mg/dL. She was above range 57% of the time, in range 43% of the time, and below range 0% of the time. The target range for this patient was 70 - 180 mg/dL. Overall, there was a pattern of daytime hyperglycemia.        Recent hypoglycemic episodes: none.     Patient compliant with glucose checks and medication administration? Yes    DIABETES MANAGEMENT STATUS  Statin: Taking  ACE/ARB: Not taking  Screening or Prevention Patient's value Goal Complete/Controlled?   HgA1C Testing and Control   Lab Results   Component Value Date    HGBA1C 6.5 (H) 09/26/2024      Annually/Less than 8% No     Lipid profile : 02/23/2024 Annually Yes     LDL control Lab Results   Component Value Date    LDLCALC 93.4 02/23/2024    LDLCALC 93.4 02/23/2024    Annually/Less than 100 mg/dl  Yes     Nephropathy screening Lab Results   Component Value Date    LABMICR 18.0 02/23/2024     Lab Results   Component Value Date    PROTEINUA Negative 09/26/2024     No results found for: "UTPCR"   Annually No     Blood pressure BP Readings from Last 1 Encounters:   11/06/24 118/74    Less than 140/90 Yes     Dilated retinal exam : 03/27/2023 Annually Yes     Foot exam   : " 03/05/2024 Annually No     Patient's medications, allergies, surgical, social and family histories were reviewed and updated as appropriate.     Review of Systems   Constitutional:  Negative for weight loss.   Eyes:  Negative for blurred vision and double vision.   Cardiovascular:  Negative for chest pain.   Gastrointestinal:  Negative for nausea and vomiting.   Genitourinary:  Negative for frequency.   Musculoskeletal:  Negative for falls.   Neurological:  Negative for dizziness and weakness.   Endo/Heme/Allergies:  Negative for polydipsia.   Psychiatric/Behavioral:  Negative for depression.    All other systems reviewed and are negative.       Physical Exam  Constitutional:       Appearance: Normal appearance.   HENT:      Head: Normocephalic and atraumatic.   Pulmonary:      Effort: No respiratory distress.   Musculoskeletal:      Cervical back: Normal range of motion.   Neurological:      Mental Status: She is alert and oriented to person, place, and time.   Psychiatric:         Mood and Affect: Mood normal.         Behavior: Behavior normal.        Last menstrual period 03/05/2014.  Wt Readings from Last 3 Encounters:   11/06/24 67.9 kg (149 lb 12.8 oz)   10/17/24 70.2 kg (154 lb 12.2 oz)   10/07/24 65.3 kg (143 lb 15.4 oz)     Hemoglobin A1C   Date Value Ref Range Status   09/26/2024 6.5 (H) 4.0 - 5.6 % Final     Comment:     ADA Screening Guidelines:  5.7-6.4%  Consistent with prediabetes  >or=6.5%  Consistent with diabetes    High levels of fetal hemoglobin interfere with the HbA1C  assay. Heterozygous hemoglobin variants (HbS, HgC, etc)do  not significantly interfere with this assay.   However, presence of multiple variants may affect accuracy.     02/23/2024 7.6 (H) 4.0 - 5.6 % Final     Comment:     ADA Screening Guidelines:  5.7-6.4%  Consistent with prediabetes  >or=6.5%  Consistent with diabetes    High levels of fetal hemoglobin interfere with the HbA1C  assay. Heterozygous hemoglobin variants (HbS,  HgC, etc)do  not significantly interfere with this assay.   However, presence of multiple variants may affect accuracy.     10/10/2023 7.3 (H) 4.0 - 5.6 % Final     Comment:     ADA Screening Guidelines:  5.7-6.4%  Consistent with prediabetes  >or=6.5%  Consistent with diabetes    High levels of fetal hemoglobin interfere with the HbA1C  assay. Heterozygous hemoglobin variants (HbS, HgC, etc)do  not significantly interfere with this assay.   However, presence of multiple variants may affect accuracy.         Lab Results   Component Value Date    CPEPTIDE 1.25 02/23/2024    GLUTAMICACID 0.00 02/23/2024       Chemistry        Component Value Date/Time     10/03/2024 0705    K 4.4 10/03/2024 0705     10/03/2024 0705    CO2 27 10/03/2024 0705    BUN 18 10/03/2024 0705    CREATININE 0.9 10/03/2024 0705     (H) 10/03/2024 0705        Component Value Date/Time    CALCIUM 9.7 10/03/2024 0705    ALKPHOS 78 09/26/2024 0712    AST 36 09/26/2024 0712    ALT 46 (H) 09/26/2024 0712    BILITOT 0.6 09/26/2024 0712    ESTGFRAFRICA >60.0 05/13/2022 0911    EGFRNONAA >60.0 05/13/2022 0911        ASSESSMENT    ICD-10-CM ICD-9-CM   1. Type 2 diabetes mellitus with hyperglycemia, without long-term current use of insulin  E11.65 250.00     790.29                 PLAN  Diagnoses and all orders for this visit:    Type 2 diabetes mellitus with hyperglycemia, without long-term current use of insulin  -     insulin pump cart,auto,BT,G6/7 (OMNIPOD 5 G6-G7 PODS, GEN 5,) Crtg; Inject 1 each into the skin Every 3 (three) days.              Reviewed pathophysiology of diabetes, complications related to the disease, importance of annual dilated eye exam and daily foot examination. Explained MOA, SE, dosage of medications. Written instructions given and reviewed with patient and patient verbalizes understanding.     1/8/2023 - complains of nausea with Mounjaro. Would like to try oral alternative, will send in Rybelsus.      2/19/2024 - post prandial glucoses remain uncontrolled, despite rybelsus and pre meal correction dosing. Will change fiasp to meal size, increase rybelsus. Patient would like to reconsider going back in an insulin pump. Will have her complete labs this week and then decide on pump.     3/18/2024 - continues to have post prandial hyperglycemia. Will increase rybelsus. Insulin ab/FILOMENA/cpep neg. Will schedule for pump evaluation, patient is interested in iLet.    5/20/2024 - continues to have post prandial hyperglycemia. Discussed with patient need to decrease carbohydrate intake with meals, due to such high post prandial excursions, typically taking 10 units fiasp before medium size meals. States she received letter from her insurance company on Saturday stating iLet not approved due to her previous pump still in warranty, but patient has not been on a pump in 14 years. She will call them today to clarify.      6/17/2024 - states ilet pump shipped and should arrive tomorrow.     7/29/2024 - patient has had recurrent hypoglycemia since starting iLet insulin pump, also received a bill from her insurance company for full price of the pump.     11/12/2024 - OP5 training this Thursday. Rybelsus changed to Ozempic by cardiology, patient wishes to start at lowest dose due to experiencing heartburn with other new medications (crestor/zetia). Has not taken xigduo since September due to rx expiring, will reorder. She is taking metformin and will stop when she restarts xigduo.     12/16/2024 - holding off on starting ozempic until after colonsocopy in January.     PATIENT INSTRUCTIONS     Start Ozempic 0.25 mg subcutaneously once weekly. Take 0.25 mg weekly x 4 weeks, then increase dose to 0.5 mg subcutaneously weekly x 4 weeks. Will re-assess at follow up visit.    Restart Xigduo XR  mg by mouth daily.     Omnipod 5 Pump settings:    Insulin Type: Novolog  Basal rate: 0.7 Units/hour    Insulin to Carbohydrate Ratio:    - Adjust to 11  Insulin Sensitivity Factor:   - decrease to 45  Glucose Target: 120-130 mg/dl - Decrease to 120-120.  Active Insulin Time: 3 hours  Max Basal Rate: 5 units/hr  Max Bolus: 20 units    Continue Dexcom CGM G7.  Blood Sugar Goals:       Fastin-130.       1-2 hours after a meal: Less than 180.       U100 Pump Failure Plan:   We have decided to develop a plan in the event that your pump breaks or is nonfunctioning. After 4 hours without pump use, you should begin to consider putting your Pump Failure Back Up Plan into action especially if you foresee that you may not be able to use your pump for more than 20 hours. Since you are currently using short acting insulin (Humalog/Novolog/Admelog/Novolin R) in your pump, you will need access to your short acting insulin vial, syringes, and a back up long acting insulin in the event of a pump failure. I have sent a prescription for a long acting insulin to your pharmacy for use during your emergency pump failure plan. It is important that you keep both types of insulin/syringes with you so that you may have access to it at least 3 times daily if needed. This medication and syringes should be brought with you on overnight trips in the case of an emergency pump failure. This means making a plan to keeping your insulin and syringes at school, work, or other places you frequent. If needed, please reach out to my office about any letters you may need for permission to keep these items for emergency purposes. We will outline your plan for pump failure emergencies below, but please remember to contact the insulin pump company (toll free number is printed on the label on the back of the insulin pump) and our office if you have a pump failure. When the insulin pump is restarted, do not restart basal rates until at least 22 hours after the last long acting insulin injection. You can set a 0% temporary basal setting that will last until this time and use your  pump to bolus for meals and correction. If you need help with these feature, please call your insulin pump company tech support line or ask them in anticipation of this action.     Fiasp correction every 4 hours between meals     If  - 200, may take 4 units of Fiasp  If  - 250, may take 6 units of Fiasp  If  - 300, may take 8 units of Fiasp  If  - 350, may take 10 units of Fiasp  If  - 400, may take 12 units of Fiasp  If +, may take 14 units of Fiasp      Meal Size Dosing with Fiasp. Take 5-10 minutes before meal.   Small Meal: 10 units  Medium Meal: 12 units  Large Meal: 14 units    For any technical insulin pump issues, please contact the insulin pump company; the toll free number is printed on the label on the back of the insulin pump.    Follow up in about 6 weeks (around 1/27/2025) for OP5, Virtual.

## 2025-01-07 DIAGNOSIS — I25.10 CAD IN NATIVE ARTERY: ICD-10-CM

## 2025-01-07 DIAGNOSIS — E11.65 TYPE 2 DIABETES MELLITUS WITH HYPERGLYCEMIA, WITHOUT LONG-TERM CURRENT USE OF INSULIN: ICD-10-CM

## 2025-01-07 DIAGNOSIS — I20.0 ACCELERATING ANGINA: ICD-10-CM

## 2025-01-07 RX ORDER — ISOSORBIDE MONONITRATE 30 MG/1
30 TABLET, EXTENDED RELEASE ORAL DAILY
Qty: 30 TABLET | Refills: 3 | Status: SHIPPED | OUTPATIENT
Start: 2025-01-07

## 2025-01-07 RX ORDER — BLOOD-GLUCOSE SENSOR
1 EACH MISCELLANEOUS
Qty: 9 EACH | Refills: 2 | Status: SHIPPED | OUTPATIENT
Start: 2025-01-07 | End: 2026-01-07

## 2025-01-07 RX ORDER — INSULIN PMP CART,AUT,G6/7,CNTR
1 EACH SUBCUTANEOUS
Qty: 10 EACH | Refills: 11 | Status: SHIPPED | OUTPATIENT
Start: 2025-01-07 | End: 2025-02-06

## 2025-01-14 ENCOUNTER — PATIENT OUTREACH (OUTPATIENT)
Dept: ADMINISTRATIVE | Facility: HOSPITAL | Age: 55
End: 2025-01-14
Payer: COMMERCIAL

## 2025-01-14 ENCOUNTER — TELEPHONE (OUTPATIENT)
Dept: CARDIOLOGY | Facility: CLINIC | Age: 55
End: 2025-01-14
Payer: COMMERCIAL

## 2025-01-14 NOTE — TELEPHONE ENCOUNTER
Spoke to pharmacy, Rybelsus prescription deleted       Marguerite Florence Staff  Caller: Misti (Today,  2:43 PM)  Misti with [facility] is calling to speak with the nurse regarding concerns. Reports needing to get the pts GLP1 for semaglutide (OZEMPIC) 0.25 mg or 0.5 mg (2 mg/3 mL) pen injector. Please give Misti a call back at [phone] ref#[id number]    [facility], states that patient has requested Rybelsus but is showing that the patient is currently on ozempic. Pharmacy wants to know which one should be filled.

## 2025-01-14 NOTE — TELEPHONE ENCOUNTER
Marguerite Florence Staff  Caller: Misti (Today,  2:43 PM)  Misti with [facility] is calling to speak with the nurse regarding concerns. Reports needing to get the pts GLP1 for semaglutide (OZEMPIC) 0.25 mg or 0.5 mg (2 mg/3 mL) pen injector. Please give Misti a call back at [phone] ref#[id number]    [facility], states that patient has requested Rybelsus but is showing that the patient is currently on ozempic. Pharmacy wants to know which one should be filled.

## 2025-01-15 ENCOUNTER — PATIENT MESSAGE (OUTPATIENT)
Dept: DIABETES | Facility: CLINIC | Age: 55
End: 2025-01-15
Payer: COMMERCIAL

## 2025-01-17 ENCOUNTER — LAB VISIT (OUTPATIENT)
Dept: LAB | Facility: HOSPITAL | Age: 55
End: 2025-01-17
Attending: NURSE PRACTITIONER
Payer: COMMERCIAL

## 2025-01-17 DIAGNOSIS — I25.118 CORONARY ARTERY DISEASE OF NATIVE ARTERY OF NATIVE HEART WITH STABLE ANGINA PECTORIS: ICD-10-CM

## 2025-01-17 DIAGNOSIS — E11.65 TYPE 2 DIABETES MELLITUS WITH HYPERGLYCEMIA, WITHOUT LONG-TERM CURRENT USE OF INSULIN: ICD-10-CM

## 2025-01-17 LAB
ALBUMIN SERPL BCP-MCNC: 3.5 G/DL (ref 3.5–5.2)
ALP SERPL-CCNC: 65 U/L (ref 40–150)
ALT SERPL W/O P-5'-P-CCNC: 176 U/L (ref 10–44)
ANION GAP SERPL CALC-SCNC: 7 MMOL/L (ref 8–16)
AST SERPL-CCNC: 156 U/L (ref 10–40)
BILIRUB SERPL-MCNC: 0.4 MG/DL (ref 0.1–1)
BUN SERPL-MCNC: 15 MG/DL (ref 6–20)
CALCIUM SERPL-MCNC: 9 MG/DL (ref 8.7–10.5)
CHLORIDE SERPL-SCNC: 107 MMOL/L (ref 95–110)
CHOLEST SERPL-MCNC: 85 MG/DL (ref 120–199)
CHOLEST/HDLC SERPL: 2.4 {RATIO} (ref 2–5)
CO2 SERPL-SCNC: 28 MMOL/L (ref 23–29)
CREAT SERPL-MCNC: 0.9 MG/DL (ref 0.5–1.4)
EST. GFR  (NO RACE VARIABLE): >60 ML/MIN/1.73 M^2
ESTIMATED AVG GLUCOSE: 154 MG/DL (ref 68–131)
GLUCOSE SERPL-MCNC: 110 MG/DL (ref 70–110)
HBA1C MFR BLD: 7 % (ref 4–5.6)
HDLC SERPL-MCNC: 36 MG/DL (ref 40–75)
HDLC SERPL: 42.4 % (ref 20–50)
LDLC SERPL CALC-MCNC: 38.4 MG/DL (ref 63–159)
NONHDLC SERPL-MCNC: 49 MG/DL
POTASSIUM SERPL-SCNC: 4.2 MMOL/L (ref 3.5–5.1)
PROT SERPL-MCNC: 6.2 G/DL (ref 6–8.4)
SODIUM SERPL-SCNC: 142 MMOL/L (ref 136–145)
TRIGL SERPL-MCNC: 53 MG/DL (ref 30–150)

## 2025-01-17 PROCEDURE — 80061 LIPID PANEL: CPT | Performed by: NURSE PRACTITIONER

## 2025-01-17 PROCEDURE — 80053 COMPREHEN METABOLIC PANEL: CPT | Performed by: NURSE PRACTITIONER

## 2025-01-17 PROCEDURE — 83036 HEMOGLOBIN GLYCOSYLATED A1C: CPT | Performed by: NURSE PRACTITIONER

## 2025-01-17 PROCEDURE — 36415 COLL VENOUS BLD VENIPUNCTURE: CPT | Performed by: NURSE PRACTITIONER

## 2025-01-19 ENCOUNTER — HOSPITAL ENCOUNTER (EMERGENCY)
Facility: HOSPITAL | Age: 55
Discharge: HOME | End: 2025-01-19
Attending: FAMILY MEDICINE
Payer: COMMERCIAL

## 2025-01-19 VITALS
HEIGHT: 65 IN | BODY MASS INDEX: 24.93 KG/M2 | OXYGEN SATURATION: 97 % | DIASTOLIC BLOOD PRESSURE: 69 MMHG | RESPIRATION RATE: 16 BRPM | TEMPERATURE: 99 F | SYSTOLIC BLOOD PRESSURE: 123 MMHG | HEART RATE: 76 BPM

## 2025-01-19 DIAGNOSIS — S49.91XA ARM INJURY, RIGHT, INITIAL ENCOUNTER: ICD-10-CM

## 2025-01-19 DIAGNOSIS — S42.301A CLOSED FRACTURE OF RIGHT UPPER EXTREMITY, INITIAL ENCOUNTER: Primary | ICD-10-CM

## 2025-01-19 LAB
HCV AB SERPL QL IA: NEGATIVE
HEP C VIRUS HOLD SPECIMEN: NORMAL
HIV 1+2 AB+HIV1 P24 AG SERPL QL IA: NEGATIVE

## 2025-01-19 PROCEDURE — 99285 EMERGENCY DEPT VISIT HI MDM: CPT | Mod: 25

## 2025-01-19 PROCEDURE — 96374 THER/PROPH/DIAG INJ IV PUSH: CPT

## 2025-01-19 PROCEDURE — 86803 HEPATITIS C AB TEST: CPT | Performed by: REGISTERED NURSE

## 2025-01-19 PROCEDURE — 63600175 PHARM REV CODE 636 W HCPCS: Performed by: NURSE PRACTITIONER

## 2025-01-19 PROCEDURE — 87389 HIV-1 AG W/HIV-1&-2 AB AG IA: CPT | Performed by: REGISTERED NURSE

## 2025-01-19 PROCEDURE — 96375 TX/PRO/DX INJ NEW DRUG ADDON: CPT

## 2025-01-19 RX ORDER — HYDROCODONE BITARTRATE AND ACETAMINOPHEN 10; 325 MG/1; MG/1
1 TABLET ORAL EVERY 6 HOURS PRN
Qty: 16 TABLET | Refills: 0 | Status: SHIPPED | OUTPATIENT
Start: 2025-01-19

## 2025-01-19 RX ORDER — MORPHINE SULFATE 4 MG/ML
4 INJECTION, SOLUTION INTRAMUSCULAR; INTRAVENOUS
Status: COMPLETED | OUTPATIENT
Start: 2025-01-19 | End: 2025-01-19

## 2025-01-19 RX ORDER — ONDANSETRON HYDROCHLORIDE 2 MG/ML
4 INJECTION, SOLUTION INTRAVENOUS
Status: COMPLETED | OUTPATIENT
Start: 2025-01-19 | End: 2025-01-19

## 2025-01-19 RX ADMIN — MORPHINE SULFATE 4 MG: 4 INJECTION INTRAVENOUS at 02:01

## 2025-01-19 RX ADMIN — ONDANSETRON 4 MG: 2 INJECTION INTRAMUSCULAR; INTRAVENOUS at 02:01

## 2025-01-19 NOTE — FIRST PROVIDER EVALUATION
"Medical screening examination initiated.  I have conducted a focused provider triage encounter, findings are as follows:    Brief history of present illness:  Presents for right shoulder injury after trip and fall    Vitals:    01/19/25 1355   Pulse: 77   Resp: 16   Temp: 97.4 °F (36.3 °C)   TempSrc: Oral   Height: 5' 5" (1.651 m)       Pertinent physical exam:  Uncomfortable    Brief workup plan:  Meds, imaging    Preliminary workup initiated; this workup will be continued and followed by the physician or advanced practice provider that is assigned to the patient when roomed.  "

## 2025-01-19 NOTE — ED PROVIDER NOTES
Encounter Date: 1/19/2025       History     Chief Complaint   Patient presents with    Shoulder Injury     R shoulder/arm pain after tripping over dog this morning.      54-year-old female presents the emergency department for right upper arm pain after a trip and fall which occurred this morning.  Patient reports she tripped over her dog.  Patient denies any fever, chills, chest pain, shortness of breath, back pain, abdominal pain, nausea, vomiting, head injury, and all other concerns at this time.    The history is provided by the patient. No  was used.     Review of patient's allergies indicates:  No Known Allergies  Past Medical History:   Diagnosis Date    Abnormal Pap smear of cervix     CAD (coronary artery disease)     Chronic constipation     DM II (diabetes mellitus, type II), controlled     Hx of colposcopy with cervical biopsy      Past Surgical History:   Procedure Laterality Date    ANGIOGRAM, CORONARY, WITH LEFT HEART CATHETERIZATION  2/19/2020    Procedure: Angiogram, Coronary, with Left Heart Cath;  Surgeon: Martin Villatoro MD;  Location: [facility] CATH LAB;  Service: Cardiology;;    CYST REMOVAL      ganglionic right wrist    LAPAROSCOPIC CHOLECYSTECTOMY N/A 7/20/2018    Procedure: CHOLECYSTECTOMY-LAPAROSCOPIC;  Surgeon: Louis O. Jeansonne IV, MD;  Location: [facility] OR;  Service: General;  Laterality: N/A;    LAPAROSCOPIC LYSIS OF ADHESIONS N/A 7/20/2018    Procedure: LYSIS, ADHESIONS, LAPAROSCOPIC;  Surgeon: Louis O. Jeansonne IV, MD;  Location: [facility] OR;  Service: General;  Laterality: N/A;    LEFT HEART CATHETERIZATION Left 2/19/2020    Procedure: CATHETERIZATION, HEART, LEFT;  Surgeon: Martin Villatoro MD;  Location: [facility] CATH LAB;  Service: Cardiology;  Laterality: Left;  930 start time per Dr. Villatoro    LEFT HEART CATHETERIZATION Left 10/10/2024    Procedure: Left heart cath;  Surgeon: Martin Villatoro MD;  Location: [facility] CATH LAB;  Service: Cardiology;  Laterality: Left;    SLEEVE  GASTROPLASTY  1/01/12    TOTAL REDUCTION MAMMOPLASTY Bilateral 2104    TUBAL LIGATION       Family History   Problem Relation Name Age of Onset    Heart disease Father      Heart attack Father      Diabetes type II Father      Testicular cancer Brother      Dementia Mother      No Known Problems Sister      No Known Problems Sister      Breast cancer Maternal Cousin      Colon cancer Neg Hx       Social History     Tobacco Use    Smoking status: Never    Smokeless tobacco: Never   Substance Use Topics    Alcohol use: Yes     Alcohol/week: 4.0 standard drinks of alcohol     Types: 4 Glasses of wine per week    Drug use: No     Review of Systems   Constitutional:  Negative for fever.   HENT:  Negative for sore throat.    Respiratory:  Negative for shortness of breath.    Cardiovascular:  Negative for chest pain.   Gastrointestinal:  Negative for abdominal pain, nausea and vomiting.   Genitourinary:  Negative for dysuria.   Musculoskeletal:  Positive for arthralgias. Negative for back pain.   Skin:  Negative for rash.   Neurological:  Negative for weakness.   Hematological:  Does not bruise/bleed easily.       Physical Exam     Initial Vitals [01/19/25 1355]   BP Pulse Resp Temp SpO2   (!) 171/96 77 16 97.4 °F (36.3 °C) 97 %      MAP       --         Physical Exam    Nursing note and vitals reviewed.  Constitutional: She appears well-developed and well-nourished. She is not diaphoretic. No distress.   HENT:   Head: Normocephalic and atraumatic.   Eyes: Right eye exhibits no discharge. Left eye exhibits no discharge.   Neck: Neck supple.   Normal range of motion.  Cardiovascular:  Normal rate.           Pulmonary/Chest: No respiratory distress.   Abdominal: She exhibits no distension.   Musculoskeletal:      Cervical back: Normal range of motion and neck supple.      Comments: Decreased range of motion of the right upper extremity secondary to pain.  No obvious deformity.  Distal pulses 2+.  Neurovascularly intact.      Neurological: She is alert and oriented to person, place, and time. She has normal strength.   Skin: Skin is warm and dry.   Psychiatric: She has a normal mood and affect. Her behavior is normal. Thought content normal.         ED Course   Procedures  Labs Reviewed   HEP C VIRUS HOLD SPECIMEN       Result Value    HEP C Virus Hold Specimen Hold for HCV sendout      Narrative:     Release to patient->Immediate   HEPATITIS C ANTIBODY   HIV 1 / 2 ANTIBODY          Imaging Results              X-Ray Shoulder Trauma Right (Final result)  Result time 01/19/25 14:37:24      Final result by Verónica Pickering MD (Timothy) (01/19/25 14:37:24)                   Impression:      Acute fracture of the right humeral head and neck      Electronically signed by: Verónica Pickering MD  Date:    01/19/2025  Time:    14:37               Narrative:    EXAMINATION:  XR SHOULDER TRAUMA 3 VIEW RIGHT    CLINICAL HISTORY:  Unspecified injury of right shoulder and upper arm, initial encounter    TECHNIQUE:  Standard radiography performed.  Three views    COMPARISON:  None    FINDINGS:  There is a acute fracture involving the right humeral head and neck.  No evidence of dislocate                                       X-Ray Elbow Complete Right (Final result)  Result time 01/19/25 14:36:11      Final result by Verónica Pickering MD (Timothy) (01/19/25 14:36:11)                   Impression:      Negative exam.      Electronically signed by: Verónica Pickering MD  Date:    01/19/2025  Time:    14:36               Narrative:    EXAMINATION:  XR ELBOW COMPLETE 3 VIEW RIGHT    CLINICAL HISTORY:  Unspecified injury of right shoulder and upper arm, initial encounter right shoulder pain    TECHNIQUE:  Three views were obtained    COMPARISON:  None    FINDINGS:  Bone density and architecture are normal.  No acute findings.                                       Medications   morphine injection 4 mg (4 mg Intravenous Given 1/19/25 5666)   ondansetron  injection 4 mg (4 mg Intravenous Given 1/19/25 9620)     Medical Decision Making  Differential diagnosis  Fracture, dislocation, contusion    Amount and/or Complexity of Data Reviewed  Radiology: ordered.  Discussion of management or test interpretation with external provider(s): Patient to follow up ortho and return to the ER for any worsening or concerns. Sling has been applied by nursing staff     Risk  Prescription drug management.                                  2:53 PM: I discussed the pt's case with Dr. stewart (ortho) who recommends sling and CT prior to discharge.  Reports will have team arrange follow up with ortho outpatient.      Clinical Impression:  Final diagnoses:  [S49.91XA] Arm injury, right, initial encounter                 Jigar Aragon, NP  01/19/25 1500       Jigar Aragon, NP  01/19/25 1612

## 2025-01-20 ENCOUNTER — PATIENT MESSAGE (OUTPATIENT)
Dept: INTERNAL MEDICINE | Facility: CLINIC | Age: 55
End: 2025-01-20
Payer: COMMERCIAL

## 2025-01-22 ENCOUNTER — TELEPHONE (OUTPATIENT)
Dept: SPORTS MEDICINE | Facility: CLINIC | Age: 55
End: 2025-01-22
Payer: COMMERCIAL

## 2025-01-24 ENCOUNTER — TELEPHONE (OUTPATIENT)
Dept: INTERNAL MEDICINE | Facility: CLINIC | Age: 55
End: 2025-01-24
Payer: COMMERCIAL

## 2025-01-24 ENCOUNTER — TELEPHONE (OUTPATIENT)
Dept: CARDIOLOGY | Facility: CLINIC | Age: 55
End: 2025-01-24
Payer: COMMERCIAL

## 2025-01-24 ENCOUNTER — TELEPHONE (OUTPATIENT)
Dept: ORTHOPEDICS | Facility: CLINIC | Age: 55
End: 2025-01-24
Payer: COMMERCIAL

## 2025-01-24 ENCOUNTER — PATIENT MESSAGE (OUTPATIENT)
Dept: CARDIOLOGY | Facility: CLINIC | Age: 55
End: 2025-01-24
Payer: COMMERCIAL

## 2025-01-24 DIAGNOSIS — R74.8 ABNORMAL LIVER ENZYMES: Primary | ICD-10-CM

## 2025-01-24 NOTE — TELEPHONE ENCOUNTER
Please advise    Contacted PT and she stated that needs shoulder surgery with MEG with Dr. Ley     [phone]  Fax [phone]    Phone     ----- Message from Kimber sent at 1/24/2025 12:14 PM CST -----  Contact: Yandy Kebede is calling to speak to the nurse regarding her shoulder surgery she have scheduled on 01/27, her liver levels are elevated and she need surgery clearance, please give her a call at [phone]    Thanks  LJ

## 2025-01-24 NOTE — TELEPHONE ENCOUNTER
----- Message from Chely sent at 1/24/2025 12:07 PM CST -----  Contact: Eliecer /   Type:  Needs Medical Advice    Who Called: Eliecer   Symptoms (please be specific): Fell Sunday and fractured her right shoulder  How long has patient had these symptoms: 5 days ago  Pharmacy name and phone #:    [id number] - [place] - [address] AT [facility]  [address]  [place] [address]  Phone: [phone] Fax: [phone]  Would the patient rather a call back or a response via [facility]? Call back  Best Call Back Number: [phone]  Additional Information:

## 2025-01-24 NOTE — TELEPHONE ENCOUNTER
Please advise      PT will not be in for her apt on 1/29/25 she is having surgery PT broke her shoulder and stated she would like someone to look at her labs and inform PT about them and if they have any recommendations- Reviewed labs with PT , PT stated verbal understanding

## 2025-01-24 NOTE — TELEPHONE ENCOUNTER
Spoke with patient and scheduled her ER follow up appointment. Patient verbalized understanding of appointment date, time and location.

## 2025-01-24 NOTE — TELEPHONE ENCOUNTER
----- Message from Lucy sent at 1/24/2025  3:20 PM CST -----  Contact: self  Patient is requesting a call back regarding enzyme levels and blood pressure elevated. Please call back at [phone]

## 2025-01-24 NOTE — TELEPHONE ENCOUNTER
----- Message from Med Assistant Hillman sent at 1/24/2025  1:54 PM CST -----  Contact: Eliecer /   Patient  stated they are waiting for you to answer Dr. Villatoro message regarding patient being car for surgery and also once patient is cleared can she have surgery at [facility]? Please advise. Thanks  ----- Message -----  From: Chely Naik  Sent: 1/24/2025  12:09 PM CST  To: Richard Harrington Jr Staff    Type:  Needs Medical Advice    Who Called: Eliecer   Symptoms (please be specific): Fell Sunday and fractured her right shoulder  How long has patient had these symptoms: 5 days ago  Pharmacy name and phone #:    [id number] - [place] - [address] AT [facility]  [address]  [place] [address]  Phone: [phone] Fax: [phone]  Would the patient rather a call back or a response via [facility]? Call back  Best Call Back Number: [phone]  Additional Information:

## 2025-01-24 NOTE — TELEPHONE ENCOUNTER
Please fax clearance.    Pt may proceed with surgery at moderate CV risk.    Also need to schedule consult w GI asap for abnormal liver enzymes on labs.      Dr Villatoro      Please advise     Contacted PT and she stated that needs shoulder surgery with MEG with Dr. Ley      [phone]  Fax [phone]     Phone      ----- Message from [employer] sent at 1/24/2025 12:14 PM CST -----  Contact: Yandy Kebede is calling to speak to the nurse regarding her shoulder surgery she have scheduled on 01/27, her liver levels are elevated and she need surgery clearance, please give her a call at [phone]     Thanks  LJ

## 2025-01-24 NOTE — TELEPHONE ENCOUNTER
----- Message from Pili Popalon sent at 1/24/2025  2:24 PM CST -----  Contact: patient   Type:  Appointment Request    Caller is requesting a same day appointment.  Caller declined first available appointment listed below.    Name of Caller:Eliecer Eli Call Back Number:[phone]  Additional Information:  Eliecer would like a call back in regards to scheduling the pt a ER f/u for her broken shoulder. He states she was told to f/u with Dr. Ramirez.

## 2025-01-27 ENCOUNTER — TELEPHONE (OUTPATIENT)
Dept: DIABETES | Facility: CLINIC | Age: 55
End: 2025-01-27
Payer: COMMERCIAL

## 2025-01-27 ENCOUNTER — PATIENT MESSAGE (OUTPATIENT)
Dept: DIABETES | Facility: CLINIC | Age: 55
End: 2025-01-27
Payer: COMMERCIAL

## 2025-01-27 DIAGNOSIS — E11.65 TYPE 2 DIABETES MELLITUS WITH HYPERGLYCEMIA, WITHOUT LONG-TERM CURRENT USE OF INSULIN: ICD-10-CM

## 2025-01-27 RX ORDER — BLOOD-GLUCOSE SENSOR
1 EACH MISCELLANEOUS
Qty: 9 EACH | Refills: 2 | Status: SHIPPED | OUTPATIENT
Start: 2025-01-27 | End: 2026-01-27

## 2025-01-27 NOTE — TELEPHONE ENCOUNTER
----- Message from Ana sent at 1/27/2025  2:14 PM CST -----  Contact: [phone]  .[email or web address]     Patient is calling for Medical Advice regarding:dexcom is acting up     How long has patient had these symptoms:    Pharmacy name and phone#:    Patient wants a call back or thru [facility]:call back     Comments:  She states the dexcom is acting up and it tells her Urgent low and she states sensor issue she is needing help to trouble shoot please advise and give return call   Please advise patient replies from provider may take up to 48 hours.

## 2025-01-27 NOTE — TELEPHONE ENCOUNTER
Pt is having issues with her dexcom. States it is reading urgent low and brief sensor issue but when finger sticks, her blood sugar was 241. I tried to walk her thru on how to calibrate the dexcom but she says her buddy is saying calibration not available right now. I offered her to get an appointment with Education or to contact dexcom support bc it might be an issue on their end. She wants to contact dexcom support but She is requesting to come  another sensor incase dexcom tells her to restart a new sensor.      Is she able to come  a G7 sensor?

## 2025-01-27 NOTE — TELEPHONE ENCOUNTER
Pt requesting a dexcom sensor sample until her rx for sensors gets delivered. I spoke with pt to inform her that Michi was going to put one at the registration desk for her at the [place] location.

## 2025-01-27 NOTE — TELEPHONE ENCOUNTER
Lov: 12/16/24  Nov: 2/4/25    Pt id requesting for Rx to be switched to [facility] instead of [facility] due to having issues with receiving medication from [facility].

## 2025-01-27 NOTE — TELEPHONE ENCOUNTER
----- Message from Tete sent at 1/27/2025 10:15 AM CST -----  Contact: MARK DICKSON [[id number]]  .Type:  Patient Requesting Call    Who Called:MARK DICKSON [[id number]]  Does the patient know what this is regarding?:Patient requesting a call back in regards to blood-glucose sensor (DEXCOM G7 SENSOR) Lindsay. Patient states [facility] but she is needing one to  locally until her mail order is delivered.  .  [id number] - [place] - [address] AT [facility]  [address]  [place] [address]  Phone: [phone] Fax: [phone]     Would the patient rather a call back or a response via [facility]? Call back  Best Call Back Number:.[phone] (home)    Additional Information:

## 2025-01-29 ENCOUNTER — OFFICE VISIT (OUTPATIENT)
Dept: CARDIOLOGY | Facility: CLINIC | Age: 55
End: 2025-01-29
Payer: COMMERCIAL

## 2025-01-29 DIAGNOSIS — I25.118 CORONARY ARTERY DISEASE OF NATIVE ARTERY OF NATIVE HEART WITH STABLE ANGINA PECTORIS: ICD-10-CM

## 2025-01-29 DIAGNOSIS — R94.31 ABNORMAL ECG: ICD-10-CM

## 2025-01-29 DIAGNOSIS — E11.65 TYPE 2 DIABETES MELLITUS WITH HYPERGLYCEMIA, WITHOUT LONG-TERM CURRENT USE OF INSULIN: ICD-10-CM

## 2025-01-29 DIAGNOSIS — E66.3 OVERWEIGHT (BMI 25.0-29.9): ICD-10-CM

## 2025-01-29 DIAGNOSIS — E78.2 MIXED HYPERLIPIDEMIA: ICD-10-CM

## 2025-01-29 DIAGNOSIS — K21.9 GASTROESOPHAGEAL REFLUX DISEASE, UNSPECIFIED WHETHER ESOPHAGITIS PRESENT: ICD-10-CM

## 2025-01-29 DIAGNOSIS — R74.8 ABNORMAL LIVER ENZYMES: Primary | ICD-10-CM

## 2025-01-29 DIAGNOSIS — I20.9 AP (ANGINA PECTORIS): ICD-10-CM

## 2025-01-29 DIAGNOSIS — Z82.49 FAMILY HISTORY OF CARDIOVASCULAR DISEASE: ICD-10-CM

## 2025-01-29 DIAGNOSIS — I72.8 SPLENIC ARTERY ANEURYSM: ICD-10-CM

## 2025-01-29 DIAGNOSIS — I65.23 ASYMPTOMATIC BILATERAL CAROTID ARTERY STENOSIS: ICD-10-CM

## 2025-01-29 DIAGNOSIS — I34.0 NONRHEUMATIC MITRAL VALVE REGURGITATION: ICD-10-CM

## 2025-01-29 PROBLEM — Z01.810 PREOP CARDIOVASCULAR EXAM: Status: ACTIVE | Noted: 2024-06-30

## 2025-01-29 NOTE — PROGRESS NOTES
Subjective:    Patient ID:  Yandy Cabral is a 54 y.o. female who presents for evaluation of Coronary Artery Disease            The patient location is: home  The chief complaint leading to consultation is: CAD    Visit type: audiovisual    Face to Face time with patient: 20 minutes of total time spent on the encounter, which includes face to face time and non-face to face time preparing to see the patient (eg, review of tests), Obtaining and/or reviewing separately obtained history, Documenting clinical information in the electronic or other health record, Independently interpreting results (not separately reported) and communicating results to the patient/family/caregiver, or Care coordination (not separately reported).         Each patient to whom he or she provides medical services by telemedicine is:  (1) informed of the relationship between the physician and patient and the respective role of any other health care provider with respect to management of the patient; and (2) notified that he or she may decline to receive medical services by telemedicine and may withdraw from such care at any time.    Notes:          HPIPt presents for eval.  Her current med conditions include CAD, carotid disease, splenic artery aneurysm, long standing DM, MR, hyperlipidemia.  Nonsmoker.  Family history of CAD (father had MI/CABG in 50's).   Past hx pertinent for following:  Pt seen 2020 for CP/angina.  S/p LHC 2/20: 20% prox RCA, normal elsewhere. Normal EF.  ecg 5/14/21 NSR, normal ecg.  Stress echo July 2022: avg exercise capacity, achieved 69% THR (she is on beta blocker tx), mild MR, normal LVEF, no ischemia noted.  Ecg 1/3/23 NSR, low voltage.  No ischemia noted.  Ecg 6/24/24 personally reviewed: NSR, low voltage.  Pt had Coronary CTA Aug 2024: High atherosclerotic plaque burden with 25-49% ostial LM, 70% mid LAD, 70% prox RCA stenoses; Coronary calcium score 1214 (99th percentile).  Also incidental finding of  splenic artery aneurysm.   S/p LHC Oct 2024 for further evaluation.  LHC showed 30 - 40% prox LAD, 40% mid LAD, 40% prox LCX, 50% prox RCA, milder diffuse CAD elsewhere, normal EF.  OMT advised.  Reviewed cath findings w pt.  Has seen [facility] for the aneurysm w f/u.  Carotid u/s Sept 2024 mild bilateral disease.  Not tolerating higher dose of Imdur added in 2024  w dizziness, spinning.  Lipids needs improvement -- Atorvastatin switched to Rosuvastatin and Zetia added in 2024.  Now here.  Fell and fx arm and had surgery [facility].  Dog tripped her.  No cardiac complications w surgery.  LFTs were abnl so surgery delayed.  Taken off statin.  Needs to see GI.  Recovery from her surgery at least 6 weeks.  Angina stable/controlled on current meds.  Tolerating Imdur low dose 30 mg qd.  No active CP sxs.  No CHF sxs.        Past Medical History:   Diagnosis Date    Abnormal Pap smear of cervix     CAD (coronary artery disease)     Chronic constipation     DM II (diabetes mellitus, type II), controlled     Hx of colposcopy with cervical biopsy      Current Outpatient Medications   Medication Instructions    alendronate (FOSAMAX) 70 MG tablet TAKE 1 TABLET EVERY 7 DAYS    aspirin (ECOTRIN) 81 mg, Daily    blood sugar diagnostic Strp To check BG 3 times daily, to use with insurance preferred meter    blood-glucose meter Misc Use as instructed    blood-glucose meter,continuous (DEXCOM G7 ) Misc 1 Device, Misc.(Non-Drug; Combo Route), Daily    blood-glucose sensor (DEXCOM G7 SENSOR) Lindsay 1 each, Subcutaneous, Every 10 days    buPROPion (WELLBUTRIN XL) 150 MG TB24 tablet TAKE 1 TABLET DAILY    CALCIUM ORAL 1 tablet, Daily    clonazePAM (KLONOPIN) 0.5 MG tablet Take one tablet by mouth every 8 hours for plane trip and 1 at night for jetlag.    clotrimazole-betamethasone 1-0.05% (LOTRISONE) cream APPLY TOPICALLY TO THE AFFECTED AREA TWICE DAILY    cyanocobalamin 1,000 mcg/mL injection INJECT 1 ML INTO THE MUSCLE EVERY 30  "DAYS    dapaglifloz propaned-metformin (XIGDUO XR) 10-1,000 mg TBph 1 tablet, Oral, Daily    estradioL (ESTRACE) 1 mg, Oral, Daily    ezetimibe (ZETIA) 10 mg, Oral, Daily    FLOWFLEX COVID-19 AG HOME TEST Kit No dose, route, or frequency recorded.    HYDROcodone-acetaminophen (NORCO)  mg per tablet 1 tablet, Oral, Every 6 hours PRN    insulin aspart U-100 (NOVOLOG U-100 INSULIN ASPART) 100 unit/mL injection 200 units every 3 days into omnipod 5 pods.    insulin  cart,aut,G6/7,cntr (OMNIPOD 5 G6-G7 INTRO KT,GEN5,) Crtg 1 each, Subcutaneous, Every 3 days    insulin pump cart,auto,BT,G6/7 (OMNIPOD 5 G6-G7 PODS, GEN 5,) Crtg 1 each, Subcutaneous, Every 3 days    isosorbide mononitrate (IMDUR) 30 mg, Oral, Daily    lancets 33 gauge Misc To check BG 3 times daily, to use with insurance preferred meter    melatonin (MELATIN) 5 mg, Nightly    metoprolol succinate (TOPROL-XL) 25 MG 24 hr tablet TAKE 1 TABLET DAILY    multivitamin (THERAGRAN) per tablet 1 tablet    needle, disp, 24 gauge 24 gauge x 1" Ndle 1 Dose, Misc.(Non-Drug; Combo Route), Every 30 days    nitroGLYCERIN (NITROSTAT) 0.4 mg, Sublingual, Every 5 min PRN, Call 911 if pain persists after 2 doses.    OZEMPIC 0.25 mg, Subcutaneous, Every 7 days    pen needle, diabetic (BD ULTRA-FINE SCOTT PEN NEEDLE) 32 gauge x 5/32" Ndle USE AS DIRECTED.    pramoxine-hydrocortisone (PROCTOCREAM-HC) 1-1 % rectal cream Rectal, 3 times daily    rosuvastatin (CRESTOR) 40 mg, Oral, Nightly    vitamin D (VITAMIN D3) 1,000 Units, Daily       Review of Systems   Constitutional: Negative.   HENT: Negative.     Eyes: Negative.    Cardiovascular: Negative.    Respiratory: Negative.     Endocrine: Negative.    Hematologic/Lymphatic: Negative.    Skin: Negative.    Musculoskeletal:  Positive for arthritis, joint pain and stiffness.   Gastrointestinal: Negative.    Genitourinary: Negative.    Neurological:  Positive for loss of balance.   Psychiatric/Behavioral: Negative.   "   Allergic/Immunologic: Negative.           LMP 03/05/2014     Wt Readings from Last 3 Encounters:   11/06/24 67.9 kg (149 lb 12.8 oz)   10/17/24 70.2 kg (154 lb 12.2 oz)   10/07/24 65.3 kg (143 lb 15.4 oz)     Temp Readings from Last 3 Encounters:   01/19/25 98.8 °F (37.1 °C)   10/10/24 98.1 °F (36.7 °C)   07/29/24 97 °F (36.1 °C) (Tympanic)     BP Readings from Last 3 Encounters:   01/19/25 123/69   11/06/24 118/74   10/17/24 116/70     Pulse Readings from Last 3 Encounters:   01/19/25 76   11/06/24 81   10/17/24 66       Objective:    Physical Exam  Vitals and nursing note reviewed.   Constitutional:       General: She is not in acute distress.     Appearance: Normal appearance. She is well-developed. She is not ill-appearing or diaphoretic.   HENT:      Head: Normocephalic.   Pulmonary:      Effort: Pulmonary effort is normal.   Neurological:      Mental Status: She is alert and oriented to person, place, and time.   Psychiatric:         Behavior: Behavior normal. Behavior is cooperative.         I have reviewed all pertinent labs and cardiac studies.        Chemistry        Component Value Date/Time     01/17/2025 0726    K 4.2 01/17/2025 0726     01/17/2025 0726    CO2 28 01/17/2025 0726    BUN 15 01/17/2025 0726    CREATININE 0.9 01/17/2025 0726     01/17/2025 0726        Component Value Date/Time    CALCIUM 9.0 01/17/2025 0726    ALKPHOS 65 01/17/2025 0726     (H) 01/17/2025 0726     (H) 01/17/2025 0726    BILITOT 0.4 01/17/2025 0726    ESTGFRAFRICA >60.0 05/13/2022 0911    EGFRNONAA >60.0 05/13/2022 0911        Lab Results   Component Value Date    WBC 5.55 10/03/2024    HGB 11.2 (L) 10/03/2024    HCT 34.1 (L) 10/03/2024     (H) 10/03/2024     10/03/2024       Lab Results   Component Value Date    HGBA1C 7.2 (H) 01/23/2025         Lab Results   Component Value Date    CHOL 85 (L) 01/17/2025    CHOL 179 02/23/2024    CHOL 179 02/23/2024     Lab Results    Component Value Date    HDL 36 (L) 01/17/2025    HDL 65 02/23/2024    HDL 65 02/23/2024     Lab Results   Component Value Date    LDLCALC 38.4 (L) 01/17/2025    LDLCALC 93.4 02/23/2024    LDLCALC 93.4 02/23/2024     Lab Results   Component Value Date    TRIG 53 01/17/2025    TRIG 103 02/23/2024    TRIG 103 02/23/2024     Lab Results   Component Value Date    CHOLHDL 42.4 01/17/2025    CHOLHDL 36.3 02/23/2024    CHOLHDL 36.3 02/23/2024             Assessment:       1. Preop cardiovascular exam    2. Abnormal liver enzymes    3. Coronary artery disease of native artery of native heart with stable angina pectoris    4. Abnormal ECG    5. Family history of cardiovascular disease    6. Asymptomatic bilateral carotid artery stenosis    7. AP (angina pectoris)    8. Type 2 diabetes mellitus with hyperglycemia, without long-term current use of insulin    9. Splenic artery aneurysm    10. Overweight (BMI 25.0-29.9)    11. Nonrheumatic mitral valve regurgitation    12. Mixed hyperlipidemia    13. Gastroesophageal reflux disease, unspecified whether esophagitis present             Plan:             Stay off statin/Zetia due to abnl LFTs.  See GI asap for further evaluation of LFT abnl to work it up and see at later date if we can get her back on lipid meds.  Otherwise continue on med tx for CAD which seems stable right now.  As below:  S/p [facility] Oct 2024: nonobstructive CAD but clear progression of disease from her poorly controlled DM since last cath in 2020.  Pt counseled on her CAD.  Needs to have perfect DM HGAIC control from here on out.  Continue current DM meds.  Imdur back to 30 mg qd.  Sublingual nitroglycerin 0.4 mg for concerning chest pain episodes or breakthrough angina.  Patient advised of indications, side effects of nitroglycerin and when to report to ER.   Has long standing DM with HGAIC chronically elevated and fam hx of CAD.  Reviewed all tests and above medical conditions with patient in detail and  formulated treatment plan.  Continue optimal medical treatment for cardiovascular conditions.  Cardiac low salt diet advised.  Daily exercise encouraged, with the goal 30 +  minutes aerobic exercise as tolerated.  Maintaining healthy weight and weight loss goals (if needed) were discussed in clinic.  BP control.  Lipids: as above.  DM: Continue current DM meds.  Pt counseled on need to get DM HGAIC to goal.  Mitral regurgitation: monitor. F/u echo in future.  Abnl ecg: monitor.  GERD: OTC meds prn.  Splenic artery aneurysm: f/u w Vasc Surgery as advised.  Carotid diease: Medical mgt and f/u u/s in future.    F/u 2 months.        I have reviewed all pertinent labs and cardiac studies independently. Plans and recommendations have been formulated under my direct supervision. All questions answered and patient voiced understanding.

## 2025-01-30 ENCOUNTER — PATIENT MESSAGE (OUTPATIENT)
Dept: INTERNAL MEDICINE | Facility: CLINIC | Age: 55
End: 2025-01-30
Payer: COMMERCIAL

## 2025-01-30 ENCOUNTER — OFFICE VISIT (OUTPATIENT)
Dept: INTERNAL MEDICINE | Facility: CLINIC | Age: 55
End: 2025-01-30
Payer: COMMERCIAL

## 2025-01-30 VITALS — WEIGHT: 148 LBS | HEIGHT: 65 IN | BODY MASS INDEX: 24.66 KG/M2

## 2025-01-30 DIAGNOSIS — R79.89 ELEVATED LFTS: Primary | ICD-10-CM

## 2025-01-30 PROCEDURE — 1159F MED LIST DOCD IN RCRD: CPT | Mod: CPTII,95,, | Performed by: PHYSICIAN ASSISTANT

## 2025-01-30 PROCEDURE — 3051F HG A1C>EQUAL 7.0%<8.0%: CPT | Mod: CPTII,95,, | Performed by: PHYSICIAN ASSISTANT

## 2025-01-30 PROCEDURE — 99495 TRANSJ CARE MGMT MOD F2F 14D: CPT | Mod: 95,,, | Performed by: PHYSICIAN ASSISTANT

## 2025-01-30 PROCEDURE — 1160F RVW MEDS BY RX/DR IN RCRD: CPT | Mod: CPTII,95,, | Performed by: PHYSICIAN ASSISTANT

## 2025-01-30 RX ORDER — ONDANSETRON 4 MG/1
4 TABLET, ORALLY DISINTEGRATING ORAL
COMMUNITY
Start: 2025-01-27

## 2025-01-30 RX ORDER — ALENDRONATE SODIUM 70 MG/1
1 TABLET ORAL
COMMUNITY
Start: 2024-06-28

## 2025-01-30 RX ORDER — TRAMADOL HYDROCHLORIDE 50 MG/1
50 TABLET ORAL EVERY 6 HOURS PRN
COMMUNITY
Start: 2025-01-27

## 2025-01-30 RX ORDER — METHOCARBAMOL 750 MG/1
750 TABLET, FILM COATED ORAL EVERY 8 HOURS PRN
COMMUNITY
Start: 2025-01-27

## 2025-01-30 NOTE — PROGRESS NOTES
Subjective:       Patient ID: Yandy Cabral is a 54 y.o. female.    Chief Complaint: Results (Elevated liver enzymes)      The patient location is: at home, [place]  The chief complaint leading to consultation is: elevated LFTs and hospital follow up    Visit type: audiovisual    Face to Face time with patient: 13 minutes (chart review started 11:58; video started 12:01; video ended 12:14)  15 minutes of total time spent on the encounter, which includes face to face time and non-face to face time preparing to see the patient (eg, review of tests), Obtaining and/or reviewing separately obtained history, Documenting clinical information in the electronic or other health record, Independently interpreting results (not separately reported) and communicating results to the patient/family/caregiver, or Care coordination (not separately reported).     Each patient to whom he or she provides medical services by telemedicine is:  (1) informed of the relationship between the physician and patient and the respective role of any other health care provider with respect to management of the patient; and (2) notified that he or she may decline to receive medical services by telemedicine and may withdraw from such care at any time.    Transitional Care Note    Family and/or Caretaker present at visit?  Yes. Sister at home with patient  Diagnostic tests reviewed/disposition: I have reviewed all completed as well as pending diagnostic tests at the time of discharge.  Disease/illness education: broken right arm and shoulder, post-op  Home health/community services discussion/referrals: Patient does not have home health established from hospital visit.  They do not need home health.  If needed, we will set up home health for the patient.   Establishment or re-establishment of referral orders for community resources: No other necessary community resources.   Discussion with other health care providers:  ref to hepatology .        History of Present Illness    CHIEF COMPLAINT:  - Yandy presents to discuss elevated liver enzymes discovered in recent bloodwork and to follow up after hospitalization for a broken shoulder requiring surgery.    HPI:- Yandy had routine bloodwork with Dr. Villatoro on the Friday before a snowstorm, revealing elevated liver enzymes. 2 days later, on Sunday, she sustained a right shoulder fracture. Pre-operative bloodwork for shoulder surgery showed a 3-fold increase in liver enzymes, leading to admission at [facility] on Friday for further evaluation.  - Yandy was hospitalized for 4 days, from Friday to Monday. A liver ultrasound performed during her stay showed no abnormalities, though gas was noted around the liver. She underwent shoulder surgery on the Sunday following discharge, involving the insertion of pins, needles, and plates due to the fracture's severity.  - Yandy reports that her final liver enzyme test before discharge indicated a downward trend. She was advised to follow up with an internal medicine doctor or gastroenterologist for further investigation. As a precaution, she was instructed to discontinue her statin medication, which had been prescribed by her cardiologist following a heart catheterization in October.  - Currently, the patient denies abdominal pain, nausea, vomiting, skin pruritus, and jaundice. She reports a rash on her right arm, which she attributes to perspiration beneath the sling. Yandy began statin therapy in October, shortly after her heart catheterization, and discontinued it on the Thursday before her surgery as instructed. She reports no history of liver problems prior to this incident.    MEDICATIONS:  - Statins, for heart condition (started after heart catheterization in October), stopped prior to surgery    MEDICAL HISTORY:  - Elevated liver enzymes  - Hypercholesterolemia    SURGICAL HISTORY:  - Right shoulder surgery: Sunday, January 29, 2025, for  fractured right shoulder. Multiple pins, needles, and plates were inserted.    TEST RESULTS:  - Liver enzymes: Friday before arm fracture, elevated  - Liver enzymes: Thursday after arm fracture, increased 3 times compared to previous test  - Liver enzymes: Last labs drawn in hospital before discharge, starting to trend down  - Hepatitis C: Checked    IMAGING:  - Liver ultrasound: During hospital stay, no abnormalities seen, gas noted around liver (per patient report)          Review of Systems   Constitutional:  Negative for chills, fatigue, fever and unexpected weight change.   HENT:  Negative for congestion, dental problem, ear pain, hearing loss, rhinorrhea and trouble swallowing.    Eyes:  Negative for pain and visual disturbance.   Respiratory:  Negative for cough and shortness of breath.    Cardiovascular:  Negative for chest pain, palpitations and leg swelling.   Gastrointestinal:  Negative for abdominal distention, abdominal pain, blood in stool, constipation, diarrhea, nausea and vomiting.   Genitourinary:  Negative for difficulty urinating and pelvic pain.   Musculoskeletal:  Negative for arthralgias and myalgias.   Skin:  Negative for rash.   Neurological:  Negative for dizziness, weakness, numbness and headaches.   Hematological:  Negative for adenopathy. Does not bruise/bleed easily.   Psychiatric/Behavioral:  Negative for dysphoric mood and sleep disturbance. The patient is not nervous/anxious.          Objective:        Wt Readings from Last 3 Encounters:   01/30/25 67.1 kg (148 lb)   11/06/24 67.9 kg (149 lb 12.8 oz)   10/17/24 70.2 kg (154 lb 12.2 oz)     Temp Readings from Last 3 Encounters:   01/19/25 98.8 °F (37.1 °C)   10/10/24 98.1 °F (36.7 °C)   07/29/24 97 °F (36.1 °C) (Tympanic)     BP Readings from Last 3 Encounters:   01/19/25 123/69   11/06/24 118/74   10/17/24 116/70     Pulse Readings from Last 3 Encounters:   01/19/25 76   11/06/24 81   10/17/24 66     Body mass index is 24.63  kg/m².    Physical Exam  Constitutional:       General: She is not in acute distress.     Appearance: Normal appearance. She is not ill-appearing or toxic-appearing.   HENT:      Head: Normocephalic and atraumatic.   Eyes:      Extraocular Movements: Extraocular movements intact.      Conjunctiva/sclera: Conjunctivae normal.      Pupils: Pupils are equal, round, and reactive to light.   Cardiovascular:      Rate and Rhythm: Normal rate.   Pulmonary:      Effort: Pulmonary effort is normal. No respiratory distress.   Musculoskeletal:      Comments: Right arm in sling   Neurological:      General: No focal deficit present.      Mental Status: She is alert and oriented to person, place, and time. Mental status is at baseline.   Psychiatric:         Mood and Affect: Mood normal.         Behavior: Behavior normal.         Thought Content: Thought content normal.         Judgment: Judgment normal.         Assessment:       1. Elevated LFTs        Plan:   1. Elevated LFTs  -     Comprehensive Metabolic Panel; Future; Expected date: 07/30/2025  -     Hepatitis Panel, Acute; Future; Expected date: 01/30/2025  -     US Abdomen Limited; Future; Expected date: 01/30/2025          Assessment & Plan    IMPRESSION:  - Elevated liver enzymes noted in recent bloodwork, with significant increase after arm fracture and prior to shoulder surgery  - Hospitalized for 4 days to investigate elevated liver enzymes before proceeding with surgery  - Suspected statin medication, recently started in October, as potential cause of elevated liver enzymes  - Deferred referral to hepatology pending results of new labs and imaging  - Communicated with Dr. Ramos (PCP) and cardiologist regarding statin discontinuation and liver enzyme elevation    RIGHT HUMERUS FRACTURE:   Noted that the patient underwent surgery for a right humerus fracture the previous Sunday.   Evaluated the patient's condition, confirming a right shoulder fracture with multiple  surgical implants.   Scheduled the patient for follow-up with the orthopedic surgeon.   Referred the patient to start physical therapy.    HOME CARE:   Acknowledged that sister is providing home care assistance.    ELEVATED LIVER ENZYMES:   Monitored the patient's liver enzyme levels, noting a significant increase after starting pain medication, with recent hospital labs showing a downward trend.   Evaluated previous lab work confirming elevated liver enzymes.   Ordered non-fasting repeat liver enzyme tests, hepatitis panel, and liver ultrasound to investigate the cause of elevated enzymes.   Discontinued statin medication due to elevated liver enzymes.   Educated the patient about different types of hepatitis and potential causes, including viral infections, food poisoning, and exposure through other people.    FOLLOW UP:   Arranged a follow-up visit with Dr. Ramos in 1-2 weeks.   Will forward test results to Dr. Ramos for review.           This note was generated with the assistance of ambient listening technology. Verbal consent was obtained by the patient and accompanying visitor(s) for the recording of patient appointment to facilitate this note. I attest to having reviewed and edited the generated note for accuracy, though some syntax or spelling errors may persist. Please contact the author of this note for any clarification.

## 2025-01-31 ENCOUNTER — PATIENT MESSAGE (OUTPATIENT)
Dept: GASTROENTEROLOGY | Facility: CLINIC | Age: 55
End: 2025-01-31
Payer: COMMERCIAL

## 2025-01-31 ENCOUNTER — LAB VISIT (OUTPATIENT)
Dept: LAB | Facility: HOSPITAL | Age: 55
End: 2025-01-31
Attending: PHYSICIAN ASSISTANT
Payer: COMMERCIAL

## 2025-01-31 ENCOUNTER — PATIENT MESSAGE (OUTPATIENT)
Dept: CARDIOLOGY | Facility: CLINIC | Age: 55
End: 2025-01-31
Payer: COMMERCIAL

## 2025-01-31 DIAGNOSIS — R79.89 ELEVATED LFTS: ICD-10-CM

## 2025-01-31 LAB
HAV IGM SERPL QL IA: NORMAL
HBV CORE IGM SERPL QL IA: NORMAL
HBV SURFACE AG SERPL QL IA: NORMAL
HCV AB SERPL QL IA: NORMAL

## 2025-01-31 PROCEDURE — 80074 ACUTE HEPATITIS PANEL: CPT | Performed by: PHYSICIAN ASSISTANT

## 2025-01-31 PROCEDURE — 36415 COLL VENOUS BLD VENIPUNCTURE: CPT | Performed by: PHYSICIAN ASSISTANT

## 2025-02-03 ENCOUNTER — HOSPITAL ENCOUNTER (OUTPATIENT)
Dept: RADIOLOGY | Facility: HOSPITAL | Age: 55
Discharge: HOME | End: 2025-02-03
Attending: PHYSICIAN ASSISTANT
Payer: COMMERCIAL

## 2025-02-03 DIAGNOSIS — R79.89 ELEVATED LFTS: ICD-10-CM

## 2025-02-03 PROCEDURE — 76705 ECHO EXAM OF ABDOMEN: CPT | Mod: 26,,, | Performed by: RADIOLOGY

## 2025-02-03 PROCEDURE — 76705 ECHO EXAM OF ABDOMEN: CPT | Mod: TC

## 2025-02-04 ENCOUNTER — OFFICE VISIT (OUTPATIENT)
Dept: DIABETES | Facility: CLINIC | Age: 55
End: 2025-02-04
Payer: COMMERCIAL

## 2025-02-04 DIAGNOSIS — Z97.8 USES SELF-APPLIED CONTINUOUS GLUCOSE MONITORING DEVICE: ICD-10-CM

## 2025-02-04 DIAGNOSIS — I25.118 CORONARY ARTERY DISEASE OF NATIVE ARTERY OF NATIVE HEART WITH STABLE ANGINA PECTORIS: ICD-10-CM

## 2025-02-04 DIAGNOSIS — E78.2 MIXED HYPERLIPIDEMIA: ICD-10-CM

## 2025-02-04 DIAGNOSIS — E11.65 TYPE 2 DIABETES MELLITUS WITH HYPERGLYCEMIA, WITHOUT LONG-TERM CURRENT USE OF INSULIN: Primary | ICD-10-CM

## 2025-02-04 PROCEDURE — 95251 CONT GLUC MNTR ANALYSIS I&R: CPT | Mod: NDTC,,, | Performed by: NURSE PRACTITIONER

## 2025-02-04 PROCEDURE — 3051F HG A1C>EQUAL 7.0%<8.0%: CPT | Mod: CPTII,95,, | Performed by: NURSE PRACTITIONER

## 2025-02-04 PROCEDURE — 98006 SYNCH AUDIO-VIDEO EST MOD 30: CPT | Mod: 95,,, | Performed by: NURSE PRACTITIONER

## 2025-02-04 PROCEDURE — G2211 COMPLEX E/M VISIT ADD ON: HCPCS | Mod: 95,,, | Performed by: NURSE PRACTITIONER

## 2025-02-04 NOTE — PATIENT INSTRUCTIONS
PATIENT INSTRUCTIONS     Continue Xigduo XR  mg by mouth daily.     Omnipod 5 Pump settings:    Insulin Type: Novolog  Basal rate: 0.7 Units/hour    Insulin to Carbohydrate Ratio: /  - Adjust to 9  Insulin Sensitivity Factor: 1/45    Glucose Target: 120-120 mg/dl    Active Insulin Time: 3 hours  Max Basal Rate: 3 units/hr - INCREASE TO 5.   Max Bolus: 20 units    Continue Dexcom CGM G7.  Blood Sugar Goals:       Fastin-130.       1-2 hours after a meal: Less than 180.     U100 Pump Failure Plan:   We have decided to develop a plan in the event that your pump breaks or is nonfunctioning. After 4 hours without pump use, you should begin to consider putting your Pump Failure Back Up Plan into action especially if you foresee that you may not be able to use your pump for more than 20 hours. Since you are currently using short acting insulin (Humalog/Novolog/Admelog/Novolin R) in your pump, you will need access to your short acting insulin vial, syringes, and a back up long acting insulin in the event of a pump failure. I have sent a prescription for a long acting insulin to your pharmacy for use during your emergency pump failure plan. It is important that you keep both types of insulin/syringes with you so that you may have access to it at least 3 times daily if needed. This medication and syringes should be brought with you on overnight trips in the case of an emergency pump failure. This means making a plan to keeping your insulin and syringes at school, work, or other places you frequent. If needed, please reach out to my office about any letters you may need for permission to keep these items for emergency purposes. We will outline your plan for pump failure emergencies below, but please remember to contact the insulin pump company (toll free number is printed on the label on the back of the insulin pump) and our office if you have a pump failure. When the insulin pump is restarted, do not restart  basal rates until at least 22 hours after the last long acting insulin injection. You can set a 0% temporary basal setting that will last until this time and use your pump to bolus for meals and correction. If you need help with these feature, please call your insulin pump company tech support line or ask them in anticipation of this action.     Fiasp correction every 4 hours between meals     If  - 200, may take 4 units of Fiasp  If  - 250, may take 6 units of Fiasp  If  - 300, may take 8 units of Fiasp  If  - 350, may take 10 units of Fiasp  If  - 400, may take 12 units of Fiasp  If +, may take 14 units of Fiasp      Meal Size Dosing with Fiasp. Take 5-10 minutes before meal.   Small Meal: 10 units  Medium Meal: 12 units  Large Meal: 14 units    For any technical insulin pump issues, please contact the insulin pump company; the toll free number is printed on the label on the back of the insulin pump.

## 2025-02-04 NOTE — PROGRESS NOTES
The patient location is: Home  The chief complaint leading to consultation is: Diabetes    Visit type: audiovisual    Face to Face time with patient: 15  30 minutes of total time spent on the encounter, which includes face to face time and non-face to face time preparing to see the patient (eg, review of tests), Obtaining and/or reviewing separately obtained history, Documenting clinical information in the electronic or other health record, Independently interpreting results (not separately reported) and communicating results to the patient/family/caregiver, or Care coordination (not separately reported).  Each patient to whom he or she provides medical services by telemedicine is:  (1) informed of the relationship between the physician and patient and the respective role of any other health care provider with respect to management of the patient; and (2) notified that he or she may decline to receive medical services by telemedicine and may withdraw from such care at any time.    Notes:    Yandy Cabral is a 54 y.o. female who presents for a follow up evaluation of Type 2 diabetes mellitus.     CHIEF COMPLAINT: Diabetes Consultation    PCP: Jaycob Ramos MD      Initial visit with me - 12/19/2022     The patient was initially diagnosed with diabetes 2004, polyuria/polydipsia, unintentional weight loss, weakness, neuropathy and vision changes. Diagnosed with DM type 1.5.      Previous failed treatments include:  Was on insulin previously, was eventually weaned off.   InvInstyBookna - insurance stopped paying.  Insulin pump - at [facility] in [id number]. [employer].   Mounjaro - nausea  Ozempic - wanted to try rybelsus.     Social Documentation:  Patient lives with  and daughter.   Occupation:  for nonprofit organization.   Patient prefers In-person Visits.   and Video Virtual Visits.   Current Diet: No fried foot, avoids dairy. Drinks diet dr. Azul, drinking more water.   Exercise:  "Pilates twice a week.      Known Diabetes complications: peripheral neuropathy and cardiovascular disease.     Cardiovascular Risk Factors: family history of premature cardiovascular disease.    Diabetes Medications               dapaglifloz propaned-metformin (XIGDUO XR) 10-1,000 mg TBph Take 1 tablet by mouth once daily.    insulin aspart, niacinamide, (FIASP FLEXTOUCH U-100 INSULIN) 100 unit/mL (3 mL) InPn Inject 4-14 Units into the skin 3 (three) times daily with meals.    metFORMIN (GLUCOPHAGE) 500 MG tablet Take 500 mg by mouth 2 (two) times daily.    semaglutide (RYBELSUS) 14 mg tablet Take 1 tablet (14 mg total) by mouth once daily.     Current monitoring regimen: Dexcom G7 Sharing    The patient's Dexcom CGM was downloaded and reviewed. For 14 days, patient average glucose was 207 mg/dL. She was above range 57% of the time, in range 43% of the time, and below range 0% of the time. The target range for this patient was 70 - 180 mg/dL. Overall, there was a pattern of daytime hyperglycemia.        Recent hypoglycemic episodes: none.     Patient compliant with glucose checks and medication administration? Yes    DIABETES MANAGEMENT STATUS  Statin: Taking  ACE/ARB: Not taking  Screening or Prevention Patient's value Goal Complete/Controlled?   HgA1C Testing and Control   Lab Results   Component Value Date    HGBA1C 7.2 (H) 01/23/2025      Annually/Less than 8% No     Lipid profile : 01/17/2025 Annually Yes     LDL control Lab Results   Component Value Date    LDLCALC 38.4 (L) 01/17/2025    Annually/Less than 100 mg/dl  Yes     Nephropathy screening Lab Results   Component Value Date    LABMICR 18.0 02/23/2024     Lab Results   Component Value Date    PROTEINUA Negative 09/26/2024     No results found for: "UTPCR"   Annually No     Blood pressure BP Readings from Last 1 Encounters:   01/19/25 123/69    Less than 140/90 Yes     Dilated retinal exam : 03/27/2023 Annually Yes     Foot exam   : 03/05/2024 Annually " No     Patient's medications, allergies, surgical, social and family histories were reviewed and updated as appropriate.     Review of Systems   Constitutional:  Negative for weight loss.   Eyes:  Negative for blurred vision and double vision.   Cardiovascular:  Negative for chest pain.   Gastrointestinal:  Negative for nausea and vomiting.   Genitourinary:  Negative for frequency.   Musculoskeletal:  Negative for falls.   Neurological:  Negative for dizziness and weakness.   Endo/Heme/Allergies:  Negative for polydipsia.   Psychiatric/Behavioral:  Negative for depression.    All other systems reviewed and are negative.       Physical Exam  Constitutional:       Appearance: Normal appearance.   HENT:      Head: Normocephalic and atraumatic.   Pulmonary:      Effort: No respiratory distress.   Musculoskeletal:      Cervical back: Normal range of motion.   Neurological:      Mental Status: She is alert and oriented to person, place, and time.   Psychiatric:         Mood and Affect: Mood normal.         Behavior: Behavior normal.        Last menstrual period 03/05/2014.  Wt Readings from Last 3 Encounters:   01/30/25 67.1 kg (148 lb)   11/06/24 67.9 kg (149 lb 12.8 oz)   10/17/24 70.2 kg (154 lb 12.2 oz)     Hemoglobin A1C   Date Value Ref Range Status   01/23/2025 7.2 (H) <=6.5 % Final   01/17/2025 7.0 (H) 4.0 - 5.6 % Final     Comment:     ADA Screening Guidelines:  5.7-6.4%  Consistent with prediabetes  >or=6.5%  Consistent with diabetes    High levels of fetal hemoglobin interfere with the HbA1C  assay. Heterozygous hemoglobin variants (HbS, HgC, etc)do  not significantly interfere with this assay.   However, presence of multiple variants may affect accuracy.     09/26/2024 6.5 (H) 4.0 - 5.6 % Final     Comment:     ADA Screening Guidelines:  5.7-6.4%  Consistent with prediabetes  >or=6.5%  Consistent with diabetes    High levels of fetal hemoglobin interfere with the HbA1C  assay. Heterozygous hemoglobin variants  (HbS, HgC, etc)do  not significantly interfere with this assay.   However, presence of multiple variants may affect accuracy.     02/23/2024 7.6 (H) 4.0 - 5.6 % Final     Comment:     ADA Screening Guidelines:  5.7-6.4%  Consistent with prediabetes  >or=6.5%  Consistent with diabetes    High levels of fetal hemoglobin interfere with the HbA1C  assay. Heterozygous hemoglobin variants (HbS, HgC, etc)do  not significantly interfere with this assay.   However, presence of multiple variants may affect accuracy.         Lab Results   Component Value Date    CPEPTIDE 1.25 02/23/2024    GLUTAMICACID 0.00 02/23/2024       Chemistry        Component Value Date/Time     02/03/2025 1350    K 4.5 02/03/2025 1350     02/03/2025 1350    CO2 24 02/03/2025 1350    BUN 18 02/03/2025 1350    CREATININE 0.8 02/03/2025 1350     (H) 02/03/2025 1350        Component Value Date/Time    CALCIUM 9.2 02/03/2025 1350    ALKPHOS 107 02/03/2025 1350    AST 67 (H) 02/03/2025 1350     (H) 02/03/2025 1350    BILITOT 0.4 02/03/2025 1350    ESTGFRAFRICA >60.0 05/13/2022 0911    EGFRNONAA >60.0 05/13/2022 0911        ASSESSMENT    ICD-10-CM ICD-9-CM   1. Type 2 diabetes mellitus with hyperglycemia, without long-term current use of insulin  E11.65 250.00     790.29   2. Coronary artery disease of native artery of native heart with stable angina pectoris  I25.118 414.01     413.9   3. Mixed hyperlipidemia  E78.2 272.2   4. CGM - DEXCOM - SHARING  Z97.8 V49.89                 PLAN  Diagnoses and all orders for this visit:    Type 2 diabetes mellitus with hyperglycemia, without long-term current use of insulin    Coronary artery disease of native artery of native heart with stable angina pectoris    Mixed hyperlipidemia    CGM - DEXCOM - SHARING              Reviewed pathophysiology of diabetes, complications related to the disease, importance of annual dilated eye exam and daily foot examination. Explained MOA, SE, dosage of  medications. Written instructions given and reviewed with patient and patient verbalizes understanding.     1/8/2023 - complains of nausea with Mounjaro. Would like to try oral alternative, will send in Rybelsus.     2/19/2024 - post prandial glucoses remain uncontrolled, despite rybelsus and pre meal correction dosing. Will change fiasp to meal size, increase rybelsus. Patient would like to reconsider going back in an insulin pump. Will have her complete labs this week and then decide on pump.     3/18/2024 - continues to have post prandial hyperglycemia. Will increase rybelsus. Insulin ab/FILOMENA/cpep neg. Will schedule for pump evaluation, patient is interested in iLet.    5/20/2024 - continues to have post prandial hyperglycemia. Discussed with patient need to decrease carbohydrate intake with meals, due to such high post prandial excursions, typically taking 10 units fiasp before medium size meals. States she received letter from her insurance company on Saturday stating iLet not approved due to her previous pump still in warranty, but patient has not been on a pump in 14 years. She will call them today to clarify.      6/17/2024 - states ilet pump shipped and should arrive tomorrow.     7/29/2024 - patient has had recurrent hypoglycemia since starting iLet insulin pump, also received a bill from her insurance company for full price of the pump.     11/12/2024 - OP5 training this Thursday. Rybelsus changed to Ozempic by cardiology, patient wishes to start at lowest dose due to experiencing heartburn with other new medications (crestor/zetia). Has not taken xigduo since September due to rx expiring, will reorder. She is taking metformin and will stop when she restarts xigduo.     12/16/2024 - holding off on starting ozempic until after colonsocopy in January.     2/4/2025 - fell and fractured right shoulder 1/19/25. Surgery was delayed due to transaminitis. Patient was admitted for workup, statin discontinued (which  had been increased in October after [facility] and zetia added). Surgery done , LFTs improving. Colonoscopy delayed. Did not restart ozempic, will hold off for now. Needs tighter carb correction, strengthened from 11 to 9.       PATIENT INSTRUCTIONS     Continue Xigduo XR  mg by mouth daily.     Omnipod 5 Pump settings:    Insulin Type: Novolog  Basal rate: 0.7 Units/hour    Insulin to Carbohydrate Ratio:   - Adjust to 9  Insulin Sensitivity Factor: 45    Glucose Target: 120-120 mg/dl    Active Insulin Time: 3 hours  Max Basal Rate: 3 units/hr - INCREASE TO 5.   Max Bolus: 20 units    Continue Dexcom CGM G7.  Blood Sugar Goals:       Fastin-130.       1-2 hours after a meal: Less than 180.     U100 Pump Failure Plan:   We have decided to develop a plan in the event that your pump breaks or is nonfunctioning. After 4 hours without pump use, you should begin to consider putting your Pump Failure Back Up Plan into action especially if you foresee that you may not be able to use your pump for more than 20 hours. Since you are currently using short acting insulin (Humalog/Novolog/Admelog/Novolin R) in your pump, you will need access to your short acting insulin vial, syringes, and a back up long acting insulin in the event of a pump failure. I have sent a prescription for a long acting insulin to your pharmacy for use during your emergency pump failure plan. It is important that you keep both types of insulin/syringes with you so that you may have access to it at least 3 times daily if needed. This medication and syringes should be brought with you on overnight trips in the case of an emergency pump failure. This means making a plan to keeping your insulin and syringes at school, work, or other places you frequent. If needed, please reach out to my office about any letters you may need for permission to keep these items for emergency purposes. We will outline your plan for pump failure emergencies below,  but please remember to contact the insulin pump company (toll free number is printed on the label on the back of the insulin pump) and our office if you have a pump failure. When the insulin pump is restarted, do not restart basal rates until at least 22 hours after the last long acting insulin injection. You can set a 0% temporary basal setting that will last until this time and use your pump to bolus for meals and correction. If you need help with these feature, please call your insulin pump company tech support line or ask them in anticipation of this action.     Fiasp correction every 4 hours between meals     If  - 200, may take 4 units of Fiasp  If  - 250, may take 6 units of Fiasp  If  - 300, may take 8 units of Fiasp  If  - 350, may take 10 units of Fiasp  If  - 400, may take 12 units of Fiasp  If +, may take 14 units of Fiasp      Meal Size Dosing with Fiasp. Take 5-10 minutes before meal.   Small Meal: 10 units  Medium Meal: 12 units  Large Meal: 14 units    For any technical insulin pump issues, please contact the insulin pump company; the toll free number is printed on the label on the back of the insulin pump.    Follow up in about 6 weeks (around 3/18/2025) for Virtual, OP5.

## 2025-02-10 ENCOUNTER — PATIENT MESSAGE (OUTPATIENT)
Dept: INTERNAL MEDICINE | Facility: CLINIC | Age: 55
End: 2025-02-10
Payer: COMMERCIAL

## 2025-02-10 DIAGNOSIS — M80.00XD OSTEOPOROSIS WITH CURRENT PATHOLOGICAL FRACTURE WITH ROUTINE HEALING, UNSPECIFIED OSTEOPOROSIS TYPE, SUBSEQUENT ENCOUNTER: ICD-10-CM

## 2025-02-10 DIAGNOSIS — M81.0 OSTEOPOROSIS, UNSPECIFIED OSTEOPOROSIS TYPE, UNSPECIFIED PATHOLOGICAL FRACTURE PRESENCE: Primary | ICD-10-CM

## 2025-02-10 DIAGNOSIS — E11.65 TYPE 2 DIABETES MELLITUS WITH HYPERGLYCEMIA, WITHOUT LONG-TERM CURRENT USE OF INSULIN: ICD-10-CM

## 2025-02-10 RX ORDER — BLOOD-GLUCOSE SENSOR
EACH MISCELLANEOUS
Qty: 9 EACH | Refills: 3 | OUTPATIENT
Start: 2025-02-10

## 2025-02-10 NOTE — TELEPHONE ENCOUNTER
Spoke with pt made an appointment for dexa scan. Pt stated rheumatology already contacted her and made an appointment in October.

## 2025-02-12 ENCOUNTER — APPOINTMENT (OUTPATIENT)
Dept: RADIOLOGY | Facility: HOSPITAL | Age: 55
End: 2025-02-12
Attending: PEDIATRICS
Payer: COMMERCIAL

## 2025-02-12 ENCOUNTER — PATIENT MESSAGE (OUTPATIENT)
Dept: DIABETES | Facility: CLINIC | Age: 55
End: 2025-02-12
Payer: COMMERCIAL

## 2025-02-12 DIAGNOSIS — M80.00XD OSTEOPOROSIS WITH CURRENT PATHOLOGICAL FRACTURE WITH ROUTINE HEALING, UNSPECIFIED OSTEOPOROSIS TYPE, SUBSEQUENT ENCOUNTER: ICD-10-CM

## 2025-02-12 DIAGNOSIS — E11.65 TYPE 2 DIABETES MELLITUS WITH HYPERGLYCEMIA, WITHOUT LONG-TERM CURRENT USE OF INSULIN: ICD-10-CM

## 2025-02-12 PROCEDURE — 77080 DXA BONE DENSITY AXIAL: CPT | Mod: 26,,, | Performed by: RADIOLOGY

## 2025-02-12 PROCEDURE — 77080 DXA BONE DENSITY AXIAL: CPT | Mod: TC

## 2025-02-12 RX ORDER — BLOOD-GLUCOSE SENSOR
1 EACH MISCELLANEOUS
Qty: 9 EACH | Refills: 2 | Status: SHIPPED | OUTPATIENT
Start: 2025-02-12 | End: 2026-02-12

## 2025-02-25 ENCOUNTER — PATIENT MESSAGE (OUTPATIENT)
Dept: DIABETES | Facility: CLINIC | Age: 55
End: 2025-02-25
Payer: COMMERCIAL

## 2025-02-25 ENCOUNTER — PATIENT OUTREACH (OUTPATIENT)
Dept: ADMINISTRATIVE | Facility: HOSPITAL | Age: 55
End: 2025-02-25
Payer: COMMERCIAL

## 2025-02-25 DIAGNOSIS — E11.65 TYPE 2 DIABETES MELLITUS WITH HYPERGLYCEMIA, WITHOUT LONG-TERM CURRENT USE OF INSULIN: Primary | ICD-10-CM

## 2025-02-25 RX ORDER — BLOOD-GLUCOSE TRANSMITTER
1 EACH MISCELLANEOUS
Qty: 1 EACH | Refills: 3 | Status: SHIPPED | OUTPATIENT
Start: 2025-02-25 | End: 2026-02-25

## 2025-02-25 RX ORDER — BLOOD-GLUCOSE SENSOR
1 EACH MISCELLANEOUS
Qty: 3 EACH | Refills: 11 | Status: SHIPPED | OUTPATIENT
Start: 2025-02-25 | End: 2026-02-25

## 2025-02-25 NOTE — PROGRESS NOTES
DM LAB REPORT: HA1c, CMP, & Lipid Panel are already linked to an upcoming Lab appt. Microalbumin urine was ordered & then linked.

## 2025-02-26 ENCOUNTER — LAB VISIT (OUTPATIENT)
Dept: LAB | Facility: HOSPITAL | Age: 55
End: 2025-02-26
Attending: PEDIATRICS
Payer: COMMERCIAL

## 2025-02-26 DIAGNOSIS — E11.65 TYPE 2 DIABETES MELLITUS WITH HYPERGLYCEMIA, WITHOUT LONG-TERM CURRENT USE OF INSULIN: ICD-10-CM

## 2025-02-26 LAB
ALBUMIN SERPL BCP-MCNC: 3.6 G/DL (ref 3.5–5.2)
ALBUMIN/CREAT UR: 19.7 UG/MG (ref 0–30)
ALP SERPL-CCNC: 72 U/L (ref 40–150)
ALT SERPL W/O P-5'-P-CCNC: 26 U/L (ref 10–44)
ANION GAP SERPL CALC-SCNC: 9 MMOL/L (ref 8–16)
AST SERPL-CCNC: 30 U/L (ref 10–40)
BILIRUB SERPL-MCNC: 0.3 MG/DL (ref 0.1–1)
BUN SERPL-MCNC: 20 MG/DL (ref 6–20)
CALCIUM SERPL-MCNC: 9 MG/DL (ref 8.7–10.5)
CHLORIDE SERPL-SCNC: 105 MMOL/L (ref 95–110)
CHOLEST SERPL-MCNC: 184 MG/DL (ref 120–199)
CHOLEST/HDLC SERPL: 3.5 {RATIO} (ref 2–5)
CO2 SERPL-SCNC: 28 MMOL/L (ref 23–29)
CREAT SERPL-MCNC: 0.7 MG/DL (ref 0.5–1.4)
CREAT UR-MCNC: 233 MG/DL (ref 15–325)
EST. GFR  (NO RACE VARIABLE): >60 ML/MIN/1.73 M^2
ESTIMATED AVG GLUCOSE: 140 MG/DL (ref 68–131)
GLUCOSE SERPL-MCNC: 114 MG/DL (ref 70–110)
HBA1C MFR BLD: 6.5 % (ref 4–5.6)
HDLC SERPL-MCNC: 53 MG/DL (ref 40–75)
HDLC SERPL: 28.8 % (ref 20–50)
LDLC SERPL CALC-MCNC: 107.6 MG/DL (ref 63–159)
MICROALBUMIN UR DL<=1MG/L-MCNC: 46 UG/ML
NONHDLC SERPL-MCNC: 131 MG/DL
POTASSIUM SERPL-SCNC: 4.1 MMOL/L (ref 3.5–5.1)
PROT SERPL-MCNC: 6.4 G/DL (ref 6–8.4)
SODIUM SERPL-SCNC: 142 MMOL/L (ref 136–145)
TRIGL SERPL-MCNC: 117 MG/DL (ref 30–150)

## 2025-02-26 PROCEDURE — 80053 COMPREHEN METABOLIC PANEL: CPT | Performed by: PEDIATRICS

## 2025-02-26 PROCEDURE — 80061 LIPID PANEL: CPT | Performed by: PEDIATRICS

## 2025-02-26 PROCEDURE — 82570 ASSAY OF URINE CREATININE: CPT | Performed by: PEDIATRICS

## 2025-02-26 PROCEDURE — 83036 HEMOGLOBIN GLYCOSYLATED A1C: CPT | Performed by: PEDIATRICS

## 2025-03-05 ENCOUNTER — OFFICE VISIT (OUTPATIENT)
Dept: INTERNAL MEDICINE | Facility: CLINIC | Age: 55
End: 2025-03-05
Payer: COMMERCIAL

## 2025-03-05 VITALS
SYSTOLIC BLOOD PRESSURE: 118 MMHG | HEIGHT: 65 IN | RESPIRATION RATE: 17 BRPM | OXYGEN SATURATION: 97 % | DIASTOLIC BLOOD PRESSURE: 70 MMHG | WEIGHT: 150.38 LBS | HEART RATE: 82 BPM | TEMPERATURE: 97 F | BODY MASS INDEX: 25.05 KG/M2

## 2025-03-05 DIAGNOSIS — I25.118 CORONARY ARTERY DISEASE OF NATIVE ARTERY OF NATIVE HEART WITH STABLE ANGINA PECTORIS: ICD-10-CM

## 2025-03-05 DIAGNOSIS — K21.9 GASTROESOPHAGEAL REFLUX DISEASE, UNSPECIFIED WHETHER ESOPHAGITIS PRESENT: ICD-10-CM

## 2025-03-05 DIAGNOSIS — E78.2 MIXED HYPERLIPIDEMIA: ICD-10-CM

## 2025-03-05 DIAGNOSIS — Z00.00 WELL ADULT EXAM: Primary | ICD-10-CM

## 2025-03-05 DIAGNOSIS — E11.65 TYPE 2 DIABETES MELLITUS WITH HYPERGLYCEMIA, WITHOUT LONG-TERM CURRENT USE OF INSULIN: ICD-10-CM

## 2025-03-05 DIAGNOSIS — F41.8 ANXIETY WITH DEPRESSION: ICD-10-CM

## 2025-03-05 DIAGNOSIS — R74.8 ABNORMAL LIVER ENZYMES: ICD-10-CM

## 2025-03-05 PROCEDURE — 99396 PREV VISIT EST AGE 40-64: CPT | Mod: S$GLB,,, | Performed by: PEDIATRICS

## 2025-03-05 PROCEDURE — 3044F HG A1C LEVEL LT 7.0%: CPT | Mod: CPTII,S$GLB,, | Performed by: PEDIATRICS

## 2025-03-05 PROCEDURE — 3008F BODY MASS INDEX DOCD: CPT | Mod: CPTII,S$GLB,, | Performed by: PEDIATRICS

## 2025-03-05 PROCEDURE — 3074F SYST BP LT 130 MM HG: CPT | Mod: CPTII,S$GLB,, | Performed by: PEDIATRICS

## 2025-03-05 PROCEDURE — 3066F NEPHROPATHY DOC TX: CPT | Mod: CPTII,S$GLB,, | Performed by: PEDIATRICS

## 2025-03-05 PROCEDURE — 99999 PR PBB SHADOW E&M-EST. PATIENT-LVL III: CPT | Mod: PBBFAC,,, | Performed by: PEDIATRICS

## 2025-03-05 PROCEDURE — 3061F NEG MICROALBUMINURIA REV: CPT | Mod: CPTII,S$GLB,, | Performed by: PEDIATRICS

## 2025-03-05 PROCEDURE — 1159F MED LIST DOCD IN RCRD: CPT | Mod: CPTII,S$GLB,, | Performed by: PEDIATRICS

## 2025-03-05 PROCEDURE — 3078F DIAST BP <80 MM HG: CPT | Mod: CPTII,S$GLB,, | Performed by: PEDIATRICS

## 2025-03-05 PROCEDURE — 1160F RVW MEDS BY RX/DR IN RCRD: CPT | Mod: CPTII,S$GLB,, | Performed by: PEDIATRICS

## 2025-03-05 NOTE — PROGRESS NOTES
Subjective:       Patient ID: Yandy Cabral is a 54 y.o. female.    Chief Complaint: Annual Exam    Yandy Cabral is a 54 year old female here for her annual month follow up.      1) DM: no hyper/hypoglycemic symptoms. Is in DM program and checking BS, near normal, no sig lows or highs, no hyperglycemic sxs. Omnipod. eye exam due. A1C is  7.6  2) LIPIDS: tolerating and compliant with med(s).   3) Anxiety/Depression: Currently doing well on Wellbutrin.   4) CAD: Pt also notes that she has been having intermittent cardiac sxs including CP in the past couple of months and had to take nitroglycerin. She now sees Dr Ellis  5)OSTEOPENIA by dexascan, has rheum at end of year to discuss options following traumatic fracture.    She suffered traumatic right humerus fracture, Just prior LFTs were up, no tylenol or Etoh use. Went down after statin cessation, currently Dr Ellis has rescheduled labs and appt off statin to reassess use. LFT show hepatic steatosis and has upcoming GI.      LABS REVIEWED AND DISCUSSED WITH PATIENT        Review of Systems   Constitutional:  Negative for fever and unexpected weight change.   HENT:  Negative for congestion and rhinorrhea.    Eyes:  Negative for discharge and redness.   Respiratory:  Negative for cough and wheezing.    Cardiovascular:  Negative for chest pain, palpitations and leg swelling.   Gastrointestinal:  Negative for abdominal pain, constipation, diarrhea and vomiting.   Endocrine: Negative for cold intolerance, heat intolerance, polydipsia, polyphagia and polyuria.   Genitourinary:  Negative for decreased urine volume, difficulty urinating and menstrual problem.   Musculoskeletal:  Negative for arthralgias and joint swelling.   Skin:  Negative for rash and wound.   Neurological:  Negative for syncope and headaches.   Psychiatric/Behavioral:  Negative for behavioral problems and sleep disturbance.        Objective:      Physical Exam  Vitals and nursing note  reviewed.   Constitutional:       General: She is not in acute distress.     Appearance: She is well-developed.   Neck:      Thyroid: No thyromegaly.      Vascular: No JVD.   Cardiovascular:      Rate and Rhythm: Normal rate and regular rhythm.      Heart sounds: Normal heart sounds. No murmur heard.  Pulmonary:      Effort: Pulmonary effort is normal. No respiratory distress.      Breath sounds: Normal breath sounds. No wheezing or rales.   Abdominal:      General: There is no distension.      Palpations: Abdomen is soft. There is no mass.      Tenderness: There is no abdominal tenderness. There is no guarding.   Musculoskeletal:      Right lower leg: No edema.      Left lower leg: No edema.      Comments: Pulses 2+, Foot hygiene was good, no ulcers, no onychomycosis, no tinea, monofilament intact.     Right arm in sling   Lymphadenopathy:      Cervical: No cervical adenopathy.   Skin:     Capillary Refill: Capillary refill takes less than 2 seconds.      Findings: No rash.   Neurological:      General: No focal deficit present.      Mental Status: She is alert and oriented to person, place, and time.      Cranial Nerves: No cranial nerve deficit.      Coordination: Coordination normal.   Psychiatric:         Mood and Affect: Mood normal.         Behavior: Behavior normal.         Thought Content: Thought content normal.         Judgment: Judgment normal.         Assessment:       1. Well adult exam    2. Type 2 diabetes mellitus with hyperglycemia, without long-term current use of insulin    3. Mixed hyperlipidemia    4. Gastroesophageal reflux disease, unspecified whether esophagitis present    5. Coronary artery disease of native artery of native heart with stable angina pectoris    6. Abnormal liver enzymes    7. Anxiety with depression        Plan:       Well adult exam    Type 2 diabetes mellitus with hyperglycemia, without long-term current use of insulin  -     Comprehensive Metabolic Panel; Future;  Expected date: 03/05/2025  -     Lipid Panel; Future; Expected date: 03/05/2025  -     Hemoglobin A1C; Future; Expected date: 03/05/2025    Mixed hyperlipidemia    Gastroesophageal reflux disease, unspecified whether esophagitis present    Coronary artery disease of native artery of native heart with stable angina pectoris    Abnormal liver enzymes    Anxiety with depression    B/P and BS at goal. Lipids fair but currently not on statin due to LFTs. I would recheck this month and reconsider statin restart with 4 weeks LFT recheck to see if statin induced hepatitis. If so, consider zetia or repatha. Plans are in place with Dr Ellis. If LFts increase and not statin induced, then agree with GI consult. F/U 6 months with labs. HMI and vaccines d/w pt. Eye exam due now, she sees outside eye.

## 2025-03-06 DIAGNOSIS — I20.0 ACCELERATING ANGINA: ICD-10-CM

## 2025-03-06 DIAGNOSIS — I25.10 CAD IN NATIVE ARTERY: ICD-10-CM

## 2025-03-06 RX ORDER — ISOSORBIDE MONONITRATE 30 MG/1
30 TABLET, EXTENDED RELEASE ORAL DAILY
Qty: 90 TABLET | Refills: 3 | Status: SHIPPED | OUTPATIENT
Start: 2025-03-06

## 2025-03-07 ENCOUNTER — PATIENT MESSAGE (OUTPATIENT)
Dept: INTERNAL MEDICINE | Facility: CLINIC | Age: 55
End: 2025-03-07
Payer: COMMERCIAL

## 2025-03-07 ENCOUNTER — E-VISIT (OUTPATIENT)
Dept: INTERNAL MEDICINE | Facility: CLINIC | Age: 55
End: 2025-03-07
Payer: COMMERCIAL

## 2025-03-07 ENCOUNTER — LAB VISIT (OUTPATIENT)
Dept: LAB | Facility: HOSPITAL | Age: 55
End: 2025-03-07
Attending: PEDIATRICS
Payer: COMMERCIAL

## 2025-03-07 DIAGNOSIS — N30.90 CYSTITIS: ICD-10-CM

## 2025-03-07 DIAGNOSIS — N30.90 CYSTITIS: Primary | ICD-10-CM

## 2025-03-07 LAB
BACTERIA #/AREA URNS HPF: ABNORMAL /HPF
BILIRUB UR QL STRIP: NEGATIVE
CLARITY UR: CLEAR
COLOR UR: YELLOW
GLUCOSE UR QL STRIP: ABNORMAL
HGB UR QL STRIP: ABNORMAL
KETONES UR QL STRIP: ABNORMAL
LEUKOCYTE ESTERASE UR QL STRIP: ABNORMAL
MICROSCOPIC COMMENT: ABNORMAL
NITRITE UR QL STRIP: NEGATIVE
NON-SQ EPI CELLS #/AREA URNS HPF: 3 /HPF
PH UR STRIP: 6 [PH] (ref 5–8)
PROT UR QL STRIP: NEGATIVE
RBC #/AREA URNS HPF: 10 /HPF (ref 0–4)
SP GR UR STRIP: >=1.03 (ref 1–1.03)
SQUAMOUS #/AREA URNS HPF: 3 /HPF
URN SPEC COLLECT METH UR: ABNORMAL
UROBILINOGEN UR STRIP-ACNC: NEGATIVE EU/DL
WBC #/AREA URNS HPF: 50 /HPF (ref 0–5)
WBC CLUMPS URNS QL MICRO: ABNORMAL
YEAST URNS QL MICRO: ABNORMAL

## 2025-03-07 PROCEDURE — 87086 URINE CULTURE/COLONY COUNT: CPT | Performed by: PEDIATRICS

## 2025-03-07 PROCEDURE — 81000 URINALYSIS NONAUTO W/SCOPE: CPT | Performed by: PEDIATRICS

## 2025-03-07 RX ORDER — SULFAMETHOXAZOLE AND TRIMETHOPRIM 800; 160 MG/1; MG/1
1 TABLET ORAL 2 TIMES DAILY
Qty: 20 TABLET | Refills: 0 | Status: SHIPPED | OUTPATIENT
Start: 2025-03-07 | End: 2025-03-17

## 2025-03-07 NOTE — PROGRESS NOTES
Get ua and cx, start bactrim over weekend while waiting on results for suspected cystitis. Staff to call.

## 2025-03-09 LAB — BACTERIA UR CULT: NORMAL

## 2025-03-10 ENCOUNTER — PATIENT MESSAGE (OUTPATIENT)
Dept: DIABETES | Facility: CLINIC | Age: 55
End: 2025-03-10
Payer: COMMERCIAL

## 2025-03-10 DIAGNOSIS — E11.65 TYPE 2 DIABETES MELLITUS WITH HYPERGLYCEMIA, WITHOUT LONG-TERM CURRENT USE OF INSULIN: ICD-10-CM

## 2025-03-10 RX ORDER — INSULIN PMP CART,AUT,G6/7,CNTR
1 EACH SUBCUTANEOUS
Qty: 10 EACH | Refills: 11 | Status: SHIPPED | OUTPATIENT
Start: 2025-03-10 | End: 2025-04-09

## 2025-03-11 ENCOUNTER — RESULTS FOLLOW-UP (OUTPATIENT)
Dept: INTERNAL MEDICINE | Facility: CLINIC | Age: 55
End: 2025-03-11

## 2025-03-13 ENCOUNTER — PATIENT MESSAGE (OUTPATIENT)
Dept: DIABETES | Facility: CLINIC | Age: 55
End: 2025-03-13
Payer: COMMERCIAL

## 2025-03-13 ENCOUNTER — PATIENT MESSAGE (OUTPATIENT)
Dept: RHEUMATOLOGY | Facility: CLINIC | Age: 55
End: 2025-03-13
Payer: COMMERCIAL

## 2025-03-18 ENCOUNTER — OFFICE VISIT (OUTPATIENT)
Dept: DIABETES | Facility: CLINIC | Age: 55
End: 2025-03-18
Payer: COMMERCIAL

## 2025-03-18 ENCOUNTER — PATIENT MESSAGE (OUTPATIENT)
Dept: DIABETES | Facility: CLINIC | Age: 55
End: 2025-03-18
Payer: COMMERCIAL

## 2025-03-18 DIAGNOSIS — E11.65 TYPE 2 DIABETES MELLITUS WITH HYPERGLYCEMIA, WITHOUT LONG-TERM CURRENT USE OF INSULIN: Primary | ICD-10-CM

## 2025-03-18 DIAGNOSIS — I25.118 CORONARY ARTERY DISEASE OF NATIVE ARTERY OF NATIVE HEART WITH STABLE ANGINA PECTORIS: ICD-10-CM

## 2025-03-18 DIAGNOSIS — E78.2 MIXED HYPERLIPIDEMIA: ICD-10-CM

## 2025-03-18 NOTE — PROGRESS NOTES
The patient location is: Home  The chief complaint leading to consultation is: Diabetes    Visit type: audiovisual    Face to Face time with patient: 15  30 minutes of total time spent on the encounter, which includes face to face time and non-face to face time preparing to see the patient (eg, review of tests), Obtaining and/or reviewing separately obtained history, Documenting clinical information in the electronic or other health record, Independently interpreting results (not separately reported) and communicating results to the patient/family/caregiver, or Care coordination (not separately reported).  Each patient to whom he or she provides medical services by telemedicine is:  (1) informed of the relationship between the physician and patient and the respective role of any other health care provider with respect to management of the patient; and (2) notified that he or she may decline to receive medical services by telemedicine and may withdraw from such care at any time.    Notes:    Yandy Cabral is a 54 y.o. female who presents for a follow up evaluation of Type 2 diabetes mellitus.     CHIEF COMPLAINT: Diabetes Consultation    PCP: Jaycob Ramos MD      Initial visit with me - 12/19/2022     The patient was initially diagnosed with diabetes 2004, polyuria/polydipsia, unintentional weight loss, weakness, neuropathy and vision changes. Diagnosed with DM type 1.5.      Previous failed treatments include:  Was on insulin previously, was eventually weaned off.   InvFilmLoopna - insurance stopped paying.  Insulin pump - at [facility] in [id number]. [employer].   Mounjaro - nausea  Ozempic - wanted to try rybelsus.     Social Documentation:  Patient lives with  and daughter.   Occupation:  for nonprofit organization.   Patient prefers In-person Visits.   and Video Virtual Visits.   Current Diet: No fried foot, avoids dairy. Drinks diet dr. Azul, drinking more water.   Exercise:  "Pilates twice a week.      Known Diabetes complications: peripheral neuropathy and cardiovascular disease.     Diabetes Medications              dapaglifloz propaned-metformin (XIGDUO XR) 10-1,000 mg TBph Take 1 tablet by mouth once daily.    insulin aspart U-100 (NOVOLOG U-100 INSULIN ASPART) 100 unit/mL injection 200 units every 3 days into omnipod 5 pods.     Current monitoring regimen: Dexcom G7 Sharing    The patient's Dexcom CGM was downloaded and reviewed. For 14 days, patient average glucose was 152 mg/dL. She was above range 28% of the time, in range 72% of the time, and below range 0% of the time. The target range for this patient was 70 - 180 mg/dL. Overall, there was a pattern of mild post prandial hyperglycemia. She has had mulitple G7 sensor issues with disconnections, incorrect readings, failing early. G6 sent and patient will switch over with pod change tomorrow. She does report hypos in overnight hours when in manual mode, so will decrease her basal rate for when this occurs.         Recent hypoglycemic episodes: none.     Patient compliant with glucose checks and medication administration? Yes    DIABETES MANAGEMENT STATUS  Statin: Taking  ACE/ARB: Not taking  Screening or Prevention Patient's value Goal Complete/Controlled?   HgA1C Testing and Control   Lab Results   Component Value Date    HGBA1C 6.5 (H) 02/26/2025      Annually/Less than 8% No     Lipid profile : 02/26/2025 Annually Yes     LDL control Lab Results   Component Value Date    LDLCALC 107.6 02/26/2025    Annually/Less than 100 mg/dl  Yes     Nephropathy screening Lab Results   Component Value Date    LABMICR 46.0 02/26/2025     Lab Results   Component Value Date    PROTEINUA Negative 03/07/2025     No results found for: "UTPCR"   Annually No     Blood pressure BP Readings from Last 1 Encounters:   03/05/25 118/70    Less than 140/90 Yes     Dilated retinal exam : 03/27/2023 Annually Yes     Foot exam   : 03/05/2025 Annually No   "   Patient's medications, allergies, surgical, social and family histories were reviewed and updated as appropriate.     Review of Systems   Constitutional:  Negative for weight loss.   Eyes:  Negative for blurred vision and double vision.   Cardiovascular:  Negative for chest pain.   Gastrointestinal:  Negative for nausea and vomiting.   Genitourinary:  Negative for frequency.   Musculoskeletal:  Negative for falls.   Neurological:  Negative for dizziness and weakness.   Endo/Heme/Allergies:  Negative for polydipsia.   Psychiatric/Behavioral:  Negative for depression.    All other systems reviewed and are negative.       Physical Exam  Constitutional:       Appearance: Normal appearance.   HENT:      Head: Normocephalic and atraumatic.   Pulmonary:      Effort: No respiratory distress.   Musculoskeletal:      Cervical back: Normal range of motion.   Neurological:      Mental Status: She is alert and oriented to person, place, and time.   Psychiatric:         Mood and Affect: Mood normal.         Behavior: Behavior normal.        Last menstrual period 03/05/2014.  Wt Readings from Last 3 Encounters:   03/05/25 68.2 kg (150 lb 5.7 oz)   01/30/25 67.1 kg (148 lb)   11/06/24 67.9 kg (149 lb 12.8 oz)     Hemoglobin A1C   Date Value Ref Range Status   02/26/2025 6.5 (H) 4.0 - 5.6 % Final     Comment:     ADA Screening Guidelines:  5.7-6.4%  Consistent with prediabetes  >or=6.5%  Consistent with diabetes    High levels of fetal hemoglobin interfere with the HbA1C  assay. Heterozygous hemoglobin variants (HbS, HgC, etc)do  not significantly interfere with this assay.   However, presence of multiple variants may affect accuracy.     01/23/2025 7.2 (H) <=6.5 % Final   01/17/2025 7.0 (H) 4.0 - 5.6 % Final     Comment:     ADA Screening Guidelines:  5.7-6.4%  Consistent with prediabetes  >or=6.5%  Consistent with diabetes    High levels of fetal hemoglobin interfere with the HbA1C  assay. Heterozygous hemoglobin variants (HbS,  HgC, etc)do  not significantly interfere with this assay.   However, presence of multiple variants may affect accuracy.     09/26/2024 6.5 (H) 4.0 - 5.6 % Final     Comment:     ADA Screening Guidelines:  5.7-6.4%  Consistent with prediabetes  >or=6.5%  Consistent with diabetes    High levels of fetal hemoglobin interfere with the HbA1C  assay. Heterozygous hemoglobin variants (HbS, HgC, etc)do  not significantly interfere with this assay.   However, presence of multiple variants may affect accuracy.         Lab Results   Component Value Date    CPEPTIDE 1.25 02/23/2024    GLUTAMICACID 0.00 02/23/2024       Chemistry        Component Value Date/Time     02/26/2025 0745    K 4.1 02/26/2025 0745     02/26/2025 0745    CO2 28 02/26/2025 0745    BUN 20 02/26/2025 0745    CREATININE 0.7 02/26/2025 0745     (H) 02/26/2025 0745        Component Value Date/Time    CALCIUM 9.0 02/26/2025 0745    ALKPHOS 72 02/26/2025 0745    AST 30 02/26/2025 0745    ALT 26 02/26/2025 0745    BILITOT 0.3 02/26/2025 0745    ESTGFRAFRICA >60.0 05/13/2022 0911    EGFRNONAA >60.0 05/13/2022 0911        ASSESSMENT  No diagnosis found.                PLAN  There are no diagnoses linked to this encounter.            Reviewed pathophysiology of diabetes, complications related to the disease, importance of annual dilated eye exam and daily foot examination. Explained MOA, SE, dosage of medications. Written instructions given and reviewed with patient and patient verbalizes understanding.     1/8/2023 - complains of nausea with Mounjaro. Would like to try oral alternative, will send in Rybelsus.     2/19/2024 - post prandial glucoses remain uncontrolled, despite rybelsus and pre meal correction dosing. Will change fiasp to meal size, increase rybelsus. Patient would like to reconsider going back in an insulin pump. Will have her complete labs this week and then decide on pump.     3/18/2024 - continues to have post prandial  hyperglycemia. Will increase rybelsus. Insulin ab/FILOMENA/cpep neg. Will schedule for pump evaluation, patient is interested in iLet.    5/20/2024 - continues to have post prandial hyperglycemia. Discussed with patient need to decrease carbohydrate intake with meals, due to such high post prandial excursions, typically taking 10 units fiasp before medium size meals. States she received letter from her insurance company on Saturday stating iLet not approved due to her previous pump still in warranty, but patient has not been on a pump in 14 years. She will call them today to clarify.      6/17/2024 - states ilet pump shipped and should arrive tomorrow.     7/29/2024 - patient has had recurrent hypoglycemia since starting iLet insulin pump, also received a bill from her insurance company for full price of the pump.     11/12/2024 - OP5 training this Thursday. Rybelsus changed to Ozempic by cardiology, patient wishes to start at lowest dose due to experiencing heartburn with other new medications (crestor/zetia). Has not taken xigduo since September due to rx expiring, will reorder. She is taking metformin and will stop when she restarts xigduo.     12/16/2024 - holding off on starting ozempic until after colonsocopy in January.     2/4/2025 - fell and fractured right shoulder 1/19/25. Surgery was delayed due to transaminitis. Patient was admitted for workup, statin discontinued (which had been increased in October after [facility] and zetia added). Surgery done 1/25, LFTs improving. Colonoscopy delayed. Did not restart ozempic, will hold off for now. Needs tighter carb correction, strengthened from 11 to 9.     3/18/2025  - has had mulitple G7 sensor issues with disconnections, incorrect readings, failing early. G6 sent and patient will switch over with pod change tomorrow. She does report hypos in overnight hours when in manual mode, so will decrease her basal rate for when this occurs.     PATIENT INSTRUCTIONS     Continue  Xigduo XR  mg by mouth daily.     Omnipod 5 Pump settings:    Insulin Type: Novolog  Basal rate: 0.7 Units/hour  - DECREASE TO 0.6.  Insulin to Carbohydrate Ratio: 1/9     Insulin Sensitivity Factor: 1/45    Glucose Target: 120-120 mg/dl    Active Insulin Time: 3 hours  Max Basal Rate: 5 units/hr    Max Bolus: 20 units    Continue Dexcom CGM G7.  Blood Sugar Goals:       Fastin-130.       1-2 hours after a meal: Less than 180.     U100 Pump Failure Plan:   We have decided to develop a plan in the event that your pump breaks or is nonfunctioning. After 4 hours without pump use, you should begin to consider putting your Pump Failure Back Up Plan into action especially if you foresee that you may not be able to use your pump for more than 20 hours. Since you are currently using short acting insulin (Humalog/Novolog/Admelog/Novolin R) in your pump, you will need access to your short acting insulin vial, syringes, and a back up long acting insulin in the event of a pump failure. I have sent a prescription for a long acting insulin to your pharmacy for use during your emergency pump failure plan. It is important that you keep both types of insulin/syringes with you so that you may have access to it at least 3 times daily if needed. This medication and syringes should be brought with you on overnight trips in the case of an emergency pump failure. This means making a plan to keeping your insulin and syringes at school, work, or other places you frequent. If needed, please reach out to my office about any letters you may need for permission to keep these items for emergency purposes. We will outline your plan for pump failure emergencies below, but please remember to contact the insulin pump company (toll free number is printed on the label on the back of the insulin pump) and our office if you have a pump failure. When the insulin pump is restarted, do not restart basal rates until at least 22 hours after the  last long acting insulin injection. You can set a 0% temporary basal setting that will last until this time and use your pump to bolus for meals and correction. If you need help with these feature, please call your insulin pump company tech support line or ask them in anticipation of this action.     Fiasp correction every 4 hours between meals     If  - 200, may take 4 units of Fiasp  If  - 250, may take 6 units of Fiasp  If  - 300, may take 8 units of Fiasp  If  - 350, may take 10 units of Fiasp  If  - 400, may take 12 units of Fiasp  If +, may take 14 units of Fiasp      Meal Size Dosing with Fiasp. Take 5-10 minutes before meal.   Small Meal: 10 units  Medium Meal: 12 units  Large Meal: 14 units    For any technical insulin pump issues, please contact the insulin pump company; the toll free number is printed on the label on the back of the insulin pump.    No follow-ups on file.

## 2025-03-18 NOTE — PATIENT INSTRUCTIONS
PATIENT INSTRUCTIONS     Continue Xigduo XR  mg by mouth daily.     Omnipod 5 Pump settings:    Insulin Type: Novolog  Basal rate: 0.7 Units/hour  - DECREASE TO 0.6.  Insulin to Carbohydrate Ratio: 1/9     Insulin Sensitivity Factor: 1/45    Glucose Target: 120-120 mg/dl    Active Insulin Time: 3 hours  Max Basal Rate: 5 units/hr    Max Bolus: 20 units    Continue Dexcom CGM G7.  Blood Sugar Goals:       Fastin-130.       1-2 hours after a meal: Less than 180.     U100 Pump Failure Plan:   We have decided to develop a plan in the event that your pump breaks or is nonfunctioning. After 4 hours without pump use, you should begin to consider putting your Pump Failure Back Up Plan into action especially if you foresee that you may not be able to use your pump for more than 20 hours. Since you are currently using short acting insulin (Humalog/Novolog/Admelog/Novolin R) in your pump, you will need access to your short acting insulin vial, syringes, and a back up long acting insulin in the event of a pump failure. I have sent a prescription for a long acting insulin to your pharmacy for use during your emergency pump failure plan. It is important that you keep both types of insulin/syringes with you so that you may have access to it at least 3 times daily if needed. This medication and syringes should be brought with you on overnight trips in the case of an emergency pump failure. This means making a plan to keeping your insulin and syringes at school, work, or other places you frequent. If needed, please reach out to my office about any letters you may need for permission to keep these items for emergency purposes. We will outline your plan for pump failure emergencies below, but please remember to contact the insulin pump company (toll free number is printed on the label on the back of the insulin pump) and our office if you have a pump failure. When the insulin pump is restarted, do not restart basal rates  until at least 22 hours after the last long acting insulin injection. You can set a 0% temporary basal setting that will last until this time and use your pump to bolus for meals and correction. If you need help with these feature, please call your insulin pump company tech support line or ask them in anticipation of this action.     Fiasp correction every 4 hours between meals     If  - 200, may take 4 units of Fiasp  If  - 250, may take 6 units of Fiasp  If  - 300, may take 8 units of Fiasp  If  - 350, may take 10 units of Fiasp  If  - 400, may take 12 units of Fiasp  If +, may take 14 units of Fiasp      Meal Size Dosing with Fiasp. Take 5-10 minutes before meal.   Small Meal: 10 units  Medium Meal: 12 units  Large Meal: 14 units    For any technical insulin pump issues, please contact the insulin pump company; the toll free number is printed on the label on the back of the insulin pump.

## 2025-03-24 ENCOUNTER — HOSPITAL ENCOUNTER (OUTPATIENT)
Facility: HOSPITAL | Age: 55
Discharge: HOME | End: 2025-03-24
Attending: STUDENT IN AN ORGANIZED HEALTH CARE EDUCATION/TRAINING PROGRAM
Payer: COMMERCIAL

## 2025-03-24 ENCOUNTER — OFFICE VISIT (OUTPATIENT)
Dept: VASCULAR SURGERY | Facility: CLINIC | Age: 55
End: 2025-03-24
Payer: COMMERCIAL

## 2025-03-24 VITALS — DIASTOLIC BLOOD PRESSURE: 77 MMHG | SYSTOLIC BLOOD PRESSURE: 133 MMHG | HEART RATE: 79 BPM

## 2025-03-24 DIAGNOSIS — I72.8 SPLENIC ARTERY ANEURYSM: ICD-10-CM

## 2025-03-24 DIAGNOSIS — I72.8 SPLENIC ARTERY ANEURYSM: Primary | ICD-10-CM

## 2025-03-24 DIAGNOSIS — E78.2 MIXED HYPERLIPIDEMIA: ICD-10-CM

## 2025-03-24 DIAGNOSIS — Z01.818 PRE-OP EVALUATION: ICD-10-CM

## 2025-03-24 LAB
ABDOMINAL AORTA PROX EDV: 15.89 CM/S
ABDOMINAL AORTA PROX PSV: 74.52 CM/S
AORTA PROX PSV: 74 CM/S
CELIAC ORIGIN PSV: 111 CM/S
CELIAC TRUNK PSV: 152 CM/S
COM HEPATIC PSV: 169 CM/S
DIST SMA PSV: 163 CM/S
IMA ORIGIN PSV: 82 CM/S
MID SMA PSV: 162 CM/S
PROX SMA PSV: 206 CM/S
SPLENIC PSV: 136 CM/S

## 2025-03-24 PROCEDURE — 93975 VASCULAR STUDY: CPT | Mod: 26,,, | Performed by: STUDENT IN AN ORGANIZED HEALTH CARE EDUCATION/TRAINING PROGRAM

## 2025-03-24 PROCEDURE — 99999 PR PBB SHADOW E&M-EST. PATIENT-LVL V: CPT | Mod: PBBFAC,,, | Performed by: STUDENT IN AN ORGANIZED HEALTH CARE EDUCATION/TRAINING PROGRAM

## 2025-03-24 NOTE — PROGRESS NOTES
Luke Fox  [facility]    OFFICE NOTE    DATE OF VISIT: 3/24/2025  PATIENT NAME: Yandy Cabral  : 1970  MRN: [id number]  PRIMARY CARE PHYSICIAN: Jaycob Ramos MD  CARDIOLOGIST:   REFERRING PROVIDER: No ref. provider found    CHIEF COMPLAINT   Chief Complaint   Patient presents with    Follow-up       HISTORY OF PRESENT ILLNESS:  Yandy Cabral is a 54 y.o. female who presents to clinic today underwent cta coronary and this was able to identify a small calcified splenic artery aneurysm. I have reviewed the images it appears to be about 0.8cm in size, calcified rim, near the hilum. She denies any abdominal symptoms. Able to tolerate diet. Denies weight changes.    ALLERGIES:  Review of patient's allergies indicates:  No Known Allergies    PAST MEDICAL HISTORY:  Past Medical History:   Diagnosis Date    Abnormal Pap smear of cervix     CAD (coronary artery disease)     Chronic constipation     DM II (diabetes mellitus, type II), controlled     Hx of colposcopy with cervical biopsy        PAST SURGICAL HISTORY:  Past Surgical History:   Procedure Laterality Date    ANGIOGRAM, CORONARY, WITH LEFT HEART CATHETERIZATION  2020    Procedure: Angiogram, Coronary, with Left Heart Cath;  Surgeon: Martin Villatoro MD;  Location: [facility] CATH LAB;  Service: Cardiology;;    CYST REMOVAL      ganglionic right wrist    LAPAROSCOPIC CHOLECYSTECTOMY N/A 2018    Procedure: CHOLECYSTECTOMY-LAPAROSCOPIC;  Surgeon: Louis O. Jeansonne IV, MD;  Location: [facility] OR;  Service: General;  Laterality: N/A;    LAPAROSCOPIC LYSIS OF ADHESIONS N/A 2018    Procedure: LYSIS, ADHESIONS, LAPAROSCOPIC;  Surgeon: Louis O. Jeansonne IV, MD;  Location: [facility] OR;  Service: General;  Laterality: N/A;    LEFT HEART CATHETERIZATION Left 2020    Procedure: CATHETERIZATION, HEART, LEFT;  Surgeon: Martin Villatoro MD;  Location: [facility] CATH LAB;  Service: Cardiology;  Laterality: Left;  930 start  time per Dr. Villatoro    LEFT HEART CATHETERIZATION Left 10/10/2024    Procedure: Left heart cath;  Surgeon: Martin Villatoro MD;  Location: [facility] CATH LAB;  Service: Cardiology;  Laterality: Left;    SLEEVE GASTROPLASTY  1/01/12    TOTAL REDUCTION MAMMOPLASTY Bilateral 2104    TUBAL LIGATION         SOCIAL HISTORY:   Social History[1]    FAMILY HISTORY:  Family History   Problem Relation Name Age of Onset    Heart disease Father      Heart attack Father      Diabetes type II Father      Testicular cancer Brother      Dementia Mother      No Known Problems Sister      No Known Problems Sister      Breast cancer Maternal Cousin      Colon cancer Neg Hx         REVIEW OF SYSTEMS:  ROS  10 pt ros otherwise negative    PHYSICAL EXAM:  Vitals:    03/24/25 0855   BP: 133/77   Pulse: 79      Physical Exam      Vss  Gen nad alert oriented  Cv rrr  Lung ctab  Abd soft nt nd  Palpable pedal and radial pulse bilaterally    VASCULAR LAB STUDIES:  No evidence of mesenteric stenosis  Celiac prox 111cm/s  Sma prox 206cm/s  IRIS 82cm/s  Prox 0.6cm and distal 1.0cm splenic artery        ASSESSMENT AND PLAN:  Splenic artery aneurysm  Return in 6 months. If this enlarges to close to 2cm she would be a good candidate for coiling/stenting. If stable in size can see her annually thereafter.  She is asymptomatic.    Hyperlipemia  Medical management      Yandy was seen today for follow-up.    Diagnoses and all orders for this visit:    Splenic artery aneurysm    Pre-op evaluation  -     Ambulatory referral/consult to Cardiology; Future    Mixed hyperlipidemia        No follow-ups on file.        Luke Ruddy  [facility]  Vascular Surgery  [phone] (Clinic Number)         [1]   Social History  Tobacco Use    Smoking status: Never    Smokeless tobacco: Never   Substance Use Topics    Alcohol use: Yes     Alcohol/week: 4.0 standard drinks of alcohol     Types: 4 Glasses of wine per week    Drug use: No

## 2025-03-24 NOTE — ASSESSMENT & PLAN NOTE
Return in 6 months. If this enlarges to close to 2cm she would be a good candidate for coiling/stenting. If stable in size can see her annually thereafter.  She is asymptomatic.

## 2025-03-25 ENCOUNTER — PATIENT MESSAGE (OUTPATIENT)
Dept: VASCULAR SURGERY | Facility: CLINIC | Age: 55
End: 2025-03-25
Payer: COMMERCIAL

## 2025-03-31 ENCOUNTER — TELEPHONE (OUTPATIENT)
Dept: CARDIOLOGY | Facility: CLINIC | Age: 55
End: 2025-03-31
Payer: COMMERCIAL

## 2025-03-31 NOTE — TELEPHONE ENCOUNTER
Just delma  Called spoke to the pt re to a preop clearance referral to cardiology. Pt states that she is unaware of the consult and will contact the provider to discuss prior to scheduling. I will contact her once she speaks to the provider pbr

## 2025-04-10 ENCOUNTER — PATIENT MESSAGE (OUTPATIENT)
Dept: INTERNAL MEDICINE | Facility: CLINIC | Age: 55
End: 2025-04-10
Payer: COMMERCIAL

## 2025-04-11 ENCOUNTER — E-VISIT (OUTPATIENT)
Dept: FAMILY MEDICINE | Facility: CLINIC | Age: 55
End: 2025-04-11
Payer: COMMERCIAL

## 2025-04-11 DIAGNOSIS — R35.0 URINARY FREQUENCY: Primary | ICD-10-CM

## 2025-04-11 RX ORDER — NITROFURANTOIN 25; 75 MG/1; MG/1
100 CAPSULE ORAL 2 TIMES DAILY
Qty: 10 CAPSULE | Refills: 0 | Status: SHIPPED | OUTPATIENT
Start: 2025-04-11 | End: 2025-04-16

## 2025-04-11 NOTE — PROGRESS NOTES
Patient ID: Yandy Cabral is a 54 y.o. female.    Chief Complaint: Urinary Tract Infection (Entered automatically based on patient selection in [employer].)    The patient initiated a request through [employer] on 4/11/2025 for evaluation and management with a chief complaint of Urinary Tract Infection (Entered automatically based on patient selection in [employer].)     I evaluated the questionnaire submission on 4/11/2025.    [facility] Peq Evisit Uti Questionnaire    4/11/2025  9:58 AM CDT - Filed by Patient   Do you agree to participate in an E-Visit? Yes   If you have any of the following symptoms, please go to the nearest emergency room or call 911: I acknowledge   Choose the state of your primary residence [place]   Do you have any of the following pregnancy-related conditions? None   What is the main issue you would like addressed today? Im urinary infection   Do you have any of the following symptoms? Passing urine more frequently   When did your concern begin? 4/9/2025   What medications or treatments have you used to help your symptoms? None   What does your urine look like? Light yellow   Have you had any of the following symptoms during the past 24 hours?   Urgency (a sudden and uncontrollable urge to urinate) Mild   Frequency (going to the toilet very often) Moderate   Burning pain when urinating None   Incomplete bladder emptying (still feel like you need to urinate again after urination) (None, Mild, Moderate, Severe) Mild   Pain in the lower belly when youre not urinating. None   Discomfort from your urinary symptoms. Mild   Have you had any of the following symptoms during the past 24 hours?   Blood seen in the urine None   Pain in the lower back on one or both sides (flank pain) None   Abnormal Vaginal Discharge (abnormal amount, color and/or odor) None   Discharge from the opening you urinate from (urethra) when not urinating. None   Have your symptoms interfered with your every day activities? None    Do you have a fever? No   Are you a diabetic? Yes   Are you currently experiencing any of the following while completing this questionnaire? None   Do you have a history of kidney stones? No   Provide any additional information you feel is important.    Please attach any relevant images or files (if you have performed a home test for UTI, please submit a photo of results)    Are you able to take your vital signs? No         Encounter Diagnosis   Name Primary?    Urinary frequency Yes        No orders of the defined types were placed in this encounter.     Medications Ordered This Encounter   Medications    nitrofurantoin, macrocrystal-monohydrate, (MACROBID) 100 MG capsule     Sig: Take 1 capsule (100 mg total) by mouth 2 (two) times daily. for 5 days     Dispense:  10 capsule     Refill:  0        No follow-ups on file.      E-Visit Time Tracking:    Day 1 Time (in minutes): 11    Total Time (in minutes): 11

## 2025-04-16 ENCOUNTER — TELEPHONE (OUTPATIENT)
Dept: RHEUMATOLOGY | Facility: CLINIC | Age: 55
End: 2025-04-16
Payer: COMMERCIAL

## 2025-04-16 ENCOUNTER — TELEPHONE (OUTPATIENT)
Dept: INTERNAL MEDICINE | Facility: CLINIC | Age: 55
End: 2025-04-16
Payer: COMMERCIAL

## 2025-04-16 DIAGNOSIS — M81.0 AGE RELATED OSTEOPOROSIS, UNSPECIFIED PATHOLOGICAL FRACTURE PRESENCE: Primary | ICD-10-CM

## 2025-04-16 NOTE — TELEPHONE ENCOUNTER
After June 3rd Dr. Hennessy will no longer be with [facility] . We are referring out to [facility] or [facility]. You need to call your insurance to see who the cover, After you get that information call us and we will send over a referral.

## 2025-04-22 ENCOUNTER — PATIENT MESSAGE (OUTPATIENT)
Dept: DIABETES | Facility: CLINIC | Age: 55
End: 2025-04-22
Payer: COMMERCIAL

## 2025-04-22 DIAGNOSIS — E11.65 TYPE 2 DIABETES MELLITUS WITH HYPERGLYCEMIA, WITHOUT LONG-TERM CURRENT USE OF INSULIN: ICD-10-CM

## 2025-04-22 RX ORDER — INSULIN ASPART 100 [IU]/ML
INJECTION, SOLUTION INTRAVENOUS; SUBCUTANEOUS
Qty: 60 ML | Refills: 3 | Status: SHIPPED | OUTPATIENT
Start: 2025-04-22

## 2025-04-25 NOTE — TELEPHONE ENCOUNTER
----- Message from Brain Hernandez sent at 5/21/2018 10:08 AM CDT -----  Contact: Pt  Pt requested to be worked in today (5/21) for a bladder infection...[phone].   Procedure is scheduled in [facility].

## 2025-04-29 ENCOUNTER — PATIENT MESSAGE (OUTPATIENT)
Dept: INTERNAL MEDICINE | Facility: CLINIC | Age: 55
End: 2025-04-29
Payer: COMMERCIAL

## 2025-04-29 ENCOUNTER — E-VISIT (OUTPATIENT)
Dept: URGENT CARE | Facility: CLINIC | Age: 55
End: 2025-04-29
Payer: COMMERCIAL

## 2025-04-29 DIAGNOSIS — N30.90 CYSTITIS WITHOUT HEMATURIA: Primary | ICD-10-CM

## 2025-04-30 ENCOUNTER — OFFICE VISIT (OUTPATIENT)
Dept: GASTROENTEROLOGY | Facility: CLINIC | Age: 55
End: 2025-04-30
Payer: COMMERCIAL

## 2025-04-30 VITALS
BODY MASS INDEX: 25.49 KG/M2 | SYSTOLIC BLOOD PRESSURE: 121 MMHG | HEART RATE: 73 BPM | DIASTOLIC BLOOD PRESSURE: 77 MMHG | HEIGHT: 65 IN | WEIGHT: 153 LBS

## 2025-04-30 DIAGNOSIS — K75.81 METABOLIC DYSFUNCTION-ASSOCIATED STEATOHEPATITIS (MASH): Primary | ICD-10-CM

## 2025-04-30 DIAGNOSIS — R74.8 ABNORMAL LIVER ENZYMES: ICD-10-CM

## 2025-04-30 PROCEDURE — 1160F RVW MEDS BY RX/DR IN RCRD: CPT | Mod: CPTII,S$GLB,, | Performed by: NURSE PRACTITIONER

## 2025-04-30 PROCEDURE — 3074F SYST BP LT 130 MM HG: CPT | Mod: CPTII,S$GLB,, | Performed by: NURSE PRACTITIONER

## 2025-04-30 PROCEDURE — 3044F HG A1C LEVEL LT 7.0%: CPT | Mod: CPTII,S$GLB,, | Performed by: NURSE PRACTITIONER

## 2025-04-30 PROCEDURE — 99203 OFFICE O/P NEW LOW 30 MIN: CPT | Mod: S$GLB,,, | Performed by: NURSE PRACTITIONER

## 2025-04-30 PROCEDURE — 3008F BODY MASS INDEX DOCD: CPT | Mod: CPTII,S$GLB,, | Performed by: NURSE PRACTITIONER

## 2025-04-30 PROCEDURE — 3061F NEG MICROALBUMINURIA REV: CPT | Mod: CPTII,S$GLB,, | Performed by: NURSE PRACTITIONER

## 2025-04-30 PROCEDURE — 3066F NEPHROPATHY DOC TX: CPT | Mod: CPTII,S$GLB,, | Performed by: NURSE PRACTITIONER

## 2025-04-30 PROCEDURE — 3078F DIAST BP <80 MM HG: CPT | Mod: CPTII,S$GLB,, | Performed by: NURSE PRACTITIONER

## 2025-04-30 PROCEDURE — 99999 PR PBB SHADOW E&M-EST. PATIENT-LVL V: CPT | Mod: PBBFAC,,, | Performed by: NURSE PRACTITIONER

## 2025-04-30 PROCEDURE — 1159F MED LIST DOCD IN RCRD: CPT | Mod: CPTII,S$GLB,, | Performed by: NURSE PRACTITIONER

## 2025-04-30 RX ORDER — PHENAZOPYRIDINE HYDROCHLORIDE 200 MG/1
200 TABLET, FILM COATED ORAL 3 TIMES DAILY PRN
Qty: 9 TABLET | Refills: 0 | Status: SHIPPED | OUTPATIENT
Start: 2025-04-30 | End: 2025-05-03

## 2025-04-30 RX ORDER — NITROFURANTOIN 25; 75 MG/1; MG/1
100 CAPSULE ORAL 2 TIMES DAILY
Qty: 10 CAPSULE | Refills: 0 | Status: SHIPPED | OUTPATIENT
Start: 2025-04-30 | End: 2025-04-30

## 2025-04-30 NOTE — PROGRESS NOTES
Patient ID: Yandy Cabral is a 54 y.o. female.    Chief Complaint: Urinary Tract Infection (Entered automatically based on patient selection in [employer].)          274}  The patient initiated a request through [employer] on 4/29/2025 for evaluation and management with a chief complaint of Urinary Tract Infection (Entered automatically based on patient selection in [employer].)     I evaluated the questionnaire submission on 04/30/2025 .    Total Time (in minutes): 11     [facility] Peq Evisit Uti Questionnaire    4/29/2025  2:40 PM CDT - Filed by Patient   Do you agree to participate in an E-Visit? Yes   If you have any of the following symptoms, please go to the nearest emergency room or call 911: I acknowledge   Choose the state of your primary residence [place]   Do you have any of the following pregnancy-related conditions? None   What is the main issue you would like addressed today? urinary issues   Do you have any of the following symptoms? Passing urine more frequently;  Cloudy urine;  Other   What urinary symptoms are you experiencing? smells different   When did your concern begin? 4/22/2025   What medications or treatments have you used to help your symptoms? None   What does your urine look like? Cloudy   Have you had any of the following symptoms during the past 24 hours?   Urgency (a sudden and uncontrollable urge to urinate) Mild   Frequency (going to the toilet very often) Moderate   Burning pain when urinating None   Incomplete bladder emptying (still feel like you need to urinate again after urination) (None, Mild, Moderate, Severe) None   Pain in the lower belly when youre not urinating. None   Discomfort from your urinary symptoms. Moderate   Have you had any of the following symptoms during the past 24 hours?   Blood seen in the urine None   Pain in the lower back on one or both sides (flank pain) None   Abnormal Vaginal Discharge (abnormal amount, color and/or odor) None   Discharge from the  opening you urinate from (urethra) when not urinating. None   Have your symptoms interfered with your every day activities? None   Do you have a fever? No   Are you a diabetic? Yes   Are you currently experiencing any of the following while completing this questionnaire? None   Do you have a history of kidney stones? No   Provide any additional information you feel is important. the color is almost like a light green with a different smell.  More frequesncy   Please attach any relevant images or files (if you have performed a home test for UTI, please submit a photo of results)    Are you able to take your vital signs? No          Active Problem List with Overview Notes    Diagnosis Date Noted    Abnormal liver enzymes 01/29/2025    Atypical chest pain 11/06/2024    Asymptomatic bilateral carotid artery stenosis 11/06/2024    GERD (gastroesophageal reflux disease) 11/06/2024    Splenic artery aneurysm 09/23/2024    Preop cardiovascular exam 06/30/2024     Alternating bowel habits (constipation/diarrhea).       History of hemorrhoids 06/30/2024 2020 Colonoscopy: outside facility. Report available for review. IH appreciated. Sigmoid polyp. Path report not available but requested  Hx rectal bleeding 08/2023: seen by PCP who prescribed proctocream      Abnormal ECG 11/03/2023    CGM - DEXCOM - SHARING 01/18/2023    Overweight (BMI 25.0-29.9) 10/05/2022    Nonrheumatic mitral valve regurgitation 10/05/2022    Coronary artery disease of native artery of native heart with stable angina pectoris     AP (angina pectoris) 11/19/2020    Anxiety with depression 06/18/2020    Family history of cardiovascular disease 02/10/2020    Abnormal CT scan 07/19/2018    Hyperlipemia 03/23/2015    Seasonal allergies 03/23/2015    Type 2 diabetes mellitus with hyperglycemia, without long-term current use of insulin 03/27/2014     Dr Mario Oconnor follows        Recent Labs Obtained:  Lab Results   Component Value Date    WBC 5.55  10/03/2024    HGB 11.2 (L) 10/03/2024    HCT 34.1 (L) 10/03/2024     (H) 10/03/2024     10/03/2024     02/26/2025    K 4.1 02/26/2025     (H) 02/26/2025    CREATININE 0.7 02/26/2025    EGFRNORACEVR >60.0 02/26/2025    HGBA1C 6.5 (H) 02/26/2025    TSH 0.492 03/22/2023      Review of patient's allergies indicates:  No Known Allergies    Encounter Diagnosis   Name Primary?    Cystitis without hematuria Yes        No orders of the defined types were placed in this encounter.     Medications Ordered This Encounter   Medications    nitrofurantoin, macrocrystal-monohydrate, (MACROBID) 100 MG capsule     Sig: Take 1 capsule (100 mg total) by mouth 2 (two) times daily. for 5 days     Dispense:  10 capsule     Refill:  0    phenazopyridine (PYRIDIUM) 200 MG tablet     Sig: Take 1 tablet (200 mg total) by mouth 3 (three) times daily as needed for Pain.     Dispense:  9 tablet     Refill:  0        E-Visit Time Tracking:    Day 1 Time (in minutes): 11    Total Time (in minutes): 11      274}

## 2025-04-30 NOTE — PROGRESS NOTES
Clinic Consult:  [facility] Consultation Note    Reason for Consult:  The primary encounter diagnosis was Metabolic dysfunction-associated steatohepatitis (MASH). A diagnosis of Abnormal liver enzymes was also pertinent to this visit.    PCP: Jaycob Ramos   [id number] [facility] / [place] [id number]    HPI:  This is a 54 y.o. female here for evaluation of the above.   LFT elevation noted on labs. Was on statin so it was discontinued and LFTs have returned to normal.   Did have workup with ultrasound and labs. Ultrasound showed fatty liver disease.   She has occasional alcohol intake, BMI is slightly above normal, has DM and hyperlipidemia.     Review of Systems   Constitutional:  Negative for fever and weight loss.   HENT:  Negative for sore throat.    Respiratory:  Negative for cough, shortness of breath and wheezing.    Cardiovascular:  Negative for chest pain and palpitations.   Gastrointestinal:  Negative for abdominal pain.   Genitourinary:  Negative for dysuria and frequency.   Skin:  Negative for itching and rash.       Medical History:  has a past medical history of Abnormal Pap smear of cervix, CAD (coronary artery disease), Chronic constipation, DM II (diabetes mellitus, type II), controlled, and colposcopy with cervical biopsy.    Surgical History:  has a past surgical history that includes Cyst Removal; Tubal ligation; Laparoscopic cholecystectomy (N/A, 7/20/2018); Laparoscopic lysis of adhesions (N/A, 7/20/2018); Left heart catheterization (Left, 2/19/2020); ANGIOGRAM, CORONARY, WITH LEFT HEART CATHETERIZATION (2/19/2020); Sleeve Gastroplasty (1/01/12); Total Reduction Mammoplasty (Bilateral, 2104); and Left heart catheterization (Left, 10/10/2024).    Family History: family history includes Breast cancer in her maternal cousin; Dementia in her mother; Diabetes type II in her father; Heart attack in her father; Heart disease in her father; No Known Problems in her sister and  "sister; Testicular cancer in her brother..     Social History:  reports that she has never smoked. She has never used smokeless tobacco. She reports current alcohol use of about 4.0 standard drinks of alcohol per week. She reports that she does not use drugs.    Allergies: Reviewed    Home Medications:   Medications Ordered Prior to Encounter[1]    Physical Exam:  /77 (Patient Position: Sitting)   Pulse 73   Ht 5' 5" (1.651 m)   Wt 69.4 kg (153 lb)   LMP 03/05/2014   BMI 25.46 kg/m²   Body mass index is 25.46 kg/m².  Physical Exam  Constitutional:       General: She is not in acute distress.  HENT:      Head: Normocephalic.   Neurological:      General: No focal deficit present.      Mental Status: She is alert.   Psychiatric:         Mood and Affect: Mood normal.         Judgment: Judgment normal.         Labs: Pertinent labs reviewed.    Assessment:  1. Metabolic dysfunction-associated steatohepatitis (MASH)    2. Abnormal liver enzymes    Statin induced elevated LFTs. Also with fatty liver disease.     Recommendations:   - discussed fatty liver disease  - Fibroscan  - Recommendations for weight loss with diet and exercise.   Mediterranean diet -- There is no single definition of a Mediterranean diet, but such a diet is typically high in fruits, vegetables, whole grains, beans, nuts, and seeds, includes olive oil as an important source of monounsaturated fat. It generally includes low to moderate amounts of fish, poultry, and dairy products, with little red meat.  - needs good control over DM and cholesterol     Metabolic dysfunction-associated steatohepatitis (MASH)  -     FibroScan (Vibration Controlled Transient Elastography); Future    Abnormal liver enzymes  -     Ambulatory referral/consult to Gastroenterology  -     Ambulatory referral/consult to Gastroenterology  -     FibroScan (Vibration Controlled Transient Elastography); Future    Follow up to be determined after results/ " procedure(s).    Thank you so much for allowing me to participate in the care of Yandy Kenney Misha Bor, RAYMOND-ERIC         [1]   Current Outpatient Medications on File Prior to Visit   Medication Sig Dispense Refill    alendronate (FOSAMAX) 70 MG tablet TAKE 1 TABLET EVERY 7 DAYS 12 tablet 3    alendronate (FOSAMAX) 70 MG tablet Take 1 tablet by mouth every 7 days.      aspirin (ECOTRIN) 81 MG EC tablet Take 81 mg by mouth once daily.      blood sugar diagnostic Strp To check BG 3 times daily, to use with insurance preferred meter 100 each 1    blood-glucose meter Misc Use as instructed 1 each 0    blood-glucose sensor (DEXCOM G6 SENSOR) Lindsay 1 each by Misc.(Non-Drug; Combo Route) route every 10 days. 3 each 11    blood-glucose sensor (DEXCOM G7 SENSOR) Lindsay Inject 1 each into the skin every 10 days. 9 each 2    blood-glucose transmitter (DEXCOM G6 TRANSMITTER) Lindsay 1 each by Misc.(Non-Drug; Combo Route) route every 3 (three) months. 1 each 3    buPROPion (WELLBUTRIN XL) 150 MG TB24 tablet TAKE 1 TABLET DAILY 90 tablet 3    dapaglifloz propaned-metformin (XIGDUO XR) 10-1,000 mg TBph Take 1 tablet by mouth once daily. 90 tablet 3    docusate sodium (COLACE) 50 MG capsule Take 50 mg by mouth 2 (two) times daily.      ezetimibe (ZETIA) 10 mg tablet Take 1 tablet (10 mg total) by mouth once daily. 90 tablet 3    insulin aspart U-100 (NOVOLOG U-100 INSULIN ASPART) 100 unit/mL injection 200 units every 3 days into omnipod 5 pods. 60 mL 3    insulin  cart,aut,G6/7,cntr (OMNIPOD 5 G6-G7 INTRO KT,GEN5,) Crtg Inject 1 each into the skin Every 3 (three) days. 1 each 0    isosorbide mononitrate (IMDUR) 30 MG 24 hr tablet Take 1 tablet (30 mg total) by mouth once daily. 90 tablet 3    magnesium aspartate HCl 61 mg (615 mg) TbEC Take 150 tablets by mouth every evening.      metoprolol succinate (TOPROL-XL) 25 MG 24 hr tablet TAKE 1 TABLET DAILY 90 tablet 3    nitroGLYCERIN (NITROSTAT) 0.4 MG SL tablet Place 1 tablet  "(0.4 mg total) under the tongue every 5 (five) minutes as needed for Chest pain. Call 911 if pain persists after 2 doses. 25 tablet 12    OMNIPOD 5 G6-G7 PODS, GEN 5, Crtg Inject 1 each into the skin Every 3 (three) days. 10 each 11    ondansetron (ZOFRAN-ODT) 4 MG TbDL 4 mg.      phenazopyridine (PYRIDIUM) 200 MG tablet Take 1 tablet (200 mg total) by mouth 3 (three) times daily as needed for Pain. 9 tablet 0    pramoxine-hydrocortisone (PROCTOCREAM-HC) 1-1 % rectal cream Place rectally 3 (three) times daily. 30 g 1    rosuvastatin (CRESTOR) 40 MG Tab Take 1 tablet (40 mg total) by mouth every evening. 90 tablet 3    traMADoL (ULTRAM) 50 mg tablet Take 50 mg by mouth every 6 (six) hours as needed.      vitamin D (VITAMIN D3) 1000 units Tab Take 1,000 Units by mouth once daily.      [DISCONTINUED] nitrofurantoin, macrocrystal-monohydrate, (MACROBID) 100 MG capsule Take 1 capsule (100 mg total) by mouth 2 (two) times daily. for 5 days 10 capsule 0    blood-glucose meter,continuous (DEXCOM G7 ) Misc 1 Device by Misc.(Non-Drug; Combo Route) route once daily. 1 each 0    HYDROcodone-acetaminophen (NORCO)  mg per tablet Take 1 tablet by mouth every 6 (six) hours as needed for Pain. (Patient not taking: Reported on 4/30/2025) 16 tablet 0    lancets 33 gauge Misc To check BG 3 times daily, to use with insurance preferred meter 100 each 1    methocarbamoL (ROBAXIN) 750 MG Tab Take 750 mg by mouth every 8 (eight) hours as needed.      needle, disp, 24 gauge 24 gauge x 1" Ndle 1 Dose by Misc.(Non-Drug; Combo Route) route every 30 days. (Patient not taking: Reported on 4/30/2025) 8 each 3    pen needle, diabetic (BD ULTRA-FINE SCOTT PEN NEEDLE) 32 gauge x 5/32" Ndle USE AS DIRECTED. 100 each 11     No current facility-administered medications on file prior to visit.     "

## 2025-05-02 ENCOUNTER — TELEPHONE (OUTPATIENT)
Dept: OPHTHALMOLOGY | Facility: CLINIC | Age: 55
End: 2025-05-02
Payer: COMMERCIAL

## 2025-05-02 NOTE — TELEPHONE ENCOUNTER
Called patient and left voicemail in regards to scheduling overdue eye exam. Booked patient for next available appointment and mailed out appointment slip.

## 2025-05-06 ENCOUNTER — PATIENT MESSAGE (OUTPATIENT)
Dept: INTERNAL MEDICINE | Facility: CLINIC | Age: 55
End: 2025-05-06
Payer: COMMERCIAL

## 2025-05-16 DIAGNOSIS — E11.65 TYPE 2 DIABETES MELLITUS WITH HYPERGLYCEMIA, WITHOUT LONG-TERM CURRENT USE OF INSULIN: ICD-10-CM

## 2025-05-16 RX ORDER — BLOOD-GLUCOSE SENSOR
EACH MISCELLANEOUS
Qty: 9 EACH | Refills: 1 | Status: SHIPPED | OUTPATIENT
Start: 2025-05-16

## 2025-05-22 NOTE — PLAN OF CARE
Problem: Patient Care Overview  Goal: Plan of Care Review  Outcome: Ongoing (interventions implemented as appropriate)  Patient remains on IVF this a.m. Dockery intact, urine output greater than 90ml each hour. Tmax 100.3.  Patient received tylenol for a headache during the night.  See MAR for medication doses and times of administration.        110

## 2025-06-02 DIAGNOSIS — M81.0 AGE RELATED OSTEOPOROSIS, UNSPECIFIED PATHOLOGICAL FRACTURE PRESENCE: ICD-10-CM

## 2025-06-02 RX ORDER — ALENDRONATE SODIUM 70 MG/1
TABLET ORAL
Qty: 12 TABLET | Refills: 3 | Status: SHIPPED | OUTPATIENT
Start: 2025-06-02

## 2025-06-04 ENCOUNTER — PATIENT MESSAGE (OUTPATIENT)
Dept: INTERNAL MEDICINE | Facility: CLINIC | Age: 55
End: 2025-06-04
Payer: COMMERCIAL

## 2025-06-04 ENCOUNTER — TELEPHONE (OUTPATIENT)
Dept: INTERNAL MEDICINE | Facility: CLINIC | Age: 55
End: 2025-06-04
Payer: COMMERCIAL

## 2025-06-04 ENCOUNTER — APPOINTMENT (OUTPATIENT)
Dept: LAB | Facility: HOSPITAL | Age: 55
End: 2025-06-04
Attending: PEDIATRICS
Payer: COMMERCIAL

## 2025-06-04 DIAGNOSIS — N30.90 CYSTITIS: Primary | ICD-10-CM

## 2025-06-04 RX ORDER — CIPROFLOXACIN 500 MG/1
500 TABLET, FILM COATED ORAL EVERY 12 HOURS
Qty: 6 TABLET | Refills: 0 | Status: SHIPPED | OUTPATIENT
Start: 2025-06-04 | End: 2025-06-07

## 2025-06-05 ENCOUNTER — RESULTS FOLLOW-UP (OUTPATIENT)
Dept: INTERNAL MEDICINE | Facility: CLINIC | Age: 55
End: 2025-06-05

## 2025-07-03 ENCOUNTER — PATIENT MESSAGE (OUTPATIENT)
Dept: DIABETES | Facility: CLINIC | Age: 55
End: 2025-07-03
Payer: COMMERCIAL

## 2025-07-03 ENCOUNTER — CLINICAL SUPPORT (OUTPATIENT)
Dept: DIABETES | Facility: CLINIC | Age: 55
End: 2025-07-03
Payer: COMMERCIAL

## 2025-07-03 DIAGNOSIS — E11.65 TYPE 2 DIABETES MELLITUS WITH HYPERGLYCEMIA, WITHOUT LONG-TERM CURRENT USE OF INSULIN: Primary | ICD-10-CM

## 2025-07-03 NOTE — PROGRESS NOTES
Diabetes Care Specialist Progress Note  Author: Helena Romero RN  Date: 7/3/2025    Intake    Program Intake  Reason for Diabetes Program Visit:: Intervention  Type of Intervention:: Individual  Individual:  (Transfer OP5 settings)  In the last month, have you used the ER or been admitted to the hospital: No  Permission to speak with others about care:: no    Current Diabetes Treatment: Insulin  Method of insulin delivery?: Insulin Pump  Type of Pump: Omnipod 5  Does patient have back-up plan?: Yes  Any problems obtaining supplies?: No    Continuous Glucose Monitoring  Patient has CGM: Yes  Personal CGM type:: Dexcom G7  GMI Date: 07/03/25  GMI Value: 6.9 %    Lab Results   Component Value Date    HGBA1C 6.5 (H) 02/26/2025     Diabetes Care Specialist Virtual Visit Note   The patient location is: [place]  The chief complaint leading to consultation is: Diabetes  Visit type: Audiovisual  Total time spent with patient: 11 min   Each patient to whom he or she provides medical services by telemedicine is:  (1) informed of the relationship between the physician and patient and the respective role of any other health care provider with respect to management of the patient; and (2) notified that he or she may decline to receive medical services by telemedicine and may withdraw from such care at any time.    Diabetes Self-Management Skills Assessment    Medication Skills Assessment  Patient is able to identify current diabetes medications, dosages, and appropriate timing of medications.: no  Patient reports problems or concerns with current medication regimen.: yes  Medication regimen problems/concerns:: unsure of doses  Patient is  aware that some diabetes medications can cause low blood sugar?: Yes  Medication Skills Assessment Completed:: Yes  Assessment indicates:: Instruction Needed, Knowledge deficit  Area of need?: Yes    Home Blood Glucose Monitoring  Personal CGM type:: Dexcom G7    Assessment Summary and  Plan    Based on today's diabetes care assessment, the following areas of need were identified:      Identified Areas of Need      Medication/Current Diabetes Treatment: Yes-See care plan.         Today's interventions were provided through individual discussion, instruction, and written materials were provided.      Patient verbalized understanding of instruction and written materials.  Pt was able to return back demonstration of instructions today. Patient understood key points, needs reinforcement and further instruction.     Diabetes Self-Management Care Plan:    Today's Diabetes Self-Management Care Plan was developed with Yandy's input. Yandy has agreed to work toward the following goal(s) to improve his/her overall diabetes control.      Care Plan: Diabetes Management   Updates made since 7/3/2024 12:00 AM        Problem: Medications         Goal: Patient agrees to take Novolog using Omnipod 5 as prescribed.    Start Date: 7/3/2025   Expected End Date: 7/3/2026   Note:    Helped transfer settings since her controlled reset.   Educated to reach out to tech support for new controller since this one malfunctioned.         Follow Up Plan     Follow up if symptoms worsen or fail to improve.    Today's care plan and follow up schedule was discussed with patient.  Yandy verbalized understanding of the care plan, goals, and agrees to follow up plan.        The patient was encouraged to communicate with his/her health care provider/physician and care team regarding his/her condition(s) and treatment.  I provided the patient with my contact information today and encouraged to contact me via phone or [facility] Patient Portal as needed.     Length of Visit   Total Time: 11 Minutes

## 2025-07-22 ENCOUNTER — PATIENT MESSAGE (OUTPATIENT)
Dept: DIABETES | Facility: CLINIC | Age: 55
End: 2025-07-22
Payer: COMMERCIAL

## 2025-07-22 DIAGNOSIS — E11.65 TYPE 2 DIABETES MELLITUS WITH HYPERGLYCEMIA, WITHOUT LONG-TERM CURRENT USE OF INSULIN: ICD-10-CM

## 2025-07-22 RX ORDER — BLOOD-GLUCOSE SENSOR
EACH MISCELLANEOUS
Qty: 9 EACH | Refills: 1 | Status: SHIPPED | OUTPATIENT
Start: 2025-07-22

## 2025-07-22 RX ORDER — BLOOD-GLUCOSE SENSOR
1 EACH MISCELLANEOUS
Qty: 3 EACH | Refills: 11 | Status: CANCELLED | OUTPATIENT
Start: 2025-07-22 | End: 2026-07-22

## 2025-07-24 ENCOUNTER — PATIENT OUTREACH (OUTPATIENT)
Dept: ADMINISTRATIVE | Facility: HOSPITAL | Age: 55
End: 2025-07-24
Payer: COMMERCIAL

## 2025-07-24 NOTE — PROGRESS NOTES
[facility] OUTREACH: per chart review pt is not Due for any HM Topics, called and spoke to pt, offered to move her 8.29.25 Fasting lab appt to 8.11.25 due to she already had a DM management appt, trying to prevent pt from having multiple trips, pt agreed will do labs same day as DM Management appt.  Pt is Due for Ha1c, other labs were already ordered by Dr Ramos staff.

## 2025-07-28 ENCOUNTER — PROCEDURE VISIT (OUTPATIENT)
Dept: HEPATOLOGY | Facility: CLINIC | Age: 55
End: 2025-07-28
Payer: COMMERCIAL

## 2025-07-28 DIAGNOSIS — R74.8 ABNORMAL LIVER ENZYMES: ICD-10-CM

## 2025-07-28 DIAGNOSIS — K75.81 METABOLIC DYSFUNCTION-ASSOCIATED STEATOHEPATITIS (MASH): ICD-10-CM

## 2025-07-28 PROCEDURE — 91200 LIVER ELASTOGRAPHY: CPT | Mod: S$GLB,,, | Performed by: INTERNAL MEDICINE

## 2025-07-28 NOTE — PROCEDURES
FibroScan (Vibration Controlled Transient Elastography)    Date/Time: 7/28/2025 9:00 AM    Performed by: Cyril Pope MD  Authorized by: Sussy Bro NP    Diagnosis:  NAFLD    Probe:  XL    Universal Protocol: Patient's identity, procedure and site were verified, confirmatory pause was performed.  Discussed procedure including risks and potential complications.  Questions answered.  Patient verbalizes understanding and wishes to proceed with VCTE.     Procedure: After providing explanations of the procedure, patient was placed in the supine position with right arm in maximum abduction to allow optimal exposure of right lateral abdomen.  Patient was briefly assessed, Testing was performed in the mid-axillary location, 50Hz Shear Wave pulses were applied and the resulting Shear Wave and Propagation Speed detected with a 3.5 MHz ultrasonic signal, using the FibroScan probe, Skin to liver capsule distance and liver parenchyma were accessed during the entire examination with the FibroScan probe, Patient was instructed to breathe normally and to abstain from sudden movements during the procedure, allowing for random measurements of liver stiffness. At least 10 Shear Waves were produced, Individual measurements of each Shear Wave were calculated.  Patient tolerated the procedure well with no complications.  Meets discharge criteria as was dismissed.  Rates pain 0 out of 10.  Patient will follow up with ordering provider to review results.    Findings  Median liver stiffness score:  5.5  CAP Reading: dB/m:  246    IQR/med %:  14  Interpretation  Fibrosis interpretation is based on medial liver stiffness - Kilopascal (kPa).    Fibrosis Stage:  F 0-1  Steatosis interpretation is based on controlled attenuation parameter - (dB/m).    Steatosis Grade:  <S1  Comments/Plan:  No significant steatosis and no fibrosis

## 2025-07-29 ENCOUNTER — PATIENT MESSAGE (OUTPATIENT)
Dept: GASTROENTEROLOGY | Facility: CLINIC | Age: 55
End: 2025-07-29
Payer: COMMERCIAL

## 2025-07-31 ENCOUNTER — PATIENT MESSAGE (OUTPATIENT)
Dept: DIABETES | Facility: CLINIC | Age: 55
End: 2025-07-31
Payer: COMMERCIAL

## 2025-08-04 ENCOUNTER — PATIENT MESSAGE (OUTPATIENT)
Dept: GASTROENTEROLOGY | Facility: CLINIC | Age: 55
End: 2025-08-04
Payer: COMMERCIAL

## 2025-08-11 ENCOUNTER — PATIENT MESSAGE (OUTPATIENT)
Dept: INTERNAL MEDICINE | Facility: CLINIC | Age: 55
End: 2025-08-11
Payer: COMMERCIAL

## 2025-08-11 ENCOUNTER — LAB VISIT (OUTPATIENT)
Dept: LAB | Facility: HOSPITAL | Age: 55
End: 2025-08-11
Attending: PEDIATRICS
Payer: COMMERCIAL

## 2025-08-11 ENCOUNTER — CLINICAL SUPPORT (OUTPATIENT)
Dept: DIABETES | Facility: CLINIC | Age: 55
End: 2025-08-11
Payer: COMMERCIAL

## 2025-08-11 DIAGNOSIS — E11.65 TYPE 2 DIABETES MELLITUS WITH HYPERGLYCEMIA, WITHOUT LONG-TERM CURRENT USE OF INSULIN: Primary | ICD-10-CM

## 2025-08-11 DIAGNOSIS — F41.8 ANXIETY WITH DEPRESSION: ICD-10-CM

## 2025-08-11 DIAGNOSIS — E11.65 TYPE 2 DIABETES MELLITUS WITH HYPERGLYCEMIA, WITHOUT LONG-TERM CURRENT USE OF INSULIN: ICD-10-CM

## 2025-08-11 LAB
ALBUMIN SERPL BCP-MCNC: 4 G/DL (ref 3.5–5.2)
ALP SERPL-CCNC: 42 UNIT/L (ref 40–150)
ALT SERPL W/O P-5'-P-CCNC: 33 UNIT/L (ref 0–55)
ANION GAP (OHS): 7 MMOL/L (ref 8–16)
AST SERPL-CCNC: 39 UNIT/L (ref 0–50)
BILIRUB SERPL-MCNC: 0.4 MG/DL (ref 0.1–1)
BUN SERPL-MCNC: 19 MG/DL (ref 6–20)
CALCIUM SERPL-MCNC: 9 MG/DL (ref 8.7–10.5)
CHLORIDE SERPL-SCNC: 107 MMOL/L (ref 95–110)
CHOLEST SERPL-MCNC: 126 MG/DL (ref 120–199)
CHOLEST/HDLC SERPL: 2.4 {RATIO} (ref 2–5)
CO2 SERPL-SCNC: 28 MMOL/L (ref 23–29)
CREAT SERPL-MCNC: 0.9 MG/DL (ref 0.5–1.4)
EAG (OHS): 126 MG/DL (ref 68–131)
GFR SERPLBLD CREATININE-BSD FMLA CKD-EPI: >60 ML/MIN/1.73/M2
GLUCOSE SERPL-MCNC: 103 MG/DL (ref 70–110)
HBA1C MFR BLD: 6 % (ref 4–5.6)
HDLC SERPL-MCNC: 52 MG/DL (ref 40–75)
HDLC SERPL: 41.3 % (ref 20–50)
LDLC SERPL CALC-MCNC: 58.2 MG/DL (ref 63–159)
NONHDLC SERPL-MCNC: 74 MG/DL
POTASSIUM SERPL-SCNC: 4.6 MMOL/L (ref 3.5–5.1)
PROT SERPL-MCNC: 7 GM/DL (ref 6–8.4)
SODIUM SERPL-SCNC: 142 MMOL/L (ref 136–145)
TRIGL SERPL-MCNC: 79 MG/DL (ref 30–150)

## 2025-08-11 PROCEDURE — 36415 COLL VENOUS BLD VENIPUNCTURE: CPT

## 2025-08-11 PROCEDURE — 80053 COMPREHEN METABOLIC PANEL: CPT

## 2025-08-11 PROCEDURE — 80061 LIPID PANEL: CPT

## 2025-08-11 PROCEDURE — 99999 PR PBB SHADOW E&M-EST. PATIENT-LVL III: CPT | Mod: PBBFAC,,,

## 2025-08-11 PROCEDURE — 83036 HEMOGLOBIN GLYCOSYLATED A1C: CPT

## 2025-08-11 PROCEDURE — G0108 DIAB MANAGE TRN  PER INDIV: HCPCS | Mod: S$GLB,,, | Performed by: PEDIATRICS

## 2025-08-11 RX ORDER — BUPROPION HYDROCHLORIDE 150 MG/1
150 TABLET ORAL
Qty: 90 TABLET | Refills: 1 | Status: SHIPPED | OUTPATIENT
Start: 2025-08-11

## 2025-08-11 RX ORDER — CIPROFLOXACIN 500 MG/1
500 TABLET, FILM COATED ORAL EVERY 12 HOURS
Qty: 6 TABLET | Refills: 0 | Status: SHIPPED | OUTPATIENT
Start: 2025-08-11 | End: 2025-08-14

## 2025-08-12 ENCOUNTER — E-VISIT (OUTPATIENT)
Dept: INTERNAL MEDICINE | Facility: CLINIC | Age: 55
End: 2025-08-12
Payer: COMMERCIAL

## 2025-08-12 ENCOUNTER — PATIENT MESSAGE (OUTPATIENT)
Dept: INTERNAL MEDICINE | Facility: CLINIC | Age: 55
End: 2025-08-12
Payer: COMMERCIAL

## 2025-08-12 DIAGNOSIS — G47.09 OTHER INSOMNIA: ICD-10-CM

## 2025-08-12 DIAGNOSIS — I20.9 AP (ANGINA PECTORIS): ICD-10-CM

## 2025-08-12 DIAGNOSIS — I25.10 CAD IN NATIVE ARTERY: ICD-10-CM

## 2025-08-12 DIAGNOSIS — F40.243 FEAR OF FLYING: Primary | ICD-10-CM

## 2025-08-12 DIAGNOSIS — I20.9 NEW-ONSET ANGINA: ICD-10-CM

## 2025-08-12 RX ORDER — LORAZEPAM 0.5 MG/1
0.5 TABLET ORAL EVERY 8 HOURS PRN
Qty: 20 TABLET | Refills: 0 | Status: SHIPPED | OUTPATIENT
Start: 2025-08-12 | End: 2025-09-01

## 2025-08-12 RX ORDER — METOPROLOL SUCCINATE 25 MG/1
25 TABLET, EXTENDED RELEASE ORAL
Qty: 90 TABLET | Refills: 3 | Status: SHIPPED | OUTPATIENT
Start: 2025-08-12

## 2025-08-26 ENCOUNTER — TELEPHONE (OUTPATIENT)
Dept: INTERNAL MEDICINE | Facility: CLINIC | Age: 55
End: 2025-08-26
Payer: COMMERCIAL

## 2025-09-05 ENCOUNTER — OFFICE VISIT (OUTPATIENT)
Dept: INTERNAL MEDICINE | Facility: CLINIC | Age: 55
End: 2025-09-05
Payer: COMMERCIAL

## 2025-09-05 ENCOUNTER — TELEPHONE (OUTPATIENT)
Dept: PHARMACY | Facility: CLINIC | Age: 55
End: 2025-09-05
Payer: COMMERCIAL

## 2025-09-05 VITALS
TEMPERATURE: 97 F | SYSTOLIC BLOOD PRESSURE: 122 MMHG | HEART RATE: 65 BPM | DIASTOLIC BLOOD PRESSURE: 70 MMHG | WEIGHT: 150.13 LBS | OXYGEN SATURATION: 100 % | RESPIRATION RATE: 17 BRPM | HEIGHT: 65 IN | BODY MASS INDEX: 25.01 KG/M2

## 2025-09-05 DIAGNOSIS — K21.9 GASTROESOPHAGEAL REFLUX DISEASE, UNSPECIFIED WHETHER ESOPHAGITIS PRESENT: ICD-10-CM

## 2025-09-05 DIAGNOSIS — Z23 NEED FOR PNEUMOCOCCAL VACCINATION: ICD-10-CM

## 2025-09-05 DIAGNOSIS — I25.118 CORONARY ARTERY DISEASE OF NATIVE ARTERY OF NATIVE HEART WITH STABLE ANGINA PECTORIS: ICD-10-CM

## 2025-09-05 DIAGNOSIS — E11.65 TYPE 2 DIABETES MELLITUS WITH HYPERGLYCEMIA, WITHOUT LONG-TERM CURRENT USE OF INSULIN: Primary | ICD-10-CM

## 2025-09-05 DIAGNOSIS — R74.8 ABNORMAL LIVER ENZYMES: ICD-10-CM

## 2025-09-05 DIAGNOSIS — F41.8 ANXIETY WITH DEPRESSION: ICD-10-CM

## 2025-09-05 DIAGNOSIS — I25.118 CORONARY ARTERY DISEASE OF NATIVE ARTERY OF NATIVE HEART WITH STABLE ANGINA PECTORIS: Primary | ICD-10-CM

## 2025-09-05 PROCEDURE — 99999 PR PBB SHADOW E&M-EST. PATIENT-LVL V: CPT | Mod: PBBFAC,,, | Performed by: PEDIATRICS

## (undated) DEVICE — SEE MEDLINE ITEM 146372

## (undated) DEVICE — GUIDEWIRE WHOLEY HI TORQ 175CM

## (undated) DEVICE — KIT GLIDESHEATH SLEND 6FR 10CM

## (undated) DEVICE — GUIDEWIRE EMERALD .035IN 260CM

## (undated) DEVICE — NDL SAFETY 25G X 1.5 ECLIPSE

## (undated) DEVICE — SHEATH INTRODUCER 6FR 11CM

## (undated) DEVICE — TROCAR ENDOPATH XCEL 5X100MM

## (undated) DEVICE — SEE MEDLINE ITEM 157027

## (undated) DEVICE — CATH DIAG IMPULSE 6FR FR4

## (undated) DEVICE — SEE MEDLINE ITEM 157187

## (undated) DEVICE — CATHETER RADIAL TIG 4.0 OPTITORQUE 5X110

## (undated) DEVICE — BAND TR COMP DEVICE REG 24CM

## (undated) DEVICE — WIRE GUIDE TEFLON 3CM .035 145

## (undated) DEVICE — APPLIER CLIP ENDO LIGAMAX 5MM

## (undated) DEVICE — NDL PERCUTANEOUS 21G 7CM VASC

## (undated) DEVICE — CATH JR4 5FR

## (undated) DEVICE — GLOVE PROTEXIS HYDROGEL SZ7.5

## (undated) DEVICE — CATHETER RADIAL JACKY 3.5 110CM 5FR

## (undated) DEVICE — APPLICATOR CHLORAPREP ORN 26ML

## (undated) DEVICE — CATH JL4 5FR

## (undated) DEVICE — ADHESIVE DERMABOND ADVANCED

## (undated) DEVICE — SEE MEDLINE ITEM 152739

## (undated) DEVICE — SYR 10CC LUER LOCK

## (undated) DEVICE — BAG TISS RETRV MONARCH 10MM

## (undated) DEVICE — CATH JL3.5 5FR

## (undated) DEVICE — EVACUATOR KIT SMOKE PLUME AWAY

## (undated) DEVICE — SEE MEDLINE ITEM 157117

## (undated) DEVICE — COVER OVERHEAD SURG LT BLUE

## (undated) DEVICE — OMNIPAQUE 350MG 150ML VIAL

## (undated) DEVICE — KIT MANIFOLD LOW PRESS TUBING

## (undated) DEVICE — NDL PNEUMO INSUFFLATI 120MM

## (undated) DEVICE — SEE MEDLINE ITEM 152622

## (undated) DEVICE — CANNULA ENDOPATH XCEL 5X100MM

## (undated) DEVICE — Device

## (undated) DEVICE — KIT SYR REUSABLE

## (undated) DEVICE — CATH PIG145 INFINITI 5X110CM

## (undated) DEVICE — SUT CTD VICRYL 0 UND BR SUT

## (undated) DEVICE — CATH IMPULSE 6FR MULTI-PAK

## (undated) DEVICE — OMNIPAQUE 300MG 150ML VIAL

## (undated) DEVICE — SUPPORT ULNA NERVE PROTECTOR

## (undated) DEVICE — ELECTRODE REM PLYHSV RETURN 9

## (undated) DEVICE — SYS IRRIG PRESSURIZED SPIKE

## (undated) DEVICE — SOL NS 1000CC

## (undated) DEVICE — ANGIOTOUCH KIT

## (undated) DEVICE — SCISSOR 5MMX35CM DIRECT DRIVE

## (undated) DEVICE — DEVICE CLOSURE DISP 14G

## (undated) DEVICE — MANIFOLD 4 PORT

## (undated) DEVICE — SOL 9P NACL IRR PIC IL

## (undated) DEVICE — GLOVE 8 PROTEXIS PI BLUE

## (undated) DEVICE — PACK CATH LAB CUSTOM BR

## (undated) DEVICE — CATH DIAG IMPULSE 6FR FL4

## (undated) DEVICE — SYR 3CC LUER LOC

## (undated) DEVICE — DRAPE ABDOMINAL TIBURON 14X11

## (undated) DEVICE — KIT ANTIFOG

## (undated) DEVICE — HANDLE PISTOL GRIP HAND CNTRL

## (undated) DEVICE — PACK HEART CATH BR

## (undated) DEVICE — SUT MONOCRYL 4.0 PS2 CP496G

## (undated) DEVICE — TROCAR ENDOPATH XCEL 12X100MM

## (undated) DEVICE — POSITIONER HEAD DONUT 9IN FOAM

## (undated) DEVICE — APPLICATOR ARISTA FLEX XL

## (undated) DEVICE — TUBING HEATED INSUFFLATOR

## (undated) DEVICE — CATH IMPULSE PIGTAIL 6F 110CM